# Patient Record
Sex: MALE | Race: WHITE | Employment: OTHER | URBAN - METROPOLITAN AREA
[De-identification: names, ages, dates, MRNs, and addresses within clinical notes are randomized per-mention and may not be internally consistent; named-entity substitution may affect disease eponyms.]

---

## 2017-01-02 ENCOUNTER — HOSPITAL ENCOUNTER (INPATIENT)
Age: 67
LOS: 4 days | Discharge: HOME HEALTH | DRG: 175 | End: 2017-01-06
Attending: EMERGENCY MEDICINE | Admitting: INTERNAL MEDICINE
Payer: MEDICARE

## 2017-01-02 ENCOUNTER — APPOINTMENT (OUTPATIENT)
Dept: CT IMAGING | Age: 67
DRG: 175 | End: 2017-01-02
Attending: EMERGENCY MEDICINE
Payer: MEDICARE

## 2017-01-02 DIAGNOSIS — I26.99 PULMONARY INFARCT (HCC): ICD-10-CM

## 2017-01-02 DIAGNOSIS — I26.99 BILATERAL PULMONARY EMBOLISM (HCC): Primary | ICD-10-CM

## 2017-01-02 LAB
ALBUMIN SERPL BCP-MCNC: 2.9 G/DL (ref 3.5–5)
ALBUMIN/GLOB SERPL: 0.7 {RATIO} (ref 1.1–2.2)
ALP SERPL-CCNC: 106 U/L (ref 45–117)
ALT SERPL-CCNC: 10 U/L (ref 12–78)
ANION GAP BLD CALC-SCNC: 11 MMOL/L (ref 5–15)
APPEARANCE UR: ABNORMAL
APTT PPP: 39.1 SEC (ref 22.1–32.5)
AST SERPL W P-5'-P-CCNC: 15 U/L (ref 15–37)
BACTERIA URNS QL MICRO: ABNORMAL /HPF
BASOPHILS # BLD AUTO: 0 K/UL (ref 0–0.1)
BASOPHILS # BLD: 1 % (ref 0–1)
BILIRUB SERPL-MCNC: 0.9 MG/DL (ref 0.2–1)
BILIRUB UR QL CFM: NEGATIVE
BUN SERPL-MCNC: 10 MG/DL (ref 6–20)
BUN/CREAT SERPL: 10 (ref 12–20)
CALCIUM SERPL-MCNC: 8.9 MG/DL (ref 8.5–10.1)
CHLORIDE SERPL-SCNC: 97 MMOL/L (ref 97–108)
CK SERPL-CCNC: 241 U/L (ref 39–308)
CO2 SERPL-SCNC: 29 MMOL/L (ref 21–32)
COLOR UR: ABNORMAL
CREAT SERPL-MCNC: 1.04 MG/DL (ref 0.7–1.3)
EOSINOPHIL # BLD: 0 K/UL (ref 0–0.4)
EOSINOPHIL NFR BLD: 1 % (ref 0–7)
EPITH CASTS URNS QL MICRO: ABNORMAL /LPF
ERYTHROCYTE [DISTWIDTH] IN BLOOD BY AUTOMATED COUNT: 14.5 % (ref 11.5–14.5)
GLOBULIN SER CALC-MCNC: 4.2 G/DL (ref 2–4)
GLUCOSE SERPL-MCNC: 199 MG/DL (ref 65–100)
GLUCOSE UR STRIP.AUTO-MCNC: NEGATIVE MG/DL
HCT VFR BLD AUTO: 37.8 % (ref 36.6–50.3)
HGB BLD-MCNC: 13.5 G/DL (ref 12.1–17)
HGB UR QL STRIP: NEGATIVE
KETONES UR QL STRIP.AUTO: NEGATIVE MG/DL
LACTATE SERPL-SCNC: 1.8 MMOL/L (ref 0.4–2)
LEUKOCYTE ESTERASE UR QL STRIP.AUTO: NEGATIVE
LYMPHOCYTES # BLD AUTO: 16 % (ref 12–49)
LYMPHOCYTES # BLD: 1.4 K/UL (ref 0.8–3.5)
MCH RBC QN AUTO: 30.5 PG (ref 26–34)
MCHC RBC AUTO-ENTMCNC: 35.7 G/DL (ref 30–36.5)
MCV RBC AUTO: 85.3 FL (ref 80–99)
MONOCYTES # BLD: 1.6 K/UL (ref 0–1)
MONOCYTES NFR BLD AUTO: 19 % (ref 5–13)
NEUTS SEG # BLD: 5.4 K/UL (ref 1.8–8)
NEUTS SEG NFR BLD AUTO: 63 % (ref 32–75)
NITRITE UR QL STRIP.AUTO: NEGATIVE
PH UR STRIP: 6 [PH] (ref 5–8)
PLATELET # BLD AUTO: 308 K/UL (ref 150–400)
POTASSIUM SERPL-SCNC: 3.2 MMOL/L (ref 3.5–5.1)
PROT SERPL-MCNC: 7.1 G/DL (ref 6.4–8.2)
PROT UR STRIP-MCNC: 30 MG/DL
RBC # BLD AUTO: 4.43 M/UL (ref 4.1–5.7)
RBC #/AREA URNS HPF: ABNORMAL /HPF (ref 0–5)
SODIUM SERPL-SCNC: 137 MMOL/L (ref 136–145)
SP GR UR REFRACTOMETRY: 1.02 (ref 1–1.03)
THERAPEUTIC RANGE,PTTT: ABNORMAL SECS (ref 58–77)
TROPONIN I SERPL-MCNC: <0.04 NG/ML
UA: UC IF INDICATED,UAUC: ABNORMAL
UROBILINOGEN UR QL STRIP.AUTO: 0.2 EU/DL (ref 0.2–1)
WBC # BLD AUTO: 8.5 K/UL (ref 4.1–11.1)
WBC URNS QL MICRO: ABNORMAL /HPF (ref 0–4)

## 2017-01-02 PROCEDURE — 74177 CT ABD & PELVIS W/CONTRAST: CPT

## 2017-01-02 PROCEDURE — 65660000000 HC RM CCU STEPDOWN

## 2017-01-02 PROCEDURE — 96375 TX/PRO/DX INJ NEW DRUG ADDON: CPT

## 2017-01-02 PROCEDURE — 87086 URINE CULTURE/COLONY COUNT: CPT | Performed by: EMERGENCY MEDICINE

## 2017-01-02 PROCEDURE — 36415 COLL VENOUS BLD VENIPUNCTURE: CPT | Performed by: EMERGENCY MEDICINE

## 2017-01-02 PROCEDURE — 96374 THER/PROPH/DIAG INJ IV PUSH: CPT

## 2017-01-02 PROCEDURE — 74011636320 HC RX REV CODE- 636/320: Performed by: EMERGENCY MEDICINE

## 2017-01-02 PROCEDURE — 85025 COMPLETE CBC W/AUTO DIFF WBC: CPT | Performed by: EMERGENCY MEDICINE

## 2017-01-02 PROCEDURE — 85730 THROMBOPLASTIN TIME PARTIAL: CPT | Performed by: EMERGENCY MEDICINE

## 2017-01-02 PROCEDURE — 81001 URINALYSIS AUTO W/SCOPE: CPT | Performed by: EMERGENCY MEDICINE

## 2017-01-02 PROCEDURE — 71275 CT ANGIOGRAPHY CHEST: CPT

## 2017-01-02 PROCEDURE — 96361 HYDRATE IV INFUSION ADD-ON: CPT

## 2017-01-02 PROCEDURE — 74011250636 HC RX REV CODE- 250/636

## 2017-01-02 PROCEDURE — 82550 ASSAY OF CK (CPK): CPT | Performed by: EMERGENCY MEDICINE

## 2017-01-02 PROCEDURE — 83605 ASSAY OF LACTIC ACID: CPT | Performed by: EMERGENCY MEDICINE

## 2017-01-02 PROCEDURE — 99285 EMERGENCY DEPT VISIT HI MDM: CPT

## 2017-01-02 PROCEDURE — 80053 COMPREHEN METABOLIC PANEL: CPT | Performed by: EMERGENCY MEDICINE

## 2017-01-02 PROCEDURE — 84484 ASSAY OF TROPONIN QUANT: CPT | Performed by: EMERGENCY MEDICINE

## 2017-01-02 PROCEDURE — 74011250636 HC RX REV CODE- 250/636: Performed by: EMERGENCY MEDICINE

## 2017-01-02 PROCEDURE — 93005 ELECTROCARDIOGRAM TRACING: CPT

## 2017-01-02 RX ORDER — MORPHINE SULFATE 4 MG/ML
INJECTION, SOLUTION INTRAMUSCULAR; INTRAVENOUS
Status: DISCONTINUED
Start: 2017-01-02 | End: 2017-01-03

## 2017-01-02 RX ORDER — HEPARIN SODIUM 5000 [USP'U]/ML
3230 INJECTION, SOLUTION INTRAVENOUS; SUBCUTANEOUS AS NEEDED
Status: DISCONTINUED | OUTPATIENT
Start: 2017-01-02 | End: 2017-01-03

## 2017-01-02 RX ORDER — MORPHINE SULFATE 4 MG/ML
4 INJECTION, SOLUTION INTRAMUSCULAR; INTRAVENOUS ONCE
Status: COMPLETED | OUTPATIENT
Start: 2017-01-02 | End: 2017-01-02

## 2017-01-02 RX ORDER — SODIUM CHLORIDE 9 MG/ML
50 INJECTION, SOLUTION INTRAVENOUS
Status: COMPLETED | OUTPATIENT
Start: 2017-01-02 | End: 2017-01-02

## 2017-01-02 RX ORDER — HEPARIN SODIUM 5000 [USP'U]/ML
80 INJECTION, SOLUTION INTRAVENOUS; SUBCUTANEOUS ONCE
Status: COMPLETED | OUTPATIENT
Start: 2017-01-02 | End: 2017-01-02

## 2017-01-02 RX ORDER — POTASSIUM CHLORIDE 750 MG/1
40 TABLET, FILM COATED, EXTENDED RELEASE ORAL ONCE
Status: COMPLETED | OUTPATIENT
Start: 2017-01-02 | End: 2017-01-03

## 2017-01-02 RX ORDER — HEPARIN SODIUM 5000 [USP'U]/ML
6450 INJECTION, SOLUTION INTRAVENOUS; SUBCUTANEOUS AS NEEDED
Status: DISCONTINUED | OUTPATIENT
Start: 2017-01-02 | End: 2017-01-03

## 2017-01-02 RX ORDER — ENOXAPARIN SODIUM 100 MG/ML
80 INJECTION SUBCUTANEOUS
Status: DISCONTINUED | OUTPATIENT
Start: 2017-01-02 | End: 2017-01-02 | Stop reason: CLARIF

## 2017-01-02 RX ORDER — SODIUM CHLORIDE 9 MG/ML
125 INJECTION, SOLUTION INTRAVENOUS CONTINUOUS
Status: DISCONTINUED | OUTPATIENT
Start: 2017-01-02 | End: 2017-01-03

## 2017-01-02 RX ORDER — HEPARIN SODIUM 10000 [USP'U]/100ML
18-36 INJECTION, SOLUTION INTRAVENOUS
Status: DISCONTINUED | OUTPATIENT
Start: 2017-01-02 | End: 2017-01-03

## 2017-01-02 RX ORDER — SODIUM CHLORIDE 0.9 % (FLUSH) 0.9 %
10 SYRINGE (ML) INJECTION
Status: COMPLETED | OUTPATIENT
Start: 2017-01-02 | End: 2017-01-02

## 2017-01-02 RX ADMIN — HEPARIN SODIUM 6450 UNITS: 5000 INJECTION, SOLUTION INTRAVENOUS; SUBCUTANEOUS at 20:03

## 2017-01-02 RX ADMIN — SODIUM CHLORIDE 1000 ML: 900 INJECTION, SOLUTION INTRAVENOUS at 19:53

## 2017-01-02 RX ADMIN — IOPAMIDOL 100 ML: 755 INJECTION, SOLUTION INTRAVENOUS at 18:51

## 2017-01-02 RX ADMIN — SODIUM CHLORIDE 1000 ML: 900 INJECTION, SOLUTION INTRAVENOUS at 17:29

## 2017-01-02 RX ADMIN — MORPHINE SULFATE 4 MG: 4 INJECTION, SOLUTION INTRAMUSCULAR; INTRAVENOUS at 18:27

## 2017-01-02 RX ADMIN — HEPARIN SODIUM AND DEXTROSE 18 UNITS/KG/HR: 10000; 5 INJECTION INTRAVENOUS at 20:06

## 2017-01-02 RX ADMIN — Medication 10 ML: at 18:50

## 2017-01-02 RX ADMIN — SODIUM CHLORIDE 125 ML/HR: 900 INJECTION, SOLUTION INTRAVENOUS at 20:45

## 2017-01-02 RX ADMIN — SODIUM CHLORIDE 50 ML/HR: 900 INJECTION, SOLUTION INTRAVENOUS at 18:50

## 2017-01-02 NOTE — IP AVS SNAPSHOT
Current Discharge Medication List  
  
Take these medications at their scheduled times Dose & Instructions Dispensing Information Comments Morning Noon Evening Bedtime  
 aspirin, buffered 81 mg Tab Your next dose is: Today, Tomorrow Other:  ____________ Take  by mouth daily. Refills:  0  
     
   
   
   
  
 furosemide 40 mg tablet Commonly known as:  LASIX Your next dose is: Today, Tomorrow Other:  ____________ Dose:  40 mg Take 40 mg by mouth daily. Refills:  0  
     
   
   
   
  
 L. acidoph & paracasei- S therm- Bifido 8 billion cell Cap cap Commonly known as:  BOB-Q/RISAQUAD Your next dose is: Today, Tomorrow Other:  ____________ Dose:  1 Cap Take 1 Cap by mouth daily. Quantity:  30 Cap Refills:  1  
     
   
   
   
  
 lisinopril 2.5 mg tablet Commonly known as:  Linda Nieto Your next dose is: Today, Tomorrow Other:  ____________ Dose:  2.5 mg Take 2.5 mg by mouth daily. Refills:  0  
     
   
   
   
  
 metFORMIN 500 mg tablet Commonly known as:  GLUCOPHAGE Your next dose is: Today, Tomorrow Other:  ____________ Take  by mouth two (2) times daily (with meals). Refills:  0  
     
   
   
   
  
 * rivaroxaban 20 mg Tab tablet Commonly known as:  [place] Your next dose is: Today, Tomorrow Other:  ____________ Dose:  20 mg Take 1 Tab by mouth daily for 30 days. Quantity:  30 Tab Refills:  0  
     
   
   
   
  
 * rivaroxaban 15 mg Tab tablet Commonly known as:  [place]wal Root Your next dose is: Today, Tomorrow Other:  ____________ Dose:  15 mg Take 1 Tab by mouth two (2) times daily (with meals) for 16 days. Quantity:  32 Tab Refills:  0  
     
   
   
   
  
 simvastatin 10 mg tablet Commonly known as:  ZOCOR Your next dose is: Today, Tomorrow Other:  ____________ Take  by mouth nightly. Refills:  0  
     
   
   
   
  
 * Notice: This list has 2 medication(s) that are the same as other medications prescribed for you. Read the directions carefully, and ask your doctor or other care provider to review them with you. Take these medications as needed Dose & Instructions Dispensing Information Comments Morning Noon Evening Bedtime  
 oxyCODONE IR 5 mg immediate release tablet Commonly known as:  Cheryln Records Your next dose is: Today, Tomorrow Other:  ____________ Dose:  5 mg Take 1 Tab by mouth every four (4) hours as needed. Max Daily Amount: 30 mg.  
 Quantity:  30 Tab Refills:  0 Take these medications as directed Dose & Instructions Dispensing Information Comments Morning Noon Evening Bedtime  
 vancomycin 50 mg/mL Soln oral solution (compounded) Your next dose is: Today, Tomorrow Other:  ____________ Take 125 mg 4 times daily for 7 days, then 125 mg 3 times daily for 7 days, then 125 mg 2 times daily for 7 days Quantity:  1 Bottle Refills:  0 Where to Get Your Medications Information about where to get these medications is not yet available ! Ask your nurse or doctor about these medications  
  oxyCODONE IR 5 mg immediate release tablet  
 rivaroxaban 15 mg Tab tablet  
 rivaroxaban 20 mg Tab tablet  
 vancomycin 50 mg/mL Soln oral solution (compounded)

## 2017-01-02 NOTE — ED PROVIDER NOTES
HPI Comments: Bryce Cabrera is a 78 y.o. M with PMHx significant for DM / HTN / Arthritis who presents ambulatory to ED [facility] c/o right sided flank pain x yesterday, diarrhea x 3 days and vomiting (gagging) phlegm x 1 week. He notes taking hydrocodone with no relief of sx. Per wife, pt was discharged November 28th 2016 for C-diff. Pt reports he was previously diagnosed with osteomyelitis in his spine, for which he was given antibiotics for 6 weeks. Pt denies any hx of abdominal surgeries, MI, DVT, PE, Diverticulitis, Hepatitis, pancreatitis or CHF. He denies using any anticoagulants. Pt specifically denies any chest pain or SOB. There are no other complaints, changes, or physical findings at this time. The history is provided by the patient and the spouse. Past Medical History:   Diagnosis Date    Arthritis      knees, back    Chronic pain      knees, back    DM (diabetes mellitus) ([address]     High cholesterol     HTN (hypertension)        Past Surgical History:   Procedure Laterality Date    Hx amputation       vascular; Left great toe amputation    Hx orthopaedic Left      Left arm fracture & repair    Hx other surgical  6/4/15     INCISION AND DRAINAGE, RESECTION OF REMAINING 1ST METATARSAL, INSERTION OF ANTIBIOTIC BEADS         Family History:   Problem Relation Age of Onset    Heart Disease Mother     Heart Disease Father        Social History     Social History    Marital status:      Spouse name: N/A    Number of children: N/A    Years of education: N/A     Occupational History    Not on file.      Social History Main Topics    Smoking status: Former Smoker     Packs/day: 3.00     Years: 20.00     Types: Cigarettes    Smokeless tobacco: Former User     Quit date: 1/15/1995      Comment: stopped smoking about 30 years ago    Alcohol use 0.5 oz/week     1 Cans of beer per week      Comment: occasionally    Drug use: Yes     Special: Prescription, OTC    Sexual activity: Not on file     Other Topics Concern    Not on file     Social History Narrative         ALLERGIES: Vancomycin    Review of Systems   Constitutional: Negative for appetite change, chills and fever. HENT: Negative for congestion. Eyes: Negative for visual disturbance. Respiratory: Negative for cough, shortness of breath and wheezing. Cardiovascular: Negative for chest pain, palpitations and leg swelling. Gastrointestinal: Positive for diarrhea and vomiting (gagging). Negative for abdominal pain. Genitourinary: Positive for flank pain (right sided). Negative for dysuria, frequency and urgency. Musculoskeletal: Negative for back pain, joint swelling, myalgias and neck stiffness. Skin: Negative for rash. Neurological: Negative for dizziness, syncope, weakness and headaches. Hematological: Negative for adenopathy. Psychiatric/Behavioral: Negative for behavioral problems and dysphoric mood. All other systems reviewed and are negative. Patient Vitals for the past 12 hrs:   Temp Pulse Resp BP SpO2   01/02/17 2115 - (!) 115 28 118/55 97 %   01/02/17 2030 - (!) 116 26 112/64 96 %   01/02/17 1951 - (!) 114 26 114/55 97 %   01/02/17 1934 - (!) 114 22 124/74 98 %   01/02/17 1900 - (!) 117 30 - 92 %   01/02/17 1827 - (!) 111 20 109/89 98 %   01/02/17 1745 - (!) 112 28 110/53 99 %   01/02/17 1730 - (!) 113 27 107/51 97 %   01/02/17 1700 - (!) 116 26 126/55 97 %   01/02/17 1617 - (!) 120 29 - 97 %   01/02/17 1612 - - - 147/79 100 %   01/02/17 1417 - (!) 105 18 124/67 98 %   01/02/17 1331 98.8 °F (37.1 °C) (!) 123 18 109/59 97 %            Physical Exam   Constitutional: He is oriented to person, place, and time. He appears well-developed and well-nourished. He appears distressed. HENT:   Head: Normocephalic and atraumatic. Eyes: Conjunctivae and EOM are normal. Pupils are equal, round, and reactive to light. Neck: Normal range of motion. No JVD present.    Cardiovascular: Normal heart sounds, intact distal pulses and normal pulses. Tachycardia present. Exam reveals no gallop and no friction rub. No murmur heard. Pulmonary/Chest: Effort normal and breath sounds normal. No stridor. No respiratory distress. He has no wheezes. He has no rales. He exhibits no tenderness. Abdominal: Soft. He exhibits no distension, no abdominal bruit, no pulsatile midline mass and no mass. Bowel sounds are decreased. There is tenderness in the right upper quadrant and epigastric area. There is no rebound, no guarding and no CVA tenderness. A hernia is present. Musculoskeletal: Normal range of motion. He exhibits no edema, tenderness or deformity. Neurological: He is alert and oriented to person, place, and time. He has normal reflexes. No cranial nerve deficit. Coordination normal.   Skin: Skin is warm. He is not diaphoretic. Nursing note and vitals reviewed. MDM  Number of Diagnoses or Management Options  Bilateral pulmonary embolism [facility]):   Pulmonary infarct [facility]):   Diagnosis management comments: DDx: Pneumonia, Pneumothorax, PE, CHF, Cholecystitis, Bowel obstruction, Perforated viscus. Recent prolonged admission to hospital. Presents with acute onset right chest wall pain, SOB. CT confirms BL pulmonary emboli with mild right heart strain. Discussed with pulmonology who recommends heparin only at this time, as patients vitals are otherwise stable. Will admit to hospitalist service.         Amount and/or Complexity of Data Reviewed  Clinical lab tests: ordered and reviewed  Tests in the radiology section of CPT®: ordered and reviewed  Tests in the medicine section of CPT®: ordered and reviewed  Obtain history from someone other than the patient: yes (Wife)  Review and summarize past medical records: yes  Discuss the patient with other providers: yes ([place] Cho)  Independent visualization of images, tracings, or specimens: yes    Risk of Complications, Morbidity, and/or Mortality  Presenting problems: high  Diagnostic procedures: high  Management options: moderate    Critical Care  Total time providing critical care: 30-74 minutes    Patient Progress  Patient progress: other (comment) (critical)    ED Course       Procedures    CONSULT NOTE:   7:40 PM  Alyssia Hendrickson MD spoke with Dr. Cory Ge,   Specialty: Hospitalist  Discussed pt's hx, disposition, and available diagnostic and imaging results. Reviewed care plans. Consultant will accept pt for admission. Written by Kari Pro. Laren Snellen, ED Scribe, as dictated by Alyssia Hendrickson MD.    CONSULT NOTE:   7:44 PM  Alyssia Hendrickson MD spoke with Dr. Leo Avilez,   Specialty: Pulmonology  Discussed pt's hx, disposition, and available diagnostic and imaging results. Reviewed care plans. Consultant agrees with plans as outlined. Consultant does not recommend TPA at this time due to patients recent colitis and osteomyelitis, as well as vitals not validating use of TPA. Possibly consider interventional radiology, but that should be left up to the hospitalist.   Written by Kari Pro. Laren Snellen, ED Scribe, as dictated by Alyssia Hendrickson MD.    CRITICAL CARE NOTE :  8:01 PM    IMPENDING DETERIORATION -Respiratory, Cardiovascular, Metabolic and Renal  ASSOCIATED RISK FACTORS - Hypotension, Shock, Hypoxia, Dysrhythmia, Dehydration and Vascular Compromise  MANAGEMENT- Bedside Assessment and Supervision of Care  INTERPRETATION -  Xrays, CT Scan, ECG and Blood Pressure  INTERVENTIONS - hemodynamic mngmt, vascular control, Metobolic interventions and heparin drip  CASE REVIEW - Hospitalist, Medical Sub-Specialist, Nursing and Family  TREATMENT RESPONSE -Stable  PERFORMED BY - Self    NOTES   :  I have spent 45 minutes of critical care time involved in lab review, consultations with specialist, family decision- making, bedside attention and documentation. During this entire length of time I was immediately available to the patient .     Alyssia Hendrickson MD    LABORATORY TESTS:  Recent Results (from the past 12 hour(s))   EKG, 12 LEAD, INITIAL    Collection Time: 01/02/17  1:45 PM   Result Value Ref Range    Ventricular Rate 113 BPM    Atrial Rate 113 BPM    P-R Interval 138 ms    QRS Duration 94 ms    Q-T Interval 314 ms    QTC Calculation (Bezet) 430 ms    Calculated P Axis 55 degrees    Calculated R Axis 70 degrees    Calculated T Axis 43 degrees    Diagnosis       Sinus tachycardia  When compared with ECG of 04-JUN-2015 08:44,  Vent. rate has increased BY  49 BPM     CBC WITH AUTOMATED DIFF    Collection Time: 01/02/17  3:45 PM   Result Value Ref Range    WBC 8.5 4.1 - 11.1 K/uL    RBC 4.43 4.10 - 5.70 M/uL    HGB 13.5 12.1 - 17.0 g/dL    HCT 37.8 36.6 - 50.3 %    MCV 85.3 80.0 - 99.0 FL    MCH 30.5 26.0 - 34.0 PG    MCHC 35.7 30.0 - 36.5 g/dL    RDW 14.5 11.5 - 14.5 %    PLATELET 785 126 - 553 K/uL    NEUTROPHILS 63 32 - 75 %    LYMPHOCYTES 16 12 - 49 %    MONOCYTES 19 (H) 5 - 13 %    EOSINOPHILS 1 0 - 7 %    BASOPHILS 1 0 - 1 %    ABS. NEUTROPHILS 5.4 1.8 - 8.0 K/UL    ABS. LYMPHOCYTES 1.4 0.8 - 3.5 K/UL    ABS. MONOCYTES 1.6 (H) 0.0 - 1.0 K/UL    ABS. EOSINOPHILS 0.0 0.0 - 0.4 K/UL    ABS. BASOPHILS 0.0 0.0 - 0.1 K/UL   METABOLIC PANEL, COMPREHENSIVE    Collection Time: 01/02/17  3:45 PM   Result Value Ref Range    Sodium 137 136 - 145 mmol/L    Potassium 3.2 (L) 3.5 - 5.1 mmol/L    Chloride 97 97 - 108 mmol/L    CO2 29 21 - 32 mmol/L    Anion gap 11 5 - 15 mmol/L    Glucose 199 (H) 65 - 100 mg/dL    BUN 10 6 - 20 MG/DL    Creatinine 1.04 0.70 - 1.30 MG/DL    BUN/Creatinine ratio 10 (L) 12 - 20      GFR est AA >60 >60 ml/min/1.73m2    GFR est non-AA >60 >60 ml/min/1.73m2    Calcium 8.9 8.5 - 10.1 MG/DL    Bilirubin, total 0.9 0.2 - 1.0 MG/DL    ALT 10 (L) 12 - 78 U/L    AST 15 15 - 37 U/L    Alk.  phosphatase 106 45 - 117 U/L    Protein, total 7.1 6.4 - 8.2 g/dL    Albumin 2.9 (L) 3.5 - 5.0 g/dL    Globulin 4.2 (H) 2.0 - 4.0 g/dL    A-G Ratio 0.7 (L) 1.1 - 2.2     URINALYSIS W/ REFLEX CULTURE    Collection Time: 01/02/17  3:48 PM   Result Value Ref Range    Color DARK YELLOW      Appearance TURBID (A) CLEAR      Specific gravity 1.020 1.003 - 1.030      pH (UA) 6.0 5.0 - 8.0      Protein 30 (A) NEG mg/dL    Glucose NEGATIVE  NEG mg/dL    Ketone NEGATIVE  NEG mg/dL    Blood NEGATIVE  NEG      Urobilinogen 0.2 0.2 - 1.0 EU/dL    Nitrites NEGATIVE  NEG      Leukocyte Esterase NEGATIVE  NEG      WBC 5-10 0 - 4 /hpf    RBC 5-10 0 - 5 /hpf    Epithelial cells FEW FEW /lpf    Bacteria 1+ (A) NEG /hpf    UA:UC IF INDICATED URINE CULTURE ORDERED (A) CNI     BILIRUBIN, CONFIRM    Collection Time: 01/02/17  3:48 PM   Result Value Ref Range    Bilirubin UA, confirm NEGATIVE  NEG     TROPONIN I    Collection Time: 01/02/17  4:30 PM   Result Value Ref Range    Troponin-I, Qt. <0.04 <0.05 ng/mL   CK W/ REFLX CKMB    Collection Time: 01/02/17  4:30 PM   Result Value Ref Range     39 - 308 U/L   LACTIC ACID, PLASMA    Collection Time: 01/02/17  4:30 PM   Result Value Ref Range    Lactic acid 1.8 0.4 - 2.0 MMOL/L   PTT    Collection Time: 01/02/17  7:55 PM   Result Value Ref Range    aPTT 39.1 (H) 22.1 - 32.5 sec    aPTT, therapeutic range     58.0 - 77.0 SECS       IMAGING RESULTS:  CTA CHEST W WO CONT   Final Result   CT ANGIOGRAPHY CHEST and CT OF THE ABDOMEN AND PELVIS WITH CONTRAST. 1/2/2017  6:51 PM      INDICATION: Dyspnea, flank pain.     COMPARISON: CT abdomen pelvis 11/22/2016, chest CT 10/27/2009, CT lumbar spine  10/14/2016.     TECHNIQUE: CT angiography of the chest was performed after the administration of  100 cc IV contrast (Isovue 370). Coronal and sagittal, and coronal MIP  reconstructions were performed.     CT of the abdomen and pelvis was performed after the same IV contrast  administration.  CT dose reduction was achieved through use of a standardized  protocol tailored for this examination and automatic exposure control for dose  modulation.     FINDINGS:  Chest: A large pulmonary embolism in the right lower lobe straddles several  segmental branches. Distal airspace disease in the right lower lobe may  represent developing infarction. There is a trace sympathetic effusion. A  smaller pulmonary embolism straddles several segmental branches of the left  lower lobe. Flattening of the interventricular septum suggests right heart  strain. The lungs are otherwise clear. The central airways are patent. The  esophagus is patulous, and there is fluid and debris extending nearly to the  level of the thoracic inlet. The heart is at the upper limits of normal in size. Aortic valvular and left anterior descending and right coronary artery  calcifications are mild. No thoracic lymphadenopathy. End-stage right  glenohumeral osteoarthritis.     Abdomen: Tiny calculi layer dependently in the gallbladder. The pancreas is  fatty infiltrated. The stomach, duodenum, liver, spleen, adrenals, and kidneys  are normal.     Pelvis: Though the appendix is mildly dilated (9 mm), the tip is normal caliber  (4 mm), and there is no periappendiceal fat stranding. Further, it appears to be  filled with fluid, and the colon is also fluid-filled. Colonic mucosal hyperemia  is mild, and greatest in the rectum. Sigmoid diverticulosis is extensive. The  small bowel and bladder are normal. No free air or fluid, and no abdominopelvic  lymphadenopathy.     Bones: Destruction of the endplates around chronic L2-L3 osteomyelitis is  stable. There is bilateral neural foraminal stenosis L2-L4.     IMPRESSION  IMPRESSION:   1. Right lower lobar pulmonary embolism. Left lower lobar segmental pulmonary  emboli. 2. Questionable right heart strain: flattening of the interventricular septum. 3. Probable developing infarction in the right lower lobe. Small sympathetic  right pleural effusion.    4. Fluid-filled colon consistent with diarrhea.     The findings were called to Dr. Scott Box on 1/2/2017 7:20 PM by  [employer]. [id number]   CT ABD PELV W CONT   Final Result   CT ANGIOGRAPHY CHEST and CT OF THE ABDOMEN AND PELVIS WITH CONTRAST. 1/2/2017  6:51 PM      INDICATION: Dyspnea, flank pain.     COMPARISON: CT abdomen pelvis 11/22/2016, chest CT 10/27/2009, CT lumbar spine  10/14/2016.     TECHNIQUE: CT angiography of the chest was performed after the administration of  100 cc IV contrast (Isovue 370). Coronal and sagittal, and coronal MIP  reconstructions were performed.     CT of the abdomen and pelvis was performed after the same IV contrast  administration. CT dose reduction was achieved through use of a standardized  protocol tailored for this examination and automatic exposure control for dose  modulation.     FINDINGS:  Chest: A large pulmonary embolism in the right lower lobe straddles several  segmental branches. Distal airspace disease in the right lower lobe may  represent developing infarction. There is a trace sympathetic effusion. A  smaller pulmonary embolism straddles several segmental branches of the left  lower lobe. Flattening of the interventricular septum suggests right heart  strain. The lungs are otherwise clear. The central airways are patent. The  esophagus is patulous, and there is fluid and debris extending nearly to the  level of the thoracic inlet. The heart is at the upper limits of normal in size. Aortic valvular and left anterior descending and right coronary artery  calcifications are mild. No thoracic lymphadenopathy. End-stage right  glenohumeral osteoarthritis.     Abdomen: Tiny calculi layer dependently in the gallbladder. The pancreas is  fatty infiltrated. The stomach, duodenum, liver, spleen, adrenals, and kidneys  are normal.     Pelvis: Though the appendix is mildly dilated (9 mm), the tip is normal caliber  (4 mm), and there is no periappendiceal fat stranding. Further, it appears to be  filled with fluid, and the colon is also fluid-filled.  Colonic mucosal hyperemia  is mild, and greatest in the rectum. Sigmoid diverticulosis is extensive. The  small bowel and bladder are normal. No free air or fluid, and no abdominopelvic  lymphadenopathy.     Bones: Destruction of the endplates around chronic L2-L3 osteomyelitis is  stable. There is bilateral neural foraminal stenosis L2-L4.     IMPRESSION  IMPRESSION:   1. Right lower lobar pulmonary embolism. Left lower lobar segmental pulmonary  emboli. 2. Questionable right heart strain: flattening of the interventricular septum. 3. Probable developing infarction in the right lower lobe. Small sympathetic  right pleural effusion. 4. Fluid-filled colon consistent with diarrhea.     The findings were called to Dr. Vinay Caceres on 1/2/2017 7:20 PM by Dr. Senthil Velez. [address]:  Medications   morphine 4 mg/mL injection (  Canceled Entry 1/2/17 1826)   0.9% sodium chloride infusion (125 mL/hr IntraVENous New Bag 1/2/17 2045)   heparin 25,000 units in D5W 250 ml infusion (18 Units/kg/hr × 80.7 kg IntraVENous New Bag 1/2/17 2006)   heparin (porcine) injection 6,450 Units (not administered)   heparin (porcine) injection 3,230 Units (not administered)   sodium chloride 0.9 % bolus infusion 1,000 mL (0 mL IntraVENous IV Completed 1/2/17 1902)   0.9% sodium chloride infusion (0 mL/hr IntraVENous IV Completed 1/2/17 1902)   iopamidol (ISOVUE-370) 76 % injection 100 mL (100 mL IntraVENous Given 1/2/17 1851)   sodium chloride (NS) flush 10 mL (10 mL IntraVENous Given 1/2/17 1850)   morphine injection 4 mg (4 mg IntraVENous Given 1/2/17 1827)   sodium chloride 0.9 % bolus infusion 1,000 mL (0 mL IntraVENous IV Completed 1/2/17 2130)   heparin (porcine) injection 6,450 Units (6,450 Units IntraVENous Given 1/2/17 2003)       IMPRESSION:  1. Bilateral pulmonary embolism (HCC)    2. Pulmonary infarct ([address]        PLAN:  1. Admit to hospital    10:39 PM  Patient is being admitted to the hospital by Dr. Regulo Morillo.   The results of their tests and reasons for their admission have been discussed with them and/or available family. They convey agreement and understanding for the need to be admitted and for their admission diagnosis. Consultation has been made with the inpatient physician specialist for hospitalization. Written by Mendel Cross. Sima Solano ED Scribe, as dictated by Allison Quinn MD.        Attestations: This note is prepared by Mendel Cross. Nat[place], acting as Scribe for Allison Quinn MD.    Allison Quinn MD: The scribe's documentation has been prepared under my direction and personally reviewed by me in its entirety. I confirm that the note above accurately reflects all work, treatment, procedures, and medical decision making performed by me.

## 2017-01-02 NOTE — IP AVS SNAPSHOT
[address] [facility] 
[phone] Patient: Kiki Roque MRN: [id number] RKL:[id number]/[id number] You are allergic to the following Allergen Reactions Vancomycin Rash Patient broke out in large hive below IV site and c/o itching to area, patient states that \"that it was more likely an infiltration as opposed to an actual reaction\" Recent Documentation Height Weight BMI Smoking Status 1.727 m 80.7 kg 27.05 kg/m2 Former Smoker Emergency Contacts Name Discharge Info Relation Home Work Mobile Kim Iniguez DISCHARGE CAREGIVER [3] Spouse [3] [phone] About your hospitalization You were admitted on:  January 2, 2017 You last received care in the:  [facility] 3 NEUROSCIENCE TELEMETRY You were discharged on:  January 6, 2017 Unit phone number:  [phone] Why you were hospitalized Your primary diagnosis was:  Not on File Your diagnoses also included:  Pulmonary Emboli (Hcc) Providers Seen During Your Hospitalizations Provider Role Specialty Primary office phone Tosin Arroyo MD Attending Provider Emergency Medicine [phone] Ketty Beal MD Attending Provider Internal Medicine [phone] Your Primary Care Physician (PCP) Primary Care Physician Office Phone Office Fax UNKNOWN, PROVIDER ** None ** ** None ** Follow-up Information None Current Discharge Medication List  
  
START taking these medications Dose & Instructions Dispensing Information Comments Morning Noon Evening Bedtime * rivaroxaban 20 mg Tab tablet Commonly known as:  Rebecca De Dios Your next dose is: Today, Tomorrow Other:  _________ Dose:  20 mg Take 1 Tab by mouth daily for 30 days. Quantity:  30 Tab Refills:  0  
     
   
   
   
  
 * rivaroxaban 15 mg Tab tablet Commonly known as:  Melody Michael Your next dose is: Today, Tomorrow Other:  _________ Dose:  15 mg Take 1 Tab by mouth two (2) times daily (with meals) for 16 days. Quantity:  32 Tab Refills:  0  
     
   
   
   
  
 vancomycin 50 mg/mL Soln oral solution (compounded) Your next dose is: Today, Tomorrow Other:  _________ Take 125 mg 4 times daily for 7 days, then 125 mg 3 times daily for 7 days, then 125 mg 2 times daily for 7 days Quantity:  1 Bottle Refills:  0  
     
   
   
   
  
 * Notice: This list has 2 medication(s) that are the same as other medications prescribed for you. Read the directions carefully, and ask your doctor or other care provider to review them with you. CONTINUE these medications which have NOT CHANGED Dose & Instructions Dispensing Information Comments Morning Noon Evening Bedtime  
 aspirin, buffered 81 mg Tab Your next dose is: Today, Tomorrow Other:  _________ Take  by mouth daily. Refills:  0  
     
   
   
   
  
 furosemide 40 mg tablet Commonly known as:  LASIX Your next dose is: Today, Tomorrow Other:  _________ Dose:  40 mg Take 40 mg by mouth daily. Refills:  0  
     
   
   
   
  
 L. acidoph & paracasei- S therm- Bifido 8 billion cell Cap cap Commonly known as:  BOB-Q/RISAQUAD Your next dose is: Today, Tomorrow Other:  _________ Dose:  1 Cap Take 1 Cap by mouth daily. Quantity:  30 Cap Refills:  1  
     
   
   
   
  
 lisinopril 2.5 mg tablet Commonly known as:  Mercedes Pass Christian Your next dose is: Today, Tomorrow Other:  _________ Dose:  2.5 mg Take 2.5 mg by mouth daily. Refills:  0  
     
   
   
   
  
 metFORMIN 500 mg tablet Commonly known as:  GLUCOPHAGE Your next dose is: Today, Tomorrow Other:  _________ Take  by mouth two (2) times daily (with meals). Refills:  0  
     
   
   
   
  
 oxyCODONE IR 5 mg immediate release tablet Commonly known as:  Cara Gutierrez Your next dose is: Today, Tomorrow Other:  _________ Dose:  5 mg Take 1 Tab by mouth every four (4) hours as needed. Max Daily Amount: 30 mg.  
 Quantity:  30 Tab Refills:  0  
     
   
   
   
  
 simvastatin 10 mg tablet Commonly known as:  ZOCOR Your next dose is: Today, Tomorrow Other:  _________ Take  by mouth nightly. Refills:  0 STOP taking these medications   
 methocarbamol 750 mg tablet Commonly known as:  ROBAXIN Where to Get Your Medications Information on where to get these meds will be given to you by the nurse or doctor. ! Ask your nurse or doctor about these medications  
  oxyCODONE IR 5 mg immediate release tablet  
 rivaroxaban 15 mg Tab tablet  
 rivaroxaban 20 mg Tab tablet  
 vancomycin 50 mg/mL Soln oral solution (compounded) Discharge Instructions Zazoo Activation Thank you for requesting access to Zazoo. Please follow the instructions below to securely access and download your online medical record. Zazoo allows you to send messages to your doctor, view your test results, renew your prescriptions, schedule appointments, and more. How Do I Sign Up? 1. In your internet browser, go to [email or web address] 
2. Click on the First Time User? Click Here link in the Sign In box. You will be redirect to the New Member Sign Up page. 3. Enter your Zazoo Access Code exactly as it appears below. You will not need to use this code after youve completed the sign-up process. If you do not sign up before the expiration date, you must request a new code. Zazoo Access Code: [email or web address] Expires: 2017 10:32 AM (This is the date your Zazoo access code will ) 4. Enter the last four digits of your Social Security Number (xxxx) and Date of Birth (mm/dd/yyyy) as indicated and click Submit. You will be taken to the next sign-up page. 5. Create a Once Innovations ID. This will be your Once Innovations login ID and cannot be changed, so think of one that is secure and easy to remember. 6. Create a Once Innov[employer] password. You can change your password at any time. 7. Enter your Password Reset Question and Answer. This can be used at a later time if you forget your password. 8. Enter your e-mail address. You will receive e-mail notification when new information is available in [address] 9. Click Sign Up. You can now view and download portions of your medical record. 10. Click the Download Summary menu link to download a portable copy of your medical information. Additional Information If you have questions, please visit the Frequently Asked Questions section of the Once Innovations website at [email or web address] Remember, Once Innovations is NOT to be used for urgent needs. For medical emergencies, dial 911. Discharge Orders None Introducing [facility]! Frank Velez introduces Once Innovations patient portal. Now you can access parts of your medical record, email your doctor's office, and request medication refills online. 1. In your internet browser, go to [email or web address] 2. Click on the First Time User? Click Here link in the Sign In box. You will see the New Member Sign Up page. 3. Enter your Once Innovations Access Code exactly as it appears below. You will not need to use this code after youve completed the sign-up process. If you do not sign up before the expiration date, you must request a new code. · Once [employer]ations Access Code: [email or web address] Expires: 2/26/2017 10:32 AM 
 
4. Enter the last four digits of your Social Security Number (xxxx) and Date of Birth (mm/dd/yyyy) as indicated and click Submit. You will be taken to the next sign-up page. 5. Create a eMotion Technologies ID. This will be your e[employer] Technologies login ID and cannot be changed, so think of one that is secure and easy to remember. 6. Create a e[employer] Technologies password. You can change your password at any time. 7. Enter your Password Reset Question and Answer. This can be used at a later time if you forget your password. 8. Enter your e-mail address. You will receive e-mail notification when new information is available in [address] 9. Click Sign Up. You can now view and download portions of your medical record. 10. Click the Download Summary menu link to download a portable copy of your medical information. If you have questions, please visit the Frequently Asked Questions section of the [employer] Technologies website. Remember, [employer] Technologies is NOT to be used for urgent needs. For medical emergencies, dial 911. Now available from your iPhone and Android! General Information Please provide this summary of care documentation to your next provider. Patient Signature:  ____________________________________________________________ Date:  ____________________________________________________________  
  
Kelvin Almonte Provider Signature:  ____________________________________________________________ Date:  ____________________________________________________________

## 2017-01-02 NOTE — IP AVS SNAPSHOT
Summary of Care Report The Summary of Care report has been created to help improve care coordination. Users with access to Zuffle or [facility] [address] (Web-based application) may access additional patient information including the Discharge Summary. If you are not currently a [facility] user and need more information, please call the number listed below in the [address] section and ask to be connected with Medical Records. Facility Information Name Address Phone [address] [address] [phone] Patient Information Patient Name Sex ANN MARIE Charles ([id number]) Male 1950 Discharge Information Admitting Provider Service Area Unit Ananth Bonilla MD / [address] [facility] 19 / [phone] Discharge Provider Discharge Date/Time Discharge Disposition Destination (none) (none) (none) (none) Patient Language Language ENGLISH [13] Problem List as of 2017  Date Reviewed: 2017 Codes Priority Class Noted - Resolved High cholesterol (Chronic) ICD-10-CM: E78.00 ICD-9-CM: 272.0  Chronic 6/3/2015 - Present Heart murmur ICD-10-CM: R01.1 ICD-9-CM: 785.2   2014 - Present LU (dyspnea on exertion) ICD-10-CM: R06.09 
ICD-9-CM: 786.09   2014 - Present Leg fatigue ICD-10-CM: R29.898 ICD-9-CM: 729.89   2014 - Present DM (diabetes mellitus) (HCC) (Chronic) ICD-10-CM: E11.9 ICD-9-CM: 250.00  Chronic 6/3/2015 - Present Diabetic foot ulcer ([facility] [address] ICD-10-CM: E11.621, W27.060 ICD-9-CM: 250.80, 707.15  Chronic 6/3/2015 - Present Cellulitis of left lower leg ICD-10-CM: L03.116 ICD-9-CM: 682.6  Present on Admission 6/3/2015 - Present  Spinal stenosis of lumbar region at multiple levels ICD-10-CM: M48.06 
 ICD-9-CM: 724.02  Chronic 6/3/2015 - Present HTN (hypertension) ICD-10-CM: I10 
ICD-9-CM: 401.9  Chronic 6/3/2015 - Present Aortic valve stenosis, moderate ICD-10-CM: I35.0 ICD-9-CM: 424.1  Chronic 6/3/2015 - Present Overview Signed 6/3/2015  1:45 AM by Celestino Davis MD  
  E[address] 66/6769 Abscess of foot including toes ICD-10-CM: L02.619 ICD-9-CM: 682.7, 681.10  Present on Admission 6/3/2015 - Present Sepsis ([facility] [address] ICD-10-CM: A41.9 ICD-9-CM: 038.9, 995.91  Present on Admission 6/3/2015 - Present Amputated great toe of left foot ([facility] [address] ICD-10-CM: E35.355 ICD-9-CM: V49.71  Present on Admission 6/3/2015 - Present Abscess of left foot ICD-10-CM: E99.566 ICD-9-CM: 682.7   6/26/2015 - Present Discitis of lumbar region ICD-10-CM: M46.46 
ICD-9-CM: 722.93   10/14/2016 - Present Clostridium difficile colitis ICD-10-CM: A04.7 ICD-9-CM: 008.45   11/22/2016 - Present Pulmonary emboli [facility]) ICD-10-CM: I26.99 
ICD-9-CM: 415.19   1/2/2017 - Present You are allergic to the following Allergen Reactions Vancomycin Rash Patient broke out in large hive below IV site and c/o itching to area, patient states that \"that it was more likely an infiltration as opposed to an actual reaction\" Current Discharge Medication List  
  
START taking these medications Dose & Instructions Dispensing Information Comments * rivaroxaban 20 mg Tab tablet Commonly known as:  Caralyn Sia Dose:  20 mg Take 1 Tab by mouth daily for 30 days. Quantity:  30 Tab Refills:  0  
   
 * rivaroxaban 15 mg Tab tablet Commonly known as:  Caralyn Sia Dose:  15 mg Take 1 Tab by mouth two (2) times daily (with meals) for 16 days. Quantity:  32 Tab Refills:  0  
   
 vancomycin 50 mg/mL Soln oral solution (compounded) Take 125 mg 4 times daily for 7 days, then 125 mg 3 times daily for 7 days, then 125 mg 2 times daily for 7 days Quantity:  1 Bottle Refills:  0 * Notice: This list has 2 medication(s) that are the same as other medications prescribed for you. Read the directions carefully, and ask your doctor or other care provider to review them with you. CONTINUE these medications which have NOT CHANGED Dose & Instructions Dispensing Information Comments  
 aspirin, buffered 81 mg Tab Take  by mouth daily. Refills:  0  
   
 furosemide 40 mg tablet Commonly known as:  LASIX Dose:  40 mg Take 40 mg by mouth daily. Refills:  0  
   
 L. acidoph & paracasei- S therm- Bifido 8 billion cell Cap cap Commonly known as:  BOB-Q/RISAQUAD Dose:  1 Cap Take 1 Cap by mouth daily. Quantity:  30 Cap Refills:  1  
   
 lisinopril 2.5 mg tablet Commonly known as:  Delsie Commander Dose:  2.5 mg Take 2.5 mg by mouth daily. Refills:  0  
   
 metFORMIN 500 mg tablet Commonly known as:  GLUCOPHAGE Take  by mouth two (2) times daily (with meals). Refills:  0  
   
 oxyCODONE IR 5 mg immediate release tablet Commonly known as:  Renell Speed Dose:  5 mg Take 1 Tab by mouth every four (4) hours as needed. Max Daily Amount: 30 mg.  
 Quantity:  30 Tab Refills:  0  
   
 simvastatin 10 mg tablet Commonly known as:  ZOCOR Take  by mouth nightly. Refills:  0 STOP taking these medications Comments  
 methocarbamol 750 mg tablet Commonly known as:  ROBAXIN Follow-up Information None Discharge Instructions SomaharMedialive Activation Thank you for requesting access to [employer]. Please follow the instructions below to securely access and download your online medical record. [employer] allows you to send messages to your doctor, view your test results, renew your prescriptions, schedule appointments, and more. How Do I Sign Up? 1. In your internet browser, go to [email or web address] 
2. Click on the First Time User? Click Here link in the Sign In box.  You will be redirect to the New Member Sign Up page. 3. Enter your Phlexglobal Access Code exactly as it appears below. You will not need to use this code after youve completed the sign-up process. If you do not sign up before the expiration date, you must request a new code. Phlex[employer] Access Code: [email or web address] Expires: 2017 10:32 AM (This is the date your Phlex[employer] access code will ) 4. Enter the last four digits of your Social Security Number (xxxx) and Date of Birth (mm/dd/yyyy) as indicated and click Submit. You will be taken to the next sign-up page. 5. Create a Anteryont ID. This will be your Phlexglobal login ID and cannot be changed, so think of one that is secure and easy to remember. 6. Create a Phlexglobal password. You can change your password at any time. 7. Enter your Password Reset Question and Answer. This can be used at a later time if you forget your password. 8. Enter your e-mail address. You will receive e-mail notification when new information is available in [facility] 9. Click Sign Up. You can now view and download portions of your medical record. 10. Click the Download Summary menu link to download a portable copy of your medical information. Additional Information If you have questions, please visit the Frequently Asked Questions section of the Phlex[employer] website at [email or web address] Remember, Phlex[employer] is NOT to be used for urgent needs. For medical emergencies, dial 911. Chart Review Routing History Recipient Method Report Sent By Beth Marks MD  
Fax: [phone] Phone: [phone] Fax Note Review [address] Marin Olvera MD [[id number]] 2014 10:14 AM 2014 Fiona Marks MD  
Fax: [phone] Phone: [phone] Fax Mary Norwood MD NOTES AUTO ROUTING REPORT Alexandre Barger MD [[id number]] 6/3/2015  7:33 AM 2015 Fiona Marks MD  
Fax: [phone] Phone: [phone] Fax Eleanor Queen MD NOTES AUTO ROUTING REPORT Marilee Szymanski MD [[id number]] 6/7/2015  9:48 AM 06/07/2015 Haley Gaines DPM  
Phone: [phone] In [employer] IP Auto Routed Collin Alva MD [[id number]] 7/19/2015  5:35 PM 07/19/2015 Provider Unknown, MD  
Patient not available to ask [address] IP Auto Routed Notes Jojo Hendrix MD [[id number]] 10/14/2016 10:16 PM 10/14/2016 Provider Unknown, MD  
Patient not available to ask [address] IP Auto Routed Notes Primitivo Caro MD [[id number]] 10/19/2016 12:16 PM 10/19/2016 Provider Unknown, MD  
Patient not available to ask [address] IP Auto Routed Notes Kiarra Chaney MD [[id number]] 11/22/2016 11:21 PM 11/22/2016 Provider Unknown, MD  
Patient not available to ask [address] IP Auto Routed Notes Sapna Miller MD [[id number]] 11/28/2016 10:24 AM 11/28/2016 Provider Unknown, MD  
Patient not available to ask [address] IP Auto Routed Notes Tania Bray  [phone] 1/3/2017  1:04 AM 01/03/2017

## 2017-01-03 LAB
ALBUMIN SERPL BCP-MCNC: 2.1 G/DL (ref 3.5–5)
ALBUMIN/GLOB SERPL: 0.6 {RATIO} (ref 1.1–2.2)
ALP SERPL-CCNC: 80 U/L (ref 45–117)
ALT SERPL-CCNC: 7 U/L (ref 12–78)
ANION GAP BLD CALC-SCNC: 10 MMOL/L (ref 5–15)
APTT PPP: 123.8 SEC (ref 22.1–32.5)
APTT PPP: 47.6 SEC (ref 22.1–32.5)
APTT PPP: 75.1 SEC (ref 22.1–32.5)
APTT PPP: 76.6 SEC (ref 22.1–32.5)
AST SERPL W P-5'-P-CCNC: 14 U/L (ref 15–37)
ATRIAL RATE: 113 BPM
BASOPHILS # BLD AUTO: 0.1 K/UL (ref 0–0.1)
BASOPHILS # BLD AUTO: 0.1 K/UL (ref 0–0.1)
BASOPHILS # BLD: 1 % (ref 0–1)
BASOPHILS # BLD: 1 % (ref 0–1)
BILIRUB DIRECT SERPL-MCNC: 0.3 MG/DL (ref 0–0.2)
BILIRUB SERPL-MCNC: 0.8 MG/DL (ref 0.2–1)
BUN SERPL-MCNC: 9 MG/DL (ref 6–20)
BUN/CREAT SERPL: 11 (ref 12–20)
C DIFF TOX GENS STL QL NAA+PROBE: POSITIVE
CALCIUM SERPL-MCNC: 7.8 MG/DL (ref 8.5–10.1)
CALCULATED P AXIS, ECG09: 55 DEGREES
CALCULATED R AXIS, ECG10: 70 DEGREES
CALCULATED T AXIS, ECG11: 43 DEGREES
CHLORIDE SERPL-SCNC: 106 MMOL/L (ref 97–108)
CK MB CFR SERPL CALC: NORMAL % (ref 0–2.5)
CK MB SERPL-MCNC: <1 NG/ML (ref 5–25)
CK SERPL-CCNC: 136 U/L (ref 39–308)
CO2 SERPL-SCNC: 26 MMOL/L (ref 21–32)
CREAT SERPL-MCNC: 0.79 MG/DL (ref 0.7–1.3)
DIAGNOSIS, 93000: NORMAL
DIFFERENTIAL METHOD BLD: ABNORMAL
DIFFERENTIAL METHOD BLD: ABNORMAL
EOSINOPHIL # BLD: 0.2 K/UL (ref 0–0.4)
EOSINOPHIL # BLD: 1.5 K/UL (ref 0–0.4)
EOSINOPHIL NFR BLD: 21 % (ref 0–7)
EOSINOPHIL NFR BLD: 3 % (ref 0–7)
ERYTHROCYTE [DISTWIDTH] IN BLOOD BY AUTOMATED COUNT: 14.5 % (ref 11.5–14.5)
ERYTHROCYTE [DISTWIDTH] IN BLOOD BY AUTOMATED COUNT: 14.6 % (ref 11.5–14.5)
GLOBULIN SER CALC-MCNC: 3.4 G/DL (ref 2–4)
GLUCOSE BLD STRIP.AUTO-MCNC: 141 MG/DL (ref 65–100)
GLUCOSE BLD STRIP.AUTO-MCNC: 151 MG/DL (ref 65–100)
GLUCOSE BLD STRIP.AUTO-MCNC: 152 MG/DL (ref 65–100)
GLUCOSE BLD STRIP.AUTO-MCNC: 156 MG/DL (ref 65–100)
GLUCOSE SERPL-MCNC: 155 MG/DL (ref 65–100)
HCT VFR BLD AUTO: 30.8 % (ref 36.6–50.3)
HCT VFR BLD AUTO: 30.9 % (ref 36.6–50.3)
HGB BLD-MCNC: 11 G/DL (ref 12.1–17)
HGB BLD-MCNC: 11.2 G/DL (ref 12.1–17)
LACTATE SERPL-SCNC: 0.9 MMOL/L (ref 0.4–2)
LYMPHOCYTES # BLD AUTO: 14 % (ref 12–49)
LYMPHOCYTES # BLD AUTO: 17 % (ref 12–49)
LYMPHOCYTES # BLD: 0.9 K/UL (ref 0.8–3.5)
LYMPHOCYTES # BLD: 1 K/UL (ref 0.8–3.5)
MAGNESIUM SERPL-MCNC: 1.9 MG/DL (ref 1.6–2.4)
MCH RBC QN AUTO: 30 PG (ref 26–34)
MCH RBC QN AUTO: 30.6 PG (ref 26–34)
MCHC RBC AUTO-ENTMCNC: 35.6 G/DL (ref 30–36.5)
MCHC RBC AUTO-ENTMCNC: 36.4 G/DL (ref 30–36.5)
MCV RBC AUTO: 84.2 FL (ref 80–99)
MCV RBC AUTO: 84.2 FL (ref 80–99)
MONOCYTES # BLD: 0.5 K/UL (ref 0–1)
MONOCYTES # BLD: 0.8 K/UL (ref 0–1)
MONOCYTES NFR BLD AUTO: 15 % (ref 5–13)
MONOCYTES NFR BLD AUTO: 7 % (ref 5–13)
NEUTS BAND NFR BLD MANUAL: 26 % (ref 0–6)
NEUTS SEG # BLD: 3.5 K/UL (ref 1.8–8)
NEUTS SEG # BLD: 4.1 K/UL (ref 1.8–8)
NEUTS SEG NFR BLD AUTO: 31 % (ref 32–75)
NEUTS SEG NFR BLD AUTO: 64 % (ref 32–75)
P-R INTERVAL, ECG05: 138 MS
PHOSPHATE SERPL-MCNC: 2.7 MG/DL (ref 2.6–4.7)
PLATELET # BLD AUTO: 237 K/UL (ref 150–400)
PLATELET # BLD AUTO: 256 K/UL (ref 150–400)
POTASSIUM SERPL-SCNC: 3.2 MMOL/L (ref 3.5–5.1)
PROT SERPL-MCNC: 5.5 G/DL (ref 6.4–8.2)
Q-T INTERVAL, ECG07: 314 MS
QRS DURATION, ECG06: 94 MS
QTC CALCULATION (BEZET), ECG08: 430 MS
RBC # BLD AUTO: 3.66 M/UL (ref 4.1–5.7)
RBC # BLD AUTO: 3.67 M/UL (ref 4.1–5.7)
RBC MORPH BLD: ABNORMAL
RBC MORPH BLD: ABNORMAL
SERVICE CMNT-IMP: ABNORMAL
SODIUM SERPL-SCNC: 142 MMOL/L (ref 136–145)
THERAPEUTIC RANGE,PTTT: ABNORMAL SECS (ref 58–77)
TROPONIN I SERPL-MCNC: <0.04 NG/ML
TSH SERPL DL<=0.05 MIU/L-ACNC: 0.97 UIU/ML (ref 0.36–3.74)
VENTRICULAR RATE, ECG03: 113 BPM
WBC # BLD AUTO: 5.5 K/UL (ref 4.1–11.1)
WBC # BLD AUTO: 7.2 K/UL (ref 4.1–11.1)
WBC MORPH BLD: ABNORMAL

## 2017-01-03 PROCEDURE — 83605 ASSAY OF LACTIC ACID: CPT | Performed by: INTERNAL MEDICINE

## 2017-01-03 PROCEDURE — G8988 SELF CARE GOAL STATUS: HCPCS

## 2017-01-03 PROCEDURE — 80048 BASIC METABOLIC PNL TOTAL CA: CPT | Performed by: INTERNAL MEDICINE

## 2017-01-03 PROCEDURE — 97165 OT EVAL LOW COMPLEX 30 MIN: CPT

## 2017-01-03 PROCEDURE — 80076 HEPATIC FUNCTION PANEL: CPT | Performed by: INTERNAL MEDICINE

## 2017-01-03 PROCEDURE — 85730 THROMBOPLASTIN TIME PARTIAL: CPT | Performed by: INTERNAL MEDICINE

## 2017-01-03 PROCEDURE — 87493 C DIFF AMPLIFIED PROBE: CPT | Performed by: INTERNAL MEDICINE

## 2017-01-03 PROCEDURE — 85730 THROMBOPLASTIN TIME PARTIAL: CPT | Performed by: EMERGENCY MEDICINE

## 2017-01-03 PROCEDURE — 84443 ASSAY THYROID STIM HORMONE: CPT | Performed by: INTERNAL MEDICINE

## 2017-01-03 PROCEDURE — G8987 SELF CARE CURRENT STATUS: HCPCS

## 2017-01-03 PROCEDURE — 74011250636 HC RX REV CODE- 250/636: Performed by: INTERNAL MEDICINE

## 2017-01-03 PROCEDURE — 74011250637 HC RX REV CODE- 250/637: Performed by: INTERNAL MEDICINE

## 2017-01-03 PROCEDURE — 84484 ASSAY OF TROPONIN QUANT: CPT | Performed by: INTERNAL MEDICINE

## 2017-01-03 PROCEDURE — 74011636637 HC RX REV CODE- 636/637: Performed by: INTERNAL MEDICINE

## 2017-01-03 PROCEDURE — 85025 COMPLETE CBC W/AUTO DIFF WBC: CPT | Performed by: EMERGENCY MEDICINE

## 2017-01-03 PROCEDURE — 36415 COLL VENOUS BLD VENIPUNCTURE: CPT | Performed by: INTERNAL MEDICINE

## 2017-01-03 PROCEDURE — 82553 CREATINE MB FRACTION: CPT | Performed by: INTERNAL MEDICINE

## 2017-01-03 PROCEDURE — 84100 ASSAY OF PHOSPHORUS: CPT | Performed by: INTERNAL MEDICINE

## 2017-01-03 PROCEDURE — 83735 ASSAY OF MAGNESIUM: CPT | Performed by: INTERNAL MEDICINE

## 2017-01-03 PROCEDURE — 97530 THERAPEUTIC ACTIVITIES: CPT

## 2017-01-03 PROCEDURE — G8979 MOBILITY GOAL STATUS: HCPCS

## 2017-01-03 PROCEDURE — 97161 PT EVAL LOW COMPLEX 20 MIN: CPT

## 2017-01-03 PROCEDURE — 82962 GLUCOSE BLOOD TEST: CPT

## 2017-01-03 PROCEDURE — G8978 MOBILITY CURRENT STATUS: HCPCS

## 2017-01-03 PROCEDURE — 65660000000 HC RM CCU STEPDOWN

## 2017-01-03 RX ORDER — SODIUM CHLORIDE 9 MG/ML
75 INJECTION, SOLUTION INTRAVENOUS CONTINUOUS
Status: DISCONTINUED | OUTPATIENT
Start: 2017-01-03 | End: 2017-01-04

## 2017-01-03 RX ORDER — HEPARIN SODIUM 5000 [USP'U]/ML
80 INJECTION, SOLUTION INTRAVENOUS; SUBCUTANEOUS AS NEEDED
Status: DISCONTINUED | OUTPATIENT
Start: 2017-01-03 | End: 2017-01-04

## 2017-01-03 RX ORDER — HEPARIN SODIUM 10000 [USP'U]/100ML
18-36 INJECTION, SOLUTION INTRAVENOUS
Status: DISCONTINUED | OUTPATIENT
Start: 2017-01-03 | End: 2017-01-04

## 2017-01-03 RX ORDER — ONDANSETRON 2 MG/ML
4 INJECTION INTRAMUSCULAR; INTRAVENOUS
Status: DISCONTINUED | OUTPATIENT
Start: 2017-01-03 | End: 2017-01-06 | Stop reason: HOSPADM

## 2017-01-03 RX ORDER — SODIUM CHLORIDE 0.9 % (FLUSH) 0.9 %
5-10 SYRINGE (ML) INJECTION AS NEEDED
Status: DISCONTINUED | OUTPATIENT
Start: 2017-01-03 | End: 2017-01-06 | Stop reason: HOSPADM

## 2017-01-03 RX ORDER — LISINOPRIL 5 MG/1
2.5 TABLET ORAL DAILY
Status: DISCONTINUED | OUTPATIENT
Start: 2017-01-03 | End: 2017-01-03

## 2017-01-03 RX ORDER — POTASSIUM CHLORIDE 20 MEQ/1
40 TABLET, EXTENDED RELEASE ORAL 2 TIMES DAILY
Status: COMPLETED | OUTPATIENT
Start: 2017-01-03 | End: 2017-01-03

## 2017-01-03 RX ORDER — PRAVASTATIN SODIUM 10 MG/1
20 TABLET ORAL
Status: DISCONTINUED | OUTPATIENT
Start: 2017-01-03 | End: 2017-01-06 | Stop reason: HOSPADM

## 2017-01-03 RX ORDER — HEPARIN SODIUM 5000 [USP'U]/ML
40 INJECTION, SOLUTION INTRAVENOUS; SUBCUTANEOUS AS NEEDED
Status: DISCONTINUED | OUTPATIENT
Start: 2017-01-03 | End: 2017-01-04

## 2017-01-03 RX ORDER — IPRATROPIUM BROMIDE AND ALBUTEROL SULFATE 2.5; .5 MG/3ML; MG/3ML
3 SOLUTION RESPIRATORY (INHALATION)
Status: DISCONTINUED | OUTPATIENT
Start: 2017-01-03 | End: 2017-01-06 | Stop reason: HOSPADM

## 2017-01-03 RX ORDER — LABETALOL HCL 20 MG/4 ML
10 SYRINGE (ML) INTRAVENOUS
Status: DISCONTINUED | OUTPATIENT
Start: 2017-01-03 | End: 2017-01-05

## 2017-01-03 RX ORDER — SODIUM CHLORIDE 0.9 % (FLUSH) 0.9 %
5-10 SYRINGE (ML) INJECTION EVERY 8 HOURS
Status: DISCONTINUED | OUTPATIENT
Start: 2017-01-03 | End: 2017-01-06 | Stop reason: HOSPADM

## 2017-01-03 RX ORDER — MUPIROCIN 20 MG/G
OINTMENT TOPICAL EVERY 12 HOURS
Status: DISCONTINUED | OUTPATIENT
Start: 2017-01-03 | End: 2017-01-06 | Stop reason: HOSPADM

## 2017-01-03 RX ORDER — IBUPROFEN 200 MG
200 TABLET ORAL
Status: DISCONTINUED | OUTPATIENT
Start: 2017-01-03 | End: 2017-01-03 | Stop reason: SDUPTHER

## 2017-01-03 RX ORDER — OXYCODONE HYDROCHLORIDE 5 MG/1
5 TABLET ORAL
Status: DISCONTINUED | OUTPATIENT
Start: 2017-01-03 | End: 2017-01-06 | Stop reason: HOSPADM

## 2017-01-03 RX ORDER — IBUPROFEN 200 MG
400 TABLET ORAL
Status: DISCONTINUED | OUTPATIENT
Start: 2017-01-03 | End: 2017-01-06 | Stop reason: HOSPADM

## 2017-01-03 RX ORDER — DEXTROSE 50 % IN WATER (D50W) INTRAVENOUS SYRINGE
12.5-25 AS NEEDED
Status: DISCONTINUED | OUTPATIENT
Start: 2017-01-03 | End: 2017-01-06 | Stop reason: HOSPADM

## 2017-01-03 RX ORDER — POLYETHYLENE GLYCOL 3350 17 G/17G
17 POWDER, FOR SOLUTION ORAL DAILY
Status: DISCONTINUED | OUTPATIENT
Start: 2017-01-03 | End: 2017-01-03

## 2017-01-03 RX ORDER — INSULIN LISPRO 100 [IU]/ML
INJECTION, SOLUTION INTRAVENOUS; SUBCUTANEOUS
Status: DISCONTINUED | OUTPATIENT
Start: 2017-01-03 | End: 2017-01-06 | Stop reason: HOSPADM

## 2017-01-03 RX ORDER — MAGNESIUM SULFATE 100 %
4 CRYSTALS MISCELLANEOUS AS NEEDED
Status: DISCONTINUED | OUTPATIENT
Start: 2017-01-03 | End: 2017-01-06 | Stop reason: HOSPADM

## 2017-01-03 RX ADMIN — MUPIROCIN: 20 OINTMENT TOPICAL at 09:00

## 2017-01-03 RX ADMIN — VANCOMYCIN HYDROCHLORIDE 250 MG: 1 INJECTION, POWDER, LYOPHILIZED, FOR SOLUTION INTRAVENOUS at 16:46

## 2017-01-03 RX ADMIN — Medication 10 ML: at 22:26

## 2017-01-03 RX ADMIN — HEPARIN SODIUM AND DEXTROSE 15 UNITS/KG/HR: 10000; 5 INJECTION INTRAVENOUS at 16:24

## 2017-01-03 RX ADMIN — MUPIROCIN: 20 OINTMENT TOPICAL at 21:00

## 2017-01-03 RX ADMIN — IBUPROFEN 200 MG: 200 TABLET, SUGAR COATED ORAL at 03:33

## 2017-01-03 RX ADMIN — Medication 10 ML: at 06:54

## 2017-01-03 RX ADMIN — INSULIN LISPRO 2 UNITS: 100 INJECTION, SOLUTION INTRAVENOUS; SUBCUTANEOUS at 09:00

## 2017-01-03 RX ADMIN — IBUPROFEN 400 MG: 400 TABLET ORAL at 17:54

## 2017-01-03 RX ADMIN — PRAVASTATIN SODIUM 20 MG: 10 TABLET ORAL at 03:33

## 2017-01-03 RX ADMIN — POTASSIUM CHLORIDE 40 MEQ: 20 TABLET, EXTENDED RELEASE ORAL at 17:54

## 2017-01-03 RX ADMIN — ONDANSETRON 4 MG: 2 INJECTION INTRAMUSCULAR; INTRAVENOUS at 09:01

## 2017-01-03 RX ADMIN — Medication 10 ML: at 03:35

## 2017-01-03 RX ADMIN — POTASSIUM CHLORIDE 40 MEQ: 750 TABLET, FILM COATED, EXTENDED RELEASE ORAL at 00:14

## 2017-01-03 RX ADMIN — SODIUM CHLORIDE 75 ML/HR: 900 INJECTION, SOLUTION INTRAVENOUS at 12:08

## 2017-01-03 RX ADMIN — PRAVASTATIN SODIUM 20 MG: 10 TABLET ORAL at 22:25

## 2017-01-03 RX ADMIN — IBUPROFEN 400 MG: 400 TABLET ORAL at 09:02

## 2017-01-03 RX ADMIN — POTASSIUM CHLORIDE 40 MEQ: 20 TABLET, EXTENDED RELEASE ORAL at 09:02

## 2017-01-03 RX ADMIN — Medication 1 CAPSULE: at 09:01

## 2017-01-03 RX ADMIN — INSULIN LISPRO 2 UNITS: 100 INJECTION, SOLUTION INTRAVENOUS; SUBCUTANEOUS at 02:00

## 2017-01-03 RX ADMIN — INSULIN LISPRO 2 UNITS: 100 INJECTION, SOLUTION INTRAVENOUS; SUBCUTANEOUS at 12:18

## 2017-01-03 NOTE — PROGRESS NOTES
Patient with pulmonary embolism and evidence of right ventricular strain. Not currently hypotensive but he is tachycardic. High risk for TPA due to recent Disciitis by report. Also has colitis. Recommendations include IV heparin so we can control any bleeding if it occurs, volume resuscitation and reserve TPA for life-threatening hemodynamics collapse. Recommend Hospitalist consult for admission.

## 2017-01-03 NOTE — PROGRESS NOTES
Spiritual Care Assessment/Progress Notes    Trista Philip [id number]  [id number]    1950  78 y.o.  male    Patient Telephone Number: [phone] (home)   Sabianist Affiliation: Jenise Montalvo   Language: English   Extended Emergency Contact Information  Primary Emergency Contact: [address] [place] [address] Phone: [phone]  Mobile Phone: [phone]  Relation: Spouse   Patient Active Problem List    Diagnosis Date Noted    Pulmonary emboli ([address] 01/02/2017    Clostridium difficile colitis 11/22/2016    Discitis of lumbar region 10/14/2016    Abscess of left foot 06/26/2015    High cholesterol 06/03/2015     Class: Chronic    DM (diabetes mellitus) ([address] 06/03/2015     Class: Chronic    Diabetic foot ulcer ([address] 06/03/2015     Class: Chronic    Cellulitis of left lower leg 06/03/2015     Class: Present on Admission    Spinal stenosis of lumbar region at multiple levels 06/03/2015     Class: Chronic    HTN (hypertension) 06/03/2015     Class: Chronic    Aortic valve stenosis, moderate 06/03/2015     Class: Chronic    Abscess of foot including toes 06/03/2015     Class: Present on Admission    Sepsis ([address] 06/03/2015     Class: Present on Admission    Amputated great toe of left foot ([address] 06/03/2015     Class: Present on Admission    Heart murmur 11/20/2014    LU (dyspnea on exertion) 11/20/2014    Leg fatigue 11/20/2014        Date: 1/3/2017       Level of Sabianist/Spiritual Activity:  [x]         Involved in ayaka tradition/spiritual practice    []         Not involved in ayaka tradition/spiritual practice  [x]         Spiritually oriented    []         Claims no spiritual orientation    []         seeking spiritual identity  []         Feels alienated from Taoist practice/tradition  []         Feels angry about Taoist practice/tradition  [x]         Spirituality/Taoist tradition is a resource for coping at this time.   []         Not able to assess due to medical condition    Services Provided Today:  []         crisis intervention    []         reading Scriptures  [x]         spiritual assessment    [x]         prayer  [x]         empathic listening/emotional support  []         rites and rituals (cite in comments)  [x]         life review     []         Alevism support  []         theological development    []         advocacy  []         ethical dialog     []         blessing  []         bereavement support    []         support to family  []         anticipatory grief support   []         help with AMD  []         spiritual guidance    []         meditation      Spiritual Care Needs  []         Emotional Support  []         Spiritual/Worship Care  []         Loss/Adjustment  []         Advocacy/Referral                /Ethics  [x]         No needs expressed at               this time  []         Other: (note in               comments)  Spiritual Care Plan  []         Follow up visits with               pt/family  []         Provide materials  []         Schedule sacraments  []         Contact Community               Clergy  [x]         Follow up as needed  []         Other: (note in               comments)     Comments:  Responded to staff request to offer pastoral support to patient in CCU. No visitors present. Patient was seated in chair at bedside. Provided supportive listening presence as patient shared his frustration with his lingering illness. He talked about his work as a  and his ability to work on most anything. He also shared he has an old truck he bought that he wants to work on and turn into a hot priscila. He has been very active in his Holiness in the past and said he misses being able to go regularly. He went on Telephone and this past Sunday and said he felt good going,  He was receptive to assurance of prayer. Advised him of  availability.   VETO Rodriguez, [facility], [address]     Paging Service  [phone])

## 2017-01-03 NOTE — ED NOTES
Pt. Given Heparin bolus and continuous drip after sending PT/PTT to lab for testing. Pt. Currently denies chest pain/SOB.  Family at bedside

## 2017-01-03 NOTE — PROGRESS NOTES
Problem: Self Care Deficits Care Plan (Adult)  Goal: *Acute Goals and Plan of Care (Insert Text)  Occupational Therapy Goals  Initiated 1/3/2017  1. Patient will perform lower body dressing with supervision/set-up within 7 day(s). 2. Patient will perform standing ADLs at sink with supervision/set-up within 7 day(s). 3. Patient will perform bathing with supervision/set-up within 7 day(s). 4. Patient will perform toilet transfers with supervision/set-up within 7 day(s). 5. Patient will perform all aspects of toileting with supervision/set-up within 7 day(s). 6. Patient will perform functional transfers in prep for ADLs with no more than 2 verbal cues for safety within 7 days. OCCUPATIONAL THERAPY EVALUATION  Patient: Toño Dupont (19 y.o. male)  Date: 1/3/2017  Primary Diagnosis: Pulmonary emboli (HCC)        Precautions:   Fall, Contact      ASSESSMENT :  Based on the objective data described below, the patient presents with irritability, decreased safety awareness, decreased insight into deficits, R sided rib pain with inhalation and risk for falls s/p admission for acute PE. PTA, pt lived at home with wife, was receiving OP PT (unsure if receiving OP OT as well), mod I with ADLs and ambulated with RW. This date, pt required increased encouragement for minimal activity, agreeable bed to chair. Pt completed transfer with SBA but pt did not achieve erect posture in standing. Pt reaching for bedside chair and pivoted self over using chair arms to support himself Will provide pt with RW next session and address standing and lower body ADLs. O2 and HR stable via room air. Recommend discharge home with HHOT/PT and 24 hour pending progress. Pt is adamant to complete tasks his way. Patient will benefit from skilled intervention to address the above impairments.   Patients rehabilitation potential is considered to be Good  Factors which may influence rehabilitation potential include:   [ ]             None noted  [ ]             Mental ability/status  [ ]             Medical condition  [ ]             Home/family situation and support systems  [X]             Safety awareness  [ ]             Pain tolerance/management  [ ]             Other:        PLAN :  Recommendations and Planned Interventions:  [X]               Self Care Training                  [X]        Therapeutic Activities  [X]               Functional Mobility Training    [ ]        Cognitive Retraining  [X]               Therapeutic Exercises           [X]        Endurance Activities  [X]               Balance Training                   [ ]        Neuromuscular Re-Education  [ ]               Visual/Perceptual Training     [X]   Home Safety Training  [X]               Patient Education                 [X]        Family Training/Education  [ ]               Other (comment):     Frequency/Duration: Patient will be followed by occupational therapy 3 times a week to address goals. Discharge Recommendations: HHOT/PT with 24 hour pending progress  Further Equipment Recommendations for Discharge: TBD       SUBJECTIVE:   Patient stated If I had a walker I could get up and walk.       OBJECTIVE DATA SUMMARY:   HISTORY:   Past Medical History   Diagnosis Date    Arthritis         knees, back    Chronic pain         knees, back    DM (diabetes mellitus) ([facility] [address]      High cholesterol      HTN (hypertension)       Past Surgical History   Procedure Laterality Date    Hx amputation           vascular; Left great toe amputation    Hx orthopaedic Left         Left arm fracture & repair    Hx other surgical   6/4/15       INCISION AND DRAINAGE, RESECTION OF REMAINING 1ST METATARSAL, INSERTION OF ANTIBIOTIC BEADS        Prior Level of Function/Home Situation: PTA, pt lived at home with wife, was receiving OP PT (unsure if receiving OP OT as well), mod I with ADLs and ambulated with RW. Reported fall in parking lot 2 weeks ago transferring from electric scooter to car. Expanded or extensive additional review of patient history:      Home Situation  Home Environment: Private residence  # Steps to Enter: 0  Wheelchair Ramp: Yes  One/Two Story Residence: One story  Living Alone: No  Support Systems: Family member(s), Spouse/Significant Other/Partner  Patient Expects to be Discharged to[de-identified] Private residence  Current DME Used/Available at Home: Blood pressure cuff, Cane, straight, Glucometer, Walker, rolling  Tub or Shower Type: Tub/Shower combination  [X]  Right hand dominant             [ ]  Left hand dominant     EXAMINATION OF PERFORMANCE DEFICITS:  Cognitive/Behavioral Status:  Neurologic State: Irritable; Alert  Orientation Level: Oriented X4  Cognition: Follows commands  Perception: Appears intact  Perseveration: No perseveration noted  Safety/Judgement: Decreased insight into deficits; Decreased awareness of need for safety;Decreased awareness of need for assistance  Skin: dryness to distal LEs, L > R  Edema: distal LEs  Vision/Perceptual:    Tracking: Able to track stimulus in all quadrants w/o difficulty                      Acuity: Within Defined Limits       Range of Motion:     AROM: Within functional limits (with exception of R shoulder 2/2 hx of rotator cuff injury)                       Strength:     Strength: Generally decreased, functional              Coordination:  Coordination: Within functional limits  Fine Motor Skills-Upper: Left Intact; Right Intact    Gross Motor Skills-Upper: Left Intact; Right Intact  Tone & Sensation:     Tone: Normal  Sensation: Intact                       Balance:  Sitting: Intact  Standing: Impaired  Standing - Static: Fair  Standing - Dynamic : Fair     Functional Mobility and Transfers for ADLs:  Bed Mobility:  Rolling: Supervision  Supine to Sit: Supervision  Scooting: Supervision     Transfers:  Sit to Stand: Stand-by asssistance  Stand to Sit: Stand-by asssistance  Bed to Chair: Stand-by asssistance  Toilet Transfer : Stand-by asssistance;Contact guard assistance     ADL Assessment:  Feeding: Independent     Oral Facial Hygiene/Grooming: Contact guard assistance     Bathing: Moderate assistance (c/o R sided pain from PE)     Upper Body Dressing: Supervision     Lower Body Dressing: Moderate assistance (refused to don socks, demosntrate cross leg)     Toileting: Contact guard assistance                 ADL Intervention and task modifications:                Pt educated on role of OT and required verbal cues for safety and proper hand placement during ADLs/functional transfers. Educated pt that due to acute PE, OOB activity is encouraged for all meals. Cognitive Retraining  Safety/Judgement: Decreased insight into deficits; Decreased awareness of need for safety;Decreased awareness of need for assistance        Functional Measure:  Barthel Index:      Bathin  Bladder: 10  Bowels: 10  Groomin  Dressin  Feeding: 10  Mobility: 0  Stairs: 0  Toilet Use: 5  Transfer (Bed to Chair and Back): 10  Total: 55         Barthel and G-code impairment scale:  Percentage of impairment CH  0% CI  1-19% CJ  20-39% CK  40-59% CL  60-79% CM  80-99% CN  100%   Barthel Score 0-100 100 99-80 79-60 59-40 20-39 1-19    0   Barthel Score 0-20 20 17-19 13-16 9-12 5-8 1-4 0      The Barthel ADL Index: Guidelines  1. The index should be used as a record of what a patient does, not as a record of what a patient could do. 2. The main aim is to establish degree of independence from any help, physical or verbal, however minor and for whatever reason. 3. The need for supervision renders the patient not independent. 4. A patient's performance should be established using the best available evidence. Asking the patient, friends/relatives and nurses are the usual sources, but direct observation and common sense are also important. However direct testing is not needed.   5. Usually the patient's performance over the preceding 24-48 hours is important, but occasionally longer periods will be relevant. 6. Middle categories imply that the patient supplies over 50 per cent of the effort. 7. Use of aids to be independent is allowed. Jesse Salcido., Barthel, D.W. (7569). Functional evaluation: the Barthel Index. [address] [facility] (14)2. CELI Hinson, Nico Rodriguez., Leif Jacob., Georgette, [address] [place] [address] (1999). Measuring the change indisability after inpatient rehabilitation; comparison of the responsiveness of the Barthel Index and Functional [place] Measure. Journal of Neurology, Neurosurgery, and Psychiatry, 66(4), 828-682. Edmund Huerta, N.J.A, TROY Neal, & Kole Morel M.A. (2004.) Assessment of post-stroke quality of life in cost-effectiveness studies: The usefulness of the Barthel Index and the EuroQoL-5D. Quality of Life Research, 13, 168-15            G codes: In compliance with CMSs Claims Based Outcome Reporting, the following G-code set was chosen for this patient based on their primary functional limitation being treated: The outcome measure chosen to determine the severity of the functional limitation was the Barthel Index with a score of 55/100 which was correlated with the impairment scale. · Self Care:               - CURRENT STATUS:    CK - 40%-59% impaired, limited or restricted               - GOAL STATUS:           CI - 1%-19% impaired, limited or restricted               - D/C STATUS:                       ---------------To be determined---------------            Pain:  Pain Scale 1: Numeric (0 - 10)  Pain Intensity 1: 0  Pain Location 1: Flank  Pain Orientation 1: Right  Pain Description 1: Sharp; Other (comment) (hurts with movement and deep breaths)  Pain Intervention(s) 1: Medication (see MAR)  Activity Tolerance:   VSS on room air max  with activity   Please refer to the flowsheet for vital signs taken during this treatment.   After treatment:   [X] Patient left in no apparent distress sitting up in chair  [ ] Patient left in no apparent distress in bed  [X] Call bell left within reach  [X] Nursing notified  [ ] Caregiver present  [ ] Bed alarm activated      COMMUNICATION/EDUCATION:   The patients plan of care was discussed with: Physical Therapist and Registered Nurse. [ ] Home safety education was provided and the patient/caregiver indicated understanding. [X] Patient/family have participated as able in goal setting and plan of care. [ ] Patient/family agree to work toward stated goals and plan of care. [ ] Patient understands intent and goals of therapy, but is neutral about his/her participation. [ ] Patient is unable to participate in goal setting and plan of care. This patients plan of care is appropriate for delegation to [facility].      Thank you for this referral.  Kim An OT  Time Calculation: 22 mins

## 2017-01-03 NOTE — PROGRESS NOTES
Critical care interdisciplinary rounds held on [id number]. Following members present: Pharmacy, Diabetes Treatment, Case Management, Occupational Therapy, Physical Therapy and Nutrition. May transfer to tele bed. Plan of care discussed. See clinical pathway fo plan of care and interventions and desired outcomes.

## 2017-01-03 NOTE — H&P
Hospitalist Admission Note    NAME: Noreen Eisenberg   :  1950   MRN:  [id number]     Date/Time:  2017 10:41 PM    Patient PCP: PROVIDER UNKNOWN  ________________________________________________________________________    Given the patient's current clinical presentation, I have a high level of concern for decompensation if discharged from the ED. Complex decision making was performed which includes reviewing the patient's available past medical records, laboratory results, and Xray films. I have also directly communicated my plan and discussed this case with the involved ED physician. My assessment of this patient's clinical condition and my plan of care is as follows:    Assessment / Plan:  Acute pulmonary embolus with right heart strain  Sinus tachycardia  Pulmonary infarction  Pleuritis  -hep gtt for now, convert to NOAC prior to dc once stabilizes  -watch for worsened hypoxia or hypotension  -motrin if able to tolerate for pleuritic pain  -wean o2 as able to tolerate. Is symptomatically improved on O2 at present  -up OOB to walking  -nebs prn  -no role for abx  -pulmonary to continue to follow  -IR on standby should he worsen and reflect hypotension  -monitor on tele for improvement in ST  -avoid pro arrhythmic agents as able - nebs will speed him up though  -TTE pending to evaluate strain pattern better    Diabetes mellitus 2 uncontrolled  -poc bs + ssi for now  -hold OP PO medicine for now - restart as improves    Hypertension, Benign essential   -labetalol PRN sbp >170    Hypokalemia  -watch for hypomag as well given increased WOB  -Replace K>4 and Mag>2, IV or PO    Code Status: full  DVT Prophylaxis: on hep gtt  GI Prophylaxis: not indicated    Baseline: independent        Subjective:   CHIEF COMPLAINT: \"sob and chest hurts when i breathe\"    HISTORY OF PRESENT ILLNESS:     Jason Childs is a 78 y.o.  male with known history as listed below presents to ED with complaint noted above.  Available records were reviewed at the time of H&P. Patient with 5days of increased WOB with exertion worsened over the last 24hr when he further developed right sided chst pain with deep breath. He notes pain is sharp, stabbing at peak inspiration. To improve pain he was taking short breaths and using peppermints. No f/c/n/v. +diarrhea and weakness over this time as well and he is concerned of the viral GE that is going around. No sick contacts. Decreased PO intake. ++recent completion of treatment for Discitis by Dr Mary Walker 2 mos ago and unfortunately developed CDIFF following completion of abx for discitis and now has completed 3wks PO vanco as of 3-4wks ago. He has improved overall. In ED noted to have PE right lower and left lower lobar with developing right lower lobar infarct and possible right heart strain pattern. Without hypotension Pulmonary, whom was consulted by ED, opted not to use TPA, hep gtt initiated. reamins on O2 for comfort. +tachycardic in ED with right sided chest discomfort. We were asked to admit for work up and evaluation of the above problems.     Past Medical History   Diagnosis Date    Arthritis      knees, back    Chronic pain      knees, back    DM [facility] mellitus) ([facility] [address]     High cholesterol     HTN (hypertension)       Past Surgical History   Procedure Laterality Date    Hx amputation       vascular; Left great toe amputation    Hx orthopaedic Left      Left arm fracture & repair    Hx other surgical  6/4/15     INCISION AND DRAINAGE, RESECTION OF REMAINING 1ST METATARSAL, INSERTION OF ANTIBIOTIC BEADS     Social History   Substance Use Topics    Smoking status: Former Smoker     Packs/day: 3.00     Years: 20.00     Types: Cigarettes    Smokeless tobacco: Former User     Quit date: 1/15/1995      Comment: stopped smoking about 30 years ago    Alcohol use 0.5 oz/week     1 Cans of beer per week      Comment: occasionally      Family History   Problem Relation Age of Onset    Heart Disease Mother     Heart Disease Father       Allergies   Allergen Reactions    Vancomycin Rash     Patient broke out in large hive below IV site and c/o itching to area, patient states that \"that it was more likely an infiltration as opposed to an actual reaction\"      Prior to Admission medications    Medication Sig Start Date End Date Taking? Authorizing Provider   oxyCODONE IR (ROXICODONE) 5 mg immediate release tablet Take 1 Tab by mouth every four (4) hours as needed. Max Daily Amount: 30 mg. 11/28/16   MD MISBAH Rocha. acidoph & paracasei- S therm- Bifido (BOB-Q/RISAQUAD) 8 billion cell cap cap Take 1 Cap by mouth daily. 10/19/16   Alec Fregoso MD   furosemide (LASIX) 40 mg tablet Take 40 mg by mouth daily. Historical Provider   lisinopril (PRINIVIL, ZESTRIL) 2.5 mg tablet Take 2.5 mg by mouth daily. Historical Provider   methocarbamol (ROBAXIN) 750 mg tablet Take 750 mg by mouth two (2) times daily as needed. 10/24/14   Historical Provider   Aspirin, Buffered 81 mg tab Take  by mouth daily. Historical Provider   metFORMIN (GLUCOPHAGE) 500 mg tablet Take  by mouth two (2) times daily (with meals). Historical Provider   simvastatin (ZOCOR) 10 mg tablet Take  by mouth nightly.     Historical Provider     REVIEW OF SYSTEMS:  See HPI for details  General: negative for fever, chills, sweats, + weakness, weight loss  Eyes: negative for blurred vision, eye pain, loss of vision, diplopia  Ear Nose and Throat: negative for rhinorrhea, pharyngitis, otalgia, tinnitus, speech or swallowing difficulties  Respiratory:  +pleuritic pain, cough, NO sputum production, wheezing, +SOB, LU  Cardiology:  negative for chest pain, palpitations, orthopnea, PND, edema, syncope   Gastrointestinal: negative for abdominal pain, N/V, dysphagia, change in bowel habits, bleeding  Genitourinary: negative for frequency, urgency, dysuria, hematuria, incontinence  Muskuloskeletal : negative for arthralgia, myalgia  Hematology: negative for easy bruising, bleeding, lymphadenopathy  Dermatological: negative for rash, ulceration, mole change, new lesion  Endocrine: negative for hot flashes or polydipsia  Neurological: negative for headache, dizziness, confusion, focal weakness, paresthesia, memory loss, gait disturbance  Psychological: negative for anxiety, depression, agitation    Objective:   VITALS:    Visit Vitals    /54    Pulse (!) 111    Temp 98.8 °F (37.1 °C)    Resp 25    Wt 80.7 kg (178 lb)    SpO2 98%    BMI 27.06 kg/m2     PHYSICAL EXAM:     GENERAL:    WD y   WN y   Cachectic    Thin y   Obese    Disheveled y   Ill Appearing Critically y   Ill Appearing Chronically    Acute Distress y pulmonary   Other      HEENT:    NC/AT/EOMI y   PERRLA y   Conjunctivae Pink    Conjunctivae Pale y   Moist Mucosa    Dry Mucosa y   Hearing intact to voice y   Other      NECK:    Supple y   Masses n   Thyroid Tender n   Other                   RESPIRATORY:    CTA bilaterally WITHOUT wheezing/rhonchi/rales or crackles    Wheezing n   Rhonchi n   Crackles y right   Use of accessory muscles y   Other      CARDIAC:    regular rate and rhythm No murmurs/rubs/gallops    Murmur 3/6   Rubs n   Gallops n   Rate Regular/Irregular reg   Carotid Bruit Left/Right n   Lower Extremity Edema n   JVP  n   Other Normal capillary refill     ABDOMEN:    Soft non distended non tender +bowel sounds no HSM y   Rigid    Tenderness    Hepatomegaly    Splenomegaly    Distended n   Increased girth due to habitus    Normal/Hyper/Hypo Active Bowel Sounds nml   Other      SKIN / MUSCULOSKELETAL:    Rashes n   Ecchymosis n   Ulcers    Tight to palpitation    Turgor Good/Poor g   Cyanosis/Clubbing n   Amputation(s) n   Other      NEUROLOGY:    cranial nerves II-XII grossly intact y   Cranial Nerve Deficit    Facial Droop n   Slurred Speech    Aphasia    Strength Normal y   Weakness n   Meningismus/Kernig's Sign/ Brudzinsky n   Follows Commands y   Other      PSYCHIATRIC:    AAOx3 in no acute distress y   Insight Poor    Insight Good y   Alert and Oriented to Person     Alert and Oriented to Place    Alert and Oriented toTime    Depressed    Anxious n   Agitated n   Lethargic n   Stuporous n   Sedated    Other    _______________________________________________________________________  Care Plan discussed with:    Comments   Patient y Discussed with patient in room. POC outlined and Questions answered (22   Family  y Wife at baseline   RN x    Care Manager                    Consultant:  shruthi CARMEN MD 10   _______________________________________________________________________  Recommended Disposition:   Home with Family y   HH/PT/OT/RN    SNF/LTC    HATTIE    ________________________________________________________________________  TOTAL TIME:   Minutes    Critical Care Provided   I have provided 60 minutes of critical care time. During this entire length of time I was immediately available to the patient. The reason for providing this level of medical care was due to a critical illness that impaired one or more vital organ systems, such that there was a high probability of imminent or life threatening deterioration in the patient's condition. This care involved high complexity decision making which includes reviewing the patient's past medical records, current laboratory results, and actual Xray films in order to assess, support vital system function, and to treat this degree of vital organ system failure, and to prevent further life threatening deterioration of the patients condition. Comments   >50% of visit spent in counseling and coordination of care x Chart review  Discussion with patient and/or family and questions answered     ________________________________________________________________________  Signed:  Maykel Parson MD    Procedures: see electronic medical records for all procedures/Xrays and details which were not copied into this note but were reviewed prior to creation of Plan. LAB DATA REVIEWED:    Recent Results (from the past 24 hour(s))   EKG, 12 LEAD, INITIAL    Collection Time: 01/02/17  1:45 PM   Result Value Ref Range    Ventricular Rate 113 BPM    Atrial Rate 113 BPM    P-R Interval 138 ms    QRS Duration 94 ms    Q-T Interval 314 ms    QTC Calculation (Bezet) 430 ms    Calculated P Axis 55 degrees    Calculated R Axis 70 degrees    Calculated T Axis 43 degrees    Diagnosis       Sinus tachycardia  When compared with ECG of 04-JUN-2015 08:44,  Vent. rate has increased BY  49 BPM     CBC WITH AUTOMATED DIFF    Collection Time: 01/02/17  3:45 PM   Result Value Ref Range    WBC 8.5 4.1 - 11.1 K/uL    RBC 4.43 4.10 - 5.70 M/uL    HGB 13.5 12.1 - 17.0 g/dL    HCT 37.8 36.6 - 50.3 %    MCV 85.3 80.0 - 99.0 FL    MCH 30.5 26.0 - 34.0 PG    MCHC 35.7 30.0 - 36.5 g/dL    RDW 14.5 11.5 - 14.5 %    PLATELET 217 140 - 049 K/uL    NEUTROPHILS 63 32 - 75 %    LYMPHOCYTES 16 12 - 49 %    MONOCYTES 19 (H) 5 - 13 %    EOSINOPHILS 1 0 - 7 %    BASOPHILS 1 0 - 1 %    ABS. NEUTROPHILS 5.4 1.8 - 8.0 K/UL    ABS. LYMPHOCYTES 1.4 0.8 - 3.5 K/UL    ABS. MONOCYTES 1.6 (H) 0.0 - 1.0 K/UL    ABS. EOSINOPHILS 0.0 0.0 - 0.4 K/UL    ABS. BASOPHILS 0.0 0.0 - 0.1 K/UL   METABOLIC PANEL, COMPREHENSIVE    Collection Time: 01/02/17  3:45 PM   Result Value Ref Range    Sodium 137 136 - 145 mmol/L    Potassium 3.2 (L) 3.5 - 5.1 mmol/L    Chloride 97 97 - 108 mmol/L    CO2 29 21 - 32 mmol/L    Anion gap 11 5 - 15 mmol/L    Glucose 199 (H) 65 - 100 mg/dL    BUN 10 6 - 20 MG/DL    Creatinine 1.04 0.70 - 1.30 MG/DL    BUN/Creatinine ratio 10 (L) 12 - 20      GFR est AA >60 >60 ml/min/1.73m2    GFR est non-AA >60 >60 ml/min/1.73m2    Calcium 8.9 8.5 - 10.1 MG/DL    Bilirubin, total 0.9 0.2 - 1.0 MG/DL    ALT 10 (L) 12 - 78 U/L    AST 15 15 - 37 U/L    Alk.  phosphatase 106 45 - 117 U/L    Protein, total 7.1 6.4 - 8.2 g/dL    Albumin 2.9 (L) 3.5 - 5.0 g/dL    Globulin 4.2 (H) 2.0 - 4.0 g/dL    A-G Ratio 0.7 (L) 1.1 - 2.2     URINALYSIS W/ REFLEX CULTURE    Collection Time: 01/02/17  3:48 PM   Result Value Ref Range    Color DARK YELLOW      Appearance TURBID (A) CLEAR      Specific gravity 1.020 1.003 - 1.030      pH (UA) 6.0 5.0 - 8.0      Protein 30 (A) NEG mg/dL    Glucose NEGATIVE  NEG mg/dL    Ketone NEGATIVE  NEG mg/dL    Blood NEGATIVE  NEG      Urobilinogen 0.2 0.2 - 1.0 EU/dL    Nitrites NEGATIVE  NEG      Leukocyte Esterase NEGATIVE  NEG      WBC 5-10 0 - 4 /hpf    RBC 5-10 0 - 5 /hpf    Epithelial cells FEW FEW /lpf    Bacteria 1+ (A) NEG /hpf    UA:UC IF INDICATED URINE CULTURE ORDERED (A) CNI     BILIRUBIN, CONFIRM    Collection Time: 01/02/17  3:48 PM   Result Value Ref Range    Bilirubin UA, confirm NEGATIVE  NEG     TROPONIN I    Collection Time: 01/02/17  4:30 PM   Result Value Ref Range    Troponin-I, Qt. <0.04 <0.05 ng/mL   CK W/ REFLX CKMB    Collection Time: 01/02/17  4:30 PM   Result Value Ref Range     39 - 308 U/L   LACTIC ACID, PLASMA    Collection Time: 01/02/17  4:30 PM   Result Value Ref Range    Lactic acid 1.8 0.4 - 2.0 MMOL/L   PTT    Collection Time: 01/02/17  7:55 PM   Result Value Ref Range    aPTT 39.1 (H) 22.1 - 32.5 sec    aPTT, therapeutic range     58.0 - 77.0 SECS

## 2017-01-03 NOTE — PROGRESS NOTES
Hospitalist Progress Note    NAME: Rachel Guidry   :  1950   MRN:  [id number]         Assessment / Plan:  Acute bilateral pulmonary embolus with right heart strain, right sided pleuritic chest pain and RLL pulmonary infarction:   - CTA chest with right lower lobar pulmonary embolism. Left lower lobar segmental pulmonary emboli. Questionable right heart strain: flattening of the interventricular septum. Probable developing infarction in the right lower lobe. Small sympathetic  right pleural effusion. Fluid-filled colon consistent with diarrhea. - on heparin gtt for now  - have asked CM to check pricing for xarelto, could transition tomorrow if has coverage  - check echo  Noninsulin dependent DM2 uncontrolled without complication:  - holding home metformin due to contrast for CT  - lispro sliding scale    Hypertension, benign/essential:  Blood pressure has been low  - con't holding home lisinopril, lasix  - prn nitrobid   Severe protein calorie malnutrition:  Appetite and strength remain poor after treatment for C Diff.  - supplements added  - nutrition consulted  Recent C Diff colitis :  Completed vancomycin ~2 weeks ago   - +stool c/w diarrhea seen on CT - will send for C Diff if has loose stool while here  L2-3 discitis 10/16 with psoas abscess and severe spinal stenosis: possible due to back injections vs DM foot ulcer as source. Disc aspiration cultures with amp-resistent staph, now felt to be treated but complicated by C Diff as above. Hypokalemia: replacing     Code Status: full  DVT Prophylaxis: heparin gtt     Subjective:     Chief Complaint / Reason for Physician Visit  \"I'm still having pain on the right side\". Discussed with RN events overnight.      Review of Systems:  Symptom Y/N Comments  Symptom Y/N Comments   Fever/Chills n   Chest Pain y    Poor Appetite y   Edema n    Cough n   Abdominal Pain n    Sputum n   Joint Pain     SOB/LU n   Pruritis/Rash     Nausea/vomit    Tolerating PT/OT     Diarrhea    Tolerating Diet     Constipation    Other       Could NOT obtain due to:      Objective:     VITALS:   Last 24hrs VS reviewed since prior progress note. Most recent are:  Patient Vitals for the past 24 hrs:   Temp Pulse Resp BP SpO2   01/03/17 0700 - 99 20 105/47 96 %   01/03/17 0600 - (!) 101 20 - 95 %   01/03/17 0500 - (!) 102 20 102/48 95 %   01/03/17 0425 - (!) 104 20 102/53 95 %   01/03/17 0314 98.6 °F (37 °C) (!) 114 30 129/65 95 %   01/03/17 0312 - - - - (!) 85 %   01/03/17 0300 98.6 °F (37 °C) - - - -   01/03/17 0230 - (!) 108 18 116/54 97 %   01/03/17 0215 - (!) 111 20 114/56 96 %   01/03/17 0200 - (!) 110 20 100/55 98 %   01/03/17 0145 - (!) 109 19 93/51 96 %   01/03/17 0130 - (!) 110 18 116/60 96 %   01/03/17 0115 - (!) 109 21 97/64 98 %   01/03/17 0100 - (!) 109 20 91/52 96 %   01/03/17 0045 - (!) 108 20 100/58 93 %   01/03/17 0030 - (!) 113 25 114/54 98 %   01/03/17 0015 - (!) 112 22 123/59 94 %   01/03/17 0000 - (!) 112 29 113/60 98 %   01/02/17 2345 - (!) 112 22 115/65 96 %   01/02/17 2315 - (!) 115 25 116/56 97 %   01/02/17 2300 - (!) 117 23 106/64 96 %   01/02/17 2215 - (!) 111 25 105/54 98 %   01/02/17 2115 - (!) 115 28 118/55 97 %   01/02/17 2030 - (!) 116 26 112/64 96 %   01/02/17 1951 - (!) 114 26 114/55 97 %   01/02/17 1934 - (!) 114 22 124/74 98 %   01/02/17 1900 - (!) 117 30 - 92 %   01/02/17 1827 - (!) 111 20 109/89 98 %   01/02/17 1745 - (!) 112 28 110/53 99 %   01/02/17 1730 - (!) 113 27 107/51 97 %   01/02/17 1700 - (!) 116 26 126/55 97 %   01/02/17 1617 - (!) 120 29 - 97 %   01/02/17 1612 - - - 147/79 100 %   01/02/17 1417 - (!) 105 18 124/67 98 %   01/02/17 1331 98.8 °F (37.1 °C) (!) 123 18 109/59 97 %       Intake/Output Summary (Last 24 hours) at 01/03/17 0818  Last data filed at 01/03/17 0700   Gross per 24 hour   Intake           524.52 ml   Output              300 ml   Net           224.52 ml        PHYSICAL EXAM:  General: WD, WN.  Alert, cooperative, no acute distress    EENT:  EOMI. Anicteric sclerae. MMM  Resp:  CTA bilaterally, no wheezing or rales. No accessory muscle use  CV:  Regular rhythm,  No edema  GI:  Soft, Non distended, Non tender.  +Bowel sounds  Neurologic:  Alert and oriented X 3, normal speech,   Psych:   Fair insight. Not anxious nor agitated  Skin:  No rashes. No jaundice    Reviewed most current lab test results and cultures  YES  Reviewed most current radiology test results   YES  Review and summation of old records today    NO  Reviewed patient's current orders and MAR    YES  PMH/SH reviewed - no change compared to H&P  ________________________________________________________________________  Care Plan discussed with:    Comments   Patient x    Family      RN x    Care Manager     Consultant                        Multidiciplinary team rounds were held today with , nursing, pharmacist and clinical coordinator. Patient's plan of care was discussed; medications were reviewed and discharge planning was addressed. ________________________________________________________________________  Total NON critical care TIME:  35 Minutes    Total CRITICAL CARE TIME Spent:   Minutes non procedure based      Comments   >50% of visit spent in counseling and coordination of care x    ________________________________________________________________________  Sebastian Peterson MD     Procedures: see electronic medical records for all procedures/Xrays and details which were not copied into this note but were reviewed prior to creation of Plan. LABS:  I reviewed today's most current labs and imaging studies.   Pertinent labs include:  Recent Labs      01/03/17   0454  01/02/17   1545   WBC  5.5  8.5   HGB  11.0*  13.5   HCT  30.9*  37.8   PLT  237  308     Recent Labs      01/03/17   0454  01/02/17   1545   NA  142  137   K  3.2*  3.2*   CL  106  97   CO2  26  29   GLU  155*  199*   BUN  9  10   CREA  0.79  1.04   CA  7.8*  8.9   MG  1.9   --    PHOS 2.7   --    ALB  2.1*  2.9*   TBILI  0.8  0.9   SGOT  14*  15   ALT  7*  10*       Signed: Gonzalo Hester MD

## 2017-01-03 NOTE — PROGRESS NOTES
5101: TRANSFER - IN REPORT:    Verbal report received from [address] [facility] RN(name) on Arias Saldivar  being received from ED(unit) for routine progression of care      Report consisted of patients Situation, Background, Assessment and   Recommendations(SBAR). Information from the following report(s) ED Summary, Intake/Output, MAR, Recent Results and Cardiac Rhythm ST was reviewed with the receiving nurse. Opportunity for questions and clarification was provided. Assessment completed upon patients arrival to unit and care assumed. Primary Nurse Elio Stanley RN and Jose Gray RN performed a dual skin assessment on this patient No impairment noted. Harris score is 20.    0600: Patient refusing do drink Miralax due to \"bad taste\". Resting well this morning after Motrin taken earlier for pain.

## 2017-01-03 NOTE — CONSULTS
[facility] INTENSIVIST Consult Service Note  Pulmonary, Critical Care, and Sleep Medicine    Name: Johnetta Favre MRN: [id number]   : 1950 Hospital: [address]   Date: 1/3/2017   Hospital Day: 2       Subjective/Interval History:   I have reviewed the flowsheet and previous days notes. Seen earlier today on rounds. Reviewed the evaluation and will assist in implementing plan as outlined by Dr. Nathaniel Mccall and team      IMPRESSION:   · Acute pulmonary embolus with right heart strain  · Sinus tachycardia  · Pulmonary infarction  · Right Pleuritic chest pain  · Hypokalemia  · Diabetes mellitus 2 uncontrolled-poc bs + ssi for now-hold OP PO medicine for now - restart as improves  · Hypertension, Benign essential -labetalol PRN sbp >170  · Hypokalemia  · Former Smoker Packs/day:3.00  Years:20.00  · h/o C diff  · Recent Lumbar Diskiitis      RECOMMENDATIONS/PLAN:   · Monitor for back pain acute neuro changes  · If he develops low back pain as above, can stop heparin and get stat MRI to r/o spinal hematoma  · Continue heparin for 48 hours then if no problems, may convert to NOAC or warfarin  · Replete K  · Recheck for C diff?- now on isolation  · Will be available to assist in medical management while in the CCU pending disposition     Code: Full Code          Subjective/Initial History:   I have reviewed the flowsheet and previous days notes. Asked to see this 78 y.o.    male now in the CCU as a PCU overflow with Large PE, on IV heparin. Florian Cox He presented to ED with 5days of increased WOB with exertion worsened over the last 24hr when he further developed right sided chst pain with deep breath. He notes pain is sharp, stabbing at peak inspiration. To improve pain he was taking short breaths and using peppermints. No f/c/n/v. +diarrhea and weakness over this time as well and he is concerned of the viral GE that is going around. No sick contacts. Decreased PO intake.  ++recent completion of treatment for Discitis by Dr Yamilex Acevedo 2 mos ago and unfortunately developed CDIFF following completion of abx for discitis and now has completed 3wks PO vanco as of 3-4wks ago. He has improved overall. In ED noted to have PE right lower and left lower lobar with developing right lower lobar infarct and possible right heart strain pattern. Without hypotension. Due to his recent Discitiis, decision made to not to use TPA, hep gtt initiated. reamins on O2 for comfort. +tachycardic in ED with right sided chest discomfort. Pt admitted. No back pain or leg complaints. Has some liquid stools. Patient with pulmonary embolism and evidence of right ventricular strain. Pt is tachycardic at rest. Some chest pain with deep breathing. PMH:  has a past medical history of Arthritis; Chronic pain; DM (diabetes mellitus) ([address] High cholesterol; and HTN (hypertension). He also has no past medical history of Difficult intubation; Malignant hyperthermia due to anesthesia; Nausea & vomiting; Pseudocholinesterase deficiency; or Unspecified adverse effect of anesthesia. PSH:   has a past surgical history that includes amputation; orthopaedic (Left); and other surgical (6/4/15). FHX: family history includes Heart Disease in his father and mother. SHX:  reports that he has quit smoking. His smoking use included Cigarettes. He has a 60.00 pack-year smoking history. He quit smokeless tobacco use about 21 years ago. He reports that he drinks about 0.5 oz of alcohol per week  He reports that he uses illicit drugs, including Prescription and OTC.    ROS:A comprehensive review of systems was negative except for that written in the HPI.     MAR reviewed and pertinent medications noted or modified as needed    Allergies   Allergen Reactions    Vancomycin Rash     Patient broke out in large hive below IV site and c/o itching to area, patient states that \"that it was more likely an infiltration as opposed to an actual reaction\"        MEDS:   Current Facility-Administered Medications   Medication    lactobac ac& pc-s.therm-b.anim (BOB Q/RISAQUAD)    lisinopril (PRINIVIL, ZESTRIL) tablet 2.5 mg    oxyCODONE IR (ROXICODONE) tablet 5 mg    pravastatin (PRAVACHOL) tablet 20 mg    ondansetron (ZOFRAN) injection 4 mg    labetalol (NORMODYNE;TRANDATE) 20 mg/4 mL (5 mg/mL) injection 10 mg    polyethylene glycol (MIRALAX) packet 17 g    albuterol-ipratropium (DUO-NEB) 2.5 MG-0.5 MG/3 ML    0.9% sodium chloride infusion    insulin lispro (HUMALOG) injection    glucose chewable tablet 16 g    dextrose (D50W) injection syrg 12.5-25 g    glucagon (GLUCAGEN) injection 1 mg    ibuprofen (MOTRIN) tablet 200 mg    sodium chloride (NS) flush 5-10 mL    sodium chloride (NS) flush 5-10 mL    heparin 25,000 units in D5W 250 ml infusion    mupirocin (BACTROBAN) 2 % ointment    heparin (porcine) injection 6,450 Units    heparin (porcine) injection 3,250 Units    0.9% sodium chloride infusion          Telemetry:    normal sinus rhythm      Objective:     Vital Signs: Intake/Output: Intake/Output:   Visit Vitals    /48    Pulse (!) 101    Temp 98.6 °F (37 °C)    Resp 20    Wt 80.7 kg (177 lb 14.6 oz)    SpO2 95%    BMI 27.05 kg/m2      O2 Device: Room air     Temp (24hrs), Av.7 °F (37.1 °C), Min:98.6 °F (37 °C), Max:98.8 °F (37.1 °C)     Intake/Output Summary (Last 24 hours) at 17 0731  Last data filed at 17 0400   Gross per 24 hour   Intake           294.68 ml   Output              300 ml   Net            -5.32 ml    Last shift:         Last 3 shifts:  1901 -  0700  In: 294.7 [I.V.:294.7]  Out: 300 [Urine:300]           Physical Exam:    General: well developed and well nourished, in no respiratory distress and acyanotic, alert and oriented times 3   HEENT: NCAT   Neck, Lymph: No abnormally enlarged lymph nodes.    Chest: increased AP diameter   Lungs: normal air entry   Heart: Regular rate and rhythm, S1S2 present. EVAN right base   Abdomen: soft, non-tender, without masses or organomegaly   :    Extremity: negative, cyanosis, clubbing;     Neuro: alert   Psych: oriented to time, place and person   Skin: Skin unremarkableSkin color, texture, turgor normal. No rashes or lesions;   Pulses: Bilateral, Radial, 2+    Capillary refill: normal warm;      Data:             Labs:    Recent Labs      01/03/17   0454  01/03/17   0200  01/02/17   1955  01/02/17   1545   WBC  5.5   --    --   8.5   HGB  11.0*   --    --   13.5   PLT  237   --    --   308   APTT  47.6*  123.8*  39.1*   --      Recent Labs      01/03/17   0454  01/02/17   1630  01/02/17   1545   NA  142   --   137   K  3.2*   --   3.2*   CL  106   --   97   CO2  26   --   29   GLU  155*   --   199*   BUN  9   --   10   CREA  0.79   --   1.04   CA  7.8*   --   8.9   MG  1.9   --    --    PHOS  2.7   --    --    LAC  0.9  1.8   --    ALB  2.1*   --   2.9*   SGOT  14*   --   15   ALT  7*   --   10*     No results for input(s): PH, PCO2, PO2, HCO3, FIO2 in the last 72 hours. No results for input(s): PH, PCO2, PO2, HCO3, FIO2 in the last 72 hours. Recent Labs      01/03/17 0454  01/02/17   1630   CPK  136   --    CKNDX  CANNOT BE CALCULATED   --    TROIQ  <0.04  <0.04         Imaging:  I have personally reviewed the patients radiographs and have reviewed the reports:          Results from Hospital Encounter encounter on 11/22/16   XR CHEST PORT   Narrative INDICATION: Evaluate PICC line. Portable AP semiupright view of the chest.    Direct comparison made to prior chest x-ray dated August 6, 2015. Cardiomediastinal silhouette is stable. Right-sided PICC line extends to the mid  SVC. Lungs are clear bilaterally. Pleural spaces are normal. There is marked  degenerative narrowing of the right glenohumeral joint. Impression IMPRESSION: PICC line in appropriate position.             Results from [facility] encounter on 01/02/17   CT ABD PELV W CONT   Narrative CT ANGIOGRAPHY CHEST and CT OF THE ABDOMEN AND PELVIS WITH CONTRAST. 1/2/2017  6:51 PM     INDICATION: Dyspnea, flank pain. COMPARISON: CT abdomen pelvis 11/22/2016, chest CT 10/27/2009, CT lumbar spine  10/14/2016. TECHNIQUE: CT angiography of the chest was performed after the administration of  100 cc IV contrast (Isovue 370). Coronal and sagittal, and coronal MIP  reconstructions were performed. CT of the abdomen and pelvis was performed after the same IV contrast  administration. CT dose reduction was achieved through use of a standardized  protocol tailored for this examination and automatic exposure control for dose  modulation. FINDINGS:  Chest: A large pulmonary embolism in the right lower lobe straddles several  segmental branches. Distal airspace disease in the right lower lobe may  represent developing infarction. There is a trace sympathetic effusion. A  smaller pulmonary embolism straddles several segmental branches of the left  lower lobe. Flattening of the interventricular septum suggests right heart  strain. The lungs are otherwise clear. The central airways are patent. The  esophagus is patulous, and there is fluid and debris extending nearly to the  level of the thoracic inlet. The heart is at the upper limits of normal in size. Aortic valvular and left anterior descending and right coronary artery  calcifications are mild. No thoracic lymphadenopathy. End-stage right  glenohumeral osteoarthritis. Abdomen: Tiny calculi layer dependently in the gallbladder. The pancreas is  fatty infiltrated. The stomach, duodenum, liver, spleen, adrenals, and kidneys  are normal.    Pelvis: Though the appendix is mildly dilated (9 mm), the tip is normal caliber  (4 mm), and there is no periappendiceal fat stranding. Further, it appears to be  filled with fluid, and the colon is also fluid-filled. Colonic mucosal hyperemia  is mild, and greatest in the rectum.  Sigmoid diverticulosis is extensive. The  small bowel and bladder are normal. No free air or fluid, and no abdominopelvic  lymphadenopathy. Bones: Destruction of the endplates around chronic L2-L3 osteomyelitis is  stable. There is bilateral neural foraminal stenosis L2-L4. Impression IMPRESSION:   1. Right lower lobar pulmonary embolism. Left lower lobar segmental pulmonary  emboli. 2. Questionable right heart strain: flattening of the interventricular septum. 3. Probable developing infarction in the right lower lobe. Small sympathetic  right pleural effusion. 4. Fluid-filled colon consistent with diarrhea. The findings were called to Dr. Shameka Pereyra on 1/2/2017 7:20 PM by Dr. Travis Terrazas.   789             My assessment/plan was discussed with:  nursing    respiratory therapy Dr. flower      Complex decision making was performed which includes reviewing the patient's past medical records, current laboratory results, medications, ECG and actual Xray films, immediately available at the bedside to the patient          Hoa Cuevas MD

## 2017-01-03 NOTE — PROGRESS NOTES
Nutrition Assessment:    RECOMMENDATIONS:   Continue diet as tolerated  RD to add Glucerna BID    DIETITIANS INTERVENTIONS/PLAN:   Continue diet as tolerated  Add PO supplements  Monitor appetite/PO intake    ASSESSMENT:   Pt admitted with PE + diarrhea. PMH: HTN, DM. Chart reviewed, case discussed during CCU rounds. Pt reports poor appetite 2' recent hospitalizations, he mainly sticks with liquids/soft foods like soups, yogurt, pudding etc.  Pt does drink ensure at home, and is willing to try glucerna while here. Noted he has had a significant 12.6% wt loss since October 2016. K+ 3.2, being repleted. Pt being checked for C diff. Pt seems depressed, notified RN he may like a visit from [facility] care. Provided pt with a menu and explanation of room service, encouraging him to orders what items he feels he can tolerate best.       SUBJECTIVE/OBJECTIVE:   \"I like all kinds of fruit\"   Diet Order: Regular, Other (comment) (NCS + Ensure Pudding TID)  % Eaten:  No data found. Pertinent Medications:humalog, oj Q, miralax, KCl; Rakesh@[employer]). Chemistries:  Lab Results   Component Value Date/Time    Sodium 142 01/03/2017 04:54 AM    Potassium 3.2 01/03/2017 04:54 AM    Chloride 106 01/03/2017 04:54 AM    CO2 26 01/03/2017 04:54 AM    Anion gap 10 01/03/2017 04:54 AM    Glucose 155 01/03/2017 04:54 AM    BUN 9 01/03/2017 04:54 AM    Creatinine 0.79 01/03/2017 04:54 AM    BUN/Creatinine ratio 11 01/03/2017 04:54 AM    GFR est AA >60 01/03/2017 04:54 AM    GFR est non-AA >60 01/03/2017 04:54 AM    Calcium 7.8 01/03/2017 04:54 AM    ALT 7 01/03/2017 04:54 AM    AST 14 01/03/2017 04:54 AM    Alk.  phosphatase 80 01/03/2017 04:54 AM    Protein, total 5.5 01/03/2017 04:54 AM    Albumin 2.1 01/03/2017 04:54 AM    Globulin 3.4 01/03/2017 04:54 AM    A-G Ratio 0.6 01/03/2017 04:54 AM      Anthropometrics: Height: 5' 8\" (172.7 cm) Weight: 80.7 kg (177 lb 14.6 oz)    IBW (%IBW):   ( ) UBW (%UBW):   (  %)    BMI: Body mass index is 27.05 kg/(m^2). This BMI is indicative of:  []Underweight   [x]Normal(for age)   []Overweight   [] Obesity   [] Extreme Obesity (BMI>40)  Estimated Nutrition Needs (Based on): 2018 Kcals/day (MSJ 1552 x 1.3) , 65 g (0.8gPro/kg) Protein  Carbohydrate: At Least 130 g/day  Fluids: 2000 mL/day    Last BM: 1/3-diarrhea   [x]Active     []Hyperactive  []Hypoactive       [] Absent   BS  Skin:    [x] Intact   [] Incision  [] Breakdown   [] DTI   [] Tears/Excoriation/Abrasion  []Edema [] Other: Wt Readings from Last 30 Encounters:   01/03/17 80.7 kg (177 lb 14.6 oz)   11/22/16 83.5 kg (184 lb)   10/14/16 92.3 kg (203 lb 7.8 oz)   06/20/16 109.2 kg (240 lb 11.9 oz)   08/06/15 97.1 kg (214 lb 2 oz)   06/23/15 97.1 kg (214 lb)   06/04/15 101.6 kg (224 lb)   01/15/15 101.3 kg (223 lb 6.4 oz)   11/20/14 105.4 kg (232 lb 4.8 oz)      NUTRITION DIAGNOSES:   Problem:  Inadequate oral food/beverage intake      Etiology: related to poor appetite and diarrhea      Signs/Symptoms: as evidenced by 12.6% wt loss since October 2016. NUTRITION INTERVENTIONS:  Meals/Snacks: General/healthful diet   Supplements: Commercial supplement              GOAL:   Pt will consume >50% of meals/supplements in 2-4 days.      Cultural, Mu-ism, or Ethnic Dietary Needs: None     LEARNING NEEDS (Diet, Food/Nutrient-Drug Interaction):    [x] None Identified   [] Identified and Education Provided/Documented   [] Identified and Pt declined/was not appropriate      [x] Interdisciplinary Care Plan Reviewed/Documented    [x] Participated in Discharge Planning: Regular diet + Glucerna BID     [x] Interdisciplinary Rounds     NUTRITION RISK:    [x] High              [] Moderate           []  Low  []  Minimal/Uncompromised    PT SEEN FOR:    [x]  MD Consult: []Calorie Count      []Diabetic Diet Education        []Diet Education     []Electrolyte Management     [x]General Nutrition Management and Supplements     []Management of Tube Feeding     []TPN Recommendations    [x]  RN Referral:  [x]MST score >=2     []Enteral/Parenteral Nutrition PTA     []Pregnant: Gestational DM or Multigestation   []  Low BMI  []  DTR Referral       Andrew Sanon RD  Pager [phone]  Weekend Pager [phone]

## 2017-01-03 NOTE — PROGRESS NOTES
Problem: Mobility Impaired (Adult and Pediatric)  Goal: *Acute Goals and Plan of Care (Insert Text)  Physical Therapy Goals  Initiated 1/3/2017  1. Patient will move from supine to sit and sit to supine , scoot up and down and roll side to side in bed with modified independence within 7 day(s). 2. Patient will transfer from bed to chair and chair to bed with modified independence using the least restrictive device within 7 day(s). 3. Patient will perform sit to stand with modified independence within 7 day(s). 4. Patient will ambulate with modified independence for 250 feet with the least restrictive device within 7 day(s). PHYSICAL THERAPY EVALUATION  Patient: Howard Serrano (9 y.o. male)  Date: 1/3/2017  Primary Diagnosis: Pulmonary emboli (HCC)        Precautions:   Fall, Contact      ASSESSMENT :  Based on the objective data described below, the patient presents with impaired functional mobility secondary to increased R sided flank pain, decreased safety awareness, impulsiveness, generalized weakness, and decreased endurance/activity tolerance. Prior to admission, pt reports being mod I with use of RW however reports history of 1 fall. Today, pt required increased amt of encouragement for minimal participation with therapy with pt only agreeable to mobilization to bedside chair. Pt performed all aspects of mobility with stand-by assist this date however pt extremely impulsive and would not let this author assist despite unsteady gait. Pt unable to achieve full hip and trunk extension during limited distance ambulation trial, exhibiting excessive trunk flexion as well as noted to be reaching out for support of furniture/objects. Shuffling, unsteady gait overall. Anticipate that gait stability will be much improved with use of RW as this is pt's baseline. Will progress mobility with use of RW during next therapy session.  Educated pt regarding importance of participation with therapy and progressing mobility as well as SAFETY during mobility with pt voicing understanding. Upon discharge, recommend pt return home w/ continued assist of wife and resumption of OP PT vs HH PT. Patient will benefit from skilled intervention to address the above impairments. Patients rehabilitation potential is considered to be Good  Factors which may influence rehabilitation potential include:   [ ]         None noted  [ ]         Mental ability/status  [ ]         Medical condition  [ ]         Home/family situation and support systems  [X]         Safety awareness  [ ]         Pain tolerance/management  [ ]         Other:        PLAN :  Recommendations and Planned Interventions:  [X]           Bed Mobility Training             [ ]    Neuromuscular Re-Education  [X]           Transfer Training                   [ ]    Orthotic/Prosthetic Training  [X]           Gait Training                         [ ]    Modalities  [X]           Therapeutic Exercises           [ ]    Edema Management/Control  [X]           Therapeutic Activities            [X]    Patient and Family Training/Education  [ ]           Other (comment):     Frequency/Duration: Patient will be followed by physical therapy  3 times a week to address goals. Discharge Recommendations: TBD pending further participation/progress with therapy  Further Equipment Recommendations for Discharge: None       SUBJECTIVE:   Patient stated You all don't understand. I'm not doing anything until I get my yogurt.       OBJECTIVE DATA SUMMARY:   HISTORY:    Past Medical History   Diagnosis Date    Arthritis         knees, back    Chronic pain         knees, back    DM [facility]diabetes mellitus) ([facility] [address]      High cholesterol      HTN (hypertension)       Past Surgical History   Procedure Laterality Date    Hx amputation           vascular; Left great toe amputation    Hx orthopaedic Left         Left arm fracture & repair    Hx other surgical   6/4/15       INCISION AND DRAINAGE, RESECTION OF REMAINING 1ST METATARSAL, INSERTION OF ANTIBIOTIC BEADS     Prior Level of Function/Home Situation: Mod I with use of RW, history of 1 fall since hospital admission 11/2016, lives with wife, has been attending outpatient physical therapy since Nov. 2016.    Personal factors and/or comorbidities impacting plan of care:      Home Situation  Home Environment: Private residence  # Steps to Enter: 0  Wheelchair Ramp: Yes  One/Two Story Residence: One story  Living Alone: No  Support Systems: Family member(s), Spouse/Significant Other/Partner  Patient Expects to be Discharged to[de-identified] Private residence  Current DME Used/Available at Home: Blood pressure cuff, Cane, straight, Glucometer, Walker, rolling  Tub or Shower Type: Tub/Shower combination     EXAMINATION/PRESENTATION/DECISION MAKING:   Critical Behavior:  Neurologic State: Irritable, Alert  Orientation Level: Oriented X4  Cognition: Follows commands  Safety/Judgement: Decreased insight into deficits, Decreased awareness of need for safety, Decreased awareness of need for assistance  Skin:  Intact  Strength:    Strength: Generally decreased, functional                    Tone & Sensation:   Tone: Normal              Sensation: Intact               Range Of Motion:  AROM: Within functional limits (with exception of R shoulder 2/2 hx of rotator cuff injury)                       Coordination:  Coordination: Within functional limits     Functional Mobility:  Bed Mobility:  Rolling: Supervision  Supine to Sit: Supervision     Scooting: Supervision  Transfers:  Sit to Stand: Stand-by asssistance  Stand to Sit: Stand-by asssistance        Bed to Chair: Stand-by asssistance              Balance:   Sitting: Intact  Standing: Impaired  Standing - Static: Fair  Standing - Dynamic : Fair  Ambulation/Gait Training:  Distance (ft): 2 Feet (ft)  Assistive Device: Gait belt  Ambulation - Level of Assistance: Stand-by asssistance        Gait Abnormalities: Decreased step clearance;Shuffling gait (excessive trunk flexion)        Base of Support: Widened     Speed/Elysia: Pace decreased (<100 feet/min); Shuffled  Step Length: Left shortened;Right shortened                             Pt ambulated ~2ft from EOB to bedside chair w/ stand-by assist, exhibiting shuffling gait with excessive trunk flexion and pt noted to be reaching out for support of furniture/objects. VERY IMPULSIVE during mobility. Functional Measure:  Barthel Index:      Bathin  Bladder: 10  Bowels: 10  Groomin  Dressin  Feeding: 10  Mobility: 0  Stairs: 0  Toilet Use: 5  Transfer (Bed to Chair and Back): 10  Total: 55         Barthel and G-code impairment scale:  Percentage of impairment CH  0% CI  1-19% CJ  20-39% CK  40-59% CL  60-79% CM  80-99% CN  100%   Barthel Score 0-100 100 99-80 79-60 59-40 20-39 1-19    0   Barthel Score 0-20 20 17-19 13-16 9-12 5-8 1-4 0      The Barthel ADL Index: Guidelines  1. The index should be used as a record of what a patient does, not as a record of what a patient could do. 2. The main aim is to establish degree of independence from any help, physical or verbal, however minor and for whatever reason. 3. The need for supervision renders the patient not independent. 4. A patient's performance should be established using the best available evidence. Asking the patient, friends/relatives and nurses are the usual sources, but direct observation and common sense are also important. However direct testing is not needed. 5. Usually the patient's performance over the preceding 24-48 hours is important, but occasionally longer periods will be relevant. 6. Middle categories imply that the patient supplies over 50 per cent of the effort. 7. Use of aids to be independent is allowed. India Dewitt., Barthel, D.W. (1431). Functional evaluation: the Barthel Index. [facility] (14)2. CELI Mcleod, Farshad Velasquez., Masood Santos., [place], [address] [place] [address] ().  Measuring the change indisability after inpatient rehabilitation; comparison of the responsiveness of the Barthel Index and Functional Proctor Measure. Journal of Neurology, Neurosurgery, and Psychiatry, 66(4), 649-636. JONATHAN Griffin.PETER, TROY Neal, & Clarissa Beltran M.A. (2004.) Assessment of post-stroke quality of life in cost-effectiveness studies: The usefulness of the Barthel Index and the EuroQoL-5D. Quality of Life Research, 13, 108-61         G codes: In compliance with CMSs Claims Based Outcome Reporting, the following G-code set was chosen for this patient based on their primary functional limitation being treated: The outcome measure chosen to determine the severity of the functional limitation was the Barthel Index with a score of 55/100 which was correlated with the impairment scale. · Mobility - Walking and Moving Around:               - CURRENT STATUS:    CK - 40%-59% impaired, limited or restricted               - GOAL STATUS:           CI - 1%-19% impaired, limited or restricted               - D/C STATUS:                       ---------------To be determined---------------      Physical Therapy Evaluation Charge Determination   History Examination Presentation Decision-Making   LOW Complexity : Zero comorbidities / personal factors that will impact the outcome / POC LOW Complexity : 1-2 Standardized tests and measures addressing body structure, function, activity limitation and / or participation in recreation  LOW Complexity : Stable, uncomplicated  LOW Complexity : FOTO score of       Based on the above components, the patient evaluation is determined to be of the following complexity level: LOW      Pain:  Pain Scale 1: Numeric (0 - 10)  Pain Intensity 1: 0  Pain Location 1: Flank  Pain Orientation 1: Right  Pain Description 1: Sharp; Other (comment) (hurts with movement and deep breaths)  Pain Intervention(s) 1: Medication (see MAR)  Activity Tolerance: VSS throughout on RA  Please refer to the flowsheet for vital signs taken during this treatment. After treatment:   [X]         Patient left in no apparent distress sitting up in chair  [ ]         Patient left in no apparent distress in bed  [X]         Call bell left within reach  [X]         Nursing notified  [ ]         Caregiver present  [ ]         Bed alarm activated      COMMUNICATION/EDUCATION:   The patients plan of care was discussed with: Occupational Therapist and Registered Nurse.  [X]         Fall prevention education was provided and the patient/caregiver indicated understanding. [X]         Patient/family have participated as able in goal setting and plan of care. [X]         Patient/family agree to work toward stated goals and plan of care. [ ]         Patient understands intent and goals of therapy, but is neutral about his/her participation. [ ]         Patient is unable to participate in goal setting and plan of care.      Thank you for this referral.  Gisselle Shay, PT, DPT   Time Calculation: 19 mins

## 2017-01-03 NOTE — ED NOTES
Care assumed of patient @ this time & intro with rounding done, with report from ___Hermann Barry RN________________. Patient resting quietly on stretcher without verbal complaints.

## 2017-01-03 NOTE — ED NOTES
Pt. Repositioned in bed for comfort. Pt. Reports having \"just a little back pain\", which he describes as chronic in nature. Sacral area assessed; no signs of skin break down noted.

## 2017-01-03 NOTE — PROGRESS NOTES
10:30 Pt sitting in chair, c/o of discomfort when moving but relief post med with motrin. I loose stool this am and voided without problem. Awaiting transfer to [facility] floor. Cont on heparin gtt, ptt pending @ 12:35.  14:00 Heparin gtt remains @ 15 units/kg/hr, ptt therapeutic 76.6. Cont to monitor. 19:15 Report to oncoming shift Sudha SCHULTE.

## 2017-01-04 LAB
ANION GAP BLD CALC-SCNC: 10 MMOL/L (ref 5–15)
APTT PPP: 77.2 SEC (ref 22.1–32.5)
BACTERIA SPEC CULT: NORMAL
BUN SERPL-MCNC: 10 MG/DL (ref 6–20)
BUN/CREAT SERPL: 13 (ref 12–20)
CALCIUM SERPL-MCNC: 7.6 MG/DL (ref 8.5–10.1)
CC UR VC: NORMAL
CHLORIDE SERPL-SCNC: 107 MMOL/L (ref 97–108)
CO2 SERPL-SCNC: 25 MMOL/L (ref 21–32)
CREAT SERPL-MCNC: 0.79 MG/DL (ref 0.7–1.3)
ERYTHROCYTE [DISTWIDTH] IN BLOOD BY AUTOMATED COUNT: 14.6 % (ref 11.5–14.5)
GLUCOSE BLD STRIP.AUTO-MCNC: 125 MG/DL (ref 65–100)
GLUCOSE BLD STRIP.AUTO-MCNC: 144 MG/DL (ref 65–100)
GLUCOSE BLD STRIP.AUTO-MCNC: 152 MG/DL (ref 65–100)
GLUCOSE BLD STRIP.AUTO-MCNC: 162 MG/DL (ref 65–100)
GLUCOSE SERPL-MCNC: 116 MG/DL (ref 65–100)
HCT VFR BLD AUTO: 21.1 % (ref 36.6–50.3)
HCT VFR BLD AUTO: 28.8 % (ref 36.6–50.3)
HGB BLD-MCNC: 10.4 G/DL (ref 12.1–17)
HGB BLD-MCNC: 7.5 G/DL (ref 12.1–17)
MAGNESIUM SERPL-MCNC: 1.8 MG/DL (ref 1.6–2.4)
MCH RBC QN AUTO: 29.3 PG (ref 26–34)
MCHC RBC AUTO-ENTMCNC: 35.5 G/DL (ref 30–36.5)
MCV RBC AUTO: 82.4 FL (ref 80–99)
PLATELET # BLD AUTO: 206 K/UL (ref 150–400)
POTASSIUM SERPL-SCNC: 3.4 MMOL/L (ref 3.5–5.1)
RBC # BLD AUTO: 2.56 M/UL (ref 4.1–5.7)
SERVICE CMNT-IMP: ABNORMAL
SERVICE CMNT-IMP: NORMAL
SODIUM SERPL-SCNC: 142 MMOL/L (ref 136–145)
THERAPEUTIC RANGE,PTTT: ABNORMAL SECS (ref 58–77)
WBC # BLD AUTO: 6.9 K/UL (ref 4.1–11.1)

## 2017-01-04 PROCEDURE — 85027 COMPLETE CBC AUTOMATED: CPT | Performed by: INTERNAL MEDICINE

## 2017-01-04 PROCEDURE — 36415 COLL VENOUS BLD VENIPUNCTURE: CPT | Performed by: INTERNAL MEDICINE

## 2017-01-04 PROCEDURE — 80048 BASIC METABOLIC PNL TOTAL CA: CPT | Performed by: INTERNAL MEDICINE

## 2017-01-04 PROCEDURE — 74011250636 HC RX REV CODE- 250/636: Performed by: INTERNAL MEDICINE

## 2017-01-04 PROCEDURE — 85018 HEMOGLOBIN: CPT | Performed by: INTERNAL MEDICINE

## 2017-01-04 PROCEDURE — 85730 THROMBOPLASTIN TIME PARTIAL: CPT | Performed by: INTERNAL MEDICINE

## 2017-01-04 PROCEDURE — 74011250637 HC RX REV CODE- 250/637: Performed by: INTERNAL MEDICINE

## 2017-01-04 PROCEDURE — 83735 ASSAY OF MAGNESIUM: CPT | Performed by: INTERNAL MEDICINE

## 2017-01-04 PROCEDURE — 74011636637 HC RX REV CODE- 636/637: Performed by: INTERNAL MEDICINE

## 2017-01-04 PROCEDURE — 82962 GLUCOSE BLOOD TEST: CPT

## 2017-01-04 PROCEDURE — 65660000000 HC RM CCU STEPDOWN

## 2017-01-04 RX ORDER — POTASSIUM CHLORIDE 750 MG/1
40 TABLET, FILM COATED, EXTENDED RELEASE ORAL
Status: COMPLETED | OUTPATIENT
Start: 2017-01-04 | End: 2017-01-04

## 2017-01-04 RX ORDER — ENOXAPARIN SODIUM 100 MG/ML
1 INJECTION SUBCUTANEOUS EVERY 12 HOURS
Status: DISCONTINUED | OUTPATIENT
Start: 2017-01-04 | End: 2017-01-05

## 2017-01-04 RX ADMIN — Medication 1 CAPSULE: at 09:45

## 2017-01-04 RX ADMIN — PRAVASTATIN SODIUM 20 MG: 10 TABLET ORAL at 22:39

## 2017-01-04 RX ADMIN — INSULIN LISPRO 2 UNITS: 100 INJECTION, SOLUTION INTRAVENOUS; SUBCUTANEOUS at 17:09

## 2017-01-04 RX ADMIN — ENOXAPARIN SODIUM 80 MG: 80 INJECTION SUBCUTANEOUS at 22:39

## 2017-01-04 RX ADMIN — POTASSIUM CHLORIDE 40 MEQ: 750 TABLET, FILM COATED, EXTENDED RELEASE ORAL at 12:25

## 2017-01-04 RX ADMIN — FIDAXOMICIN 200 MG: 200 TABLET, FILM COATED ORAL at 17:14

## 2017-01-04 RX ADMIN — VANCOMYCIN HYDROCHLORIDE 250 MG: 1 INJECTION, POWDER, LYOPHILIZED, FOR SOLUTION INTRAVENOUS at 00:22

## 2017-01-04 RX ADMIN — HEPARIN SODIUM AND DEXTROSE 15 UNITS/KG/HR: 10000; 5 INJECTION INTRAVENOUS at 12:52

## 2017-01-04 RX ADMIN — MUPIROCIN: 20 OINTMENT TOPICAL at 09:46

## 2017-01-04 RX ADMIN — IBUPROFEN 400 MG: 400 TABLET ORAL at 17:09

## 2017-01-04 RX ADMIN — OXYCODONE HYDROCHLORIDE 5 MG: 5 TABLET ORAL at 21:07

## 2017-01-04 RX ADMIN — VANCOMYCIN HYDROCHLORIDE 250 MG: 1 INJECTION, POWDER, LYOPHILIZED, FOR SOLUTION INTRAVENOUS at 06:13

## 2017-01-04 RX ADMIN — VANCOMYCIN HYDROCHLORIDE 250 MG: 1 INJECTION, POWDER, LYOPHILIZED, FOR SOLUTION INTRAVENOUS at 12:55

## 2017-01-04 RX ADMIN — Medication 10 ML: at 06:13

## 2017-01-04 RX ADMIN — INSULIN LISPRO 2 UNITS: 100 INJECTION, SOLUTION INTRAVENOUS; SUBCUTANEOUS at 12:55

## 2017-01-04 RX ADMIN — MUPIROCIN: 20 OINTMENT TOPICAL at 22:39

## 2017-01-04 RX ADMIN — Medication 10 ML: at 22:40

## 2017-01-04 RX ADMIN — SODIUM CHLORIDE 75 ML/HR: 900 INJECTION, SOLUTION INTRAVENOUS at 17:15

## 2017-01-04 NOTE — PROGRESS NOTES
Interdisciplinary team rounds were held 1/4/2017 with the following team members:Cardiac Rehab and Wellness, Diabetes Treatment Specialist, Nursing, Nutrition, Pharmacy, Physical Therapy. Physician and Respiratory therapy. Plan of care discussed. Goal: Replenish electrolytes. See MD orders and progress notes for further  interventions and desired outcomes.

## 2017-01-04 NOTE — PROGRESS NOTES
11:35 Update events, cont awaiting bed for transfer to tele floor. H/h pending post decrease am labs from yesterday. Pt cont to deny pain at present. Cont to monitor. 11:46 Repeat labs 10.4/28.8.  19:10 Report to oncoming shift Sudha SCHULTE.

## 2017-01-04 NOTE — PROGRESS NOTES
Hospitalist Progress Note    NAME: Jessica Mckeon   :  1950   MRN:  [id number]         Assessment / Plan:  C diff diarrhea, present on admission:  Recent C Diff colitis  after treatment for discitis  - appreciate ID input, probiotic stopped per recommendations  - difficid started, have asked CM to assist with insurance coverage/options  Acute bilateral pulmonary embolus with right heart strain, right sided pleuritic chest pain and RLL pulmonary infarction:   - CTA chest on admssion with right lower lobar pulmonary embolism. Left lower lobar segmental pulmonary emboli. Questionable right heart strain: flattening of the interventricular septum. Probable developing infarction in the right lower lobe. Small sympathetic right pleural effusion. Fluid-filled colon consistent with diarrhea. - echo with EF 55-60%. There were no regional wall motion abnormalities. There was mild concentric hypertrophy. Aortic valve moderately to markedly increased thickness, moderate to marked calcification, and moderately reduced cuspal separation. There was moderate stenosis. No RV dilation.  - has been stable on heparin gtt, will transition to lovenox q12  - CM checking pricing for xarelto, could transition tomorrow if has coverage  Noninsulin dependent DM2 uncontrolled without complication:  - con't holding home metformin due to contrast for CT  - lispro sliding scale    Hypertension, benign/essential: Blood pressure has been low  - con't holding home lisinopril, lasix today, possible restart tomorrow  - prn nitrobid   Severe protein calorie malnutrition:  Appetite and strength remain poor after treatment for C Diff.  - supplements added  - nutrition consult appreciated  L2-3 discitis 10/16 with psoas abscess and severe spinal stenosis: possible due to back injections vs DM foot ulcer as source. Disc aspiration cultures with amp-resistent staph, now felt to be treated but complicated by C Diff as above.   Hypokalemia: replacing      Code Status: full  DVT Prophylaxis: heparin gtt     Subjective:     Chief Complaint / Reason for Physician Visit  \"My chest pain is better today\". Discussed with RN events overnight. Review of Systems:  Symptom Y/N Comments  Symptom Y/N Comments   Fever/Chills n   Chest Pain y    Poor Appetite n   Edema n    Cough n   Abdominal Pain n    Sputum n   Joint Pain     SOB/LU y   Pruritis/Rash     Nausea/vomit    Tolerating PT/OT     Diarrhea    Tolerating Diet     Constipation    Other       Could NOT obtain due to:      Objective:     VITALS:   Last 24hrs VS reviewed since prior progress note. Most recent are:  Patient Vitals for the past 24 hrs:   Temp Pulse Resp BP SpO2   01/04/17 1000 - 90 20 - -   01/04/17 0832 98.2 °F (36.8 °C) 95 24 112/61 98 %   01/04/17 0800 - (!) 103 17 - 99 %   01/04/17 0700 - 91 18 - 96 %   01/04/17 0600 - 87 17 - 97 %   01/04/17 0400 98.4 °F (36.9 °C) 90 17 102/54 96 %   01/04/17 0019 98.5 °F (36.9 °C) 96 20 97/50 96 %   01/03/17 1958 98.1 °F (36.7 °C) 96 20 98/46 97 %   01/03/17 1800 - 94 19 - 99 %   01/03/17 1755 - 96 21 - 99 %   01/03/17 1700 - 94 24 - 100 %   01/03/17 1619 98.1 °F (36.7 °C) 96 23 106/69 94 %   01/03/17 1600 - 98 18 - 100 %   01/03/17 1527 - 93 16 - 100 %   01/03/17 1515 - 89 18 - 99 %   01/03/17 1357 - 96 21 - 97 %       Intake/Output Summary (Last 24 hours) at 01/04/17 1246  Last data filed at 01/04/17 1100   Gross per 24 hour   Intake          2393.32 ml   Output                0 ml   Net          2393.32 ml        PHYSICAL EXAM:  General: WD, WN. Alert, cooperative, no acute distress    EENT:  EOMI. Anicteric sclerae. MMM  Resp:  CTA bilaterally, no wheezing or rales. No accessory muscle use. Diminished at bases. CV:  Regular rhythm,  No edema  GI:  Soft, Non distended, Non tender.  +Bowel sounds  Neurologic:  Alert and oriented X 3, normal speech,   Psych:   Good insight. Not anxious nor agitated  Skin:  No rashes.   No jaundice    Reviewed most current lab test results and cultures  YES  Reviewed most current radiology test results   YES  Review and summation of old records today    NO  Reviewed patient's current orders and MAR    YES  PMH/SH reviewed - no change compared to H&P  ________________________________________________________________________  Care Plan discussed with:    Comments   Patient x    Family      RN     Care Manager     Consultant  x ID                     Multidiciplinary team rounds were held today with , nursing, pharmacist and clinical coordinator. Patient's plan of care was discussed; medications were reviewed and discharge planning was addressed. ________________________________________________________________________  Total NON critical care TIME:  35 Minutes    Total CRITICAL CARE TIME Spent:   Minutes non procedure based      Comments   >50% of visit spent in counseling and coordination of care x    ________________________________________________________________________  Brian Sanford MD     Procedures: see electronic medical records for all procedures/Xrays and details which were not copied into this note but were reviewed prior to creation of Plan. LABS:  I reviewed today's most current labs and imaging studies.   Pertinent labs include:  Recent Labs      01/04/17   1129  01/04/17   0430  01/03/17   1236  01/03/17   0454   WBC   --   6.9  7.2  5.5   HGB  10.4*  7.5*  11.2*  11.0*   HCT  28.8*  21.1*  30.8*  30.9*   PLT   --   206  256  237     Recent Labs      01/04/17   0430  01/03/17   0454  01/02/17   1545   NA  142  142  137   K  3.4*  3.2*  3.2*   CL  107  106  97   CO2  25  26  29   GLU  116*  155*  199*   BUN  10  9  10   CREA  0.79  0.79  1.04   CA  7.6*  7.8*  8.9   MG  1.8  1.9   --    PHOS   --   2.7   --    ALB   --   2.1*  2.9*   TBILI   --   0.8  0.9   SGOT   --   14*  15   ALT   --   7*  10*       Signed: Brian Sanford MD

## 2017-01-04 NOTE — PROGRESS NOTES
PULMONARY ASSOCIATES [facility] INTENSIVIST Consult Service Note  Pulmonary, Critical Care, and Sleep Medicine    Name: Inessa Logan MRN: [id number]   : 1950 Hospital: [facility]   Date: 2017   Hospital Day: 3       Subjective/Interval History:   17: Pt has some mild right lower chest wall pain. No back pain, no abdominal pain. Still having loose stools. I have reviewed the flowsheet and previous days notes. Seen earlier today on rounds. Reviewed the evaluation and will assist in implementing plan as outlined by Dr. Marilynn Hirsch and team      IMPRESSION:   · Acute pulmonary embolus with right heart strain  · Sinus tachycardia  · Pulmonary infarction  · Right Pleuritic chest pain  · Hypokalemia  · Diabetes mellitus 2 uncontrolled-poc bs + ssi for now-hold OP PO medicine for now - restart as improves  · Hypertension, Benign essential -labetalol PRN sbp >170  · Hypokalemia  · Former Smoker Packs/day:3.00  Years:20.00  · h/o C diff, now C diff positive again. · Recent Lumbar Diskiitis      RECOMMENDATIONS/PLAN:   · Monitor for back pain acute neuro changes  · If he develops low back pain as above, can stop heparin and get stat MRI to r/o spinal hematoma  · Continue heparin for 48 hours then if no problems, may convert to NOAC or warfarin  · Replete K  · C diff, recurrent. Will ask Dr. David Rosa opinion and how best to manage. · Will be available to assist in medical management while in the CCU pending disposition  · Day 2 waiting for bed outside ICU. Code: Full Code          Subjective/Initial History:   I have reviewed the flowsheet and previous days notes. Asked to see this 78 y.o.    male now in the CCU as a PCU overflow with Large PE, on IV heparin. [employer] He presented to ED with 5days of increased WOB with exertion worsened over the last 24hr when he further developed right sided chst pain with deep breath. He notes pain is sharp, stabbing at peak inspiration.  To improve pain he was taking short breaths and using peppermints. No f/c/n/v. +diarrhea and weakness over this time as well and he is concerned of the viral GE that is going around. No sick contacts. Decreased PO intake. ++recent completion of treatment for Discitis by Dr Dax Kat 2 mos ago and unfortunately developed CDIFF following completion of abx for discitis and now has completed 3wks PO vanco as of 3-4wks ago. He has improved overall. In ED noted to have PE right lower and left lower lobar with developing right lower lobar infarct and possible right heart strain pattern. Without hypotension. Due to his recent Discitiis, decision made to not to use TPA, hep gtt initiated. reamins on O2 for comfort. +tachycardic in ED with right sided chest discomfort. Pt admitted. No back pain or leg complaints. Has some liquid stools. Patient with pulmonary embolism and evidence of right ventricular strain. Pt is tachycardic at rest. Some chest pain with deep breathing. PMH:  has a past medical history of Arthritis; Chronic pain; DM (diabetes mellitus) ([address] High cholesterol; and HTN (hypertension). He also has no past medical history of Difficult intubation; Malignant hyperthermia due to anesthesia; Nausea & vomiting; Pseudocholinesterase deficiency; or Unspecified adverse effect of anesthesia. PSH:   has a past surgical history that includes amputation; orthopaedic (Left); and other surgical (6/4/15). FHX: family history includes Heart Disease in his father and mother. SHX:  reports that he has quit smoking. His smoking use included Cigarettes. He has a 60.00 pack-year smoking history. He quit smokeless tobacco use about 21 years ago. He reports that he drinks about 0.5 oz of alcohol per week  He reports that he uses illicit drugs, including Prescription and OTC.    ROS:A comprehensive review of systems was negative except for that written in the HPI.     MAR reviewed and pertinent medications noted or modified as needed    Allergies   Allergen Reactions    Vancomycin Rash     Patient broke out in large hive below IV site and c/o itching to area, patient states that \"that it was more likely an infiltration as opposed to an actual reaction\"        MEDS:   Current Facility-Administered Medications   Medication    lactobac ac& pc-s.therm-b.anim (BOB Q/RISAQUAD)    oxyCODONE IR (ROXICODONE) tablet 5 mg    pravastatin (PRAVACHOL) tablet 20 mg    ondansetron (ZOFRAN) injection 4 mg    labetalol (NORMODYNE;TRANDATE) 20 mg/4 mL (5 mg/mL) injection 10 mg    albuterol-ipratropium (DUO-NEB) 2.5 MG-0.5 MG/3 ML    0.9% sodium chloride infusion    insulin lispro (HUMALOG) injection    glucose chewable tablet 16 g    dextrose (D50W) injection syrg 12.5-25 g    glucagon (GLUCAGEN) injection 1 mg    sodium chloride (NS) flush 5-10 mL    sodium chloride (NS) flush 5-10 mL    heparin 25,000 units in D5W 250 ml infusion    mupirocin (BACTROBAN) 2 % ointment    heparin (porcine) injection 6,450 Units    heparin (porcine) injection 3,250 Units    ibuprofen (MOTRIN) tablet 400 mg    vancomycin 50 mg/mL oral solution (compounded) 250 mg          Telemetry:    normal sinus rhythm      Objective:     Vital Signs: Intake/Output: Intake/Output:   Visit Vitals    /61 (BP 1 Location: Right arm, BP Patient Position: At rest)    Pulse 95    Temp 98.2 °F (36.8 °C)    Resp 24    Ht 5' 8\" (1.727 m)    Wt 80.7 kg (177 lb 14.6 oz)    SpO2 98%    BMI 27.05 kg/m2      O2 Device: Room air     Temp (24hrs), Av.1 °F (36.7 °C), Min:97.5 °F (36.4 °C), Max:98.5 °F (36.9 °C)     Intake/Output Summary (Last 24 hours) at 17 0925  Last data filed at 17 0600   Gross per 24 hour   Intake          2419.12 ml   Output                0 ml   Net          2419.12 ml    Last shift:         Last 3 shifts:   0700  In: 3267.8 [P.O.:740;  I.V.:2527.8]  Out: 300 [Urine:300]           Physical Exam:    General: well developed and well nourished, in no respiratory distress and acyanotic, alert and oriented times 3   HEENT: NCAT   Neck, Lymph: No abnormally enlarged lymph nodes. Chest: increased AP diameter   Lungs: normal air entry   Heart: Regular rate and rhythm, S1S2 present. EVAN right base   Abdomen: soft, non-tender, without masses or organomegaly   :    Extremity: negative, cyanosis, clubbing;     Neuro: alert   Psych: oriented to time, place and person   Skin: Skin unremarkableSkin color, texture, turgor normal. No rashes or lesions;   Pulses: Bilateral, Radial, 2+    Capillary refill: normal warm;      Data:             Labs:    Recent Labs      01/04/17   0430  01/03/17   1953  01/03/17   1236  01/03/17   0454   WBC  6.9   --   7.2  5.5   HGB  7.5*   --   11.2*  11.0*   PLT  206   --   256  237   APTT  77.2*  75.1*  76.6*  47.6*     Recent Labs      01/04/17 0430 01/03/17 0454 01/02/17   1630  01/02/17   1545   NA  142  142   --   137   K  3.4*  3.2*   --   3.2*   CL  107  106   --   97   CO2  25  26   --   29   GLU  116*  155*   --   199*   BUN  10  9   --   10   CREA  0.79  0.79   --   1.04   CA  7.6*  7.8*   --   8.9   MG  1.8  1.9   --    --    PHOS   --   2.7   --    --    LAC   --   0.9  1.8   --    ALB   --   2.1*   --   2.9*   SGOT   --   14*   --   15   ALT   --   7*   --   10*     No results for input(s): PH, PCO2, PO2, HCO3, FIO2 in the last 72 hours. No results for input(s): PH, PCO2, PO2, HCO3, FIO2 in the last 72 hours. Recent Labs      01/03/17 0454 01/02/17   1630   CPK  136   --    CKNDX  CANNOT BE CALCULATED   --    TROIQ  <0.04  <0.04         Imaging:  I have personally reviewed the patients radiographs and have reviewed the reports:          Results from Hospital Encounter encounter on 11/22/16   XR CHEST PORT   Narrative INDICATION: Evaluate PICC line.     Portable AP semiupright view of the chest.    Direct comparison made to prior chest x-ray dated August 6, 2015.    Cardiomediastinal silhouette is stable. Right-sided PICC line extends to the mid  SVC. Lungs are clear bilaterally. Pleural spaces are normal. There is marked  degenerative narrowing of the right glenohumeral joint. Impression IMPRESSION: PICC line in appropriate position. Results from [place] encounter on 01/02/17   CT ABD PELV W CONT   Narrative CT ANGIOGRAPHY CHEST and CT OF THE ABDOMEN AND PELVIS WITH CONTRAST. 1/2/2017  6:51 PM     INDICATION: Dyspnea, flank pain. COMPARISON: CT abdomen pelvis 11/22/2016, chest CT 10/27/2009, CT lumbar spine  10/14/2016. TECHNIQUE: CT angiography of the chest was performed after the administration of  100 cc IV contrast (Isovue 370). Coronal and sagittal, and coronal MIP  reconstructions were performed. CT of the abdomen and pelvis was performed after the same IV contrast  administration. CT dose reduction was achieved through use of a standardized  protocol tailored for this examination and automatic exposure control for dose  modulation. FINDINGS:  Chest: A large pulmonary embolism in the right lower lobe straddles several  segmental branches. Distal airspace disease in the right lower lobe may  represent developing infarction. There is a trace sympathetic effusion. A  smaller pulmonary embolism straddles several segmental branches of the left  lower lobe. Flattening of the interventricular septum suggests right heart  strain. The lungs are otherwise clear. The central airways are patent. The  esophagus is patulous, and there is fluid and debris extending nearly to the  level of the thoracic inlet. The heart is at the upper limits of normal in size. Aortic valvular and left anterior descending and right coronary artery  calcifications are mild. No thoracic lymphadenopathy. End-stage right  glenohumeral osteoarthritis. Abdomen: Tiny calculi layer dependently in the gallbladder. The pancreas is  fatty infiltrated.  The stomach, duodenum, liver, spleen, adrenals, and kidneys  are normal.    Pelvis: Though the appendix is mildly dilated (9 mm), the tip is normal caliber  (4 mm), and there is no periappendiceal fat stranding. Further, it appears to be  filled with fluid, and the colon is also fluid-filled. Colonic mucosal hyperemia  is mild, and greatest in the rectum. Sigmoid diverticulosis is extensive. The  small bowel and bladder are normal. No free air or fluid, and no abdominopelvic  lymphadenopathy. Bones: Destruction of the endplates around chronic L2-L3 osteomyelitis is  stable. There is bilateral neural foraminal stenosis L2-L4. Impression IMPRESSION:   1. Right lower lobar pulmonary embolism. Left lower lobar segmental pulmonary  emboli. 2. Questionable right heart strain: flattening of the interventricular septum. 3. Probable developing infarction in the right lower lobe. Small sympathetic  right pleural effusion. 4. Fluid-filled colon consistent with diarrhea. The findings were called to Dr. Shaggy Lei on 1/2/2017 7:20 PM by Dr. Jesi Hayes.   789             My assessment/plan was discussed with:  nursing    respiratory therapy Dr. flower      Complex decision making was performed which includes reviewing the patient's past medical records, current laboratory results, medications, ECG and actual Xray films, immediately available at the bedside to the patient          Josesito Gandhi MD

## 2017-01-04 NOTE — CONSULTS
Infectious Disease Consult    I am asked to see this patient by Dr. Ashwin Joshua for advice/opinion related to evaluating first relapse of C difficile diarrheal infection. The patient was interviewed and examined. All available records were reviewed in detail. The patient is a 78 y.o. male admitted to [facility] on 1/2/2017 with an acute pulmonary embolus. He had recently been treated to resolution with oral vancomycin for his first C diff infection, but has relapsed this admission. I am familiar with him from ID consultation for recent treatment of MSSA  lumbar discitis(L2-3). He was discharged on 10/19 with a PICC line on cefazolin 2 grams IV q 8 hrs and a daily probiotic. He was readmitted with  C Difficile+ colitis after he had completed all but a few days of the planned 6 wks Tx of the discitis with cefazolin. All antibiotics were stopped and he was treated with PO vancomycin with resolution of diarrhea and abdominal pain. He is now admitted with an acute right sided PE and respiratory failure, and is responding to treatment. However, he began having frequent watery stools, and stool was again C Diff +. Oral vancomycin was restarted, and he is improving on Tx. No true antibiotic allergies. Medication list, past medical history, and social history were all reviewed. Impression:    1. Acute PE and infarct. 2. First relapse C Diff colitis. Standard of care is to treat first relapses (occur in 25-30% of patients) with same drug as in initial Tx, ie vancomycin. He is responding to this approach. However, some data to show that though there is no difference in success with vancomycin vs fidaxomicin, the latter may decrease likelihood of further relapses. With his significant comorbid issues, I would like to do our best to prevent this. Therefore, will change to fidaxomicin. Also, will stop probiotic. There is some risk of bacteremia with this when given during acute C Diff infection.  More value to using it to prevent diarrhea while on antibiotics. 3. Recent lumbar discitis secondary to MSSA. Antibiotic treatment completed and no clinical signs of recurrence at this point in time. ·     Plan:      1. Fidaxomicin 200 mg PO BID for at least 14 days. 2. Stop vancomycin and probiotics. 3. Appropriate isolation for C Diff. 4. No further Tx for discitis needed for now. 5. Will follow with you. Subjective:   Date of Consultation:  January 4, 2017  Referring Physician: Bonifacio Caruso    Patient Active Problem List   Diagnosis Code    High cholesterol E78.00    Heart murmur R01.1    LU (dyspnea on exertion) R06.09    Leg fatigue R29.898    DM (diabetes mellitus) ([facility] E11.9    Diabetic foot ulcer ([facility] E11.621, L97.509    Cellulitis of left lower leg L03. 80    Spinal stenosis of lumbar region at multiple levels M48.06    HTN (hypertension) I10    Aortic valve stenosis, moderate I35.0    Abscess of foot including toes L02.619    Sepsis (HCC) A41.9    Amputated great toe of left foot ([facility]) Z89.412    Abscess of left foot L02.612    Discitis of lumbar region M46.46    Clostridium difficile colitis A04.7    Pulmonary emboli ([facility]) I26.99     Past Medical History   Diagnosis Date    Arthritis      knees, back    Chronic pain      knees, back    DM (diabetes mellitus) ([facility])     High cholesterol     HTN (hypertension)       Family History   Problem Relation Age of Onset    Heart Disease Mother     Heart Disease Father       Social History   Substance Use Topics    Smoking status: Former Smoker     Packs/day: 3.00     Years: 20.00     Types: Cigarettes    Smokeless tobacco: Former User     Quit date: 1/15/1995      Comment: stopped smoking about 30 years ago    Alcohol use 0.5 oz/week     1 Cans of beer per week      Comment: occasionally     Past Surgical History   Procedure Laterality Date    Hx amputation       vascular; Left great toe amputation    Hx orthopaedic Left      Left arm fracture & repair    Hx other surgical  6/4/15     INCISION AND DRAINAGE, RESECTION OF REMAINING 1ST METATARSAL, INSERTION OF ANTIBIOTIC BEADS      Prior to Admission medications    Medication Sig Start Date End Date Taking? Authorizing Provider   oxyCODONE IR (ROXICODONE) 5 mg immediate release tablet Take 1 Tab by mouth every four (4) hours as needed. Max Daily Amount: 30 mg. 11/28/16   Sari Gomez MD   L. acidoph & paracasei- S therm- Bifido (BOB-Q/RISAQUAD) 8 billion cell cap cap Take 1 Cap by mouth daily. 10/19/16   Alec Fregoso MD   furosemide (LASIX) 40 mg tablet Take 40 mg by mouth daily. Historical Provider   lisinopril (PRINIVIL, ZESTRIL) 2.5 mg tablet Take 2.5 mg by mouth daily. Historical Provider   methocarbamol (ROBAXIN) 750 mg tablet Take 750 mg by mouth two (2) times daily as needed. 10/24/14   Historical Provider   Aspirin, Buffered 81 mg tab Take  by mouth daily. Historical Provider   metFORMIN (GLUCOPHAGE) 500 mg tablet Take  by mouth two (2) times daily (with meals). Historical Provider   simvastatin (ZOCOR) 10 mg tablet Take  by mouth nightly. Historical Provider     Allergies   Allergen Reactions    Vancomycin Rash     Patient broke out in large hive below IV site and c/o itching to area, patient states that \"that it was more likely an infiltration as opposed to an actual reaction\"        Review of Systems:    - fever, sweats, or chills. +fatigue/malasie.  -Rash.  -Swollen glands.  -Oral lesions.  -Photophobia. -Jaundice.   + SOB. - cough. - abdominal pain or tenderness.  - Nausea or vomiting.  + Diarrhea. No joint inflammation or swelling. No headache or AMS. + chronic back pain. ROS otherwise negative with greater than 10 systems reviewed. Objective:   Blood pressure 99/45, pulse (!) 103, temperature 98.6 °F (37 °C), resp. rate 23, height 5' 8\" (1.727 m), weight 177 lb 14.6 oz (80.7 kg), SpO2 95 %. Exam:   General: Afebrile and not toxic. No exanthem or enanthem.  No peripheral adenopathy. Alert and oriented x3. HEENT: EOMI. Anicteric. Oral mucosa normal. Conjunctivae normal. Neck supple. Chest: Lungs clear to P. + decreased breath sounds at right base. Regular rhythm without murmurs. Abdomen: Soft and diffusely tender in the lower quadrants, [place] on the left. Nondistended.    Musculoskeletal: No joint inflammation or effusions. No edema. . . Neurologic: Nonfocal exam.        Data Review: WBC = 6900. Creat = 0.79. Imaging with right sided PE/infarct. Stool + for C diff.         Current Facility-Administered Medications   Medication Dose Route Frequency    lactobac ac& pc-s.therm-b.anim (BOB Q/RISAQUAD)  1 Cap Oral DAILY    oxyCODONE IR (ROXICODONE) tablet 5 mg  5 mg Oral Q4H PRN    pravastatin (PRAVACHOL) tablet 20 mg  20 mg Oral QHS    ondansetron (ZOFRAN) injection 4 mg  4 mg IntraVENous Q4H PRN    labetalol (NORMODYNE;TRANDATE) 20 mg/4 mL (5 mg/mL) injection 10 mg  10 mg IntraVENous Q2H PRN    albuterol-ipratropium (DUO-NEB) 2.5 MG-0.5 MG/3 ML  3 mL Nebulization Q2H PRN    0.9% sodium chloride infusion  75 mL/hr IntraVENous CONTINUOUS    insulin lispro (HUMALOG) injection   SubCUTAneous AC&HS    glucose chewable tablet 16 g  4 Tab Oral PRN    dextrose (D50W) injection syrg 12.5-25 g  12.5-25 g IntraVENous PRN    glucagon (GLUCAGEN) injection 1 mg  1 mg IntraMUSCular PRN    sodium chloride (NS) flush 5-10 mL  5-10 mL IntraVENous Q8H    sodium chloride (NS) flush 5-10 mL  5-10 mL IntraVENous PRN    heparin 25,000 units in D5W 250 ml infusion  18-36 Units/kg/hr IntraVENous TITRATE    mupirocin (BACTROBAN) 2 % ointment   Topical Q12H    heparin (porcine) injection 6,450 Units  80 Units/kg IntraVENous PRN    heparin (porcine) injection 3,250 Units  40 Units/kg IntraVENous PRN    ibuprofen (MOTRIN) tablet 400 mg  400 mg Oral TID PRN    vancomycin 50 mg/mL oral solution (compounded) 250 mg  250 mg Oral Q6H          Signed By: Emelia Forte MD     January 4, 2017

## 2017-01-04 NOTE — PROGRESS NOTES
Patient with unevenful night, he did have 3 liquid stools. Denied pain or SOB. 0700 Bedside and Verbal shift change report given to Cheyenne Hu (oncoming nurse) by [employer] (offgoing nurse). Report included the following information SBAR, Kardex, OR Summary, Procedure Summary, Intake/Output, MAR, Recent Results and Med Rec Status.

## 2017-01-05 LAB
ANION GAP BLD CALC-SCNC: 12 MMOL/L (ref 5–15)
BUN SERPL-MCNC: 6 MG/DL (ref 6–20)
BUN/CREAT SERPL: 8 (ref 12–20)
CALCIUM SERPL-MCNC: 7 MG/DL (ref 8.5–10.1)
CHLORIDE SERPL-SCNC: 107 MMOL/L (ref 97–108)
CO2 SERPL-SCNC: 22 MMOL/L (ref 21–32)
CREAT SERPL-MCNC: 0.74 MG/DL (ref 0.7–1.3)
ERYTHROCYTE [DISTWIDTH] IN BLOOD BY AUTOMATED COUNT: 14.6 % (ref 11.5–14.5)
GLUCOSE BLD STRIP.AUTO-MCNC: 134 MG/DL (ref 65–100)
GLUCOSE BLD STRIP.AUTO-MCNC: 144 MG/DL (ref 65–100)
GLUCOSE BLD STRIP.AUTO-MCNC: 148 MG/DL (ref 65–100)
GLUCOSE BLD STRIP.AUTO-MCNC: 156 MG/DL (ref 65–100)
GLUCOSE SERPL-MCNC: 213 MG/DL (ref 65–100)
HCT VFR BLD AUTO: 24.6 % (ref 36.6–50.3)
HGB BLD-MCNC: 8.6 G/DL (ref 12.1–17)
MCH RBC QN AUTO: 29.5 PG (ref 26–34)
MCHC RBC AUTO-ENTMCNC: 35 G/DL (ref 30–36.5)
MCV RBC AUTO: 84.2 FL (ref 80–99)
PLATELET # BLD AUTO: 244 K/UL (ref 150–400)
POTASSIUM SERPL-SCNC: 3.1 MMOL/L (ref 3.5–5.1)
RBC # BLD AUTO: 2.92 M/UL (ref 4.1–5.7)
SERVICE CMNT-IMP: ABNORMAL
SODIUM SERPL-SCNC: 141 MMOL/L (ref 136–145)
WBC # BLD AUTO: 9.2 K/UL (ref 4.1–11.1)

## 2017-01-05 PROCEDURE — 74011250637 HC RX REV CODE- 250/637: Performed by: INTERNAL MEDICINE

## 2017-01-05 PROCEDURE — 74011250636 HC RX REV CODE- 250/636: Performed by: INTERNAL MEDICINE

## 2017-01-05 PROCEDURE — 82962 GLUCOSE BLOOD TEST: CPT

## 2017-01-05 PROCEDURE — 80048 BASIC METABOLIC PNL TOTAL CA: CPT | Performed by: INTERNAL MEDICINE

## 2017-01-05 PROCEDURE — 74011636637 HC RX REV CODE- 636/637: Performed by: INTERNAL MEDICINE

## 2017-01-05 PROCEDURE — 97535 SELF CARE MNGMENT TRAINING: CPT

## 2017-01-05 PROCEDURE — 36415 COLL VENOUS BLD VENIPUNCTURE: CPT | Performed by: INTERNAL MEDICINE

## 2017-01-05 PROCEDURE — 65660000000 HC RM CCU STEPDOWN

## 2017-01-05 PROCEDURE — 97110 THERAPEUTIC EXERCISES: CPT

## 2017-01-05 PROCEDURE — 97116 GAIT TRAINING THERAPY: CPT

## 2017-01-05 PROCEDURE — 85027 COMPLETE CBC AUTOMATED: CPT | Performed by: INTERNAL MEDICINE

## 2017-01-05 RX ORDER — POTASSIUM CHLORIDE 750 MG/1
40 TABLET, FILM COATED, EXTENDED RELEASE ORAL
Status: COMPLETED | OUTPATIENT
Start: 2017-01-05 | End: 2017-01-05

## 2017-01-05 RX ORDER — POTASSIUM CHLORIDE 20 MEQ/1
40 TABLET, EXTENDED RELEASE ORAL EVERY 4 HOURS
Status: DISPENSED | OUTPATIENT
Start: 2017-01-05 | End: 2017-01-05

## 2017-01-05 RX ORDER — METFORMIN HYDROCHLORIDE 500 MG/1
500 TABLET ORAL 2 TIMES DAILY WITH MEALS
Status: DISCONTINUED | OUTPATIENT
Start: 2017-01-06 | End: 2017-01-06 | Stop reason: HOSPADM

## 2017-01-05 RX ORDER — FUROSEMIDE 20 MG/1
20 TABLET ORAL DAILY
Status: DISCONTINUED | OUTPATIENT
Start: 2017-01-06 | End: 2017-01-06 | Stop reason: HOSPADM

## 2017-01-05 RX ADMIN — MUPIROCIN: 20 OINTMENT TOPICAL at 11:49

## 2017-01-05 RX ADMIN — POTASSIUM CHLORIDE 40 MEQ: 750 TABLET, FILM COATED, EXTENDED RELEASE ORAL at 09:31

## 2017-01-05 RX ADMIN — RIVAROXABAN 15 MG: 15 TABLET, FILM COATED ORAL at 21:17

## 2017-01-05 RX ADMIN — ENOXAPARIN SODIUM 80 MG: 80 INJECTION SUBCUTANEOUS at 09:31

## 2017-01-05 RX ADMIN — PRAVASTATIN SODIUM 20 MG: 10 TABLET ORAL at 21:17

## 2017-01-05 RX ADMIN — POTASSIUM CHLORIDE 40 MEQ: 20 TABLET, EXTENDED RELEASE ORAL at 13:49

## 2017-01-05 RX ADMIN — MUPIROCIN: 20 OINTMENT TOPICAL at 21:17

## 2017-01-05 RX ADMIN — FIDAXOMICIN 200 MG: 200 TABLET, FILM COATED ORAL at 17:00

## 2017-01-05 RX ADMIN — INSULIN LISPRO 2 UNITS: 100 INJECTION, SOLUTION INTRAVENOUS; SUBCUTANEOUS at 17:00

## 2017-01-05 RX ADMIN — Medication 10 ML: at 06:45

## 2017-01-05 RX ADMIN — FIDAXOMICIN 200 MG: 200 TABLET, FILM COATED ORAL at 09:57

## 2017-01-05 RX ADMIN — INSULIN LISPRO 2 UNITS: 100 INJECTION, SOLUTION INTRAVENOUS; SUBCUTANEOUS at 09:57

## 2017-01-05 RX ADMIN — Medication 10 ML: at 21:18

## 2017-01-05 NOTE — PROGRESS NOTES
Problem: Self Care Deficits Care Plan (Adult)  Goal: *Acute Goals and Plan of Care (Insert Text)  Occupational Therapy Goals  Initiated 1/3/2017  1. Patient will perform lower body dressing with supervision/set-up within 7 day(s). 2. Patient will perform standing ADLs at sink with supervision/set-up within 7 day(s). 3. Patient will perform bathing with supervision/set-up within 7 day(s). 4. Patient will perform toilet transfers with supervision/set-up within 7 day(s). 5. Patient will perform all aspects of toileting with supervision/set-up within 7 day(s). 6. Patient will perform functional transfers in prep for ADLs with no more than 2 verbal cues for safety within 7 days. OCCUPATIONAL THERAPY TREATMENT/DISCHARGE  Patient: Jonah Cruz (73 y.o. male)  Date: 1/5/2017  Diagnosis: Pulmonary emboli (HCC) <principal problem not specified>       Precautions: Fall, Contact      ASSESSMENT:  patient demonstrated improvement today with participation and functional mobility. Pt was mod I for bed mobility, SBA for all transfers. Pt steady with use of RW, completed standing ADLs at sink with SBA. Pt able to dress LB with cross leg technique, R LE with baseline cellulitis. HR elevated at rest 107-110. Pt in chair at end of session and anticipates discharge home with OP PT follow-up. Recommend continued use of RW. Pt is not in need of further OT services. Progression toward goals:  [X]            Improving appropriately and progressing toward goals  [ ]            Improving slowly and progressing toward goals  [ ]            Not making progress toward goals and plan of care will be adjusted       PLAN:  Patient will be discharged from occupational therapy at this time.   Rationale for discharge:  [X]   Goals Achieved  [ ]   Emma Lino  [ ]   Patient not participating in therapy  [ ]   Other:  Discharge Recommendations:  OP PT resumed services  Further Equipment Recommendations for Discharge:  none       SUBJECTIVE: Patient stated Mg Herrmann will get up.       OBJECTIVE DATA SUMMARY:   Cognitive/Behavioral Status:  Neurologic State: Eyes open spontaneously  Orientation Level: Oriented X4  Cognition: Appropriate for age attention/concentration              Functional Mobility and Transfers for ADLs:  Bed Mobility:  Supine to Sit: Modified independent     Transfers:  Sit to Stand: Stand-by asssistance  Stand to Sit: Stand-by asssistance     Balance:  Sitting: Intact  Standing: Intact; With support (RW)  Standing - Static: Good  Standing - Dynamic : Good     ADL Intervention:        Grooming  Washing Hands: Stand-by assistance                       Lower Body Dressing Assistance  Socks: Supervision/set-up (simulated in chair, pt completes EOB, cross leg)                 Pain:  Pain Scale 1: Numeric (0 - 10)  Pain Intensity 1: 0              Activity Tolerance:   HR at rest 107-110   Please refer to the flowsheet for vital signs taken during this treatment.   After treatment:   [X] Patient left in no apparent distress sitting up in chair  [ ] Patient left in no apparent distress in bed  [X] Call bell left within reach  [X] Nursing notified  [ ] Caregiver present  [ ] Bed alarm activated      COMMUNICATION/COLLABORATION:   The patients plan of care was discussed with: Registered Nurse     Sergo Christianson OT  Time Calculation: 13 mins

## 2017-01-05 NOTE — PROGRESS NOTES
Shift Summary:    Mr. Preeti Monge ambulated in the gray with walker and standby assist twice today. Up to the chair 3 times, worked with PT and OT. VS remained stable. Denied pain throughout shift. No stools this shift. Voiding well. Fair PO intake.

## 2017-01-05 NOTE — PROGRESS NOTES
Hospitalist Progress Note    NAME: Jonah Cruz   :  1950   MRN:  [id number]         Assessment / Plan:  Anemia:  Hg slowly declining, but not overt source of blood loss seen  - con't xarelto  - monitor Hg closely  C diff diarrhea, present on admission: Recent C Diff colitis  after treatment for discitis  - appreciate ID input, probiotic stopped per recommendations  - dificid started but does not appear will be affordable on discharge. Discussed with ID, will change to vancomycin oral on discharge x 3 weeks if cannot qualify for dificid assistance  Acute bilateral pulmonary embolus with right heart strain, right sided pleuritic chest pain and RLL pulmonary infarction:   - CTA chest on admssion with right lower lobar pulmonary embolism. Left lower lobar segmental pulmonary emboli. Questionable right heart strain: flattening of the interventricular septum. Probable developing infarction in the right lower lobe. Small sympathetic right pleural effusion. Fluid-filled colon consistent with diarrhea. - echo with EF 55-60%. There were no regional wall motion abnormalities. There was mild concentric hypertrophy. Aortic valve moderately to markedly increased thickness, moderate to marked calcification, and moderately reduced cuspal separation. There was moderate stenosis. No RV dilation.   - transitioning to xarelto today  Non-insulin dependent DM2 uncontrolled without complication:  - restart metformin tomorrow AM (initially held for CT contrast)  - lispro sliding scale    Hypertension, benign/essential: Blood pressure has been low  - con't holding home lisinopril  - restart home lasix at reduced dose tomorrow  - prn nitrobid   Severe protein calorie malnutrition:  Appetite and strength remain poor after treatment for C Diff.  - supplements added  - nutrition consult appreciated  - con't PT/OT  L2-3 discitis 10/16 with psoas abscess and severe spinal stenosis: possible due to back injections vs DM foot ulcer as source. Disc aspiration cultures with amp-resistent staph, now felt to be treated but complicated by C Diff as above. Hypokalemia: replacing      Code Status: full (wife would be decision maker if needed)  DVT Prophylaxis: xarelto     Subjective:     Chief Complaint / Reason for Physician Visit  \"I'm stronger today\". Able to work with PT. Appetite overall remains poor. Discussed with RN events overnight. Review of Systems:  Symptom Y/N Comments  Symptom Y/N Comments   Fever/Chills n   Chest Pain n    Poor Appetite n   Edema n    Cough n   Abdominal Pain n    Sputum n   Joint Pain     SOB/LU n   Pruritis/Rash     Nausea/vomit    Tolerating PT/OT     Diarrhea    Tolerating Diet     Constipation    Other       Could NOT obtain due to:      Objective:     VITALS:   Last 24hrs VS reviewed since prior progress note. Most recent are:  Patient Vitals for the past 24 hrs:   Temp Pulse Resp BP SpO2   01/05/17 1150 (P) 98.2 °F (36.8 °C) - - - -   01/05/17 0830 98.1 °F (36.7 °C) 83 20 130/46 100 %   01/05/17 0400 98.2 °F (36.8 °C) 85 20 106/55 99 %   01/04/17 2320 98 °F (36.7 °C) 94 19 119/61 98 %   01/04/17 2000 97.8 °F (36.6 °C) 100 22 103/53 99 %   01/04/17 1647 98.5 °F (36.9 °C) 95 26 99/65 99 %       Intake/Output Summary (Last 24 hours) at 01/05/17 1256  Last data filed at 01/05/17 0830   Gross per 24 hour   Intake            633.9 ml   Output              300 ml   Net            333.9 ml        PHYSICAL EXAM:  General: WD, WN. Alert, cooperative, no acute distress    EENT:  EOMI. Anicteric sclerae. MMM  Resp:  CTA bilaterally, no wheezing or rales. No accessory muscle use  CV:  Regular  rhythm,  No edema  GI:  Soft, mildly distended, Non tender.  +Bowel sounds  Neurologic:  Alert and oriented X 3, normal speech,   Psych:   Good insight. Not anxious nor agitated  Skin:  No rashes.   No jaundice    Reviewed most current lab test results and cultures  YES  Reviewed most current radiology test results YES  Review and summation of old records today    NO  Reviewed patient's current orders and MAR    YES  PMH/SH reviewed - no change compared to H&P  ________________________________________________________________________  Care Plan discussed with:    Comments   Patient x    Family      RN x    Care Manager     Consultant  x ID                     Multidiciplinary team rounds were held today with , nursing, pharmacist and clinical coordinator. Patient's plan of care was discussed; medications were reviewed and discharge planning was addressed. ________________________________________________________________________  Total NON critical care TIME:  25 Minutes    Total CRITICAL CARE TIME Spent:   Minutes non procedure based      Comments   >50% of visit spent in counseling and coordination of care x    ________________________________________________________________________  Hoda Ni MD     Procedures: see electronic medical records for all procedures/Xrays and details which were not copied into this note but were reviewed prior to creation of Plan. LABS:  I reviewed today's most current labs and imaging studies.   Pertinent labs include:  Recent Labs      01/05/17   0401  01/04/17   1129  01/04/17   0430  01/03/17   1236   WBC  9.2   --   6.9  7.2   HGB  8.6*  10.4*  7.5*  11.2*   HCT  24.6*  28.8*  21.1*  30.8*   PLT  244   --   206  256     Recent Labs      01/05/17   0401  01/04/17   0430  01/03/17   0454  01/02/17   1545   NA  141  142  142  137   K  3.1*  3.4*  3.2*  3.2*   CL  107  107  106  97   CO2  22  25  26  29   GLU  213*  116*  155*  199*   BUN  6  10  9  10   CREA  0.74  0.79  0.79  1.04   CA  7.0*  7.6*  7.8*  8.9   MG   --   1.8  1.9   --    PHOS   --    --   2.7   --    ALB   --    --   2.1*  2.9*   TBILI   --    --   0.8  0.9   SGOT   --    --   14*  15   ALT   --    --   7*  10*       Signed: Hoda Ni MD

## 2017-01-05 NOTE — PROGRESS NOTES
Interdisciplinary team rounds were held  1/5/2017  with the following team members:Care Management, Diabetes Treatment Specialist, Nursing, Nutrition, Pharmacy, Physical Therapy, Physician and Respiratory Therapy. Plan of care discussed. Goal: See MD orders and progress notes for further  interventions and desired outcomes.

## 2017-01-05 NOTE — PROGRESS NOTES
Patient had quiet night. No c/o chest pain or SOB. Had 3 loose stools, in smaller amts than last night. Bedside and Verbal shift change report given to [address] [place] [address] (oncoming nurse) by Neo Drummond (offgoing nurse). Report included the following information SBAR, Kardex, OR Summary, Procedure Summary, Intake/Output, MAR and Recent Results.

## 2017-01-05 NOTE — PROGRESS NOTES
Problem: Mobility Impaired (Adult and Pediatric)  Goal: *Acute Goals and Plan of Care (Insert Text)  Physical Therapy Goals  Initiated 1/3/2017  1. Patient will move from supine to sit and sit to supine , scoot up and down and roll side to side in bed with modified independence within 7 day(s). 2. Patient will transfer from bed to chair and chair to bed with modified independence using the least restrictive device within 7 day(s). 3. Patient will perform sit to stand with modified independence within 7 day(s). 4. Patient will ambulate with modified independence for 250 feet with the least restrictive device within 7 day(s). PHYSICAL THERAPY TREATMENT  Patient: Mina Vargas (19 y.o. male)  Date: 1/5/2017  Diagnosis: Pulmonary emboli (HCC) <principal problem not specified>       Precautions: Fall, Contact      ASSESSMENT:  Pt up in chair, agreeable to therapy. Pt is overall SBA with mobility, including amb 200 ft with min use of cardiac cart, RA VSS. When medically stable,pt will do well with discharge home with return to [facility]. Progression toward goals:  [X]    Improving appropriately and progressing toward goals  [ ]    Improving slowly and progressing toward goals  [ ]    Not making progress toward goals and plan of care will be adjusted       PLAN:  Patient continues to benefit from skilled intervention to address the above impairments. Continue treatment per established plan of care. Discharge Recommendations:  Return to [facility]  Further Equipment Recommendations for Discharge:  None, has RW       SUBJECTIVE:   Patient stated I'm doing well with therapy & i get around good with my walker.       OBJECTIVE DATA SUMMARY:   Critical Behavior:  Neurologic State: Alert  Orientation Level: Oriented X4  Cognition: Appropriate for age attention/concentration  Safety/Judgement: mildly impulsive  Functional Mobility Training:  Bed Mobility:         Pt uup in chair           Transfers:  Sit to Stand: Stand-by asssistance  Stand to Sit: Stand-by asssistance                             Balance:  Sitting: Intact  Standing: Intact; With support (Good with RW, fair without)  Standing - Static: Good  Standing - Dynamic : Good  Ambulation/Gait Training:  Distance (ft): 200 Feet (ft)  Assistive Device:  (Cardiac cart)  Ambulation - Level of Assistance: Stand-by asssistance                       Speed/Elysia: Accelerated                    No balance loss, cues to slow his pace  Therapeutic Exercises:   Review of seated/standing BLE exer. PT has HEP from [facility]  Pain:  Pain Scale 1: Numeric (0 - 10)  Pain Intensity 1: 0              Activity Tolerance:   Good  Please refer to the flowsheet for vital signs taken during this treatment.   After treatment:   [ ]    Patient left in no apparent distress sitting up in chair  [X]    Patient left in no apparent distress in bed  [X]    Call bell left within reach  [X]    Nursing notified  [ ]    Caregiver present  [ ]    Bed alarm activated      COMMUNICATION/COLLABORATION:   The patients plan of care was discussed with: Registered Nurse     Siva De La Rosa, PT   Time Calculation: 25 mins

## 2017-01-05 NOTE — PROGRESS NOTES
ID Progress Note    Subjective:   Daily Progress Note: 2017 2:49 PM    DX:    1. Acute PE and infarct.     2. First relapse C Diff colitis. 3. Recent lumbar discitis secondary to MSSA. Review of Systems: Diarrhea slowing down. Afebrile. fatigue/malasie. -Rash. -Swollen glands. -Oral lesions. -Photophobia. -Jaundice. + SOB. - cough. - abdominal pain or tenderness. - Nausea or vomiting. + Diarrhea. No joint inflammation or swelling. + chronic back pain. ROS otherwise negative. CURRENT ANTIMICROBIALS: fidaxomicin      Objective:     Visit Vitals    /46 (BP 1 Location: Right arm, BP Patient Position: At rest)    Pulse 83    Temp (P) 98.2 °F (36.8 °C)    Resp 20    Ht 5' 8\" (1.727 m)    Wt 177 lb 14.6 oz (80.7 kg)    SpO2 100%    BMI 27.05 kg/m2      O2 Device: Room air    Temp (24hrs), Av.1 °F (36.7 °C), Min:97.8 °F (36.6 °C), Max:98.5 °F (36.9 °C)  Exam:   General: Afebrile and not toxic. No exanthem or enanthem. No peripheral adenopathy. Alert and oriented x3. HEENT: EOMI. Anicteric. Oral mucosa normal. Conjunctivae normal. Neck supple. Chest: Lungs clear to P. + decreased breath sounds at right base. Regular rhythm without murmurs. Abdomen: Soft and diffusely tender in the lower quadrants, bethel on the left. Nondistended.    Musculoskeletal: No joint inflammation or effusions. No edema. . . Neurologic: Nonfocal exam.          Data Review:  WBC = 9200. Creat = 0.74.           Recent Results (from the past 24 hour(s))   GLUCOSE, POC    Collection Time: 17  4:50 PM   Result Value Ref Range    Glucose (POC) 144 (H) 65 - 100 mg/dL    Performed by [address] [place] [address], POC    Collection Time: 17 10:17 PM   Result Value Ref Range    Glucose (POC) 152 (H) 65 - 100 mg/dL    Performed by Joaquin Morales    CBC W/O DIFF    Collection Time: 17  4:01 AM   Result Value Ref Range    WBC 9.2 4.1 - 11.1 K/uL    RBC 2.92 (L) 4.10 - 5.70 M/uL    HGB 8.6 (L) 12.1 - 17.0 g/dL HCT 24.6 (L) 36.6 - 50.3 %    MCV 84.2 80.0 - 99.0 FL    MCH 29.5 26.0 - 34.0 PG    MCHC 35.0 30.0 - 36.5 g/dL    RDW 14.6 (H) 11.5 - 14.5 %    PLATELET 660 420 - 446 K/uL   METABOLIC PANEL, BASIC    Collection Time: 01/05/17  4:01 AM   Result Value Ref Range    Sodium 141 136 - 145 mmol/L    Potassium 3.1 (L) 3.5 - 5.1 mmol/L    Chloride 107 97 - 108 mmol/L    CO2 22 21 - 32 mmol/L    Anion gap 12 5 - 15 mmol/L    Glucose 213 (H) 65 - 100 mg/dL    BUN 6 6 - 20 MG/DL    Creatinine 0.74 0.70 - 1.30 MG/DL    BUN/Creatinine ratio 8 (L) 12 - 20      GFR est AA >60 >60 ml/min/1.73m2    GFR est non-AA >60 >60 ml/min/1.73m2    Calcium 7.0 (L) 8.5 - 10.1 MG/DL   GLUCOSE, POC    Collection Time: 01/05/17  9:30 AM   Result Value Ref Range    Glucose (POC) 144 (H) 65 - 100 mg/dL    Performed by E[address] Robin [address], POC    Collection Time: 01/05/17 11:48 AM   Result Value Ref Range    Glucose (POC) 134 (H) 65 - 100 mg/dL    Performed by Mayelin Loya          Assessment/Plan:    Responding to fidaxomicin Tx for C diff colitis. May be too expensive to complete outpatient Tx with this. If so, would to back to oral vancomccin, and taper over a month. Discussed with Dr. Winston Mack.

## 2017-01-05 NOTE — PROGRESS NOTES
[facility] INTENSIVIST Consult Service Note  Pulmonary, Critical Care, and Sleep Medicine    Name: Fabian Madden MRN: [id number]   : 1950 Hospital: [facility]   Date: 2017   Hospital Day: 4       Subjective/Interval History:   17: NO acute complaints. Chest discomfort is much better. NO difficulty breathing. No coughing. Has some ongoing loose stools. No headaches. No back pain. No abdominal pain. 17: Pt has some mild right lower chest wall pain. No back pain, no abdominal pain. Still having loose stools. I have reviewed the flowsheet and previous days notes. Seen earlier today on rounds. Reviewed the evaluation and will assist in implementing plan as outlined by Dr. Mcgowan Killer and team      IMPRESSION:   · Acute pulmonary embolus with right heart strain-stable. Normal oxygen levels. · Pulmonary infarction  · Right Pleuritic chest pain  · Hypokalemia  · Diabetes mellitus 2 uncontrolled-poc bs + ssi for now-hold OP PO medicine for now - restart as improves  · Hypertension, Benign essential -labetalol PRN sbp >170  · Hypokalemia  · Former Smoker Packs/day:3.00  Years:20.00  · Recurrent C diff positive again. · Recent Lumbar Diskiitis      RECOMMENDATIONS/PLAN:   · Agree with oral anticoagulant  · If he develops low back pain as above, can stop heparin and get stat MRI to r/o spinal hematoma  · Continue heparin for 48 hours then if no problems, may convert to NOAC or warfarin  · Replete K  · C diff, recurrent. Will ask Dr. Rafal Conrad opinion and how best to manage. · Will be available to assist in medical management while in the CCU pending disposition  · Day 3 waiting for bed outside ICU. Agree with discharge home form ICU. Can follow up in our office or with his PCP. Code: Full Code          Subjective/Initial History:   I have reviewed the flowsheet and previous days notes.     Asked to see this 78 y.o.    male now in the CCU as a PCU overflow with Large PE, on IV heparin. Mariana Nevarez He presented to ED with 5days of increased WOB with exertion worsened over the last 24hr when he further developed right sided chst pain with deep breath. He notes pain is sharp, stabbing at peak inspiration. To improve pain he was taking short breaths and using peppermints. No f/c/n/v. +diarrhea and weakness over this time as well and he is concerned of the viral GE that is going around. No sick contacts. Decreased PO intake. ++recent completion of treatment for Discitis by Dr Mary Walker 2 mos ago and unfortunately developed CDIFF following completion of abx for discitis and now has completed 3wks PO vanco as of 3-4wks ago. He has improved overall. In ED noted to have PE right lower and left lower lobar with developing right lower lobar infarct and possible right heart strain pattern. Without hypotension. Due to his recent Discitiis, decision made to not to use TPA, hep gtt initiated. reamins on O2 for comfort. +tachycardic in ED with right sided chest discomfort. Pt admitted. No back pain or leg complaints. Has some liquid stools. Patient with pulmonary embolism and evidence of right ventricular strain. Pt is tachycardic at rest. Some chest pain with deep breathing. PMH:  has a past medical history of Arthritis; Chronic pain; DM (diabetes mellitus) ([address] High cholesterol; and HTN (hypertension). He also has no past medical history of Difficult intubation; Malignant hyperthermia due to anesthesia; Nausea & vomiting; Pseudocholinesterase deficiency; or Unspecified adverse effect of anesthesia. PSH:   has a past surgical history that includes amputation; orthopaedic (Left); and other surgical (6/4/15). FHX: family history includes Heart Disease in his father and mother. SHX:  reports that he has quit smoking. His smoking use included Cigarettes. He has a 60.00 pack-year smoking history. He quit smokeless tobacco use about 21 years ago.  He reports that he drinks about 0.5 oz of alcohol per week  He reports that he uses illicit drugs, including Prescription and OTC.    ROS:A comprehensive review of systems was negative except for that written in the HPI.     MAR reviewed and pertinent medications noted or modified as needed    Allergies   Allergen Reactions    Vancomycin Rash     Patient broke out in large hive below IV site and c/o itching to area, patient states that \"that it was more likely an infiltration as opposed to an actual reaction\"        MEDS:   Current Facility-Administered Medications   Medication    potassium chloride (K-DUR, KLOR-CON) SR tablet 40 mEq    [START ON 2017] rivaroxaban (XARELTO) tablet 15 mg    rivaroxaban (XARELTO) tablet 15 mg    fidaxomicin (DIFICID) tablet 200 mg    oxyCODONE IR (ROXICODONE) tablet 5 mg    pravastatin (PRAVACHOL) tablet 20 mg    ondansetron (ZOFRAN) injection 4 mg    albuterol-ipratropium (DUO-NEB) 2.5 MG-0.5 MG/3 ML    insulin lispro (HUMALOG) injection    glucose chewable tablet 16 g    dextrose (D50W) injection syrg 12.5-25 g    glucagon (GLUCAGEN) injection 1 mg    sodium chloride (NS) flush 5-10 mL    sodium chloride (NS) flush 5-10 mL    mupirocin (BACTROBAN) 2 % ointment    ibuprofen (MOTRIN) tablet 400 mg          Telemetry:    normal sinus rhythm      Objective:     Vital Signs: Intake/Output: Intake/Output:   Visit Vitals    /46 (BP 1 Location: Right arm, BP Patient Position: At rest)    Pulse 83    Temp (P) 98.2 °F (36.8 °C)    Resp 20    Ht 5' 8\" (1.727 m)    Wt 80.7 kg (177 lb 14.6 oz)    SpO2 100%    BMI 27.05 kg/m2      O2 Device: Room air     Temp (24hrs), Av.1 °F (36.7 °C), Min:97.8 °F (36.6 °C), Max:98.5 °F (36.9 °C)     Intake/Output Summary (Last 24 hours) at 17 1405  Last data filed at 17 0830   Gross per 24 hour   Intake            459.7 ml   Output              300 ml   Net            159.7 ml    Last shift:      701 -  1900  In: -   Out: 150 [Urine:150]  Last 3 shifts: 01/03 1901 - 01/05 0700  In: 2461.9 [P.O.:340; I.V.:2121.9]  Out: 300 [Urine:300]           Physical Exam:    General: well developed and well nourished, in no respiratory distress and acyanotic, alert and oriented times 3   HEENT: NCAT   Neck, Lymph: No abnormally enlarged lymph nodes. Chest: increased AP diameter   Lungs: normal air entry   Heart: Regular rate and rhythm, S1S2 present. EVAN right base   Abdomen: soft, non-tender, without masses or organomegaly   :    Extremity: negative, cyanosis, clubbing;     Neuro: alert   Psych: oriented to time, place and person   Skin: Skin unremarkableSkin color, texture, turgor normal. No rashes or lesions;   Pulses: Bilateral, Radial, 2+    Capillary refill: normal warm;      Data:             Labs:    Recent Labs      01/05/17   0401  01/04/17   1129  01/04/17   0430  01/03/17   1953  01/03/17   1236   WBC  9.2   --   6.9   --   7.2   HGB  8.6*  10.4*  7.5*   --   11.2*   PLT  244   --   206   --   256   APTT   --    --   77.2*  75.1*  76.6*     Recent Labs      01/05/17   0401  01/04/17   0430  01/03/17   0454  01/02/17   1630  01/02/17   1545   NA  141  142  142   --   137   K  3.1*  3.4*  3.2*   --   3.2*   CL  107  107  106   --   97   CO2  22  25  26   --   29   GLU  213*  116*  155*   --   199*   BUN  6  10  9   --   10   CREA  0.74  0.79  0.79   --   1.04   CA  7.0*  7.6*  7.8*   --   8.9   MG   --   1.8  1.9   --    --    PHOS   --    --   2.7   --    --    LAC   --    --   0.9  1.8   --    ALB   --    --   2.1*   --   2.9*   SGOT   --    --   14*   --   15   ALT   --    --   7*   --   10*     No results for input(s): PH, PCO2, PO2, HCO3, FIO2 in the last 72 hours. No results for input(s): PH, PCO2, PO2, HCO3, FIO2 in the last 72 hours.   Recent Labs      01/03/17   0454  01/02/17   1630   CPK  136   --    CKNDX  CANNOT BE CALCULATED   --    TROIQ  <0.04  <0.04         Imaging:  I have personally reviewed the patients radiographs and have reviewed the reports:          Results from Hospital Encounter encounter on 11/22/16   XR CHEST PORT   Narrative INDICATION: Evaluate PICC line. Portable AP semiupright view of the chest.    Direct comparison made to prior chest x-ray dated August 6, 2015. Cardiomediastinal silhouette is stable. Right-sided PICC line extends to the mid  SVC. Lungs are clear bilaterally. Pleural spaces are normal. There is marked  degenerative narrowing of the right glenohumeral joint. Impression IMPRESSION: PICC line in appropriate position. Results from [place] encounter on 01/02/17   CT ABD PELV W CONT   Narrative CT ANGIOGRAPHY CHEST and CT OF THE ABDOMEN AND PELVIS WITH CONTRAST. 1/2/2017  6:51 PM     INDICATION: Dyspnea, flank pain. COMPARISON: CT abdomen pelvis 11/22/2016, chest CT 10/27/2009, CT lumbar spine  10/14/2016. TECHNIQUE: CT angiography of the chest was performed after the administration of  100 cc IV contrast (Isovue 370). Coronal and sagittal, and coronal MIP  reconstructions were performed. CT of the abdomen and pelvis was performed after the same IV contrast  administration. CT dose reduction was achieved through use of a standardized  protocol tailored for this examination and automatic exposure control for dose  modulation. FINDINGS:  Chest: A large pulmonary embolism in the right lower lobe straddles several  segmental branches. Distal airspace disease in the right lower lobe may  represent developing infarction. There is a trace sympathetic effusion. A  smaller pulmonary embolism straddles several segmental branches of the left  lower lobe. Flattening of the interventricular septum suggests right heart  strain. The lungs are otherwise clear. The central airways are patent. The  esophagus is patulous, and there is fluid and debris extending nearly to the  level of the thoracic inlet. The heart is at the upper limits of normal in size.   Aortic valvular and left anterior descending and right coronary artery  calcifications are mild. No thoracic lymphadenopathy. End-stage right  glenohumeral osteoarthritis. Abdomen: Tiny calculi layer dependently in the gallbladder. The pancreas is  fatty infiltrated. The stomach, duodenum, liver, spleen, adrenals, and kidneys  are normal.    Pelvis: Though the appendix is mildly dilated (9 mm), the tip is normal caliber  (4 mm), and there is no periappendiceal fat stranding. Further, it appears to be  filled with fluid, and the colon is also fluid-filled. Colonic mucosal hyperemia  is mild, and greatest in the rectum. Sigmoid diverticulosis is extensive. The  small bowel and bladder are normal. No free air or fluid, and no abdominopelvic  lymphadenopathy. Bones: Destruction of the endplates around chronic L2-L3 osteomyelitis is  stable. There is bilateral neural foraminal stenosis L2-L4. Impression IMPRESSION:   1. Right lower lobar pulmonary embolism. Left lower lobar segmental pulmonary  emboli. 2. Questionable right heart strain: flattening of the interventricular septum. 3. Probable developing infarction in the right lower lobe. Small sympathetic  right pleural effusion. 4. Fluid-filled colon consistent with diarrhea. The findings were called to Dr. Odilon Ramirez on 1/2/2017 7:20 PM by Dr. Marcy Deng.   [phone]             My assessment/plan was discussed with:  nursing    respiratory therapy Dr. flower      Complex decision making was performed which includes reviewing the patient's past medical records, current laboratory results, medications, ECG and actual Xray films, immediately available at the bedside to the patient          Jesica Zarate MD

## 2017-01-06 VITALS
DIASTOLIC BLOOD PRESSURE: 72 MMHG | BODY MASS INDEX: 26.96 KG/M2 | RESPIRATION RATE: 20 BRPM | HEART RATE: 89 BPM | WEIGHT: 177.91 LBS | HEIGHT: 68 IN | OXYGEN SATURATION: 96 % | SYSTOLIC BLOOD PRESSURE: 110 MMHG | TEMPERATURE: 98.6 F

## 2017-01-06 LAB
ANION GAP BLD CALC-SCNC: 10 MMOL/L (ref 5–15)
BUN SERPL-MCNC: 4 MG/DL (ref 6–20)
BUN/CREAT SERPL: 5 (ref 12–20)
CALCIUM SERPL-MCNC: 8.1 MG/DL (ref 8.5–10.1)
CHLORIDE SERPL-SCNC: 106 MMOL/L (ref 97–108)
CO2 SERPL-SCNC: 25 MMOL/L (ref 21–32)
CREAT SERPL-MCNC: 0.77 MG/DL (ref 0.7–1.3)
ERYTHROCYTE [DISTWIDTH] IN BLOOD BY AUTOMATED COUNT: 14.7 % (ref 11.5–14.5)
GLUCOSE BLD STRIP.AUTO-MCNC: 114 MG/DL (ref 65–100)
GLUCOSE BLD STRIP.AUTO-MCNC: 164 MG/DL (ref 65–100)
GLUCOSE BLD STRIP.AUTO-MCNC: 165 MG/DL (ref 65–100)
GLUCOSE SERPL-MCNC: 134 MG/DL (ref 65–100)
HCT VFR BLD AUTO: 29.6 % (ref 36.6–50.3)
HGB BLD-MCNC: 10.5 G/DL (ref 12.1–17)
MCH RBC QN AUTO: 30.2 PG (ref 26–34)
MCHC RBC AUTO-ENTMCNC: 35.5 G/DL (ref 30–36.5)
MCV RBC AUTO: 85.1 FL (ref 80–99)
PLATELET # BLD AUTO: 333 K/UL (ref 150–400)
POTASSIUM SERPL-SCNC: 4 MMOL/L (ref 3.5–5.1)
RBC # BLD AUTO: 3.48 M/UL (ref 4.1–5.7)
SERVICE CMNT-IMP: ABNORMAL
SODIUM SERPL-SCNC: 141 MMOL/L (ref 136–145)
WBC # BLD AUTO: 12.6 K/UL (ref 4.1–11.1)

## 2017-01-06 PROCEDURE — 74011250637 HC RX REV CODE- 250/637: Performed by: INTERNAL MEDICINE

## 2017-01-06 PROCEDURE — 80048 BASIC METABOLIC PNL TOTAL CA: CPT | Performed by: INTERNAL MEDICINE

## 2017-01-06 PROCEDURE — 36415 COLL VENOUS BLD VENIPUNCTURE: CPT | Performed by: INTERNAL MEDICINE

## 2017-01-06 PROCEDURE — 97116 GAIT TRAINING THERAPY: CPT

## 2017-01-06 PROCEDURE — 85027 COMPLETE CBC AUTOMATED: CPT | Performed by: INTERNAL MEDICINE

## 2017-01-06 PROCEDURE — 74011636637 HC RX REV CODE- 636/637: Performed by: INTERNAL MEDICINE

## 2017-01-06 PROCEDURE — 82962 GLUCOSE BLOOD TEST: CPT

## 2017-01-06 RX ORDER — OXYCODONE HYDROCHLORIDE 5 MG/1
5 TABLET ORAL
Qty: 30 TAB | Refills: 0 | Status: SHIPPED | OUTPATIENT
Start: 2017-01-06 | End: 2017-07-13

## 2017-01-06 RX ORDER — FUROSEMIDE 40 MG/1
20 TABLET ORAL DAILY
Qty: 30 TAB | Refills: 0 | Status: SHIPPED
Start: 2017-01-06 | End: 2019-06-29

## 2017-01-06 RX ADMIN — INSULIN LISPRO 2 UNITS: 100 INJECTION, SOLUTION INTRAVENOUS; SUBCUTANEOUS at 02:00

## 2017-01-06 RX ADMIN — Medication 10 ML: at 07:48

## 2017-01-06 RX ADMIN — FIDAXOMICIN 200 MG: 200 TABLET, FILM COATED ORAL at 10:28

## 2017-01-06 RX ADMIN — FUROSEMIDE 20 MG: 20 TABLET ORAL at 07:47

## 2017-01-06 RX ADMIN — RIVAROXABAN 15 MG: 15 TABLET, FILM COATED ORAL at 10:28

## 2017-01-06 RX ADMIN — INSULIN LISPRO 2 UNITS: 100 INJECTION, SOLUTION INTRAVENOUS; SUBCUTANEOUS at 12:50

## 2017-01-06 RX ADMIN — MUPIROCIN: 20 OINTMENT TOPICAL at 10:31

## 2017-01-06 RX ADMIN — METFORMIN HYDROCHLORIDE 500 MG: 500 TABLET ORAL at 07:47

## 2017-01-06 RX ADMIN — OXYCODONE HYDROCHLORIDE 5 MG: 5 TABLET ORAL at 07:47

## 2017-01-06 NOTE — PROGRESS NOTES
TRANSFER - OUT REPORT:    Verbal report given to Tanisha(name) on Kaci Balloon  being transferred to TELE(unit) for routine progression of care       Report consisted of patients Situation, Background, Assessment and   Recommendations(SBAR). Information from the following report(s) SBAR, Kardex, ED Summary, MAR, Recent Results and Med Rec Status was reviewed with the receiving nurse. Lines:   Peripheral IV 01/02/17 Right Hand (Active)   Site Assessment Clean, dry, & intact 1/5/2017  4:00 PM   Phlebitis Assessment 0 1/5/2017  4:00 PM   Infiltration Assessment 0 1/5/2017  4:00 PM   Dressing Status Clean, dry, & intact 1/5/2017  4:00 PM   Dressing Type Transparent 1/5/2017  4:00 PM   Hub Color/Line Status Blue;Flushed 1/5/2017  8:30 AM       Peripheral IV 01/03/17 Left Wrist (Active)   Site Assessment Clean, dry, & intact 1/5/2017  4:00 PM   Phlebitis Assessment 0 1/5/2017  4:00 PM   Infiltration Assessment 0 1/5/2017  4:00 PM   Dressing Status Clean, dry, & intact 1/5/2017  4:00 PM   Dressing Type Transparent 1/5/2017  4:00 PM   Hub Color/Line Status Pink;Flushed 1/5/2017  8:30 AM        Opportunity for questions and clarification was provided.       Patient transported with:   Monitor  Patient-specific medications from Pharmacy  Registered Nurse

## 2017-01-06 NOTE — ROUTINE PROCESS
..  * No surgery found *  Bedside and Verbal shift change report given to [address] (oncoming nurse) by Ernst Jerry (offgoing nurse). Report included the following information SBAR, Kardex and Recent Results.     Zone Phone:  [phone]      Significant changes during shift:  Signed release form to not have bed alarm         Patient Information    Noreen Eisenberg  78 y.o.  1/2/2017  4:06 PM by Bert Peres MD. Noreen Eisenberg was admitted from Home    Problem List    Patient Active Problem List    Diagnosis Date Noted    Pulmonary emboli ([address] 01/02/2017    Clostridium difficile colitis 11/22/2016    Discitis of lumbar region 10/14/2016    Abscess of left foot 06/26/2015    High cholesterol 06/03/2015     Class: Chronic    DM (diabetes mellitus) ([address] 06/03/2015     Class: Chronic    Diabetic foot ulcer ([address] 06/03/2015     Class: Chronic    Cellulitis of left lower leg 06/03/2015     Class: Present on Admission    Spinal stenosis of lumbar region at multiple levels 06/03/2015     Class: Chronic    HTN (hypertension) 06/03/2015     Class: Chronic    Aortic valve stenosis, moderate 06/03/2015     Class: Chronic    Abscess of foot including toes 06/03/2015     Class: Present on Admission    Sepsis ([address] 06/03/2015     Class: Present on Admission    Amputated great toe of left foot ([address] 06/03/2015     Class: Present on Admission    Heart murmur 11/20/2014    LU (dyspnea on exertion) 11/20/2014    Leg fatigue 11/20/2014     Past Medical History   Diagnosis Date    Arthritis      knees, back    Chronic pain      knees, back    DM (diabetes mellitus) ([address]     High cholesterol     HTN (hypertension)          Core Measures:    CVA: NO NO  CHF:NO NO  PNA:NO NO    Post Op Surgical (If Applicable):     Number times ambulated in hallway past shift:  0  Number of times OOB to chair past shift:   0  NG Tube: NO  Incentive Spirometer: NO  Drains: NO   Volume  0  Dressing Present:  NO  Flatus:  NO    Activity Status:    OOB to Chair NO  Ambulated this shift NO   Bed Rest YES    Supplemental O2: (If Applicable)    NC NO  NRB NO  Venti-mask NO  On 0 Liters/min      LINES AND DRAINS:    Central Line? NO Placement date 0 Reason Medically Necessary 0    PICC LINE? NO Placement date 0Reason Medically Necessary 0    Urinary Catheter? NO Placement Date 0 Reason Medically Necessary 0    DVT prophylaxis:    DVT prophylaxis Med- NO  DVT prophylaxis SCD or ANTONIO- NO     Wounds: (If Applicable)    Wounds- NO    Location 0    Patient Safety:    Falls Score Total Score: 3  Safety Level_______  Bed Alarm On? NO  Sitter?  NO    Plan for upcoming shift:  Cont blood thinner apply scds        Discharge Plan: NO 0    Active Consults:  IP CONSULT TO INTENSIVIST  IP CONSULT TO INFECTIOUS DISEASES

## 2017-01-06 NOTE — DISCHARGE SUMMARY
Hospitalist Discharge Summary     Patient ID:  Jewel Alonzo  [id number]  78 y.o.  1950    PCP on record: PROVIDER UNKNOWN    Admit date: 1/2/2017  Discharge date and time: 1/6/2017      DISCHARGE DIAGNOSIS:  Acute bilateral pulmonary embolus with right heart strain, right sided pleuritic chest pain and RLL pulmonary infarction  C diff diarrhea, present on admission  Non-insulin dependent DM2 uncontrolled without complication  Anemia  Hypertension, benign/essential  Severe protein calorie malnutrition  L2-3 discitis 10/16 with psoas abscess and severe spinal stenosis  Hypokalemia, replaced        CONSULTATIONS:  IP CONSULT TO INTENSIVIST  IP CONSULT TO INFECTIOUS DISEASES    Excerpted HPI from H&P of Rhoda Vanegas MD:  Janette Cruz is a 78 y.o.  male with known history as listed below presents to ED with complaint noted above. Available records were reviewed at the time of H&P. Patient with 5days of increased WOB with exertion worsened over the last 24hr when he further developed right sided chst pain with deep breath. He notes pain is sharp, stabbing at peak inspiration. To improve pain he was taking short breaths and using peppermints. No f/c/n/v. +diarrhea and weakness over this time as well and he is concerned of the viral GE that is going around. No sick contacts. Decreased PO intake. ++recent completion of treatment for Discitis by Dr Vernon Pallas 2 mos ago and unfortunately developed CDIFF following completion of abx for discitis and now has completed 3wks PO vanco as of 3-4wks ago. He has improved overall. In ED noted to have PE right lower and left lower lobar with developing right lower lobar infarct and possible right heart strain pattern. Without hypotension Pulmonary, whom was consulted by ED, opted not to use TPA, hep gtt initiated. reamins on O2 for comfort. +tachycardic in ED with right sided chest discomfort.  We were asked to admit for work up and evaluation of the above problems. ______________________________________________________________________  DISCHARGE SUMMARY/HOSPITAL COURSE:  for full details see H&P, daily progress notes, labs, consult notes. Hospital course:  Acute bilateral pulmonary embolus with right heart strain, right sided pleuritic chest pain and RLL pulmonary infarction:  CTA chest on admssion with right lower lobar pulmonary embolism. Left lower lobar segmental pulmonary emboli. Questionable right heart strain: flattening of the interventricular septum. Probable developing infarction in the right lower lobe. Small sympathetic right pleural effusion. Fluid-filled colon consistent with diarrhea. Pt had echo with EF 55-60%. There were no regional wall motion abnormalities. There was mild concentric hypertrophy. Aortic valve moderately to markedly increased thickness, moderate to marked calcification, and moderately reduced cuspal separation. There was moderate stenosis. No RV dilation. He was initially treated with IV heparin, but remained stable and was able to be transitioned to xarelto on discharge after it was verified with his insurance that he does have coverage. C diff diarrhea, present on admission: Recent C Diff colitis 11/16 after treatment for discitis. Pt was seen by ID, probiotic stopped per recommendations. He was started on dificid but it does not appear will be affordable on discharge. Discussed with ID, Pt was changed to oral vancomycin taper on discharge x 3 weeks. Non-insulin dependent DM2 uncontrolled without complication: restart metformin (initially held for CT contrast)  Anemia: Hg slowly declining, but not overt source of blood loss seen. Feel this is due to chronic disease and poor nutritional status. Hypertension, benign/essential: Blood pressure has been low. I stopped his home lisinopril.   Will con't home lasix at reduced dose.   Severe protein calorie malnutrition:  Appetite and strength remain poor after treatment for C Diff. Long discussion with his Pt and wife regarding importance of nutrition in order for Pt to recover normal bowel function. L2-3 discitis 10/16 with psoas abscess and severe spinal stenosis: possible due to back injections vs DM foot ulcer as source. Disc aspiration cultures 10/16 with amp-resistent staph, now felt to be treated but complicated by C Diff as above. Hypokalemia  _______________________________________________________________________  Patient seen and examined by me on discharge day. Pertinent Findings:  Gen: awake, appropriate, NAD  HEENT: cl grzegorz, no lesions  Chest: CTA bilaterally, no crackles or wheezes  Cv: RRR, no murmur, no edema  Abd: soft, NT, ND, BS+, no mass  Neuro: CN intact  _______________________________________________________________________  DISCHARGE MEDICATIONS:   Current Discharge Medication List      START taking these medications    Details   !! rivaroxaban (XARELTO) 15 mg tab tablet Take 1 Tab by mouth two (2) times daily (with meals) for 16 days. Qty: 32 Tab, Refills: 0      vancomycin 50 mg/mL soln oral solution (compounded) Take 125 mg 4 times daily for 7 days, then 125 mg 3 times daily for 7 days, then 125 mg 2 times daily for 7 days  Qty: 1 Bottle, Refills: 0      !! rivaroxaban (XARELTO) 20 mg tab tablet Take 1 Tab by mouth daily for 30 days. Qty: 30 Tab, Refills: 0       !! - Potential duplicate medications found. Please discuss with provider. CONTINUE these medications which have CHANGED    Details   oxyCODONE IR (ROXICODONE) 5 mg immediate release tablet Take 1 Tab by mouth every four (4) hours as needed. Max Daily Amount: 30 mg.  Qty: 30 Tab, Refills: 0      furosemide (LASIX) 40 mg tablet Take 0.5 Tabs by mouth daily. Qty: 30 Tab, Refills: 0         CONTINUE these medications which have NOT CHANGED    Details   Aspirin, Buffered 81 mg tab Take  by mouth daily. metFORMIN (GLUCOPHAGE) 500 mg tablet Take  by mouth two (2) times daily (with meals). simvastatin (ZOCOR) 10 mg tablet Take  by mouth nightly. STOP taking these medications       L. acidoph & paracasei- S therm- Bifido (BOB-Q/RISAQUAD) 8 billion cell cap cap Comments:   Reason for Stopping:         lisinopril (PRINIVIL, ZESTRIL) 2.5 mg tablet Comments:   Reason for Stopping:         methocarbamol (ROBAXIN) 750 mg tablet Comments:   Reason for Stopping:               My Recommended Diet, Activity, Wound Care, and follow-up labs are listed in the patient's Discharge Insturctions which I have personally completed and reviewed.     _______________________________________________________________________  DISPOSITION:    Home with Family: x   Home with HH/PT/OT/RN: x   SNF/LTC:    HATTIE:    OTHER:        Condition at Discharge:  Stable  _______________________________________________________________________  Follow up with:   PCP : PROVIDER UNKNOWN  Follow-up Information     Follow up With Details Comments Efren Hart MD In 3 weeks  [id number]  [address] [place] [address]  [phone]                Total time in minutes spent coordinating this discharge (includes going over instructions, follow-up, prescriptions, and preparing report for sign off to her PCP) :  45 minutes    Signed:  Mateo Mayo MD

## 2017-01-06 NOTE — PROGRESS NOTES
ID Progress Note    Subjective:   Daily Progress Note: 2017 2:49 PM    DX:    1. Acute PE and infarct.     2. First relapse C Diff colitis. 3. Recent lumbar discitis secondary to MSSA. Review of Systems: Diarrhea gone. . Afebrile. fatigue/malasie. -Rash. -Swollen glands. -Oral lesions. -Photophobia. -Jaundice. + SOB. - cough. - abdominal pain or tenderness. - Nausea or vomiting. -Diarrhea. No joint inflammation or swelling. + chronic back pain. ROS otherwise negative. CURRENT ANTIMICROBIALS: fidaxomicin      Objective:     Visit Vitals    /72 (BP 1 Location: Left arm, BP Patient Position: At rest)    Pulse 89    Temp 98.6 °F (37 °C)    Resp 20    Ht 5' 8\" (1.727 m)    Wt 177 lb 14.6 oz (80.7 kg)    SpO2 96%    BMI 27.05 kg/m2      O2 Device: Room air    Temp (24hrs), Av.6 °F (37 °C), Min:98.1 °F (36.7 °C), Max:99 °F (37.2 °C)  Exam:   General: Afebrile and not toxic. No exanthem or enanthem. No peripheral adenopathy. Alert and oriented x3. HEENT: EOMI. Anicteric. Oral mucosa normal. Conjunctivae normal. Neck supple. Chest: Lungs clear to P. + decreased breath sounds at right base. Regular rhythm without murmurs. Abdomen: Soft and diffusely tender in the lower quadrants, bethel on the left. Nondistended.    Musculoskeletal: No joint inflammation or effusions. No edema. . . Neurologic: Nonfocal exam.          Data Review:  WBC = 12,600. Creat = 0.77.           Recent Results (from the past 24 hour(s))   GLUCOSE, POC    Collection Time: 17  4:22 PM   Result Value Ref Range    Glucose (POC) 156 (H) 65 - 100 mg/dL    Performed by Ej ePderson, POC    Collection Time: 17  8:55 PM   Result Value Ref Range    Glucose (POC) 148 (H) 65 - 100 mg/dL    Performed by Romie Olvera, BASIC    Collection Time: 17  4:26 AM   Result Value Ref Range    Sodium 141 136 - 145 mmol/L    Potassium 4.0 3.5 - 5.1 mmol/L    Chloride 106 97 - 108 mmol/L CO2 25 21 - 32 mmol/L    Anion gap 10 5 - 15 mmol/L    Glucose 134 (H) 65 - 100 mg/dL    BUN 4 (L) 6 - 20 MG/DL    Creatinine 0.77 0.70 - 1.30 MG/DL    BUN/Creatinine ratio 5 (L) 12 - 20      GFR est AA >60 >60 ml/min/1.73m2    GFR est non-AA >60 >60 ml/min/1.73m2    Calcium 8.1 (L) 8.5 - 10.1 MG/DL   CBC W/O DIFF    Collection Time: 01/06/17  4:26 AM   Result Value Ref Range    WBC 12.6 (H) 4.1 - 11.1 K/uL    RBC 3.48 (L) 4.10 - 5.70 M/uL    HGB 10.5 (L) 12.1 - 17.0 g/dL    HCT 29.6 (L) 36.6 - 50.3 %    MCV 85.1 80.0 - 99.0 FL    MCH 30.2 26.0 - 34.0 PG    MCHC 35.5 30.0 - 36.5 g/dL    RDW 14.7 (H) 11.5 - 14.5 %    PLATELET 858 545 - 895 K/uL   GLUCOSE, POC    Collection Time: 01/06/17  6:21 AM   Result Value Ref Range    Glucose (POC) 164 (H) 65 - 100 mg/dL    Performed by Sejal Anton, POC    Collection Time: 01/06/17 12:00 PM   Result Value Ref Range    Glucose (POC) 165 (H) 65 - 100 mg/dL    Performed by Florian Rivera (PCT)          Assessment/Plan:    Responding to fidaxomicin Tx for C diff colitis. Feels well and no diarrhea or abdominal pain. May be too expensive to complete outpatient Tx with this. If so, would to back to oral vancomccin, and taper over a month. Little else that I can add at this point in time and will sign off. Call back PRN.

## 2017-01-06 NOTE — PROGRESS NOTES
Nutrition Assessment:    INTERVENTIONS/RECOMMENDATIONS:   Meals/Snacks: General/healthful diet: Continue current diet. Encourage PO and honor preferences as able  Supplements: Commercial supplement: Ensure pudding TID, Glucerna BID    ASSESSMENT:   Patient medically noted for C-diff, pulmonary emboli, DM, and HTN. Patient reports improved appetite. Using menu and room service to order foods he tolerates well. Drinking glucerna and eating ensure pudding well; reports liking these and consuming routinely. Continue current nutrition plan of care. Encourage PO intake of meals/supplements. Diet Order: Regular (No concentrated sweets )  % Eaten:  Patient Vitals for the past 72 hrs:   % Diet Eaten   01/05/17 1830 25 %   01/05/17 1200 25 %   01/05/17 0800 25 %   01/04/17 1252 20 %     Pertinent Medications: [x] Reviewed []Other: dificid, lasix, humalog, metformin, pravastatin, xarelto   Pertinent Labs: [x]Reviewed  []Other:  [phone]  Food Allergies: [x]None []Other:     Last BM: 1/4   [x]Active     []Hyperactive  []Hypoactive       [] Absent  BS  Skin:    [x] Intact   [] Incision  [] Breakdown   []Edema   []Other:    Anthropometrics: Height: 5' 8\" (172.7 cm) Weight: 80.7 kg (177 lb 14.6 oz)    IBW (%IBW):   ( ) UBW (%UBW):   (  %)    BMI: Body mass index is 27.05 kg/(m^2). This BMI is indicative of:  []Underweight   []Normal   [x]Overweight   [] Obesity   [] Extreme Obesity (BMI>40)  Last Weight Metrics:  Weight Loss Metrics 1/3/2017 11/22/2016 10/14/2016 6/20/2016 8/6/2015 6/23/2015 6/4/2015   Today's Wt 177 lb 14.6 oz 184 lb 203 lb 7.8 oz 240 lb 11.9 oz 214 lb 2 oz 214 lb 224 lb   BMI 27.05 kg/m2 27.98 kg/m2 29.2 kg/m2 32.64 kg/m2 29.03 kg/m2 29.02 kg/m2 32.14 kg/m2       Estimated Nutrition Needs (Based on): 2018 Kcals/day (MSJ 1552 x 1.3) , 65 g (0.8gPro/kg) Protein  Carbohydrate:  At Least 130 g/day  Fluids: 2000 mL/day     Pt expected to meet estimated nutrient needs: [x]Yes []No    NUTRITION DIAGNOSES:   Problem:  Unintended weight loss      Etiology: related to poor appetite and diarrhea      Signs/Symptoms: as evidenced by 12.6% wt loss since October 2016. NUTRITION INTERVENTIONS:  Meals/Snacks: General/healthful diet   Supplements: Commercial supplement              GOAL:   PO intake >/=50% of meals/supplements next 3-5 days     NUTRITION MONITORING AND EVALUATION   Food/Nutrient Intake Outcomes: Total energy intake  Physical Signs/Symptoms Outcomes: Weight/weight change, Electrolyte and renal profile, Glucose profile, GI profile    Previous Goal Met:   [] Met              [x] Progressing Towards Goal              [] Not Progressing Towards Goal   Previous Recommendations:   [x] Implemented          [] Not Implemented          [] Not Applicable    LEARNING NEEDS (Diet, Food/Nutrient-Drug Interaction):    [x] None Identified   [] Identified and Education Provided/Documented   [] Identified and Pt declined/was not appropriate     Cultural, Bahai, OR Ethnic Dietary Needs:    [x] None Identified   [] Identified and Addressed     [x] Interdisciplinary Care Plan Reviewed/Documented    [x] Discharge Planning: Regular/consistent carbohydrate diet.  Preferred ONS TID    [] Participated in Interdisciplinary Rounds    NUTRITION RISK:    [] High              [x] Moderate           []  Low  []  Minimal/Uncompromised      Scott Sorto  Pager [phone]              Weekend Pager [phone]

## 2017-01-06 NOTE — DISCHARGE INSTRUCTIONS
HOSPITALIST DISCHARGE INSTRUCTIONS    NAME: Rachel Guidry   :  1950   MRN:  [id number]     Date/Time:  2017 4:49 PM    ADMIT DATE: 2017   DISCHARGE DATE: 2017     Attending Physician: Thomas Willingham MD    DISCHARGE DIAGNOSIS:  Pulmonary embolism  C Diff Colitis    MEDICATIONS:  See above    · It is important that you take the medication exactly as they are prescribed. · Keep your medication in the bottles provided by the pharmacist and keep a list of the medication names, dosages, and times to be taken in your wallet. · Do not take other medications without consulting your doctor. Pain Management: per above medications    What to do at Home    Recommended diet:  Please work on adding solid food to your diet. Liquids and hydration are very important, but you also need solid food in order for your bowels to function properly. Recommended activity: Activity as tolerated    If you have questions regarding the hospital related prescriptions or hospital related issues please call [facility] at . You can always direct your questions to your primary care doctor if you are unable to reach your hospital physician; your PCP works as an extension of your hospital doctor just like your hospital doctor is an extension of your PCP for your time at [facility]. If you experience any of the following symptoms then please call your primary care physician or return to the emergency room if you cannot get hold of your doctor:  Fever, chills, nausea, vomiting, diarrhea, change in mentation, falling, bleeding, shortness of breath    Additional Instructions:      Bring these papers with you to your follow up appointments. The papers will help your doctors be sure to continue the care plan from the hospital.              Information obtained by :  I understand that if any problems occur once I am at home I am to contact my physician.     I understand and acknowledge receipt of the instructions indicated above. Physician's or R.N.'s Signature                                                                  Date/Time                                                                                                                                              Patient or Representative Signature                                                          Date/Time      MyChart Activation    Thank you for requesting access to [facility]. Please follow the instructions below to securely access and download your online medical record. [facility] allows you to send messages to your doctor, view your test results, renew your prescriptions, schedule appointments, and more. How Do I Sign Up? 1. In your internet browser, go to [email or web address]  2. Click on the First Time User? Click Here link in the Sign In box. You will be redirect to the New Member Sign Up page. 3. Enter your [facility] Access Code exactly as it appears below. You will not need to use this code after youve completed the sign-up process. If you do not sign up before the expiration date, you must request a new code. [facility] Access Code: [email or web address]  Expires: 2017 10:32 AM (This is the date your [facility] access code will )    4. Enter the last four digits of your Social Security Number (xxxx) and Date of Birth (mm/dd/yyyy) as indicated and click Submit. You will be taken to the next sign-up page. 5. Create a [facility] ID. This will be your [facility] login ID and cannot be changed, so think of one that is secure and easy to remember. 6. Create a [facility] password. You can change your password at any time. 7. Enter your Password Reset Question and Answer. This can be used at a later time if you forget your password. 8. Enter your e-mail address.  You will receive e-mail notification when new information is available in [address] 9. Click Sign Up. You can now view and download portions of your medical record. 10. Click the Download Summary menu link to download a portable copy of your medical information. Additional Information    If you have questions, please visit the Frequently Asked Questions section of the [facility] website at [email or web address] Remember, [facility] is NOT to be used for urgent needs. For medical emergencies, dial 911.

## 2017-01-06 NOTE — HOME CARE
Home Health Care Discharge Planning: [facility]  Face to Face Encounter      NAME: Trista Philip   :  1950   MRN:  [id number]     Primary Diagnosis:   Acute bilateral pulmonary embolus with right heart strain, right sided pleuritic chest pain and RLL pulmonary infarction  C diff diarrhea, present on admission  Non-insulin dependent DM2 uncontrolled without complication  Anemia  Hypertension, benign/essential  Severe protein calorie malnutrition  L2-3 discitis 10/16 with psoas abscess and severe spinal stenosis  Hypokalemia, replaced    Date of Face to Face:  2017 6:13 PM                                  Face to Face Encounter findings are related to primary reason for home care:   YES    1. I certify that the patient needs intermittent skilled nursing care, physical therapy and/or speech therapy. I will not be following this patient in the Community and Dr. Terrence Coats will be responsible for signing the [address] of Care. 2. Initial Orders for Care: Skilled Nursing and Physical Therapy    3. I certify that this patient is homebound because of illness or injury, need the aid of supportive devices such as crutches, canes, wheelchairs, and walkers; the use of special transportation; or the assistance of another person in order to leave their place of residence. There exists a normal inability to leave home and leaving home requires a considerable and taxing effort. 4. I certify that this patient is under my care and that I had a Face-to-Face Encounter that meets the physician Face-to-Face Encounter requirements.   Document the physical findings from the Face-to-Face Encounter that support the need for skilled services: Has new diagnosis that requires skilled nursing teaching and intervention , Has new medications that requires skilled nursing teaching and monitoring for understanding and compliance  and Has new finding of weakness and altered mobility that requires skilled physical/occupational and/or speech therapy services for evaluation and interventions.      Mardell Cowden, MD  Discharging Physician                 Office:  [phone]  Fax:    [phone]

## 2017-01-06 NOTE — PROGRESS NOTES
Primary Nurse Jacqueline Chaves RN and Gerhard Ramirez RN performed a dual skin assessment on this patient noted redness on back and bilateral lower extremity. Left great toe amputated, with dry skin noted to bilateral lower feet.   Harris score is 21

## 2017-01-06 NOTE — INTERDISCIPLINARY ROUNDS
[facility] Interdisciplinary rounds were held today to discuss patient plan of care and outcomes. The interdisciplinary team collaborated to facilitate discharge planning needs. The following members were present; Nurse Manager, Pharmacist, Primary Nurse, , Physical Therapy, [facility] Liaison,  Physician, and Case Management.      PLAN:  Will need outpt PT when ready

## 2017-01-06 NOTE — PROGRESS NOTES
Patient awake and alert one person assist back to bed from bedside commode. Patient follow all commands with no complaints. Will continue to monitor.

## 2017-01-06 NOTE — PROGRESS NOTES
Problem: Mobility Impaired (Adult and Pediatric)  Goal: *Acute Goals and Plan of Care (Insert Text)  Physical Therapy Goals  Initiated 1/3/2017  1. Patient will move from supine to sit and sit to supine , scoot up and down and roll side to side in bed with modified independence within 7 day(s). 2. Patient will transfer from bed to chair and chair to bed with modified independence using the least restrictive device within 7 day(s). 3. Patient will perform sit to stand with modified independence within 7 day(s). 4. Patient will ambulate with modified independence for 250 feet with the least restrictive device within 7 day(s). PHYSICAL THERAPY TREATMENT  Patient: Kaci Arriola (8 y.o. male)  Date: 1/6/2017  Diagnosis: Pulmonary emboli (HCC) <principal problem not specified>       Precautions: Fall, Contact      ASSESSMENT:  Patient received sitting on EOB with caregiver present and agreeable to PT intervention. Patient cleared by nursing for mobility. Patient performed transfers with SBA, requiring minimal VCs for safety while transferring stand>sit. Patient ambulated 215' with SBA-CGA x 1 using RW. Patient with fluctuations in gait speed, forward flexed trunk, and shuffling gait throughout ambulation. Patient with RW too far in front during gait and provided with VCs to correct, however patient reporting that that was how he used his RW at home. Patient also provided with education to remain within RW frame during gait for safety. Patient somewhat resistant towards education provided by therapist this date. Patient was assisted into bedside chair and encouraged to perform seated exercises, however patient performed one (as described below, requiring VCs for proper performance), then declined additional exercises. Patient was left sitting in bedside chair with all needs met, nursing informed, and caregiver present. Recommend patient return home with family support, use of RW, and continued OP PT.       Progression toward goals:  [X]    Improving appropriately and progressing toward goals  [ ]    Improving slowly and progressing toward goals  [ ]    Not making progress toward goals and plan of care will be adjusted       PLAN:  Patient continues to benefit from skilled intervention to address the above impairments. Continue treatment per established plan of care. Discharge Recommendations:  Outpatient  Further Equipment Recommendations for Discharge:  None; pt owns RW       SUBJECTIVE:   Patient stated Don't put it over there.       OBJECTIVE DATA SUMMARY:   Critical Behavior:  Neurologic State: Alert  Orientation Level: Oriented X4  Cognition: Appropriate safety awareness, Follows commands  Safety/Judgement: Decreased insight into deficits, Decreased awareness of need for safety, Decreased awareness of need for assistance  Functional Mobility Training:  Bed Mobility:           Scooting: Modified independent        Transfers:  Sit to Stand: Stand-by asssistance  Stand to Sit: Stand-by asssistance        Bed to Chair: Stand-by asssistance                    Balance:  Sitting: Intact; Without support  Standing: Intact; With support (RW support)  Standing - Static: Good  Standing - Dynamic : Good  Ambulation/Gait Training:  Distance (ft): 215 Feet (ft)  Assistive Device: Gait belt;Walker, rolling  Ambulation - Level of Assistance: Stand-by asssistance;Contact guard assistance        Gait Abnormalities: Decreased step clearance;Shuffling gait (forward flexed trunk)        Base of Support: Widened     Speed/Elysia: Fluctuations  Step Length: Left shortened;Right shortened     Therapeutic Exercises:   LAQ 10x BLE  Pain:  Pain Scale 1: Numeric (0 - 10)  Pain Intensity 1: 0  Pain Location 1: Rib cage  Pain Orientation 1: Right  Pain Description 1: Sharp  Pain Intervention(s) 1: Medication (see MAR)  Activity Tolerance:   Good - pt without complaints  After treatment:   [X]    Patient left in no apparent distress sitting up in chair  [ ]    Patient left in no apparent distress in bed  [X]    Call bell left within reach  [X]    Nursing notified  [X]    Caregiver present  [ ]    Bed alarm activated      COMMUNICATION/COLLABORATION:   The patients plan of care was discussed with: Physical Therapist and Registered Nurse     Jaclynn Mcardle, PT, DPT   Time Calculation: 17 mins

## 2017-01-06 NOTE — PROGRESS NOTES
Pt is a 77 yo male admitted from home for evaluation/treatment of SOB and R-sided chest pain - suspected pulmonary emboli. Pt has complex hx which includes MSSA discitis, C Diff which has re-occured, DM type 2, HTN. Pt receives his primary care at [facility] but is unable to name a specific MD.    Pt lives w/ his spouse, is a retired  - fairly independent with [address] [place] [address]. Pt also has electric scooter. Pt has had 3 admissions since Oct 2016 and has been to SNF care at [facility] after one of these admission. After one of these admissions, it was arranged for pt to obtain oral Vancomycin at [facility]. CM was asked to look into pt cost for Xarelto at [place]. His regular pharmacy is at [facility] in [place] - pharmacist reported that after pt satisfies $360 annual deductible, his cost for Xarelto will be $35/mo. Discount card for first 30 days Free is on pt's bedside chart for dc. CM also looked into cost of Dificid for resistant CDiff - cost at pt's pharmacy and [facility] was in range of $1300 for 26 tablets. Dr. David Rosa and Dr. Marilynn Hirsch are considering oral Vanc at [facility]. CM could also inquire if Dificid would covered by SNF (doubtful) but pt is not enthusiastic about returning to SNF at [facility]. [facility] is recommending resumption of O/P PT at [facility] - pt had been attending O/P at [facility] in [place]. Care Management Interventions  PCP Verified by CM: No (Need to determine if pt has PCP)  Mode of Transport at Discharge:  Other (see comment) (family)  Transition of Care Consult (CM Consult): Discharge Planning (Pt was assessed for possible dc needs and medication assistance)  Discharge Durable Medical Equipment: No  Physical Therapy Consult: Yes  Occupational Therapy Consult: Yes  Speech Therapy Consult: No  Current Support Network: Lives with Spouse  Confirm Follow Up Transport: Family  Plan discussed with Pt/Family/Caregiver: Yes  Freedom of Choice Offered: Yes  Discharge Location  Discharge Placement: Home with home health     SABRINA Niño

## 2017-07-13 ENCOUNTER — HOSPITAL ENCOUNTER (OUTPATIENT)
Dept: CT IMAGING | Age: 67
Discharge: HOME | End: 2017-07-13
Attending: NURSE PRACTITIONER
Payer: MEDICARE

## 2017-07-13 ENCOUNTER — OFFICE VISIT (OUTPATIENT)
Dept: SURGERY | Age: 67
End: 2017-07-13

## 2017-07-13 VITALS
BODY MASS INDEX: 30.77 KG/M2 | HEART RATE: 65 BPM | SYSTOLIC BLOOD PRESSURE: 121 MMHG | OXYGEN SATURATION: 99 % | RESPIRATION RATE: 20 BRPM | DIASTOLIC BLOOD PRESSURE: 60 MMHG | HEIGHT: 68 IN | WEIGHT: 203 LBS

## 2017-07-13 DIAGNOSIS — R10.31 ABDOMINAL PAIN, RIGHT LOWER QUADRANT: ICD-10-CM

## 2017-07-13 DIAGNOSIS — K40.90 RIGHT INGUINAL HERNIA: Primary | ICD-10-CM

## 2017-07-13 LAB — CREAT BLD-MCNC: 1.4 MG/DL (ref 0.6–1.3)

## 2017-07-13 PROCEDURE — 74011000255 HC RX REV CODE- 255: Performed by: INTERNAL MEDICINE

## 2017-07-13 PROCEDURE — 74177 CT ABD & PELVIS W/CONTRAST: CPT

## 2017-07-13 PROCEDURE — 82565 ASSAY OF CREATININE: CPT

## 2017-07-13 PROCEDURE — 74011636320 HC RX REV CODE- 636/320: Performed by: INTERNAL MEDICINE

## 2017-07-13 PROCEDURE — 74011250636 HC RX REV CODE- 250/636: Performed by: INTERNAL MEDICINE

## 2017-07-13 RX ORDER — SODIUM CHLORIDE 9 MG/ML
50 INJECTION, SOLUTION INTRAVENOUS
Status: COMPLETED | OUTPATIENT
Start: 2017-07-13 | End: 2017-07-13

## 2017-07-13 RX ORDER — BARIUM SULFATE 20 MG/ML
900 SUSPENSION ORAL
Status: COMPLETED | OUTPATIENT
Start: 2017-07-13 | End: 2017-07-13

## 2017-07-13 RX ORDER — SODIUM CHLORIDE 0.9 % (FLUSH) 0.9 %
10 SYRINGE (ML) INJECTION
Status: COMPLETED | OUTPATIENT
Start: 2017-07-13 | End: 2017-07-13

## 2017-07-13 RX ADMIN — Medication 10 ML: at 08:40

## 2017-07-13 RX ADMIN — IOPAMIDOL 100 ML: 755 INJECTION, SOLUTION INTRAVENOUS at 08:40

## 2017-07-13 RX ADMIN — BARIUM SULFATE 900 ML: 21 SUSPENSION ORAL at 08:40

## 2017-07-13 RX ADMIN — SODIUM CHLORIDE 50 ML/HR: 900 INJECTION, SOLUTION INTRAVENOUS at 08:40

## 2017-07-13 NOTE — MR AVS SNAPSHOT
Visit Information Date & Time Provider Department Dept. Phone Encounter #  
 7/13/2017 10:40 AM Dell Powers MD [facility] [id number] Upcoming Health Maintenance Date Due Hepatitis C Screening 1950 MICROALBUMIN Q1 7/12/1959 EYE EXAM RETINAL OR DILATED Q1 7/12/1959 DTaP/Tdap/Td series (1 - Tdap) 7/12/1970 FOBT Q 1 YEAR AGE 50-75 7/12/1999 ZOSTER VACCINE AGE 60> 7/12/2009 GLAUCOMA SCREENING Q2Y 7/12/2014 Pneumococcal 65+ High/Highest Risk (1 of 2 - PCV13) 7/12/2014 MEDICARE YEARLY EXAM 7/12/2014 LIPID PANEL Q1 4/10/2016 HEMOGLOBIN A1C Q6M 4/15/2017 INFLUENZA AGE 9 TO ADULT 8/1/2017 FOOT EXAM Q1 10/16/2017 Allergies as of 7/13/2017  Review Complete On: 7/13/2017 By: Dell Powers MD  
  
 Severity Noted Reaction Type Reactions Vancomycin  06/03/2015    Rash Patient broke out in large hive below IV site and c/o itching to area, patient states that \"that it was more likely an infiltration as opposed to an actual reaction\" Current Immunizations  Reviewed on 6/3/2015 No immunizations on file. Not reviewed this visit Vitals BP Pulse Resp Height(growth percentile) Weight(growth percentile) SpO2  
 121/60 (BP 1 Location: Right arm, BP Patient Position: Sitting) 65 20 5' 8\" (1.727 m) 203 lb (92.1 kg) 99% BMI Smoking Status 30.87 kg/m2 Former Smoker BMI and BSA Data Body Mass Index Body Surface Area  
 30.87 kg/m 2 2.1 m 2 Preferred Pharmacy Pharmacy Name Phone [id number] [address] [facility] [phone] Your Updated Medication List  
  
   
This list is accurate as of: 7/13/17 11:16 AM.  Always use your most recent med list.  
  
  
  
  
 aspirin, buffered 81 mg Tab Take  by mouth daily. furosemide 40 mg tablet Commonly known as:  LASIX Take 0.5 Tabs by mouth daily. metFORMIN 500 mg tablet Commonly known as:  GLUCOPHAGE Take  by mouth two (2) times daily (with meals). simvastatin 10 mg tablet Commonly known as:  ZOCOR Take  by mouth nightly. Introducing [facility] SERVICES! Renetta Herring introduces [employer] patient portal. Now you can access parts of your medical record, email your doctor's office, and request medication refills online. 1. In your internet browser, go to [email or web address] 2. Click on the First Time User? Click Here link in the Sign In box. You will see the New Member Sign Up page. 3. Enter your Cit[employer] Access Code exactly as it appears below. You will not need to use this code after youve completed the sign-up process. If you do not sign up before the expiration date, you must request a new code. · [employer] Access Code: [email or web address] Expires: 10/11/2017  6:55 AM 
 
4. Enter the last four digits of your Social Security Number (xxxx) and Date of Birth (mm/dd/yyyy) as indicated and click Submit. You will be taken to the next sign-up page. 5. Create a Citizengine ID. This will be your Cit[employer]engine login ID and cannot be changed, so think of one that is secure and easy to remember. 6. Create a Cit[employer]engine password. You can change your password at any time. 7. Enter your Password Reset Question and Answer. This can be used at a later time if you forget your password. 8. Enter your e-mail address. You will receive e-mail notification when new information is available in [address] 9. Click Sign Up. You can now view and download portions of your medical record. 10. Click the Download Summary menu link to download a portable copy of your medical information. If you have questions, please visit the Frequently Asked Questions section of the [employer] website. Remember, [employer]engine is NOT to be used for urgent needs. For medical emergencies, dial 911. Now available from your iPhone and Android! Please provide this summary of care documentation to your next provider. Your primary care clinician is listed as Ada Quintana. If you have any questions after today's visit, please call [phone].

## 2017-07-13 NOTE — PROGRESS NOTES
HISTORY OF PRESENT ILLNESS  Chantelle Wheeler is a 75 y.o. male. HPI Comments:   Right groin pain for 2 months  Radiates to right testicle    No nausea  BMs normal  Appetite good    On chronic hydrocodone     Was on oral vanc in Jan for C Diff      ____________________________________________________________________________  Patient presents with:  Possible Hernia: Seen at the request of Dr. Chel Engel for evaluation of possible Hernia. /60 (BP 1 Location: Right arm, BP Patient Position: Sitting)  Pulse 65  Resp 20  Ht 5' 8\" (1.727 m)  Wt 92.1 kg (203 lb)  SpO2 99%  BMI 30.87 kg/m2  Past Medical History:  No date: Arthritis      Comment: knees, back  No date: Chronic pain      Comment: knees, back  No date: DM (diabetes mellitus) (HCC)  No date: High cholesterol  No date: HTN (hypertension)  Past Surgical History:  No date: HX AMPUTATION      Comment: vascular; Left great toe amputation  No date: HX ORTHOPAEDIC Left      Comment: Left arm fracture & repair  6/4/15: HX OTHER SURGICAL      Comment: INCISION AND DRAINAGE, RESECTION OF REMAINING                1ST METATARSAL, INSERTION OF ANTIBIOTIC BEADS  Social History    Marital status:              Spouse name:                       Years of education:                 Number of children:               Social History Main Topics    Smoking status: Former Smoker                                                                Packs/day: 3.00      Years: 20.00          Types: Cigarettes    Smokeless status: Former User                          Quit date: 1/15/1995    Comment: stopped smoking about 30 years ago    Alcohol use: Yes           0.5 oz/week       1 Cans of beer per week       Comment: occasionally    Drug use:  Yes                Special: Prescription, OTC    Review of patient's family history indicates:    Heart Disease                  Mother                    Hypertension                   Mother                    Diabetes Mother                    Heart Disease                  Father                    Current Outpatient Prescriptions:  oxyCODONE IR (ROXICODONE) 5 mg immediate release tablet, Take 1 Tab by mouth every four (4) hours as needed. Max Daily Amount: 30 mg.  furosemide (LASIX) 40 mg tablet, Take 0.5 Tabs by mouth daily. Aspirin, Buffered 81 mg tab, Take  by mouth daily. metFORMIN (GLUCOPHAGE) 500 mg tablet, Take  by mouth two (2) times daily (with meals). simvastatin (ZOCOR) 10 mg tablet, Take  by mouth nightly. No current facility-administered medications for this visit. Allergies:  -- Vancomycin -- Rash    --  Patient broke out in large hive below IV site and             c/o itching to area, patient states that \"that it             was more likely an infiltration as opposed to an             actual reaction\"  _____________________________________________________________________________      Possible Hernia   The history is provided by the patient. This is a chronic problem. The current episode started more than 1 week ago. The problem occurs daily. The problem has been gradually worsening. Pertinent negatives include no chest pain, no abdominal pain, no headaches and no shortness of breath. The symptoms are aggravated by exertion. The symptoms are relieved by rest. The treatment provided no relief. Review of Systems   Constitutional: Negative for chills, fever and weight loss. HENT: Negative for ear pain. Eyes: Negative for pain. Respiratory: Negative for shortness of breath. Cardiovascular: Negative for chest pain. Gastrointestinal: Negative for abdominal pain and blood in stool. Genitourinary: Negative for hematuria. Musculoskeletal: Negative for joint pain. Skin: Negative for rash. Neurological: Negative for dizziness, focal weakness, seizures and headaches. Endo/Heme/Allergies: Does not bruise/bleed easily. Psychiatric/Behavioral: The patient does not have insomnia. Physical Exam   Constitutional: He is oriented to person, place, and time. He appears well-developed and well-nourished. No distress. HENT:   Head: Normocephalic and atraumatic. Mouth/Throat: No oropharyngeal exudate. Eyes: Pupils are equal, round, and reactive to light. Neck: Normal range of motion. No tracheal deviation present. Cardiovascular: Normal rate, regular rhythm and normal heart sounds. No murmur heard. Pulmonary/Chest: Effort normal and breath sounds normal. No respiratory distress. He has no wheezes. Abdominal: Soft. Bowel sounds are normal. He exhibits no distension and no mass. There is no rebound and no guarding. A hernia is present. Hernia confirmed positive in the right inguinal area. Hernia confirmed negative in the ventral area. Musculoskeletal: Normal range of motion. He exhibits no edema or tenderness. Lymphadenopathy:     He has no cervical adenopathy. Neurological: He is alert and oriented to person, place, and time. Skin: Skin is warm. No rash noted. He is not diaphoretic. No erythema. Psychiatric: He has a normal mood and affect. His behavior is normal.       ASSESSMENT and PLAN    ICD-10-CM ICD-9-CM    1. Right inguinal hernia K40.90 550.90      Lucinda Grey is having symptoms. I have recommended to him that we proceed with surgery. I had an extensive discussion with him regarding the risks, benefits, and alternatives of proceeding with a Laparoscopic Right, Possible Bilateral Inguinal Hernia Repair with Mesh, Robot Assisted. Risks,benefits, and alternatives were discussed including the risk of anesthesia, bleeding, infection, including mesh infection, chronic orchialgia, neuralgia, other pain syndromes, testicular ischemia, conversion to open, injury to bowel, and recurrence were discussed. I reviewed with Lucinda Grey his increased risk of bleeding secondary to Asprin use.   I have asked him to discontinue the Asprin for 2 days prior to surgery to reduce this risk. Randall Dobbins with receive sliding scale insulin in the perioperative period to reduce increase risk of infection. He is in agreement to proceed. All questions were answered. Randall Dobbins will undergo preoperative evaluation and will proceed provided he is medically stable. We will schedule him at his earliest convenience. Thank you for this consult.

## 2017-08-11 ENCOUNTER — TELEPHONE (OUTPATIENT)
Dept: SURGERY | Age: 67
End: 2017-08-11

## 2017-08-11 NOTE — TELEPHONE ENCOUNTER
Spoke with pt's wife, hernia surgery on hold until ok'd by Dr. Lobito Argueta. If there any any new symptoms regarding pt's hernia call the office. Try to avoid any heavy lifting. All questions answered with clarification.

## 2017-08-11 NOTE — TELEPHONE ENCOUNTER
Received a note from Dr. Abe Ramey today. Pt had a PE in January and is supposed to be on Xarelto. Pt told us the only blood thinner he is on is 81mg ASA. Xarelto is not listed on his current meds and PE is not listed in [address]. Looking back, I do now see a hospital admission in January where a PE was diagnosed. Dr. Abe Ramey has a CTA planned for next week and has recommended Xarelto for at least 3 more months. I added PE to his PMH. Jabier Gregory, Let the patient know that we are not scheduling his surgery and he can follow up in the office to reassess after cleared by Dr. Abe Ramey.

## 2017-08-11 NOTE — LETTER
8/11/2017 3:46 PM 
 
Mr. Randall Dobbins [address] [facility] [address] [place], Thank you for your note today. Randall Dobbins did not make me aware that he was on Xarelto for a bilateral PE. We will not be scheduling his hernia surgery at this time. Rubén Aleman MD

## 2017-08-17 ENCOUNTER — HOSPITAL ENCOUNTER (OUTPATIENT)
Dept: CT IMAGING | Age: 67
Discharge: HOME | End: 2017-08-17
Attending: INTERNAL MEDICINE
Payer: MEDICARE

## 2017-08-17 DIAGNOSIS — I26.99 BILATERAL PULMONARY EMBOLISM (HCC): ICD-10-CM

## 2017-08-17 PROCEDURE — 74011636320 HC RX REV CODE- 636/320: Performed by: INTERNAL MEDICINE

## 2017-08-17 PROCEDURE — 71275 CT ANGIOGRAPHY CHEST: CPT

## 2017-08-17 PROCEDURE — 74011250636 HC RX REV CODE- 250/636: Performed by: INTERNAL MEDICINE

## 2017-08-17 RX ORDER — SODIUM CHLORIDE 0.9 % (FLUSH) 0.9 %
10 SYRINGE (ML) INJECTION
Status: COMPLETED | OUTPATIENT
Start: 2017-08-17 | End: 2017-08-17

## 2017-08-17 RX ORDER — SODIUM CHLORIDE 9 MG/ML
50 INJECTION, SOLUTION INTRAVENOUS
Status: COMPLETED | OUTPATIENT
Start: 2017-08-17 | End: 2017-08-17

## 2017-08-17 RX ADMIN — Medication 10 ML: at 09:04

## 2017-08-17 RX ADMIN — SODIUM CHLORIDE 50 ML/HR: 900 INJECTION, SOLUTION INTRAVENOUS at 09:04

## 2017-08-17 RX ADMIN — IOPAMIDOL 100 ML: 755 INJECTION, SOLUTION INTRAVENOUS at 09:04

## 2018-08-23 ENCOUNTER — OFFICE VISIT (OUTPATIENT)
Dept: CARDIOLOGY CLINIC | Age: 68
End: 2018-08-23

## 2018-08-23 VITALS
OXYGEN SATURATION: 96 % | SYSTOLIC BLOOD PRESSURE: 100 MMHG | WEIGHT: 207.3 LBS | BODY MASS INDEX: 31.42 KG/M2 | RESPIRATION RATE: 18 BRPM | HEART RATE: 73 BPM | DIASTOLIC BLOOD PRESSURE: 64 MMHG | HEIGHT: 68 IN

## 2018-08-23 DIAGNOSIS — I35.0 AORTIC VALVE STENOSIS, MODERATE: ICD-10-CM

## 2018-08-23 DIAGNOSIS — E78.00 HIGH CHOLESTEROL: Chronic | ICD-10-CM

## 2018-08-23 DIAGNOSIS — R01.1 HEART MURMUR: Primary | ICD-10-CM

## 2018-08-23 DIAGNOSIS — I10 ESSENTIAL HYPERTENSION: ICD-10-CM

## 2018-08-23 RX ORDER — BISMUTH SUBSALICYLATE 262 MG
1 TABLET,CHEWABLE ORAL DAILY
COMMUNITY

## 2018-08-23 RX ORDER — HYDROCODONE BITARTRATE AND ACETAMINOPHEN 5; 325 MG/1; MG/1
1 TABLET ORAL
COMMUNITY
Start: 2018-08-06 | End: 2019-07-08

## 2018-08-23 NOTE — MR AVS SNAPSHOT
[address] [facility] 
[phone] Patient: Yarelis Mccartney MRN: [id number] NXN:[id number] Visit Information Date & Time Provider Department Dept. Phone Encounter #  
 8/23/2018  2:30 PM Alissa Wetzel, [address] [facility] [phone] [id number] Follow-up Instructions Return in about 1 year (around 8/23/2019). Your Appointments 8/31/2018  1:30 PM  
ECHO CARDIOGRAMS 2D with [address] [facility] Appt Note: Dr TAM 2D ECHO COMPLETE ADULT (TTE) W OR WO CONTR [[id number]] (Order [id number]),[facility]  
 [address] [facility]  
[phone] [address] [facility] Upcoming Health Maintenance Date Due Hepatitis C Screening 1950 MICROALBUMIN Q1 7/12/1959 EYE EXAM RETINAL OR DILATED Q1 7/12/1959 DTaP/Tdap/Td series (1 - Tdap) 7/12/1970 FOBT Q 1 YEAR AGE 50-75 7/12/1999 ZOSTER VACCINE AGE 60> 5/12/2009 GLAUCOMA SCREENING Q2Y 7/12/2014 Pneumococcal 65+ Low/Medium Risk (1 of 2 - PCV13) 7/12/2014 LIPID PANEL Q1 4/10/2016 HEMOGLOBIN A1C Q6M 4/15/2017 FOOT EXAM Q1 10/16/2017 MEDICARE YEARLY EXAM 3/14/2018 Influenza Age 5 to Adult 8/1/2018 Allergies as of 8/23/2018  Review Complete On: 8/23/2018 By: Alissa Wetzel MD  
  
 Severity Noted Reaction Type Reactions Vancomycin  06/03/2015    Rash Patient broke out in large hive below IV site and c/o itching to area, patient states that \"that it was more likely an infiltration as opposed to an actual reaction\" Current Immunizations  Reviewed on 6/3/2015 No immunizations on file. Not reviewed this visit You Were Diagnosed With   
  
 Codes Comments Heart murmur    -  Primary ICD-10-CM: R01.1 ICD-9-CM: 785.2 High cholesterol     ICD-10-CM: E78.00 ICD-9-CM: 272.0 Essential hypertension     ICD-10-CM: I10 
ICD-9-CM: 401.9 Aortic valve stenosis, moderate     ICD-10-CM: I35.0 ICD-9-CM: 424.1 Vitals BP Pulse Resp Height(growth percentile) Weight(growth percentile) SpO2  
 100/64 (BP 1 Location: Right arm, BP Patient Position: Sitting) 73 18 5' 8\" (1.727 m) 207 lb 4.8 oz (94 kg) 96% BMI Smoking Status 31.52 kg/m2 Former Smoker Vitals History BMI and BSA Data Body Mass Index Body Surface Area  
 31.52 kg/m 2 2.12 m 2 Preferred Pharmacy Pharmacy Name Phone [id number] [address] [phone] Your Updated Medication List  
  
   
This list is accurate as of 8/23/18  2:58 PM.  Always use your most recent med list.  
  
  
  
  
 aspirin, buffered 81 mg Tab Take  by mouth daily. furosemide 40 mg tablet Commonly known as:  LASIX Take 0.5 Tabs by mouth daily. HYDROcodone-acetaminophen 5-325 mg per tablet Commonly known as:  [address] Take 1 Tab by mouth every eight (8) hours as needed. metFORMIN 500 mg tablet Commonly known as:  GLUCOPHAGE Take  by mouth daily (with breakfast). multivitamin tablet Commonly known as:  ONE A DAY Take 1 Tab by mouth daily. simvastatin 10 mg tablet Commonly known as:  ZOCOR Take  by mouth nightly. We Performed the Following AMB POC EKG ROUTINE W/ 12 LEADS, INTER & REP [11891 CPT(R)] LIPID PANEL [56280 CPT(R)] Follow-up Instructions Return in about 1 year (around 8/23/2019). To-Do List   
 08/23/2018 ECHO:  2D ECHO COMPLETE ADULT (TTE) W OR WO CONTR Introducing [facility] & HEALTH SERVICES! Sagar Trevino introduces [employer] patient portal. Now you can access parts of your medical record, email your doctor's office, and request medication refills online. 1. In your internet browser, go to [email or web address] 2. Click on the First Time User? Click Here link in the Sign In box. You will see the New Member Sign Up page. 3. Enter your Rapt Access Code exactly as it appears below. You will not need to use this code after youve completed the sign-up process. If you do not sign up before the expiration date, you must request a new code. · Rapt Access Code: [email or web address] Expires: 11/21/2018  2:56 PM 
 
4. Enter the last four digits of your Social Security Number (xxxx) and Date of Birth (mm/dd/yyyy) as indicated and click Submit. You will be taken to the next sign-up page. 5. Create a Rapt ID. This will be your Rapt login ID and cannot be changed, so think of one that is secure and easy to remember. 6. Create a Rapt password. You can change your password at any time. 7. Enter your Password Reset Question and Answer. This can be used at a later time if you forget your password. 8. Enter your e-mail address. You will receive e-mail notification when new information is available in [address] 9. Click Sign Up. You can now view and download portions of your medical record. 10. Click the Download Summary menu link to download a portable copy of your medical information. If you have questions, please visit the Frequently Asked Questions section of the Rapt website. Remember, Rapt is NOT to be used for urgent needs. For medical emergencies, dial 911. Now available from your iPhone and Android! Please provide this summary of care documentation to your next provider. Your primary care clinician is listed as Sejal Garcia. If you have any questions after today's visit, please call [phone].

## 2018-08-23 NOTE — PROGRESS NOTES
1. Have you been to the ER, urgent care clinic since your last visit? Hospitalized since your last visit? No.    2. Have you seen or consulted any other health care providers outside of the [address] [place] [address] since your last visit? Include any pap smears or colon screening. Seen by PCP. Elieser Scott for pain management.     Chief Complaint   Patient presents with    New Patient     referred by PCP- heart murmur-pt denies any cardiac symptoms

## 2018-08-23 NOTE — PROGRESS NOTES
8/23/2018 2:56 PM      Subjective:     Nisreen Forte is here for evaluation. Last seen in 2014. He denies chest pain, chest pressure/discomfort, dyspnea, palpitations, irregular heart beats, near-syncope, syncope, fatigue, orthopnea, paroxysmal nocturnal dyspnea, exertional chest pressure/discomfort, claudication, lower extremity edema, tachypnea. Visit Vitals    /64 (BP 1 Location: Right arm, BP Patient Position: Sitting)    Pulse 73    Resp 18    Ht 5' 8\" (1.727 m)    Wt 207 lb 4.8 oz (94 kg)    SpO2 96%    BMI 31.52 kg/m2     Current Outpatient Prescriptions   Medication Sig    HYDROcodone-acetaminophen (NORCO) 5-325 mg per tablet Take 1 Tab by mouth every eight (8) hours as needed.  multivitamin (ONE A DAY) tablet Take 1 Tab by mouth daily.  furosemide (LASIX) 40 mg tablet Take 0.5 Tabs by mouth daily. (Patient taking differently: Take 40 mg by mouth daily.)    Aspirin, Buffered 81 mg tab Take  by mouth daily.  metFORMIN (GLUCOPHAGE) 500 mg tablet Take  by mouth daily (with breakfast).  simvastatin (ZOCOR) 10 mg tablet Take  by mouth nightly. No current facility-administered medications for this visit. Objective:      Visit Vitals    /64 (BP 1 Location: Right arm, BP Patient Position: Sitting)    Pulse 73    Resp 18    Ht 5' 8\" (1.727 m)    Wt 207 lb 4.8 oz (94 kg)    SpO2 96%    BMI 31.52 kg/m2       Data Review:    EKG: Normal sinus rhythm, no acute st/t changes    Reviewed and/or ordered active problem list, medication list tests    Past Medical History:   Diagnosis Date    Arthritis     knees, back    Chronic pain     knees, back    DM (diabetes mellitus) (HCC)     High cholesterol     HTN (hypertension)     currently on no meds    PE (pulmonary thromboembolism) ([facility] [address] 01/2017    bilateral with right heart strain.  Treated by Dr. Gregg Ponce      Past Surgical History:   Procedure Laterality Date    HX AMPUTATION      vascular; Left great toe amputation    HX ORTHOPAEDIC Left     Left arm fracture & repair    HX OTHER SURGICAL  6/4/15    INCISION AND DRAINAGE, RESECTION OF REMAINING 1ST METATARSAL, INSERTION OF ANTIBIOTIC BEADS     Allergies   Allergen Reactions    Vancomycin Rash     Patient broke out in large hive below IV site and c/o itching to area, patient states that \"that it was more likely an infiltration as opposed to an actual reaction\"      Family History   Problem Relation Age of Onset    Heart Disease Mother     Hypertension Mother     Diabetes Mother     Heart Disease Father       Social History     Social History    Marital status:      Spouse name: N/A    Number of children: N/A    Years of education: N/A     Occupational History    Not on file. Social History Main Topics    Smoking status: Former Smoker     Packs/day: 3.00     Years: 20.00     Types: Cigarettes    Smokeless tobacco: Never Used      Comment: stopped smoking about 30 years ago    Alcohol use 0.5 oz/week     1 Cans of beer per week      Comment: occasionally    Drug use: Yes     Special: Prescription, OTC    Sexual activity: Not on file     Other Topics Concern    Not on file     Social History Narrative       Review of Systems     General: Not Present- Anorexia, Chills, Dietary Changes, Fatigue, Fever, Medication Changes, Night Sweats, Weight Gain > 10lbs. and Weight Loss > 10lbs. .  Skin: Not Present- Bruising and Excessive Sweating. HEENT: Not Present- Headache, Visual Loss and Vertigo. Respiratory: Not Present- Cough, Decreased Exercise Tolerance, Difficulty Breathing, Snoring and Wheezing. Cardiovascular: Not Present- Abnormal Blood Pressure, Chest Pain, Claudications, Difficulty Breathing On Exertion, Edema, Fainting / Blacking Out, Irregular Heart Beat, Night Cramps, Orthopnea, Palpitations, Paroxysmal Nocturnal Dyspnea, Rapid Heart Rate, Shortness of Breath and Swelling of Extremities.   Gastrointestinal: Not Present- Black, Tarry Stool, Bloody Stool, Diarrhea, Hematemesis, Rectal Bleeding and Vomiting. Musculoskeletal: Not Present- Muscle Pain and Muscle Weakness. Neurological: Not Present- Dizziness. Psychiatric: Not Present- Depression. Endocrine: Not Present- Cold Intolerance, Heat Intolerance and Thyroid Problems. Hematology: Not Present- Abnormal Bleeding, Anemia, Blood Clots and Easy Bruising.       Physical Exam   The physical exam findings are as follows:       General   Mental Status - Alert. General Appearance - Cooperative and Well groomed. Not in acute distress. Orientation - Oriented to time, Oriented to place and Oriented to person. Build & Nutrition - Well developed. HEENT  Head - Normal.  Eye - Normal.      Neck   Carotid Arteries - normal upstroke. No Bruits. Chest and Lung Exam   Inspection:   Chest Wall: - Normal. Accessory muscles - No use of accessory muscles in breathing. Auscultation:   Breath sounds: - Normal.      Cardiovascular   Inspection: Jugular vein - Bilateral - Inspection Normal.  Palpation/Percussion:   Apical Impulse: - Normal.  Auscultation: Rhythm - Regular. Heart Sounds - S1 WNL and S2 WNL. No S3 or S4. Murmurs & Other Heart Sounds: Auscultation of the heart reveals - 2/6 systolic murmur over aortic area. Abdomen   Palpation/Percussion: Palpation and Percussion of the abdomen reveal - No Palpable abdominal masses. Auscultation: Auscultation of the abdomen reveals - Bowel sounds normal.      Peripheral Vascular   Upper Extremity: Inspection - Bilateral - No Cyanotic nailbeds or Digital clubbing. Palpation: Radial pulse - Bilateral - Normal.  Lower Extremity:   Palpation: Edema - Bilateral - No edema. Neurologic   Mental Status: Affect - normal.  Motor: - Normal. Gait - Normal.        Assessment:       ICD-10-CM ICD-9-CM    1. Heart murmur R01.1 785.2 AMB POC EKG ROUTINE W/ 12 LEADS, INTER & REP      2D ECHO COMPLETE ADULT (TTE) W OR WO CONTR      LIPID PANEL   2. High cholesterol E78.00 272.0 2D ECHO COMPLETE ADULT (TTE) W OR WO CONTR      LIPID PANEL   3. Essential hypertension I10 401.9 2D ECHO COMPLETE ADULT (TTE) W OR WO CONTR      LIPID PANEL   4. Aortic valve stenosis, moderate I35.0 424.1 2D ECHO COMPLETE ADULT (TTE) W OR WO CONTR      LIPID PANEL       Plan:     1. Moderate Aortic stenosis on last Echo in 2014. Clinically asymptomatic. Recheck Echo.    2. Hyperlipidemia: on statin. No recent FLP. Check FLP  3. Diet and exercise.

## 2018-08-31 ENCOUNTER — CLINICAL SUPPORT (OUTPATIENT)
Dept: CARDIOLOGY CLINIC | Age: 68
End: 2018-08-31

## 2018-08-31 DIAGNOSIS — R01.1 HEART MURMUR: ICD-10-CM

## 2018-08-31 DIAGNOSIS — I35.0 AORTIC VALVE STENOSIS, MODERATE: ICD-10-CM

## 2018-08-31 DIAGNOSIS — I10 ESSENTIAL HYPERTENSION: ICD-10-CM

## 2018-08-31 DIAGNOSIS — E78.00 HIGH CHOLESTEROL: Chronic | ICD-10-CM

## 2018-09-11 ENCOUNTER — OFFICE VISIT (OUTPATIENT)
Dept: CARDIOLOGY CLINIC | Age: 68
End: 2018-09-11

## 2018-09-11 VITALS
DIASTOLIC BLOOD PRESSURE: 60 MMHG | OXYGEN SATURATION: 98 % | HEART RATE: 65 BPM | SYSTOLIC BLOOD PRESSURE: 118 MMHG | BODY MASS INDEX: 31.64 KG/M2 | HEIGHT: 68 IN | WEIGHT: 208.8 LBS

## 2018-09-11 DIAGNOSIS — E11.8 TYPE 2 DIABETES MELLITUS WITH COMPLICATION, WITHOUT LONG-TERM CURRENT USE OF INSULIN (HCC): Chronic | ICD-10-CM

## 2018-09-11 DIAGNOSIS — R01.1 HEART MURMUR: Primary | ICD-10-CM

## 2018-09-11 DIAGNOSIS — I10 ESSENTIAL HYPERTENSION: ICD-10-CM

## 2018-09-11 DIAGNOSIS — I35.0 AORTIC VALVE STENOSIS, MODERATE: ICD-10-CM

## 2018-09-11 NOTE — MR AVS SNAPSHOT
[address] [facility] 
[phone] Patient: Anamaria Stokes MRN: [id number] NJE:[birth year] Visit Information Date & Time Provider Department Dept. Phone Encounter #  
 9/11/2018  9:15 AM Liberty Domínguez, [address] [facility] [phone] [id number] Follow-up Instructions Return in about 3 months (around 12/11/2018). Follow-up and Disposition History Your Appointments 12/6/2018  1:45 PM  
ESTABLISHED PATIENT with Liberty Domínguez MD  
[facility]) Appt Note: 3 month f/u 9/11/18 kb  
 [address] [facility]  
[phone] [address] [facility] Upcoming Health Maintenance Date Due Hepatitis C Screening 1950 MICROALBUMIN Q1 7/12/1959 EYE EXAM RETINAL OR DILATED Q1 7/12/1959 DTaP/Tdap/Td series (1 - Tdap) 7/12/1970 FOBT Q 1 YEAR AGE 50-75 7/12/1999 ZOSTER VACCINE AGE 60> 5/12/2009 GLAUCOMA SCREENING Q2Y 7/12/2014 Pneumococcal 65+ Low/Medium Risk (1 of 2 - PCV13) 7/12/2014 LIPID PANEL Q1 4/10/2016 HEMOGLOBIN A1C Q6M 4/15/2017 FOOT EXAM Q1 10/16/2017 MEDICARE YEARLY EXAM 3/14/2018 Influenza Age 5 to Adult 8/1/2018 Allergies as of 9/11/2018  Review Complete On: 9/11/2018 By: Liberty Domínguez MD  
  
 Severity Noted Reaction Type Reactions Vancomycin  06/03/2015    Rash Patient broke out in large hive below IV site and c/o itching to area, patient states that \"that it was more likely an infiltration as opposed to an actual reaction\" Current Immunizations  Reviewed on 6/3/2015 No immunizations on file. Not reviewed this visit You Were Diagnosed With   
  
 Codes Comments Heart murmur    -  Primary ICD-10-CM: R01.1 ICD-9-CM: 785.2 Essential hypertension     ICD-10-CM: I10 
ICD-9-CM: 401.9 Aortic valve stenosis, moderate     ICD-10-CM: I35.0 ICD-9-CM: 424.1 Type 2 diabetes mellitus with complication, without long-term current use of insulin (HCC)     ICD-10-CM: E11.8 ICD-9-CM: 250.90 Vitals BP Pulse Height(growth percentile) Weight(growth percentile) SpO2 BMI  
 118/60 (BP 1 Location: Right arm, BP Patient Position: Sitting) 65 5' 8\" (1.727 m) 208 lb 12.8 oz (94.7 kg) 98% 31.75 kg/m2 Smoking Status Former Smoker Vitals History BMI and BSA Data Body Mass Index Body Surface Area 31.75 kg/m 2 2.13 m 2 Preferred Pharmacy Pharmacy Name Phone [id number] [phone] Your Updated Medication List  
  
   
This list is accurate as of 9/11/18 10:04 AM.  Always use your most recent med list.  
  
  
  
  
 aspirin, buffered 81 mg Tab Take  by mouth daily. furosemide 40 mg tablet Commonly known as:  LASIX Take 0.5 Tabs by mouth daily. HYDROcodone-acetaminophen 5-325 mg per tablet Commonly known as:  Annamary Hasmukh Take 1 Tab by mouth every eight (8) hours as needed. metFORMIN 500 mg tablet Commonly known as:  GLUCOPHAGE Take  by mouth daily (with breakfast). multivitamin tablet Commonly known as:  ONE A DAY Take 1 Tab by mouth daily. simvastatin 10 mg tablet Commonly known as:  ZOCOR Take  by mouth nightly. We Performed the Following AMB POC EKG ROUTINE W/ 12 LEADS, INTER & REP [69986 CPT(R)] Follow-up Instructions Return in about 3 months (around 12/11/2018). Introducing [facility]! [employer] introduces [employer] patient portal. Now you can access parts of your medical record, email your doctor's office, and request medication refills online. 1. In your internet browser, go to [email or web address] 2. Click on the First Time User? Click Here link in the Sign In box.  You will see the New Member Sign Up page. 3. Enter your Inuvo Access Code exactly as it appears below. You will not need to use this code after youve completed the sign-up process. If you do not sign up before the expiration date, you must request a new code. · Inuvo Access Code: [email or web address] Expires: 11/21/2018  2:56 PM 
 
4. Enter the last four digits of your Social Security Number (xxxx) and Date of Birth (mm/dd/yyyy) as indicated and click Submit. You will be taken to the next sign-up page. 5. Create a Inuvo ID. This will be your Inuvo login ID and cannot be changed, so think of one that is secure and easy to remember. 6. Create a Inuvo password. You can change your password at any time. 7. Enter your Password Reset Question and Answer. This can be used at a later time if you forget your password. 8. Enter your e-mail address. You will receive e-mail notification when new information is available in [address] 9. Click Sign Up. You can now view and download portions of your medical record. 10. Click the Download Summary menu link to download a portable copy of your medical information. If you have questions, please visit the Frequently Asked Questions section of the Inuvo website. Remember, Inuvo is NOT to be used for urgent needs. For medical emergencies, dial 911. Now available from your iPhone and Android! Please provide this summary of care documentation to your next provider. Your primary care clinician is listed as Patrick Henry. If you have any questions after today's visit, please call [phone].

## 2018-09-11 NOTE — PROGRESS NOTES
Chief Complaint Patient presents with  
 Heart Murmur F/U ECHO RESULTS 1. Have you been to the ER, urgent care clinic since your last visit? Hospitalized since your last visit? NO 
 
2. Have you seen or consulted any other health care providers outside of the [address] [place] [address] since your last visit? Include any pap smears or colon screening.  NO

## 2018-09-11 NOTE — PROGRESS NOTES
[address] [phone] Subjective:  
  
Cr Boswell is a 70 y.o. male is here to discuss result of recent echocardiogram.  The patient denies chest pain/ shortness of breath, orthopnea, PND, LE edema, palpitations, syncope, or presyncope. Patient Active Problem List  
 Diagnosis Date Noted  Pulmonary emboli ([address] 01/02/2017  Clostridium difficile colitis 11/22/2016  Discitis of lumbar region 10/14/2016  Abscess of left foot 06/26/2015  High cholesterol 06/03/2015 Class: Chronic  DM (diabetes mellitus) ([address] 06/03/2015 Class: Chronic  Diabetic foot ulcer ([address] 06/03/2015 Class: Chronic  Cellulitis of left lower leg 06/03/2015 Class: Present on Admission  Spinal stenosis of lumbar region at multiple levels 06/03/2015 Class: Chronic  
 HTN (hypertension) 06/03/2015 Class: Chronic  Aortic valve stenosis, moderate 06/03/2015 Class: Chronic  Abscess of foot including toes 06/03/2015 Class: Present on Admission  Sepsis ([address] 06/03/2015 Class: Present on Admission  Amputated great toe of left foot ([address] 06/03/2015 Class: Present on Admission  Heart murmur 11/20/2014  LU (dyspnea on exertion) 11/20/2014  Leg fatigue 11/20/2014 Frankey Console, NP Past Medical History:  
Diagnosis Date  Arthritis   
 knees, back  Chronic pain   
 knees, back  DM (diabetes mellitus) ([address]  High cholesterol  HTN (hypertension) currently on no meds  PE (pulmonary thromboembolism) ([address] 01/2017  
 bilateral with right heart strain. Treated by Dr. Amparo Henry Past Surgical History:  
Procedure Laterality Date  HX AMPUTATION    
 vascular; Left great toe amputation  HX ORTHOPAEDIC Left Left arm fracture & repair  HX OTHER SURGICAL  6/4/15 INCISION AND DRAINAGE, RESECTION OF REMAINING 1ST METATARSAL, INSERTION OF ANTIBIOTIC BEADS Allergies Allergen Reactions  Vancomycin Rash Patient broke out in large hive below IV site and c/o itching to area, patient states that \"that it was more likely an infiltration as opposed to an actual reaction\" Family History Problem Relation Age of Onset  Heart Disease Mother  Hypertension Mother  Diabetes Mother  Heart Disease Father Social History Social History  Marital status:  Spouse name: N/A  
 Number of children: N/A  
 Years of education: N/A Occupational History  Not on file. Social History Main Topics  Smoking status: Former Smoker Packs/day: 3.00 Years: 20.00 Types: Cigarettes  Smokeless tobacco: Never Used Comment: stopped smoking about 30 years ago  Alcohol use 0.5 oz/week 1 Cans of beer per week Comment: occasionally  Drug use: Yes Special: Prescription, OTC  Sexual activity: Not on file Other Topics Concern  Not on file Social History Narrative Current Outpatient Prescriptions Medication Sig  
 HYDROcodone-acetaminophen (NORCO) 5-325 mg per tablet Take 1 Tab by mouth every eight (8) hours as needed.  multivitamin (ONE A DAY) tablet Take 1 Tab by mouth daily.  furosemide (LASIX) 40 mg tablet Take 0.5 Tabs by mouth daily. (Patient taking differently: Take 40 mg by mouth daily.)  Aspirin, Buffered 81 mg tab Take  by mouth daily.  metFORMIN (GLUCOPHAGE) 500 mg tablet Take  by mouth daily (with breakfast).  simvastatin (ZOCOR) 10 mg tablet Take  by mouth nightly. No current facility-administered medications for this visit. Review of Symptoms: 
11 systems reviewed, negative other than as stated in the HPI Physical ExamPhysical Exam:   
Vitals:  
 09/11/18 0910 09/11/18 7444 BP: 122/64 118/60 Pulse: 65 SpO2: 98% Weight: 208 lb 12.8 oz (94.7 kg) Height: 5' 8\" (1.727 m) Body mass index is 31.75 kg/(m^2). General PE Gen:  NAD 
 Mental Status - Alert. General Appearance - Not in acute distress. Chest and Lung Exam  
Inspection: Accessory muscles - No use of accessory muscles in breathing. Auscultation:  
Breath sounds: - Normal.  
Cardiovascular Inspection: Jugular vein - Bilateral - Inspection Normal.  
Palpation/Percussion:  
Apical Impulse: - Normal.  
Auscultation: Rhythm - Regular. Heart Sounds - S1 WNL and S2 WNL. No S3 or S4. Murmurs & Other Heart Sounds: Auscultation of the heart reveals - 3/6 EVAN Peripheral Vascular Upper Extremity: Inspection - Bilateral - No Cyanotic nailbeds or Digital clubbing. Lower Extremity:  
Palpation: Edema - Bilateral - No edema. Abdomen:   Soft, non-tender, bowel sounds are active. Neuro: A&O times 3, CN and motor grossly WNL Labs:  
Lab Results Component Value Date/Time Cholesterol, total 118 04/10/2015 10:15 AM  
 HDL Cholesterol 42 04/10/2015 10:15 AM  
 LDL, calculated 54 04/10/2015 10:15 AM  
 Triglyceride 109 04/10/2015 10:15 AM  
 
Lab Results Component Value Date/Time  01/03/2017 04:54 AM  
 
Lab Results Component Value Date/Time Sodium 141 01/06/2017 04:26 AM  
 Potassium 4.0 01/06/2017 04:26 AM  
 Chloride 106 01/06/2017 04:26 AM  
 CO2 25 01/06/2017 04:26 AM  
 Anion gap 10 01/06/2017 04:26 AM  
 Glucose 134 (H) 01/06/2017 04:26 AM  
 BUN 4 (L) 01/06/2017 04:26 AM  
 Creatinine 0.77 01/06/2017 04:26 AM  
 BUN/Creatinine ratio 5 (L) 01/06/2017 04:26 AM  
 GFR est AA >60 01/06/2017 04:26 AM  
 GFR est non-AA >60 01/06/2017 04:26 AM  
 Calcium 8.1 (L) 01/06/2017 04:26 AM  
 Bilirubin, total 0.8 01/03/2017 04:54 AM  
 AST (SGOT) 14 (L) 01/03/2017 04:54 AM  
 Alk. phosphatase 80 01/03/2017 04:54 AM  
 Protein, total 5.5 (L) 01/03/2017 04:54 AM  
 Albumin 2.1 (L) 01/03/2017 04:54 AM  
 Globulin 3.4 01/03/2017 04:54 AM  
 A-G Ratio 0.6 (L) 01/03/2017 04:54 AM  
 ALT (SGPT) 7 (L) 01/03/2017 04:54 AM  
 
 
EKG: 
NSR Assessment: 
 
 Assessment: 1. Heart murmur 2. Essential hypertension 3. Aortic valve stenosis, moderate 4. Type 2 diabetes mellitus with complication, without long-term current use of insulin ([address] Orders Placed This Encounter  AMB POC EKG ROUTINE W/ 12 LEADS, INTER & REP Order Specific Question:   Reason for Exam: Answer:   ROUTINE Plan:  
 
Patient presents to discuss result of recent echo,  doing well and stable from cardiac standpoint. 1. Moderate Aortic stenosis on last Echo in 2014. Repeat echo 8/18 showed severe AS, mean gradient 44 mmHg, valve area 0.86 cm2. Clinically asymptomatic, specifically denies sob/doyle, cp, dizziness. 2. Hyperlipidemia: On statin. Lipids and labs followed by PCP. Pt has lab slip for repeat. 3. Bp well controlled 4. Discussed importance of diet and exercise - eventual goal of 30 - 60 minutes, 5-7 times a week as per AHA guideline. Continue current care and f/u in 3 months Mary Miner NP Pt seen and examined in details. Agree with NP A&P. Option of right & left heart cath and evaluation by valve team d/w pt. He wants to hold off and will monitor for symptoms. F/u in 3 months. Advised to call back if notice any change.   
 
Genesis Guardado MD

## 2019-01-29 ENCOUNTER — OFFICE VISIT (OUTPATIENT)
Dept: CARDIOLOGY CLINIC | Age: 69
End: 2019-01-29

## 2019-01-29 VITALS
RESPIRATION RATE: 16 BRPM | BODY MASS INDEX: 33.3 KG/M2 | DIASTOLIC BLOOD PRESSURE: 70 MMHG | HEART RATE: 70 BPM | SYSTOLIC BLOOD PRESSURE: 120 MMHG | HEIGHT: 68 IN | OXYGEN SATURATION: 96 % | WEIGHT: 219.7 LBS

## 2019-01-29 DIAGNOSIS — E78.00 HIGH CHOLESTEROL: Chronic | ICD-10-CM

## 2019-01-29 DIAGNOSIS — I10 ESSENTIAL HYPERTENSION: ICD-10-CM

## 2019-01-29 DIAGNOSIS — I35.0 AORTIC VALVE STENOSIS, MODERATE: Primary | ICD-10-CM

## 2019-01-29 NOTE — PROGRESS NOTES
Subjective/HPI:  
 
Mr. Ada Sullivan is a 70 y.o. male is here for routine f/u. He has a PMHx of aortic stenosis, DM2, HTN, HLD and hx of PE. He is doing well. He remains asymptomatic with exertion. He enjoys working on cars and trucks. He has no symptoms of chest pains, shortness of breath, light-headedness, syncope or near-syncope. He denies orthopnea, PND or edema. He denies palpitations. PCP Provider Mark Saldaña NP Past Medical History:  
Diagnosis Date  Arthritis   
 knees, back  Chronic pain   
 knees, back  DM (diabetes mellitus) [facility]  High cholesterol  HTN (hypertension) currently on no meds  [facility] 01/2017  
 bilateral with right heart strain. Treated by Dr. Jared Ellington Past Surgical History:  
Procedure Laterality Date  HX AMPUTATION    
 vascular; Left great toe amputation  HX ORTHOPAEDIC Left Left arm fracture & repair  HX OTHER SURGICAL  6/4/15 INCISION AND DRAINAGE, RESECTION OF REMAINING 1ST METATARSAL, INSERTION OF ANTIBIOTIC BEADS Family History Problem Relation Age of Onset  Heart Disease Mother  Hypertension Mother  Diabetes Mother  Heart Disease Father Social History Socioeconomic History  Marital status:  Spouse name: Not on file  Number of children: Not on file  Years of education: Not on file  Highest education level: Not on file Social Needs  Financial resource strain: Not on file  Food insecurity - worry: Not on file  Food insecurity - inability: Not on file  Transportation needs - medical: Not on file  Transportation needs - non-medical: Not on file Occupational History  Not on file Tobacco Use  Smoking status: Former Smoker Packs/day: 3.00 Years: 20.00 Pack years: 60.00 Types: Cigarettes  Smokeless tobacco: Never Used  Tobacco comment: stopped smoking about 30 years ago Substance and Sexual Activity  Alcohol use: Yes Alcohol/week: 0.5 oz Types: 1 Cans of beer per week Comment: occasionally  Drug use: Yes Types: Prescription, OTC  Sexual activity: Not on file Other Topics Concern  Not on file Social History Narrative  Not on file Allergies Allergen Reactions  Vancomycin Rash Patient broke out in large hive below IV site and c/o itching to area, patient states that \"that it was more likely an infiltration as opposed to an actual reaction\" Current Outpatient Medications Medication Sig  
 HYDROcodone-acetaminophen (NORCO) 5-325 mg per tablet Take 1 Tab by mouth every eight (8) hours as needed.  multivitamin (ONE A DAY) tablet Take 1 Tab by mouth daily.  furosemide (LASIX) 40 mg tablet Take 0.5 Tabs by mouth daily. (Patient taking differently: Take 40 mg by mouth daily.)  Aspirin, Buffered 81 mg tab Take  by mouth daily.  metFORMIN (GLUCOPHAGE) 500 mg tablet Take  by mouth daily (with breakfast).  simvastatin (ZOCOR) 10 mg tablet Take  by mouth nightly. No current facility-administered medications for this visit. I have reviewed the problem list, allergy list, medical history, family, social history and medications. Review of Symptoms: 
 
Review of Systems Constitutional: Negative for chills, fever and weight loss. HENT: Negative for nosebleeds. Eyes: Negative for blurred vision and double vision. Respiratory: Negative for cough, shortness of breath and wheezing. Cardiovascular: Negative for chest pain, palpitations, orthopnea, leg swelling and PND. Gastrointestinal: Negative for abdominal pain, blood in stool, diarrhea, nausea and vomiting. Musculoskeletal: Negative for joint pain. Skin: Negative for rash. Neurological: Negative for dizziness, tingling and loss of consciousness. Endo/Heme/Allergies: Does not bruise/bleed easily.   
 
 
Physical Exam:   
 
 General: Well developed, in no acute distress, cooperative and alert HEENT: No carotid bruits, no JVD, trach is midline. Neck Supple, PEERL, EOM intact. Heart:  reg rate and rhythm; normal S1/S2; 3/6 crescendo-descrendo murmur heard loudest over the RUSB radiating up to the carotids, no gallops or rubs.  
Respiratory: Clear bilaterally x 4, no wheezing or rales Abdomen:   Soft, non-tender, no distention, no masses. + BS.  
Extremities:  Normal cap refill, no cyanosis, atraumatic. No edema. Neuro: A&Ox3, speech clear, gait stable. Skin: Skin color is normal. No rashes or lesions. Non diaphoretic Vascular: 2+ pulses symmetric in all extremities Vitals:  
 01/29/19 1025 01/29/19 1026 BP: 120/70 120/70 Pulse: 70 Resp: 16 SpO2: 96% Weight: 219 lb 11.2 oz (99.7 kg) Height: 5' 8\" (1.727 m) Cardiographics ECG: sinus rhythm with rare PVCs Results for orders placed or performed during the hospital encounter of 01/02/17 EKG, 12 LEAD, INITIAL Result Value Ref Range Ventricular Rate 113 BPM  
 Atrial Rate 113 BPM  
 P-R Interval 138 ms QRS Duration 94 ms Q-T Interval 314 ms QTC Calculation (Bezet) 430 ms Calculated P Axis 55 degrees Calculated R Axis 70 degrees Calculated T Axis 43 degrees Diagnosis Sinus tachycardia When compared with ECG of 04-JUN-2015 08:44, 
Vent. rate has increased BY  49 BPM 
Confirmed by Delisa Farris ([id number]) on 1/3/2017 2:43:30 PM 
  
 
 
Cardiology Labs: 
Lab Results Component Value Date/Time Cholesterol, total 118 04/10/2015 10:15 AM  
 HDL Cholesterol 42 04/10/2015 10:15 AM  
 LDL, calculated 54 04/10/2015 10:15 AM  
 Triglyceride 109 04/10/2015 10:15 AM  
 
 
Lab Results Component Value Date/Time  Sodium 141 01/06/2017 04:26 AM  
 Potassium 4.0 01/06/2017 04:26 AM  
 Chloride 106 01/06/2017 04:26 AM  
 CO2 25 01/06/2017 04:26 AM  
 Anion gap 10 01/06/2017 04:26 AM  
 Glucose 134 (H) 01/06/2017 04:26 AM  
 BUN 4 (L) 01/06/2017 04:26 AM  
 Creatinine 0.77 01/06/2017 04:26 AM  
 BUN/Creatinine ratio 5 (L) 01/06/2017 04:26 AM  
 GFR est AA >60 01/06/2017 04:26 AM  
 GFR est non-AA >60 01/06/2017 04:26 AM  
 Calcium 8.1 (L) 01/06/2017 04:26 AM  
 Bilirubin, total 0.8 01/03/2017 04:54 AM  
 AST (SGOT) 14 (L) 01/03/2017 04:54 AM  
 Alk. phosphatase 80 01/03/2017 04:54 AM  
 Protein, total 5.5 (L) 01/03/2017 04:54 AM  
 Albumin 2.1 (L) 01/03/2017 04:54 AM  
 Globulin 3.4 01/03/2017 04:54 AM  
 A-G Ratio 0.6 (L) 01/03/2017 04:54 AM  
 ALT (SGPT) 7 (L) 01/03/2017 04:54 AM  
  
 
 
 Assessment: 
 
 Assessment: ICD-10-CM ICD-9-CM 1. Aortic valve stenosis, moderate I35.0 424.1 AMB POC EKG ROUTINE W/ 12 LEADS, INTER & REP  
   ECHO ADULT COMPLETE 2. Essential hypertension I10 401.9 3. High cholesterol E78.00 272.0 Plan: 1. Aortic valve stenosis, With severe AS with Vmax 4.28 m/s and mean PG 44 mmHg with preserved LVEF 60-65%. He remains asymptomatic with exertion. Will repeat echo in 3 months and keep close f/u. Discussed left/right heart catheterization for further assessment of valve severity and prep for valve replacement, but he would like to wait a few months longer. 
- AMB POC EKG ROUTINE W/ 12 LEADS, INTER & REP 2. Essential hypertension BP controlled; continue anti-hypertensives and low sodium diet. 3. High cholesterol Continue statin therapy and low fat, low cholesterol diet. Due for lipids check, will print lab slip. JAYLIN Gaffney-MILAN Lee NP Patient seen and examined by me with nurse practitioner. Damaris Kin personally performed all components of the history, physical, and medical decision making and agree with the assessment and plan with minor modifications as noted. Severe asymptomatic AS. Symptoms d/w pt. Close f/u.   
 
Matt Thompson MD

## 2019-01-29 NOTE — PROGRESS NOTES
1. Have you been to the ER, urgent care clinic since your last visit? Hospitalized since your last visit? No 
 
2. Have you seen or consulted any other health care providers outside of the [address] [place] [address] since your last visit? Include any pap smears or colon screening. Yes When: Pain clinic for shot to R knee Chief Complaint Patient presents with  Follow-up Hx of HTN, Aortic Valve disorder Pt has no cardiac complaints today

## 2019-04-29 ENCOUNTER — OFFICE VISIT (OUTPATIENT)
Dept: CARDIOLOGY CLINIC | Age: 69
End: 2019-04-29

## 2019-04-29 VITALS
SYSTOLIC BLOOD PRESSURE: 124 MMHG | OXYGEN SATURATION: 98 % | HEIGHT: 68 IN | BODY MASS INDEX: 33.86 KG/M2 | DIASTOLIC BLOOD PRESSURE: 70 MMHG | RESPIRATION RATE: 18 BRPM | WEIGHT: 223.4 LBS | HEART RATE: 66 BPM

## 2019-04-29 DIAGNOSIS — I35.0 AORTIC STENOSIS, SEVERE: Primary | ICD-10-CM

## 2019-04-29 DIAGNOSIS — R06.09 DOE (DYSPNEA ON EXERTION): ICD-10-CM

## 2019-04-29 DIAGNOSIS — E78.00 HIGH CHOLESTEROL: ICD-10-CM

## 2019-04-29 DIAGNOSIS — I10 ESSENTIAL HYPERTENSION: ICD-10-CM

## 2019-04-29 RX ORDER — BLOOD SUGAR DIAGNOSTIC
STRIP MISCELLANEOUS
COMMUNITY
Start: 2019-03-05

## 2019-04-29 RX ORDER — LANCETS
EACH MISCELLANEOUS
COMMUNITY
Start: 2019-03-05

## 2019-04-29 NOTE — PROGRESS NOTES
Elina Solomon DNP, ANP-BC  Subjective/HPI:     Yarelis Mccartney is a 70 y.o. male is here for 3-month recheck of aortic stenosis progression. Patient reports he feels relatively well with intermittent fatigue and mild dyspnea, he is accompanied by his wife who reports he is \"not telling the entire story\". He works on cars on a regular basis he reports his limiting factor is a right arm rotator cuff injury and right leg osteoarthritis. Denies lightheadedness dizziness syncope or presyncopal feelings. PCP Provider  Marlene Mejia NP  Past Medical History:   Diagnosis Date    Arthritis     knees, back    Chronic pain     knees, back    DM (diabetes mellitus) ([facility]     High cholesterol     HTN (hypertension)     currently on no meds    PE (pulmonary thromboembolism) ([facility] 01/2017    bilateral with right heart strain. Treated by Dr. Lana Florence      Past Surgical History:   Procedure Laterality Date    HX AMPUTATION      vascular; Left great toe amputation    HX ORTHOPAEDIC Left     Left arm fracture & repair    HX OTHER SURGICAL  6/4/15    INCISION AND DRAINAGE, RESECTION OF REMAINING 1ST METATARSAL, INSERTION OF ANTIBIOTIC BEADS     Allergies   Allergen Reactions    Vancomycin Rash     Patient broke out in large hive below IV site and c/o itching to area, patient states that \"that it was more likely an infiltration as opposed to an actual reaction\"      Family History   Problem Relation Age of Onset    Heart Disease Mother     Hypertension Mother     Diabetes Mother     Heart Disease Father       Current Outpatient Medications   Medication Sig    ACCU-CHEK KIRILL PLUS TEST STRP strip     ACCU-CHEK SOFTCLIX LANCETS misc     HYDROcodone-acetaminophen (NORCO) 5-325 mg per tablet Take 1 Tab by mouth every eight (8) hours as needed.  multivitamin (ONE A DAY) tablet Take 1 Tab by mouth daily.  furosemide (LASIX) 40 mg tablet Take 0.5 Tabs by mouth daily.  (Patient taking differently: Take 40 mg by mouth daily.)    Aspirin, Buffered 81 mg tab Take  by mouth daily.  metFORMIN (GLUCOPHAGE) 500 mg tablet Take  by mouth daily (with breakfast).  simvastatin (ZOCOR) 10 mg tablet Take  by mouth nightly. No current facility-administered medications for this visit. Vitals:    04/29/19 1018 04/29/19 1031   BP: 124/60 124/70   Pulse: 66    Resp: 18    SpO2: 98%    Weight: 223 lb 6.4 oz (101.3 kg)    Height: 5' 8\" (1.727 m)      Social History     Socioeconomic History    Marital status:      Spouse name: Not on file    Number of children: Not on file    Years of education: Not on file    Highest education level: Not on file   Occupational History    Not on file   Social Needs    Financial resource strain: Not on file    Food insecurity:     Worry: Not on file     Inability: Not on file    Transportation needs:     Medical: Not on file     Non-medical: Not on file   Tobacco Use    Smoking status: Former Smoker     Packs/day: 3.00     Years: 20.00     Pack years: 60.00     Types: Cigarettes    Smokeless tobacco: Never Used    Tobacco comment: stopped smoking about 30 years ago   Substance and Sexual Activity    Alcohol use:  Yes     Alcohol/week: 0.5 oz     Types: 1 Cans of beer per week     Comment: occasionally    Drug use: Yes     Types: Prescription, OTC    Sexual activity: Not on file   Lifestyle    Physical activity:     Days per week: Not on file     Minutes per session: Not on file    Stress: Not on file   Relationships    Social connections:     Talks on phone: Not on file     Gets together: Not on file     Attends Hinduism service: Not on file     Active member of club or organization: Not on file     Attends meetings of clubs or organizations: Not on file     Relationship status: Not on file    Intimate partner violence:     Fear of current or ex partner: Not on file     Emotionally abused: Not on file     Physically abused: Not on file     Forced sexual activity: Not on file Other Topics Concern    Not on file   Social History Narrative    Not on file       I have reviewed the nurses notes, vitals, problem list, allergy list, medical history, family, social history and medications. Review of Symptoms:    General: Pt denies excessive weight gain or loss. Pt is able to conduct ADL's  HEENT: Denies blurred vision, headaches, epistaxis and difficulty swallowing. Respiratory: Denies shortness of breath, + intermittent LU,no wheezing or stridor. Cardiovascular: Denies precordial pain, palpitations, edema or PND  Gastrointestinal: Denies poor appetite, indigestion, abdominal pain or blood in stool  Musculoskeletal: Right rotator cuff pain and right leg pain  Neurologic: Denies tremor, paresthesias, or sensory motor disturbance  Skin: Denies rash, itching or texture change. Physical Exam:      General: Well developed, in no acute distress, cooperative and alert  HEENT: No carotid bruits, no JVD, trach is midline. Neck Supple, PEERL, EOM intact. Heart:  Normal S1/S2 negative S3 or S4. Regular, 3/6 harsh systolic aortic murmur, no gallop or rub.   Respiratory: Clear bilaterally x 4, no wheezing or rales  Abdomen:   Soft, non-tender, no masses, bowel sounds are active.   Extremities:  No edema, normal cap refill, no cyanosis, atraumatic. Neuro: A&Ox3, speech clear, gait stable. Skin: Skin color is normal. No rashes or lesions.  Non diaphoretic  Vascular: 2+ pulses symmetric in all extremities    Cardiographics    ECG:   Results for orders placed or performed during the hospital encounter of 01/02/17   EKG, 12 LEAD, INITIAL   Result Value Ref Range    Ventricular Rate 113 BPM    Atrial Rate 113 BPM    P-R Interval 138 ms    QRS Duration 94 ms    Q-T Interval 314 ms    QTC Calculation (Bezet) 430 ms    Calculated P Axis 55 degrees    Calculated R Axis 70 degrees    Calculated T Axis 43 degrees    Diagnosis       Sinus tachycardia  When compared with ECG of 04-JUN-2015 08:44,  Vent. rate has increased BY  49 BPM  Confirmed by Adriana Escamilla ([id number]) on 1/3/2017 2:43:30 PM           Cardiology Labs:  Lab Results   Component Value Date/Time    Cholesterol, total 118 04/10/2015 10:15 AM    HDL Cholesterol 42 04/10/2015 10:15 AM    LDL, calculated 54 04/10/2015 10:15 AM    Triglyceride 109 04/10/2015 10:15 AM       Lab Results   Component Value Date/Time    Sodium 141 01/06/2017 04:26 AM    Potassium 4.0 01/06/2017 04:26 AM    Chloride 106 01/06/2017 04:26 AM    CO2 25 01/06/2017 04:26 AM    Anion gap 10 01/06/2017 04:26 AM    Glucose 134 (H) 01/06/2017 04:26 AM    BUN 4 (L) 01/06/2017 04:26 AM    Creatinine 0.77 01/06/2017 04:26 AM    BUN/Creatinine ratio 5 (L) 01/06/2017 04:26 AM    GFR est AA >60 01/06/2017 04:26 AM    GFR est non-AA >60 01/06/2017 04:26 AM    Calcium 8.1 (L) 01/06/2017 04:26 AM    Bilirubin, total 0.8 01/03/2017 04:54 AM    AST (SGOT) 14 (L) 01/03/2017 04:54 AM    Alk. phosphatase 80 01/03/2017 04:54 AM    Protein, total 5.5 (L) 01/03/2017 04:54 AM    Albumin 2.1 (L) 01/03/2017 04:54 AM    Globulin 3.4 01/03/2017 04:54 AM    A-G Ratio 0.6 (L) 01/03/2017 04:54 AM    ALT (SGPT) 7 (L) 01/03/2017 04:54 AM           Assessment:     Assessment:     Diagnoses and all orders for this visit:    1. Aortic stenosis, severe    2. High cholesterol  -     AMB POC EKG ROUTINE W/ 12 LEADS, INTER & REP    3. Essential hypertension    4. LU (dyspnea on exertion)        ICD-10-CM ICD-9-CM    1. Aortic stenosis, severe I35.0 424.1    2. High cholesterol E78.00 272.0 AMB POC EKG ROUTINE W/ 12 LEADS, INTER & REP   3. Essential hypertension I10 401.9    4. LU (dyspnea on exertion) R06.09 786.09      Orders Placed This Encounter    AMB POC EKG ROUTINE W/ 12 LEADS, INTER & REP     Order Specific Question:   Reason for Exam:     Answer:   Routine    ACCU-CHEK KIRILL PLUS TEST STRP strip    ACCU-CHEK SOFTCLIX LANCETS [facility]        Plan:     1.   Severe aortic stenosis: Repeat mean gradient 40 mmHg. Reports intermittent fatigue and mild dyspnea on exertion states his limiting factor in his activities is his right leg and right arm pain. 2.  Hypertension: Controlled 124/70 continue current medications  3. Hyperlipidemia: On statin therapy,  Maisha Dykes NP    This note was created using voice recognition software. Despite editing, there may be syntax errors. Patient seen and examined by me with nurse practitioner. Emma Hager personally performed all components of the history, physical, and medical decision making and agree with the assessment and plan with minor modifications as noted. Discussed with pt and wife in details. It appears that he is not convinced that AS is symptomatic. However per wife conversation, it appears that his AS is symptomatic now. Work up of AS, including cardiac cath, CTA and heart team evaluation d/w pt. He wants to think about it and will call back.      Phuc Keating MD

## 2019-04-29 NOTE — PROGRESS NOTES
1. Have you been to the ER, urgent care clinic since your last visit? Hospitalized since your last visit? No    2. Have you seen or consulted any other health care providers outside of the [address] [place] [address] since your last visit? Include any pap smears or colon screening.  Yes, Dr. Wolf Lopez    Chief Complaint   Patient presents with    Cholesterol Problem     3 mo follow up    Hypertension     3 mo follow up

## 2019-05-06 DIAGNOSIS — I35.0 AORTIC VALVE STENOSIS, MODERATE: Primary | ICD-10-CM

## 2019-05-07 LAB
ALBUMIN SERPL-MCNC: 4.1 G/DL (ref 3.6–4.8)
ALBUMIN/GLOB SERPL: 1.9 {RATIO} (ref 1.2–2.2)
ALP SERPL-CCNC: 69 IU/L (ref 39–117)
ALT SERPL-CCNC: 10 IU/L (ref 0–44)
AST SERPL-CCNC: 14 IU/L (ref 0–40)
BILIRUB SERPL-MCNC: 0.4 MG/DL (ref 0–1.2)
BUN SERPL-MCNC: 14 MG/DL (ref 8–27)
BUN/CREAT SERPL: 12 (ref 10–24)
CALCIUM SERPL-MCNC: 9.2 MG/DL (ref 8.6–10.2)
CHLORIDE SERPL-SCNC: 102 MMOL/L (ref 96–106)
CO2 SERPL-SCNC: 26 MMOL/L (ref 20–29)
CREAT SERPL-MCNC: 1.16 MG/DL (ref 0.76–1.27)
ERYTHROCYTE [DISTWIDTH] IN BLOOD BY AUTOMATED COUNT: 14.1 % (ref 12.3–15.4)
GLOBULIN SER CALC-MCNC: 2.2 G/DL (ref 1.5–4.5)
GLUCOSE SERPL-MCNC: 129 MG/DL (ref 65–99)
HCT VFR BLD AUTO: 41 % (ref 37.5–51)
HGB BLD-MCNC: 14.1 G/DL (ref 13–17.7)
INR PPP: 1.1 (ref 0.8–1.2)
MCH RBC QN AUTO: 31.6 PG (ref 26.6–33)
MCHC RBC AUTO-ENTMCNC: 34.4 G/DL (ref 31.5–35.7)
MCV RBC AUTO: 92 FL (ref 79–97)
PLATELET # BLD AUTO: 233 X10E3/UL (ref 150–379)
POTASSIUM SERPL-SCNC: 4.4 MMOL/L (ref 3.5–5.2)
PROT SERPL-MCNC: 6.3 G/DL (ref 6–8.5)
PROTHROMBIN TIME: 11 SEC (ref 9.1–12)
RBC # BLD AUTO: 4.46 X10E6/UL (ref 4.14–5.8)
SODIUM SERPL-SCNC: 140 MMOL/L (ref 134–144)
WBC # BLD AUTO: 4.7 X10E3/UL (ref 3.4–10.8)

## 2019-05-13 ENCOUNTER — APPOINTMENT (OUTPATIENT)
Dept: CT IMAGING | Age: 69
End: 2019-05-13
Attending: NURSE PRACTITIONER
Payer: MEDICARE

## 2019-05-13 ENCOUNTER — HOSPITAL ENCOUNTER (OUTPATIENT)
Age: 69
Discharge: HOME | End: 2019-05-13
Attending: INTERNAL MEDICINE | Admitting: INTERNAL MEDICINE
Payer: MEDICARE

## 2019-05-13 ENCOUNTER — APPOINTMENT (OUTPATIENT)
Dept: VASCULAR SURGERY | Age: 69
End: 2019-05-13
Attending: NURSE PRACTITIONER
Payer: MEDICARE

## 2019-05-13 VITALS
HEIGHT: 70 IN | RESPIRATION RATE: 18 BRPM | DIASTOLIC BLOOD PRESSURE: 55 MMHG | OXYGEN SATURATION: 97 % | BODY MASS INDEX: 31.64 KG/M2 | SYSTOLIC BLOOD PRESSURE: 117 MMHG | TEMPERATURE: 98.2 F | HEART RATE: 74 BPM | WEIGHT: 221 LBS

## 2019-05-13 DIAGNOSIS — I24.9 ACS (ACUTE CORONARY SYNDROME) (HCC): ICD-10-CM

## 2019-05-13 PROBLEM — Z98.890 S/P CARDIAC CATH: Status: ACTIVE | Noted: 2019-05-13

## 2019-05-13 LAB
GLUCOSE BLD STRIP.AUTO-MCNC: 134 MG/DL (ref 65–100)
LEFT CCA DIST DIAS: 15.6 CM/S
LEFT CCA DIST SYS: 56.3 CM/S
LEFT CCA PROX DIAS: 15.2 CM/S
LEFT CCA PROX SYS: 59.7 CM/S
LEFT ECA DIAS: 0 CM/S
LEFT ECA SYS: 31.8 CM/S
LEFT ICA DIST DIAS: 32.1 CM/S
LEFT ICA DIST SYS: 96.5 CM/S
LEFT ICA MID DIAS: 24.9 CM/S
LEFT ICA MID SYS: 78.2 CM/S
LEFT ICA PROX DIAS: 20.8 CM/S
LEFT ICA PROX SYS: 67.7 CM/S
LEFT ICA/CCA SYS: 1.71
LEFT SUBCLAVIAN DIAS: 0 CM/S
LEFT SUBCLAVIAN SYS: 109.1 CM/S
LEFT VERTEBRAL DIAS: 14.74 CM/S
LEFT VERTEBRAL SYS: 33.1 CM/S
RIGHT CCA DIST DIAS: 13.8 CM/S
RIGHT CCA DIST SYS: 41.5 CM/S
RIGHT CCA PROX DIAS: 16.8 CM/S
RIGHT CCA PROX SYS: 67.5 CM/S
RIGHT ECA DIAS: 0 CM/S
RIGHT ECA SYS: 45.2 CM/S
RIGHT ICA DIST DIAS: 23 CM/S
RIGHT ICA DIST SYS: 85.9 CM/S
RIGHT ICA MID DIAS: 32.9 CM/S
RIGHT ICA MID SYS: 74.8 CM/S
RIGHT ICA PROX DIAS: 12.8 CM/S
RIGHT ICA PROX SYS: 60.1 CM/S
RIGHT ICA/CCA SYS: 2.1
RIGHT SUBCLAVIAN DIAS: 0 CM/S
RIGHT SUBCLAVIAN SYS: 88.2 CM/S
RIGHT VERTEBRAL DIAS: 11 CM/S
RIGHT VERTEBRAL SYS: 43.3 CM/S
SERVICE CMNT-IMP: ABNORMAL

## 2019-05-13 PROCEDURE — 76937 US GUIDE VASCULAR ACCESS: CPT | Performed by: INTERNAL MEDICINE

## 2019-05-13 PROCEDURE — C1769 GUIDE WIRE: HCPCS | Performed by: INTERNAL MEDICINE

## 2019-05-13 PROCEDURE — 82962 GLUCOSE BLOOD TEST: CPT

## 2019-05-13 PROCEDURE — 77030015766: Performed by: INTERNAL MEDICINE

## 2019-05-13 PROCEDURE — 77030019569 HC BND COMPR RAD TERU -B: Performed by: INTERNAL MEDICINE

## 2019-05-13 PROCEDURE — 94060 EVALUATION OF WHEEZING: CPT

## 2019-05-13 PROCEDURE — 74011000250 HC RX REV CODE- 250: Performed by: INTERNAL MEDICINE

## 2019-05-13 PROCEDURE — C1887 CATHETER, GUIDING: HCPCS | Performed by: INTERNAL MEDICINE

## 2019-05-13 PROCEDURE — 93880 EXTRACRANIAL BILAT STUDY: CPT

## 2019-05-13 PROCEDURE — 77030010221 HC SPLNT WR POS TELE -B: Performed by: INTERNAL MEDICINE

## 2019-05-13 PROCEDURE — C1751 CATH, INF, PER/CENT/MIDLINE: HCPCS | Performed by: INTERNAL MEDICINE

## 2019-05-13 PROCEDURE — 77030019698 HC SYR ANGI MDLON MRTM -A: Performed by: INTERNAL MEDICINE

## 2019-05-13 PROCEDURE — 74011250637 HC RX REV CODE- 250/637: Performed by: INTERNAL MEDICINE

## 2019-05-13 PROCEDURE — 74174 CTA ABD&PLVS W/CONTRAST: CPT

## 2019-05-13 PROCEDURE — 77030008543 HC TBNG MON PRSS MRTM -A: Performed by: INTERNAL MEDICINE

## 2019-05-13 PROCEDURE — 99153 MOD SED SAME PHYS/QHP EA: CPT | Performed by: INTERNAL MEDICINE

## 2019-05-13 PROCEDURE — 94729 DIFFUSING CAPACITY: CPT

## 2019-05-13 PROCEDURE — C1894 INTRO/SHEATH, NON-LASER: HCPCS | Performed by: INTERNAL MEDICINE

## 2019-05-13 PROCEDURE — 74011250636 HC RX REV CODE- 250/636: Performed by: INTERNAL MEDICINE

## 2019-05-13 PROCEDURE — 71275 CT ANGIOGRAPHY CHEST: CPT

## 2019-05-13 PROCEDURE — 77030004549 HC CATH ANGI DX PRF MRTM -A: Performed by: INTERNAL MEDICINE

## 2019-05-13 PROCEDURE — 77030028837 HC SYR ANGI PWR INJ COEU -A: Performed by: INTERNAL MEDICINE

## 2019-05-13 PROCEDURE — 99152 MOD SED SAME PHYS/QHP 5/>YRS: CPT | Performed by: INTERNAL MEDICINE

## 2019-05-13 PROCEDURE — 93460 R&L HRT ART/VENTRICLE ANGIO: CPT | Performed by: INTERNAL MEDICINE

## 2019-05-13 PROCEDURE — 74011636320 HC RX REV CODE- 636/320: Performed by: INTERNAL MEDICINE

## 2019-05-13 PROCEDURE — 74011250636 HC RX REV CODE- 250/636

## 2019-05-13 PROCEDURE — 77030030195 HC CATH ANGI DX PRF4 MRTM -A: Performed by: INTERNAL MEDICINE

## 2019-05-13 PROCEDURE — 74011250636 HC RX REV CODE- 250/636: Performed by: NURSE PRACTITIONER

## 2019-05-13 RX ORDER — FENTANYL CITRATE 50 UG/ML
INJECTION, SOLUTION INTRAMUSCULAR; INTRAVENOUS AS NEEDED
Status: DISCONTINUED | OUTPATIENT
Start: 2019-05-13 | End: 2019-05-13 | Stop reason: HOSPADM

## 2019-05-13 RX ORDER — HEPARIN SODIUM 200 [USP'U]/100ML
INJECTION, SOLUTION INTRAVENOUS
Status: COMPLETED | OUTPATIENT
Start: 2019-05-13 | End: 2019-05-13

## 2019-05-13 RX ORDER — SODIUM CHLORIDE 0.9 % (FLUSH) 0.9 %
SYRINGE (ML) INJECTION
Status: DISCONTINUED
Start: 2019-05-13 | End: 2019-05-13 | Stop reason: HOSPADM

## 2019-05-13 RX ORDER — GUAIFENESIN 100 MG/5ML
81 LIQUID (ML) ORAL DAILY
Status: DISCONTINUED | OUTPATIENT
Start: 2019-05-13 | End: 2019-05-13 | Stop reason: HOSPADM

## 2019-05-13 RX ORDER — SODIUM CHLORIDE 0.9 % (FLUSH) 0.9 %
10 SYRINGE (ML) INJECTION
Status: COMPLETED | OUTPATIENT
Start: 2019-05-13 | End: 2019-05-13

## 2019-05-13 RX ORDER — LIDOCAINE HYDROCHLORIDE 10 MG/ML
INJECTION, SOLUTION EPIDURAL; INFILTRATION; INTRACAUDAL; PERINEURAL AS NEEDED
Status: DISCONTINUED | OUTPATIENT
Start: 2019-05-13 | End: 2019-05-13 | Stop reason: HOSPADM

## 2019-05-13 RX ORDER — SODIUM CHLORIDE 9 MG/ML
100 INJECTION, SOLUTION INTRAVENOUS CONTINUOUS
Status: DISCONTINUED | OUTPATIENT
Start: 2019-05-13 | End: 2019-05-13 | Stop reason: HOSPADM

## 2019-05-13 RX ORDER — VERAPAMIL HYDROCHLORIDE 2.5 MG/ML
INJECTION, SOLUTION INTRAVENOUS AS NEEDED
Status: DISCONTINUED | OUTPATIENT
Start: 2019-05-13 | End: 2019-05-13 | Stop reason: HOSPADM

## 2019-05-13 RX ORDER — HEPARIN SODIUM 1000 [USP'U]/ML
INJECTION, SOLUTION INTRAVENOUS; SUBCUTANEOUS AS NEEDED
Status: DISCONTINUED | OUTPATIENT
Start: 2019-05-13 | End: 2019-05-13 | Stop reason: HOSPADM

## 2019-05-13 RX ORDER — ALBUTEROL SULFATE 0.83 MG/ML
2.5 SOLUTION RESPIRATORY (INHALATION)
Status: COMPLETED | OUTPATIENT
Start: 2019-05-13 | End: 2019-05-13

## 2019-05-13 RX ORDER — MIDAZOLAM HYDROCHLORIDE 1 MG/ML
INJECTION, SOLUTION INTRAMUSCULAR; INTRAVENOUS AS NEEDED
Status: DISCONTINUED | OUTPATIENT
Start: 2019-05-13 | End: 2019-05-13 | Stop reason: HOSPADM

## 2019-05-13 RX ADMIN — IOPAMIDOL 100 ML: 755 INJECTION, SOLUTION INTRAVENOUS at 16:25

## 2019-05-13 RX ADMIN — ALBUTEROL SULFATE 2.5 MG: 2.5 SOLUTION RESPIRATORY (INHALATION) at 15:21

## 2019-05-13 RX ADMIN — Medication 10 ML: at 16:25

## 2019-05-13 RX ADMIN — ASPIRIN 81 MG 81 MG: 81 TABLET ORAL at 08:39

## 2019-05-13 RX ADMIN — SODIUM CHLORIDE 100 ML/HR: 900 INJECTION, SOLUTION INTRAVENOUS at 10:56

## 2019-05-13 NOTE — Clinical Note
TRANSFER - OUT REPORT:  
 
Verbal report given to: FRANCA Alvarez. Report consisted of patient's Situation, Background, Assessment and  
Recommendations(SBAR). Opportunity for questions and clarification was provided. Patient transported with a Registered Nurse and [address] [place] [address] / [facility], [place]. Patient transported to: [facility].

## 2019-05-13 NOTE — DISCHARGE INSTRUCTIONS
[address] [place] [address]  [phone]        Patient ID:  Alma Leonard  [id number]  26 y.o.  1950    Admit Date: 5/13/2019    Discharge Date: 5/13/2019     Admitting Physician: Jorgito Wilson MD     Discharge Physician: Claudia Smith NP    Admission Diagnoses:   ACS (acute coronary syndrome) [facility]) [I24.9]    Discharge Diagnoses: Active Problems:    S/P cardiac cath (5/13/2019)      Overview: 5/13/19:  Mild, non-obstructive CAD         Discharge Condition: Good    Cardiology Procedures this Admission:  Diagnostic left heart catheterization, PFTs, carotid dopplers, CTA chest/abdomen/pelvis    Disposition: home    Reference discharge instructions provided by nursing for diet and activity. Signed:  Claudia Smith NP  5/13/2019  4:25 PM      Radial Cardiac Catheterization/Angiography Discharge Instructions    It is normal to feel tired the first couple days. Take it easy and follow the physicians instructions. CHECK THE CATHETER INSERTION SITE DAILY:    Remove the wrist dressing 24 hours after the procedure. You may shower 24 hours after the procedure. Wash with soap and water and pat dry. Gentle cleaning of the site with soap and water is sufficient, cover with a dry clean dressing or bandage. Do not apply creams or powders to the area. No soaking the wrist for 3 days. Leave the puncture site open to air after 24 hours post-procedure. CALL THE PHYSICIANS:     If the site becomes red, swollen or feels warm to the touch  If there is bleeding or drainage or if there is unusual pain at the radial site. If there is any minor oozing, you may apply a band-aid and remove after 12 hours.    If the bleeding continues, hold pressure with the middle finger against the puncture site and the thumb against the back of the wrist,call 911 to be transported to the hospital.  DO  [address].    ACTIVITY:   For the first 24 hours do not manipulate the wrist.  No lifting, pushing or pulling over 3-5 pounds with the affected wrist for 7 daysand no straining the insertion site. Do not life grocery bags or the garbage can, do not run the vacuum  or  for 7 days. Start with short walks as in the hospital and gradually increase as tolerated each day. It is recommended to walk 30 minutes 5-7 days per week. Follow your physicians instructions on activity. Avoid walking outside in extremes of heat or cold. Walk inside when it is cold and windy or hot and humid. Things to keep in mind:  No driving for at least 24 hours, or as designated by your physician. Limit the number of times you go up and down the stairs  Take rests and pace yourself with activity. Be careful and do not strain with bowel movements. MEDICATIONS:    Take all medications as prescribed  Call your physician if you have any questions  Keep an updated list of your medications with you at all times and give a list to your physician and pharmacist    SIGNS AND SYMPTOMS:   Be cautious of symptoms of angina or recurrent symptoms such as chest discomfort, unusual shortness of breath or fatigue. These could be symptoms of restenosis, a new blockage or a heart attack. If your symptoms are relieved with rest it is still recommended that you notify your physician of recurrent chest pain or discomfort. For CHEST PAIN or symptoms of angina not relieved with rest:  If the discomfort is not relieved with rest, and you have been prescribed Nitroglycerin, take as directed (taken under the tongue, one at a time 5 minutes apart for a total of 3 doses). If the discomfort is not relieved after the 3rd nitroglycerin, call 911. If you have not been prescribed Nitroglycerin  and your chest discomfort is not relieved with rest, call 911. AFTER CARE:   Follow up with your physician as instructed.   Follow a heart healthy diet with proper portion control, daily stress management, daily exercise, blood pressure and cholesterol control , and smoking cessation.

## 2019-05-13 NOTE — PROGRESS NOTES
Iveth Castaneda is recovering post-procedure. R radial site dressing is CDI without swelling or bleeding. VSS. Rhythm sinus. Iveth Castaneda denies complaints at this time. If recovery continues to progress without complication, discharge is planned for later today.     
 
 
 
 
Darrius Candelaria NP 
DNP, RN, AGACNP-BC

## 2019-05-13 NOTE — PROGRESS NOTES
IV infiltrate to right AC. Per Geetha Cuenca, [facility]. Isovue 100 ml infiltrated. Swelling noted to area. Ice packs applied. IV removed. Pt states just \"aching\" at site. [place] FRANCA Pfeiffer called and given updates. Instructions given to pt and copy on chart. Pt taken to room by transport. NAD noted at time of transfer. Dr. Angeli higgins and MD assessed pt.

## 2019-05-14 NOTE — PROGRESS NOTES
Patient: Karma Goodman   Age: 70 y.o. Patient Care Team:  Radha Reddy NP as PCP - General (Nurse Practitioner)  Ubaldo Mcnair MD as Surgeon (General Surgery)  Julieth Duran MD (Cardiology)    PCP: Radha Reddy NP    Cardiologist: Dr. Lois Bajwa    Diagnosis/Reason for Consultation: The primary encounter diagnosis was Aortic valve stenosis, moderate. Diagnoses of Essential hypertension, LU (dyspnea on exertion), High cholesterol, Type 2 diabetes mellitus with ketoacidosis without coma, without long-term current use of insulin ([facility] [address] and Amputated great toe of left foot ([facility] [address] were also pertinent to this visit. Problem List:   Patient Active Problem List   Diagnosis Code    High cholesterol E78.00    Heart murmur R01.1    LU (dyspnea on exertion) R06.09    Leg fatigue R29.898    DM (diabetes mellitus) ([facility] [address] E11.9    Diabetic foot ulcer (HCC) E11.621, L97.509    Cellulitis of left lower leg L03. 80    Spinal stenosis of lumbar region at multiple levels M48.061    HTN (hypertension) I10    Aortic valve stenosis, moderate I35.0    Abscess of foot including toes L02.619    Sepsis (HCC) A41.9    Amputated great toe of left foot (HCC) Y42.685R    Abscess of left foot L02.612    Discitis of lumbar region M46.46    Clostridium difficile colitis A04.72    Pulmonary emboli (HCC) I26.99    S/P cardiac cath Z98.890         HPI: 70 y.o. male with PMHx of Aortic Stenosis, Hypertension, Hyperlipidemia, Provoked Bilateral PE's with concern for right heart strain and left lower lobe infarction (2017) - Pt had discitis with subsequent c diff colitis and PE, Left Great Toe Amputation after diabetic foot infection that is referred to the [address] [place] [address] by Dr. Lois Bajwa for interventional evaluation of his aortic stenosis. Mr. Constance Gallegos has followed with Dr. Lois Bajwa for some time. He has remained relatively asymptomatic until recently.  He is fatigue and experiencing some dyspnea on exertion. He has some osteoarthritis and a right rotator cuff injury, but his wife feels he is much more fatigued then he was a few months prior. He denies chest tightness, lightheadedness and syncope. He denies orthopnea and PND. HE CLEARLY HAD A CONTRAST DYE REACTION AND HAS NOTED REDNESS AND BLISTERING THAT APPEARED AFTER HIS CTA    He has indiscretions with his Type 2 DM and his blood glucoses do run high at times. He had TTE last August and a repeat recently and his MITZY is more tight, 0.86cm2 to 0.7cm2. He has a mean gradient of 40 mmHg and a max gradient of 66mmHg. These are similar to prior studies. TTE with EF 55-60%, Severe AS, Mild AR, Mild MAC, Trace MR, Trace TR    NYHA Classification: Class II   Class II (Mild): Slight limitation of physical activity. Comfortable at rest, but ordinary physical activity results in fatigue, palpitation, or dyspnea. Angina Classification: Class 0   Class 0: No symptoms       Past Medical History:   Diagnosis Date    Arthritis     knees, back    Chronic pain     knees, back    DM (diabetes mellitus) ([facility] [address]     High cholesterol     HTN (hypertension)     currently on no meds    PE (pulmonary thromboembolism) ([facility] [address] 01/2017    bilateral with right heart strain.  Treated by Dr. Brenda Corral       Endocarditis: No  Pulmonary HTN: mild PCWP 22 Greater than 2/3 systemic: No  Immunocompromised/Steroids: No  Heart Failure Admission w/in past year:  No  Heart Failure Admission w/in past 2 weeks: No  Liver Dz/Cirrhosis: No   If yes, MELD score:  NA    Last Dental Visit:  recent   Any dental pain/concerns: No issues, only has one tooth    Past Surgical History:   Procedure Laterality Date    HX AMPUTATION      vascular; Left great toe amputation    HX ORTHOPAEDIC Left     Left arm fracture & repair    HX OTHER SURGICAL  6/4/15    INCISION AND DRAINAGE, RESECTION OF REMAINING 1ST METATARSAL, INSERTION OF ANTIBIOTIC BEADS      Social History     Tobacco Use    Smoking status: Former Smoker     Packs/day: 3.00     Years: 20.00     Pack years: 60.00     Types: Cigarettes    Smokeless tobacco: Never Used    Tobacco comment: stopped smoking about 30 years ago   Substance Use Topics    Alcohol use: Yes     Alcohol/week: 0.5 oz     Types: 1 Cans of beer per week     Comment: occasionally      Family History   Problem Relation Age of Onset    Heart Disease Mother     Hypertension Mother     Diabetes Mother     Heart Disease Father      Prior to Admission medications    Medication Sig Start Date End Date Taking? Authorizing Provider   ACCU-CHEK KIRILL PLUS TEST STRP strip  3/5/19   Provider, Historical   [facility]  3/5/19   Provider, Historical   HYDROcodone-acetaminophen (NORCO) 5-325 mg per tablet Take 1 Tab by mouth every eight (8) hours as needed. 8/6/18   Provider, Historical   multivitamin (ONE A DAY) tablet Take 1 Tab by mouth daily. Provider, Historical   furosemide (LASIX) 40 mg tablet Take 0.5 Tabs by mouth daily. Patient taking differently: Take 40 mg by mouth daily. 1/6/17   Shira Torrez MD   Aspirin, Buffered 81 mg tab Take  by mouth daily. Provider, Historical   metFORMIN (GLUCOPHAGE) 500 mg tablet Take  by mouth daily (with breakfast). Provider, Historical   simvastatin (ZOCOR) 10 mg tablet Take  by mouth nightly.     Provider, Historical       Allergies   Allergen Reactions    Contrast Agent [Iodine] Other (comments)     He had flushing, blistering and scabbing on chest and both arms right after Cath and CTA/ His iv infiltrated with the administration of the dye    Vancomycin Rash     Patient broke out in large hive below IV site and c/o itching to area, patient states that \"that it was more likely an infiltration as opposed to an actual reaction\"       Current Medications:   Current Outpatient Medications   Medication Sig Dispense Refill    ACCU-CHEK KIRILL PLUS TEST STRP strip       [place] [facility] [place]       HYDROcodone-acetaminophen (NORCO) 5-325 mg per tablet Take 1 Tab by mouth every eight (8) hours as needed.  multivitamin (ONE A DAY) tablet Take 1 Tab by mouth daily.  furosemide (LASIX) 40 mg tablet Take 0.5 Tabs by mouth daily. (Patient taking differently: Take 40 mg by mouth daily.) 30 Tab 0    Aspirin, Buffered 81 mg tab Take  by mouth daily.  metFORMIN (GLUCOPHAGE) 500 mg tablet Take  by mouth daily (with breakfast).  simvastatin (ZOCOR) 10 mg tablet Take  by mouth nightly. Vitals: Blood pressure 128/68, pulse 73, SpO2 98 %. Weight 218lbs, Ht 5'10\"    Allergies: is allergic to contrast agent [iodine] and vancomycin. Review of Systems: Pertinent Positives per HPI   [] Unable to obtain  ROS due to  []mental status change  []sedated   []intubated   [x]Total of 13 systems reviewed as follows:  Constitutional: Positive fatigue, Negative fever, negative chills  Eyes:   Negative for amauroses fugax  ENT:   Negative sore throat,oral absecess  Endocrine Negative for thyroid replacement Rx; goiter; DM  Respiratory:  Negative chronic cough,sputum production  Cards:   Positive LU,Negative for  palpitations, lower extremity edema, varicosities, claudication  GI:   Negative for dysphagia, bleeding, nausea, vomiting, diarrhea, and abdominal pain  Genitourinary: Negative for frequency, dysuria, BPH   Integument:  Negative for rash and pruritus  Hematologic:  Negative for easy bruising; bleeding dyscarsia  Musculoskel: Negative for muscle weakness inhibiting ambulation  Neurological:  Negative for stroke, TIA, syncope, dizziness  Behavl/Psych: Negative for feelings of anxiety, depression     Cardiovascular Testing:   EKG: NSR 62    TTE:  4-  MITZY 0.7cm2, mean gradient 40mmHg and Peak Gradient 66 mmHg  · Left ventricular low normal global systolic function. Estimated left ventricular ejection fraction is 56 - 60%. Visually measured ejection fraction.  Left ventricular global hypokinesis. · Aortic valve is probably trileaflet. Aortic valve leaflet calcification present. Severe aortic valve stenosis is present. Mild aortic valve regurgitation is present. Echo Findings     Left Ventricle Normal cavity size and wall thickness. There is global low normal systolic function. The estimated ejection fraction is 56 - 60%. Visually measured ejection fraction. Global hypokinesis observed. Left Atrium Normal cavity size. Right Ventricle Normal cavity size, wall thickness and global systolic function. Right Atrium Normal size. Aortic Valve Aortic valve is probably trileaflet. There is leaflet calcification. There is severe aortic stenosis. Mild aortic valve regurgitation   Mitral Valve No stenosis. Leaflet calcification. Mild mitral annular calcification. Trace regurgitation. Tricuspid Valve Normal valve structure and no stenosis. Trace tricuspid valve regurgitation. Pulmonic Valve Normal valve structure, no stenosis and no regurgitation. Aorta Normal aortic root, ascending aortic, and aortic arch. IVC/Hepatic Veins Normal structure. Normal central venous pressure (0-5 mmHg); IVC diameter is less than 21 mm and collapses more than 50% with respiration. Pericardium Normal pericardium and no evidence of pericardial effusion. Cardiac catheterization: right and left on 5-  RV Pressures 38 mmHg    20 mmHg     288 mmHg/sec        PA Pressures 35 mmHg    25 mmHg    24 mmHg         LV Pressures 167 mmHg    7 mmHg     1680 mmHg/sec        AO Pressures 113 mmHg    62 mmHg    85 mmHg         RA Pressures   15 mmHg     14 mmHg    13 mmHg      PCW Pressures   22 mmHg     19 mmHg    20 mmHg        Left Main   The vessel is angiographically normal.   Left Anterior Descending   Mid LAD lesion 30% stenosed. . The pre-interventional distal flow is normal (KYLER 3). First Diagonal Branch   The vessel is small. The vessel exhibits minimal luminal irregularities.    Second Diagonal Branch   The vessel is small. The vessel exhibits minimal luminal irregularities. Ramus Intermedius   The vessel is angiographically normal.   Left Circumflex   The vessel exhibits minimal luminal irregularities. First Obtuse Marginal Branch   The vessel exhibits minimal luminal irregularities. Right Coronary Artery   The vessel exhibits minimal luminal irregularities         Carotid Dopplers: < 50% stenosis of BICA's    PFTs: FEV1/Predicted:5-    FEV1 pre 1.93  61%; FEV1 post 2.15 68%  Normal FEV1 > 1 Liter, Predicted = 100%, DLCO > 80%    Mild Lung Disease (60-70% Predicted)    Moderate Lung Disease (50-55% Predicted)    Severe Lung Disease (< 50% Predicted or PO2 < 60 or pCO2>50 on RA)    Gated C/A/P CTA: 5-  FINDINGS:  There is aortic valvular and annulus and coronary artery calcification. There is   aortic calcification. The thoracic aorta and great vessels enhance without   aneurysm or dissection. The abdominal aorta is calcified without dissection or aneurysm. Iliofemoral  arteries are normal. The celiac, superior mesenteric and renal arteries are  patent without stenosis. The common iliac and external iliac arteries are  patent.        Aortic measurements are provided.     The lungs contain calcified granulomas without acute finding. There is no significant mediastinal or hilar adenopathy. There are no focal abnormalities within the liver, spleen, pancreas, adrenal  glands or kidneys. There is no retroperitoneal adenopathy or mass. The bowel is nondilated. There is a right inguinal hernia containing bowel loops  without obstruction or inflammation. There is no ascites or free intraperitoneal  air.     IMPRESSION  IMPRESSION:   1. CTA Chest Abdomen Pelvis performed TAVR protocol. Measurements provided  2. No acute finding. Physical Exam:  General: Older than stated age male accompanied by his wife, a RN  Neuro: A&OX3.  ANAND. unassisted gait, appears to have gait disturbance  Head:Normocephalic. Atraumatic. Symmetrical  Neck: Trachea Midline  Resp: mild crackles in blt bases, No Adv BS/cough/sputum/tachypnea with seated conversation  CV: S1S2 RRR. EVAN II/VI. No JVD/carotid bruits. Pink/warm/dry extremities. Mild LE peripheral edema  GI:Benign ab. Soft. NT/ND. Active BS  : Voids  Integ: Arms and chest are red and clear blistered areas healing, no s/s of infection/ Left foot transmetatarsal amputation  Musculo/Skeletal: FROM in all major joints. normal muscle tone    Clinic Evaluation:   KCCQ-12: scanned into EMR -completed on 5-    5 meter gait: 7.62 seconds    Frality Survey:  Julieth Reynolds Index ADL - 6/6  scanned into EMR     STS 2.81 Risk Score / Predicted 30 day mortality: - calculations scanned into EMR  Procedure: Isolated AVR CALCULATE   Risk of Mortality:  1.350%    Renal Failure:  1.866%    Permanent Stroke:  0.796%    Prolonged Ventilation:  5.653%    DSW Infection:  0.135%    Reoperation:  4.072%    Morbidity or Mortality:  9.976%    Short Length of Stay:  50.177%    Long Length of Stay:  3.864%         Assessment/Plan:   1. Aortic Stenosis - Severe and Symptomatic,   Low Surgical Risk. Discussed low risk trial and provided a copy of the consent for review only. I provided the patient and his wife the contact information for Marilyn Villa RN. I have also reached out to David Vega. 2. Hypertension - Taken off Lisinopril for low BPs    3. Type 2 DM - Metformin daily, Hgb A1C apparently in the 6 range but has dietary indiscretions fairly often    4 Hyperlipidemia - Zocor    5 History of PE x 1 - Provoked after lengthy illness, completed Xarelto    6 Obesity - BMI 31    7. Gait Disturbance - Had left foot transmet amputation and has difficulty with ambulation, is not unsteady per say but would have trouble with SAVR    7. Further plan/care by Jose Santos

## 2019-05-15 ENCOUNTER — OFFICE VISIT (OUTPATIENT)
Dept: CARDIOTHORACIC SURGERY | Age: 69
End: 2019-05-15

## 2019-05-15 VITALS — OXYGEN SATURATION: 98 % | HEART RATE: 73 BPM | DIASTOLIC BLOOD PRESSURE: 68 MMHG | SYSTOLIC BLOOD PRESSURE: 128 MMHG

## 2019-05-15 DIAGNOSIS — E78.00 HIGH CHOLESTEROL: ICD-10-CM

## 2019-05-15 DIAGNOSIS — S98.112A AMPUTATED GREAT TOE OF LEFT FOOT (HCC): ICD-10-CM

## 2019-05-15 DIAGNOSIS — I35.0 AORTIC VALVE STENOSIS, MODERATE: Primary | ICD-10-CM

## 2019-05-15 DIAGNOSIS — R06.09 DOE (DYSPNEA ON EXERTION): ICD-10-CM

## 2019-05-15 DIAGNOSIS — E11.10 TYPE 2 DIABETES MELLITUS WITH KETOACIDOSIS WITHOUT COMA, WITHOUT LONG-TERM CURRENT USE OF INSULIN (HCC): ICD-10-CM

## 2019-05-15 DIAGNOSIS — I10 ESSENTIAL HYPERTENSION: ICD-10-CM

## 2019-05-22 ENCOUNTER — OFFICE VISIT (OUTPATIENT)
Dept: CARDIOLOGY CLINIC | Age: 69
End: 2019-05-22

## 2019-05-22 ENCOUNTER — DOCUMENTATION ONLY (OUTPATIENT)
Dept: CARDIOLOGY CLINIC | Age: 69
End: 2019-05-22

## 2019-05-22 VITALS
RESPIRATION RATE: 16 BRPM | HEIGHT: 70 IN | TEMPERATURE: 97.4 F | DIASTOLIC BLOOD PRESSURE: 64 MMHG | OXYGEN SATURATION: 97 % | SYSTOLIC BLOOD PRESSURE: 120 MMHG | HEART RATE: 73 BPM | BODY MASS INDEX: 31.5 KG/M2 | WEIGHT: 220 LBS

## 2019-05-22 DIAGNOSIS — E78.00 HIGH CHOLESTEROL: ICD-10-CM

## 2019-05-22 DIAGNOSIS — I35.0 AORTIC STENOSIS, SEVERE: ICD-10-CM

## 2019-05-22 DIAGNOSIS — E11.8 TYPE 2 DIABETES MELLITUS WITH COMPLICATION, WITHOUT LONG-TERM CURRENT USE OF INSULIN (HCC): ICD-10-CM

## 2019-05-22 DIAGNOSIS — I10 ESSENTIAL HYPERTENSION: ICD-10-CM

## 2019-05-22 DIAGNOSIS — I35.0 AORTIC VALVE STENOSIS, MODERATE: Primary | ICD-10-CM

## 2019-05-22 NOTE — PROGRESS NOTES
Patient: Marleen Torrez   Age: 70 y.o. Patient Care Team:  Xu Lo NP as PCP - General (Nurse Practitioner)  Brianna Carbajal MD as Surgeon (General Surgery)  Kimani Salvador MD (Cardiology)  Otto Franks, RN as Staff Nurse    PCP: Xu Lo NP    Cardiologist: Catalino Alvarez    Diagnosis/Reason for Consultation: The primary encounter diagnosis was Aortic valve stenosis, moderate. Diagnoses of Aortic stenosis, severe, Essential hypertension, Type 2 diabetes mellitus with complication, without long-term current use of insulin ([facility] [address] and High cholesterol were also pertinent to this visit. Problem List:   Patient Active Problem List   Diagnosis Code    High cholesterol E78.00    Heart murmur R01.1    LU (dyspnea on exertion) R06.09    Leg fatigue R29.898    DM (diabetes mellitus) ([facility] [address] E11.9    Diabetic foot ulcer (HCC) E11.621, L97.509    Cellulitis of left lower leg L03. 80    Spinal stenosis of lumbar region at multiple levels M48.061    HTN (hypertension) I10    Aortic valve stenosis, moderate I35.0    Abscess of foot including toes L02.619    Sepsis (HCC) A41.9    Amputated great toe of left foot (HCC) Q14.824A    Abscess of left foot L02.612    Discitis of lumbar region M46.46    Clostridium difficile colitis A04.72    Pulmonary emboli (HCC) I26.99    S/P cardiac cath Z98.890      HPI: 70 y.o.  male with PMHx of AS, HTN, HLD, DM, Provoked Bilateral PE's with concern for right heart strain and left lower lobe infarction (2017) in the setting of discitis and C. Diff colitis, s/p Left Great Toe Amputation after diabetic foot infection, s/p L arm surgery (plates/pins) that was referred to the [address] [place] [address] by Dr. Catalino Alvarez for interventional evaluation of his AS.  He was seen at the [facility] valve clinic last week and deemed to be a low risk surgical candidate and he is here today for interventional evaluation and consideration of enrollment in our [facility] Risk Clinical TAVR Trial (LR AMRIK). Mr. Christo Thornton has followed with Dr. Angel Diaz for some time and has a long standing murmur. A recent eval reveals pathologic progression of his AS as well as sx development. Today he reports SOB/LU with walking any extended distance as well as progressive fatigue over the past 6 months and he is requiring more naps. He admits to simply moving slower with all activities to avoid LU and the occasional chest tightness. He also states he occasionally has lightheadedness when he stands from a seated position and has some baseline balance issues. He denies any syncope but admits to a mechanical fall 2 weeks ago that did not result in any injury. He has some LE edema that has worsening over the past couple of months. He further denies any orthopnea, PND, cardiac hospitalization, chest surgery/trauma/XRT. Mr. Christo Thornton has a new allergy to contrast dye as had some redness/blistering of skin after CTA.      Followed by Dr. Tonya Sanchez in pain clinic for knees/legs    He is independent in all of his ADLs, medical decision making and medication administration. NYHA Classification: II              Class II (Mild): Slight limitation of physical activity. Comfortable at rest, but ordinary physical activity results in fatigue, palpitation, or dyspnea.     Angina Classification: 2   Class 2: Angina with moderate exercise     Past Medical History:   Diagnosis Date    Arthritis     knees, back    Chronic pain     knees, back    DM (diabetes mellitus) ([facility] [address]     High cholesterol     HTN (hypertension)     currently on no meds    PE [facility] thromboembolism) [facility] 01/2017    bilateral with right heart strain.  Treated by Dr. Jamel Franco     Endocarditis: No  Pulmonary HTN: no       Greater than 2/3 systemic: No  Immunocompromised/Steroids: No  Heart Failure Admission w/in past year:  No  Heart Failure Admission w/in past 2 weeks: No  Liver Dz/Cirrhosis: No   If yes, MELD score:  NA  Last Dental Visit:  recent   Any dental pain/concerns: No issues, only has one tooth    Past Surgical History:   Procedure Laterality Date    HX AMPUTATION      vascular; Left great toe amputation    HX ORTHOPAEDIC Left     Left arm fracture & repair    HX OTHER SURGICAL  6/4/15    INCISION AND DRAINAGE, RESECTION OF REMAINING 1ST METATARSAL, INSERTION OF ANTIBIOTIC BEADS      Social History     Tobacco Use    Smoking status: Former Smoker     Packs/day: 3.00     Years: 20.00     Pack years: 60.00     Types: Cigarettes    Smokeless tobacco: Never Used    Tobacco comment: stopped smoking about 30 years ago   Substance Use Topics    Alcohol use: Yes     Alcohol/week: 0.5 oz     Types: 1 Cans of beer per week     Comment: occasionally      Family History   Problem Relation Age of Onset    Heart Disease Mother     Hypertension Mother     Diabetes Mother     Heart Disease Father      Prior to Admission medications    Medication Sig Start Date End Date Taking? Authorizing Provider   OTHER daily. Indications: Tumeric   Yes Provider, Historical   ACCU-CHEK KIRILL PLUS TEST STRP strip  3/5/19  Yes Provider, Historical   ACCU-CHEK SOFTCLIX LANCETS misc  3/5/19  Yes Provider, Historical   HYDROcodone-acetaminophen (NORCO) 5-325 mg per tablet Take 1 Tab by mouth every eight (8) hours as needed. 8/6/18  Yes Provider, Historical   multivitamin (ONE A DAY) tablet Take 1 Tab by mouth daily. Yes Provider, Historical   furosemide (LASIX) 40 mg tablet Take 0.5 Tabs by mouth daily. Patient taking differently: Take 40 mg by mouth daily. 1/6/17  Yes Pollo Smith MD   Aspirin, Buffered 81 mg tab Take  by mouth daily. Yes Provider, Historical   metFORMIN (GLUCOPHAGE) 500 mg tablet Take  by mouth daily (with breakfast). Yes Provider, Historical   simvastatin (ZOCOR) 10 mg tablet Take  by mouth nightly. Yes Provider, Historical   predniSONE (DELTASONE) 50 mg tablet Take 50 mg of prednisone by mouth at 12:30 am on 6/26/19.  Then take 50 mg of prednisone by mouth at 6:30 am on 6/26/19 6/21/19   Darlene Pimentel NP   mupirocin [facility]) 2 % ointment Apply  to affected area daily for 2 days. 6/24/19 6/26/19  Joni Everett PA   chlorhexidine (PERIDEX) 0.12 % solution 15 mL by Swish and Spit route two (2) times a day for 2 days. 6/24/19 6/26/19  ROBYN Burgos       Allergies   Allergen Reactions    Contrast Agent [Iodine] Other (comments)     He had flushing, blistering and scabbing on chest and both arms right after Cath and CTA/ His iv infiltrated with the administration of the dye    Vancomycin Rash     Patient broke out in large hive below IV site and c/o itching to area, patient states that \"that it was more likely an infiltration as opposed to an actual reaction\"       Current Medications:   Current Outpatient Medications   Medication Sig Dispense Refill    OTHER daily. Indications: Tumeric      ACCU-CHEK KIRILL PLUS TEST STRP strip       ACCU-CHEK SOFTCLIX LANCETS misc       HYDROcodone-acetaminophen (NORCO) 5-325 mg per tablet Take 1 Tab by mouth every eight (8) hours as needed.  multivitamin (ONE A DAY) tablet Take 1 Tab by mouth daily.  furosemide (LASIX) 40 mg tablet Take 0.5 Tabs by mouth daily. (Patient taking differently: Take 40 mg by mouth daily.) 30 Tab 0    Aspirin, Buffered 81 mg tab Take  by mouth daily.  metFORMIN (GLUCOPHAGE) 500 mg tablet Take  by mouth daily (with breakfast).  simvastatin (ZOCOR) 10 mg tablet Take  by mouth nightly.  predniSONE (DELTASONE) 50 mg tablet Take 50 mg of prednisone by mouth at 12:30 am on 6/26/19. Then take 50 mg of prednisone by mouth at 6:30 am on 6/26/19 2 Tab 0    mupirocin (BACTROBAN) 2 % ointment Apply  to affected area daily for 2 days. 22 g 0    chlorhexidine (PERIDEX) 0.12 % solution 15 mL by Swish and Spit route two (2) times a day for 2 days.  420 mL 0       Vitals: Blood pressure 120/64, pulse 73, temperature 97.4 °F (36.3 °C), temperature source Oral, resp. rate 16, height 5' 10\" (1.778 m), weight 220 lb (99.8 kg), SpO2 97 %. Allergies: is allergic to contrast agent [iodine] and vancomycin. Review of Systems: Pertinent Positives per HPI   [x]Total of 13 systems reviewed as follows:  Constitutional: Negative fever, negative chills  Eyes:   Negative for amauroses fugax  ENT:   Negative sore throat,oral absecess  Endocrine Negative for thyroid replacement Rx; goiter  Respiratory:  Negative chronic cough,sputum production  Cards:   Negative for palpitations, varicosities, claudication  GI:   Negative for dysphagia, bleeding, nausea, vomiting, diarrhea, and abdominal pain  Genitourinary: Negative for frequency, dysuria  Integument:  Negative for rash and pruritus  Hematologic:  Negative for easy bruising; bleeding dyscarsia  Musculoskel: Negative for muscle weakness inhibiting ambulation  Neurological:  Negative for stroke, TIA, syncope, dizziness  Behavl/Psych: Negative for feelings of anxiety, depression     Cardiovascular Testing:   EKG 4/29/2019: SR 62 bpm. Inferior Q waves    TTE 4/29/2019: Interpretation Summary   · Left ventricular low normal global systolic function. Estimated left ventricular ejection fraction is 56 - 60%. Visually measured ejection fraction. Left ventricular global hypokinesis. · Aortic valve is probably trileaflet. Aortic valve leaflet calcification present. Severe aortic valve stenosis is present. Mild aortic valve regurgitation is present. Echo Findings     Left Ventricle Normal cavity size and wall thickness. There is global low normal systolic function. The estimated ejection fraction is 56 - 60%. Visually measured ejection fraction. Global hypokinesis observed. Left Atrium Normal cavity size. Right Ventricle Normal cavity size, wall thickness and global systolic function. Right Atrium Normal size. Aortic Valve Aortic valve is probably trileaflet. There is leaflet calcification.  There is severe aortic stenosis. Mild aortic valve regurgitation   Mitral Valve No stenosis. Leaflet calcification. Mild mitral annular calcification. Trace regurgitation. Tricuspid Valve Normal valve structure and no stenosis. Trace tricuspid valve regurgitation. Pulmonic Valve Normal valve structure, no stenosis and no regurgitation. Aorta Normal aortic root, ascending aortic, and aortic arch. IVC/Hepatic Veins Normal structure. Normal central venous pressure (0-5 mmHg); IVC diameter is less than 21 mm and collapses more than 50% with respiration. Pericardium Normal pericardium and no evidence of pericardial effusion.      2D/M-Mode Measurements     Dimensions   Measurement Value (Range)   LVIDs 2.91 cm      LVIDd 4.37 cm (4.2 - 5.9)      IVSd 0.96 cm (0.6 - 1.0)      LVPWd 0.77 cm (0.6 - 1.0)      LV Mass .8 g (88 - 224)      LV Mass AL Index 63.5 g/m2 (49 - 115)      Left Atrium Major Axis 3.7 cm      LA Area 4C 17.3 cm2      Left Atrium to Aortic Root Ratio 1.39       Tapse 2.36 cm (1.5 - 2.0)                Aorta Measurements     Aorta   Measurement Value (Range)   Ao Root D 2.66 cm             Aortic Valve Measurements     Stenosis   Aortic Valve Systolic Peak Velocity 911.25 cm/s      AoV PG 66.3 mmHg      Aortic Valve Systolic Mean Gradient 82.7 mmHg      AoV VTI 96.8 cm      Aortic Valve Area by Continuity of VTI 0.7 cm2      Aortic Valve Area by Continuity of Peak Velocity 0.7 cm2       Regurgitation/PISA   Aortic Regurgitant Pressure Half-time 602.3 cm      AR Max Antonio 310.28 cm/s       LVOT   LVOT Peak Velocity 85.21 cm/s      LVOT Peak Gradient 2.9 mmHg      LVOT VTI 20.05 cm      LVOT d 2.05 cm             Mitral Valve Measurements     Stenosis   Mitral Valve Pressure Half-time 68.6 ms      MVA (PHT) 3.2 cm2                     Diastolic Filling/Shunts     Diastolic Filling   LV E' Septal Velocity 6.45 cm/s      E/E' septal 13.69       LV E' Lateral Velocity 11.48 cm/s      E/E' lateral 7.69 E/E' ratio (averaged) 10.69       Mitral Valve E Wave Deceleration Time 236.5 ms      MV E Antonio 88.27 cm/s      MV A Antonio 81 cm/s      MV E/A 1.09        Shunt   LVOT SV 66 ml              Cardiac catheterization 5/13/2019: Coronary Findings     Diagnostic   Dominance: Right   Left Main   The vessel is angiographically normal.   Left Anterior Descending   Mid LAD lesion 30% stenosed. . The pre-interventional distal flow is normal (KYLER 3). First Diagonal Branch   The vessel is small. The vessel exhibits minimal luminal irregularities. Second Diagonal Branch   The vessel is small. The vessel exhibits minimal luminal irregularities. Ramus Intermedius   The vessel is angiographically normal.   Left Circumflex   The vessel exhibits minimal luminal irregularities. First Obtuse Marginal Branch   The vessel exhibits minimal luminal irregularities. Right Coronary Artery   The vessel exhibits minimal luminal irregularities. Intervention     No interventions have been documented. Left Heart     Left Ventricle The left ventricular systolic function is normal.   Aortic Valve There is severe aortic valve stenosis. Right Heart     Right Heart Cath [place] catheter inserted. Mild pulmonary hypertension noted. Severe aortic stenosis was noted.    Phase: Baseline     Data Systolic Diastolic Mean dP/dt A Wave V Wave   RV Pressures 38 mmHg    20 mmHg     288 mmHg/sec        PA Pressures 35 mmHg    25 mmHg    24 mmHg         LV Pressures 167 mmHg    7 mmHg     1680 mmHg/sec        AO Pressures 113 mmHg    62 mmHg    85 mmHg         RA Pressures   15 mmHg     14 mmHg    13 mmHg      PCW Pressures   22 mmHg     19 mmHg    20 mmHg      Phase: Baseline     Data HR TDCO TDCI Darek CI PVR/SVR TPR/TVR   Cardiac Output Results 72 bpm      2.8 L/min/m2        Resistance Results     0.03    0.28      Blood Oximetry 5/13/19 0927--5/13/19 1039 before discharge     PA O2 Sat   74.5 %   Aortic Valve 5/13/19 0927--5/13/19 1039 before discharge     Date/Time AV SEP AV Area AV Flow AV Index AV Gradient   05/13/19 09:30:39 19.69 sec/beat 1.09 cm2 309.96 cc 0.5 cm2/m2 40.92 mmHg           Carotid Dopplers 5/13/2019: Interpretation Summary   · There is mild stenosis in the right ICA (<50%). · The right vertebral is antegrade. · There is mild stenosis in the left ICA (<50%). · The left vertebral is antegrade. Cerebrovascular Findings   Right Carotid   There is mild stenosis in the right ICA (<50%). The right ICA has heterogeneous plaque. The right ECA is patent. The right vertebral is antegrade. The right subclavian is normal.   Left Carotid   There is mild stenosis in the left ICA (<50%). The left ECA is patent. The left vertebral is antegrade. The left subclavian is normal.     PFTs: FEV1/Predicted 5/13/2019:  1.93L / 61%    Mild Lung Disease (60-70% Predicted)      Gated C/A/P CTA 5/13/2019: FINDINGS: There is aortic valvular and annulus and coronary artery calcification. There is  aortic calcification. The thoracic aorta and great vessels enhance without  aneurysm or dissection. The abdominal aorta is calcified without dissection or aneurysm. Iliofemoral arteries are normal. The celiac, superior mesenteric and renal arteries are patent without stenosis. The common iliac and external iliac arteries are patent. Aortic measurements are provided. The lungs contain calcified granulomas without acute finding. There is no significant mediastinal or hilar adenopathy. There are no focal abnormalities within the liver, spleen, pancreas, adrenal glands or kidneys. There is no retroperitoneal adenopathy or mass. The bowel is nondilated. There is a right inguinal hernia containing bowel loops without obstruction or inflammation. There is no ascites or free intraperitoneal air. IMPRESSION:   1. CTA Chest Abdomen Pelvis performed TAVR protocol. Measurements provided  2. No acute finding.     Physical Exam:  General: Older than stated age male accompanied by his wife, a RN  Neuro: A&OX3. ANAND. unassisted gait, appears to have gait disturbance  Head:Normocephalic. Atraumatic. Symmetrical  Neck: Trachea Midline  Resp: mild crackles in blt bases, No Adv BS/cough/sputum/tachypnea with seated conversation  CV: S1S2 RRR. EVAN II/VI. No JVD/carotid bruits. Pink/warm/dry extremities. Mild LE peripheral edema  GI:Benign ab. Soft. NT/ND. Active BS  : Voids  Integ: Arms and chest are red and clear blistered areas healing, no s/s of infection/ Left foot transmetatarsal amputation  Musculo/Skeletal: FROM in all major joints. normal muscle tone    Clinic Evaluation:   KCCQ-12: scanned into EMR - done at previous clinic visit last week    5 meter gait: 6.83 seconds    Frality Survey:  Ruthie Free Index ADL - 6/6  scanned into EMR - done at previous clinic visit last week    STS 2.9 Risk Score / Predicted 30 day mortality: - calculations scanned into EMR  STS Adult Cardiac Surgery Database Version 2.9 RISK SCORES   Procedure: Isolated AVR CALCULATE   Risk of Mortality:  1.086%    Renal Failure:  2.178%    Permanent Stroke:  0.909%    Prolonged Ventilation:  4.260%    DSW Infection:  0.145%    Reoperation:  3.273%    Morbidity or Mortality:  7.914%    Short Length of Stay:  49.684%    Long Length of Stay:  3.751%     Assessment/Plan:   1. AS - severe and symptomatic. Low Risk Surgical Candidate. Interested in [address] CRC to get IFC signed. Risks/benefits of TAVR reviewed w/ pt and wife and they wish to proceed. 2. HTN - recently taken off lisinopril for hypotension  3. DM II - oral meds per PCP. Check HgbAIC at PAT. 4. HLD - statin per cards  5. PE - during setting of profound illness/septoid. No longer anticoagulated with DOAC  6. Obesity - BMI 31  7. Contrast Dye Allergy - needs prophylax meds at PAT  8. Further care by Jose Galo

## 2019-05-22 NOTE — PROGRESS NOTES
Mr. Ottoniel Mahajan presents today for consent discussion regarding the Low Risk TAVR Continued Access clinical trial.  Informed consent form (version date 20-AUG-2018, IRB approval date 25-NOV-80) was reviewed with patient in detail, specifically discussing the study rationale, schedule of events, side effects and/or risks, potential benefits, alternative choices, potential costs, confidentiality and privacy issues, and the voluntary nature of both participation and withdrawal. The patient was given time to ask questions and confirmed that I answered all of the patients questions at this time. Mr. Lilly Quezadas understanding of the information presented in the informed consent form. He is in agreement to participate in this clinical trial and signed the informed consent today, which includes HIPAA details. He was given a copy of the signed consent. I will follow up with the patient regarding the scheduling of his surgery after review of his eligibility. No protocol procedures were done prior to signing consent. I reviewed my contact information and encouraged the patient to contact me with any questions or concerns.

## 2019-05-22 NOTE — PROGRESS NOTES
Pt seen and full note reviewed  See note by Amanda Carlos NP  Echo and CTA reviewed and discussed  He is low surgical risk and I explained open access to TVAR  HE is agreeable to proceed

## 2019-05-22 NOTE — PROGRESS NOTES
I had the pleasure of seeing Mr. Sofia Baker in the valve clinic earlier today with Ms. Laurel Layne NP - please see her note for details. He has severe AS and is low surgical risk, and is symptomatic. CT shows right TF with 29 CV Pro (annulus 77mm perimeter with adequate coronary heights).

## 2019-05-23 LAB
ALBUMIN SERPL-MCNC: 4.2 G/DL (ref 3.6–4.8)
ALBUMIN/GLOB SERPL: 1.8 {RATIO} (ref 1.2–2.2)
ALP SERPL-CCNC: 75 IU/L (ref 39–117)
ALT SERPL-CCNC: 13 IU/L (ref 0–44)
AST SERPL-CCNC: 15 IU/L (ref 0–40)
BILIRUB SERPL-MCNC: 0.5 MG/DL (ref 0–1.2)
BUN SERPL-MCNC: 12 MG/DL (ref 8–27)
BUN/CREAT SERPL: 10 (ref 10–24)
CALCIUM SERPL-MCNC: 9 MG/DL (ref 8.6–10.2)
CHLORIDE SERPL-SCNC: 101 MMOL/L (ref 96–106)
CO2 SERPL-SCNC: 25 MMOL/L (ref 20–29)
CREAT SERPL-MCNC: 1.19 MG/DL (ref 0.76–1.27)
GLOBULIN SER CALC-MCNC: 2.3 G/DL (ref 1.5–4.5)
GLUCOSE SERPL-MCNC: 124 MG/DL (ref 65–99)
POTASSIUM SERPL-SCNC: 4.4 MMOL/L (ref 3.5–5.2)
PROT SERPL-MCNC: 6.5 G/DL (ref 6–8.5)
SODIUM SERPL-SCNC: 140 MMOL/L (ref 134–144)

## 2019-05-28 NOTE — PROCEDURES
[address]  PULMONARY FUNCTION TEST    Name:  Krista Jacobson  MR#:  [id number]  :  1950  ACCOUNT #:  [de-identified]  DATE OF SERVICE:  2019    REASON FOR THE TEST:  Dyspnea with exertion. Spirometry was performed and it reveals:  1. Moderate airflow obstruction. 2.  Moderate reduction in FVC. 3.  Normal DLCO. 4.  Significant improvement on FEV1 after bronchodilators. 5.  Flow-volume loop is consistent with airflow obstruction.       Chance Cespedes MD      [id number]  D:  2019 10:46  T:  2019 12:25  JOB #:  [id number]  CC:  Jak Parada NP

## 2019-05-29 ENCOUNTER — TELEPHONE (OUTPATIENT)
Dept: CARDIOLOGY CLINIC | Age: 69
End: 2019-05-29

## 2019-06-20 ENCOUNTER — HOSPITAL ENCOUNTER (OUTPATIENT)
Dept: PREADMISSION TESTING | Age: 69
Discharge: HOME | End: 2019-06-20
Payer: MEDICARE

## 2019-06-20 ENCOUNTER — HOSPITAL ENCOUNTER (OUTPATIENT)
Dept: GENERAL RADIOLOGY | Age: 69
Discharge: HOME | End: 2019-06-20
Attending: NURSE PRACTITIONER
Payer: MEDICARE

## 2019-06-20 VITALS
WEIGHT: 223 LBS | SYSTOLIC BLOOD PRESSURE: 96 MMHG | HEIGHT: 68 IN | DIASTOLIC BLOOD PRESSURE: 50 MMHG | BODY MASS INDEX: 33.8 KG/M2

## 2019-06-20 LAB
ALBUMIN SERPL-MCNC: 3.6 G/DL (ref 3.5–5)
ALBUMIN/GLOB SERPL: 1.4 {RATIO} (ref 1.1–2.2)
ALP SERPL-CCNC: 74 U/L (ref 45–117)
ALT SERPL-CCNC: 16 U/L (ref 12–78)
ANION GAP SERPL CALC-SCNC: 5 MMOL/L (ref 5–15)
APPEARANCE UR: CLEAR
APTT PPP: 25.5 SEC (ref 22.1–32)
ARTERIAL PATENCY WRIST A: YES
AST SERPL-CCNC: 17 U/L (ref 15–37)
ATRIAL RATE: 60 BPM
BACTERIA URNS QL MICRO: NEGATIVE /HPF
BASE DEFICIT BLD-SCNC: 1 MMOL/L
BASOPHILS # BLD: 0 K/UL (ref 0–0.1)
BASOPHILS NFR BLD: 1 % (ref 0–1)
BDY SITE: ABNORMAL
BILIRUB SERPL-MCNC: 0.4 MG/DL (ref 0.2–1)
BILIRUB UR QL: NEGATIVE
BNP SERPL-MCNC: 649 PG/ML
BUN SERPL-MCNC: 15 MG/DL (ref 6–20)
BUN/CREAT SERPL: 14 (ref 12–20)
CALCIUM SERPL-MCNC: 8.4 MG/DL (ref 8.5–10.1)
CALCULATED P AXIS, ECG09: 76 DEGREES
CALCULATED R AXIS, ECG10: 80 DEGREES
CALCULATED T AXIS, ECG11: 68 DEGREES
CHLORIDE SERPL-SCNC: 108 MMOL/L (ref 97–108)
CO2 SERPL-SCNC: 27 MMOL/L (ref 21–32)
COLOR UR: NORMAL
CREAT SERPL-MCNC: 1.11 MG/DL (ref 0.7–1.3)
DIAGNOSIS, 93000: NORMAL
DIFFERENTIAL METHOD BLD: ABNORMAL
EOSINOPHIL # BLD: 0.4 K/UL (ref 0–0.4)
EOSINOPHIL NFR BLD: 8 % (ref 0–7)
EPITH CASTS URNS QL MICRO: NORMAL /LPF
ERYTHROCYTE [DISTWIDTH] IN BLOOD BY AUTOMATED COUNT: 13.9 % (ref 11.5–14.5)
EST. AVERAGE GLUCOSE BLD GHB EST-MCNC: 123 MG/DL
GAS FLOW.O2 O2 DELIVERY SYS: ABNORMAL L/MIN
GLOBULIN SER CALC-MCNC: 2.6 G/DL (ref 2–4)
GLUCOSE SERPL-MCNC: 135 MG/DL (ref 65–100)
GLUCOSE UR STRIP.AUTO-MCNC: NEGATIVE MG/DL
HBA1C MFR BLD: 5.9 % (ref 4.2–6.3)
HCO3 BLD-SCNC: 23.5 MMOL/L (ref 22–26)
HCT VFR BLD AUTO: 36.6 % (ref 36.6–50.3)
HGB BLD-MCNC: 12.8 G/DL (ref 12.1–17)
HGB UR QL STRIP: NEGATIVE
HYALINE CASTS URNS QL MICRO: NORMAL /LPF (ref 0–5)
IMM GRANULOCYTES # BLD AUTO: 0 K/UL (ref 0–0.04)
IMM GRANULOCYTES NFR BLD AUTO: 0 % (ref 0–0.5)
INR PPP: 1.1 (ref 0.9–1.1)
KETONES UR QL STRIP.AUTO: NEGATIVE MG/DL
LEUKOCYTE ESTERASE UR QL STRIP.AUTO: NEGATIVE
LYMPHOCYTES # BLD: 1.2 K/UL (ref 0.8–3.5)
LYMPHOCYTES NFR BLD: 25 % (ref 12–49)
MAGNESIUM SERPL-MCNC: 2.2 MG/DL (ref 1.6–2.4)
MCH RBC QN AUTO: 31.9 PG (ref 26–34)
MCHC RBC AUTO-ENTMCNC: 35 G/DL (ref 30–36.5)
MCV RBC AUTO: 91.3 FL (ref 80–99)
MONOCYTES # BLD: 0.5 K/UL (ref 0–1)
MONOCYTES NFR BLD: 10 % (ref 5–13)
NEUTS SEG # BLD: 2.6 K/UL (ref 1.8–8)
NEUTS SEG NFR BLD: 56 % (ref 32–75)
NITRITE UR QL STRIP.AUTO: NEGATIVE
NRBC # BLD: 0 K/UL (ref 0–0.01)
NRBC BLD-RTO: 0 PER 100 WBC
O2/TOTAL GAS SETTING VFR VENT: 21 %
P-R INTERVAL, ECG05: 190 MS
PCO2 BLD: 37.9 MMHG (ref 35–45)
PH BLD: 7.4 [PH] (ref 7.35–7.45)
PH UR STRIP: 5 [PH] (ref 5–8)
PLATELET # BLD AUTO: 223 K/UL (ref 150–400)
PMV BLD AUTO: 10.9 FL (ref 8.9–12.9)
PO2 BLD: 77 MMHG (ref 80–100)
POTASSIUM SERPL-SCNC: 4.2 MMOL/L (ref 3.5–5.1)
PROT SERPL-MCNC: 6.2 G/DL (ref 6.4–8.2)
PROT UR STRIP-MCNC: NEGATIVE MG/DL
PROTHROMBIN TIME: 10.9 SEC (ref 9–11.1)
Q-T INTERVAL, ECG07: 426 MS
QRS DURATION, ECG06: 104 MS
QTC CALCULATION (BEZET), ECG08: 426 MS
RBC # BLD AUTO: 4.01 M/UL (ref 4.1–5.7)
RBC #/AREA URNS HPF: NORMAL /HPF (ref 0–5)
RBC MORPH BLD: ABNORMAL
RBC MORPH BLD: ABNORMAL
SAO2 % BLD: 95 % (ref 92–97)
SODIUM SERPL-SCNC: 140 MMOL/L (ref 136–145)
SP GR UR REFRACTOMETRY: 1.02 (ref 1–1.03)
SPECIMEN TYPE: ABNORMAL
THERAPEUTIC RANGE,PTTT: NORMAL SECS (ref 58–77)
TOTAL RESP. RATE, ITRR: 12
TSH SERPL DL<=0.05 MIU/L-ACNC: 3.02 UIU/ML (ref 0.36–3.74)
UA: UC IF INDICATED,UAUC: NORMAL
UROBILINOGEN UR QL STRIP.AUTO: 1 EU/DL (ref 0.2–1)
VENTRICULAR RATE, ECG03: 60 BPM
WBC # BLD AUTO: 4.7 K/UL (ref 4.1–11.1)
WBC URNS QL MICRO: NORMAL /HPF (ref 0–4)

## 2019-06-20 PROCEDURE — 36600 WITHDRAWAL OF ARTERIAL BLOOD: CPT

## 2019-06-20 PROCEDURE — 86900 BLOOD TYPING SEROLOGIC ABO: CPT

## 2019-06-20 PROCEDURE — 71046 X-RAY EXAM CHEST 2 VIEWS: CPT

## 2019-06-20 PROCEDURE — 83735 ASSAY OF MAGNESIUM: CPT

## 2019-06-20 PROCEDURE — 86923 COMPATIBILITY TEST ELECTRIC: CPT

## 2019-06-20 PROCEDURE — 85025 COMPLETE CBC W/AUTO DIFF WBC: CPT

## 2019-06-20 PROCEDURE — 85730 THROMBOPLASTIN TIME PARTIAL: CPT

## 2019-06-20 PROCEDURE — 84443 ASSAY THYROID STIM HORMONE: CPT

## 2019-06-20 PROCEDURE — 36415 COLL VENOUS BLD VENIPUNCTURE: CPT

## 2019-06-20 PROCEDURE — 83036 HEMOGLOBIN GLYCOSYLATED A1C: CPT

## 2019-06-20 PROCEDURE — 80053 COMPREHEN METABOLIC PANEL: CPT

## 2019-06-20 PROCEDURE — 85610 PROTHROMBIN TIME: CPT

## 2019-06-20 PROCEDURE — 82803 BLOOD GASES ANY COMBINATION: CPT

## 2019-06-20 PROCEDURE — 93005 ELECTROCARDIOGRAM TRACING: CPT

## 2019-06-20 PROCEDURE — 83880 ASSAY OF NATRIURETIC PEPTIDE: CPT

## 2019-06-20 PROCEDURE — 81001 URINALYSIS AUTO W/SCOPE: CPT

## 2019-06-20 RX ORDER — CHLORHEXIDINE GLUCONATE 1.2 MG/ML
15 RINSE ORAL 2 TIMES DAILY
Qty: 420 ML | Refills: 0 | Status: SHIPPED | OUTPATIENT
Start: 2019-06-24 | End: 2019-06-29

## 2019-06-20 RX ORDER — MUPIROCIN 20 MG/G
OINTMENT TOPICAL DAILY
Qty: 22 G | Refills: 0 | Status: SHIPPED | OUTPATIENT
Start: 2019-06-24 | End: 2019-06-29

## 2019-06-20 NOTE — H&P
CSS   History and Physical    Subjective:         HPI: 68 y.o.  male with PMHx of AS, HTN, HLD, DM, Provoked Bilateral PE's with concern for right heart strain and left lower lobe infarction (2017) in the setting of discitis and C. Diff colitis, s/p Left Great Toe Amputation after diabetic foot infection that was referred to the [address] [place] [address] by Dr. Can Childers interventional evaluation of his AS. He was seen at the [facility] valve clinic last week and deemed to be a low risk surgical candidate and he is here today for interventional evaluation and consideration of enrollment in our Low Risk Clinical TAVR Trial (LR AMRIK).       Mr. Margot Miles has followed with Dr. Shaun Ellington for some time. He has remained relatively asymptomatic until recently. He is fatigue and experiencing some dyspnea on exertion. He has some osteoarthritis and a right rotator cuff injury, but his wife feels he is much more fatigued then he was a few months prior. He denies chest tightness, lightheadedness and syncope. He denies orthopnea and PND.     HE CLEARLY HAD A CONTRAST DYE REACTION AND HAS NOTED REDNESS AND BLISTERING THAT APPEARED AFTER HIS CTA     He has indiscretions with his Type 2 DM and his blood glucoses do run high at times.     Long standing murmur. Recent Echo with AS progression. SOB/LU with walking long distance. Progressive fatigue over 6 months. Moves so slowly to avoid SOB/LU. No CP. Occasional c/o chest tightness. Occasioinal lightheadness with standing. Some balance issues - clumsy. Fell two weeks ago. No orthopena/PND. Doesn't feel rested when wakes every morning. Naps more often and for longer periods than 612 months ago.        Furosemide started couple yrs ago for LE edema - by PCP. No change in dose recently. No cardiac hosptilazations. No chest surgery/trauma/ XRT.     Dr. Evette Whitten in pain clinic - knees/leg  S/p L arm surgery - plates/pins  -974 - but Hgb A1C typically ok - ? PCP        NYHA Classification: II              Class II (Mild): Slight limitation of physical activity. Comfortable at rest, but ordinary physical activity results in fatigue, palpitation, or dyspnea.     Angina Classification: 2              Class 2: Angina with moderate exercise    Cardiac testing:    TTE:  Left Ventricle Normal cavity size and wall thickness. There is global low normal systolic function. The estimated ejection fraction is 56 - 60%. Visually measured ejection fraction. Global hypokinesis observed. Left Atrium Normal cavity size. Right Ventricle Normal cavity size, wall thickness and global systolic function. Right Atrium Normal size. Aortic Valve Aortic valve is probably trileaflet. There is leaflet calcification. There is severe aortic stenosis. Mild aortic valve regurgitation   Mitral Valve No stenosis. Leaflet calcification. Mild mitral annular calcification. Trace regurgitation. Tricuspid Valve Normal valve structure and no stenosis. Trace tricuspid valve regurgitation. Pulmonic Valve Normal valve structure, no stenosis and no regurgitation. Aorta Normal aortic root, ascending aortic, and aortic arch. IVC/Hepatic Veins Normal structure. Normal central venous pressure (0-5 mmHg); IVC diameter is less than 21 mm and collapses more than 50% with respiration. Pericardium Normal pericardium and no evidence of pericardial effusion. Cardiac Cath:  Left Main   The vessel is angiographically normal.   Left Anterior Descending   Mid LAD lesion 30% stenosed. . The pre-interventional distal flow is normal (KYLER 3). First Diagonal Branch   The vessel is small. The vessel exhibits minimal luminal irregularities. Second Diagonal Branch   The vessel is small. The vessel exhibits minimal luminal irregularities. Ramus Intermedius   The vessel is angiographically normal.   Left Circumflex   The vessel exhibits minimal luminal irregularities.    First Obtuse Marginal Branch   The vessel exhibits minimal luminal irregularities. Right Coronary Artery   The vessel exhibits minimal luminal irregularities. Past Medical History:   Diagnosis Date    Arthritis     knees, back    Chronic pain     knees, back    DM [facility]di[facility] mellitus) [facility] [facility]     High cholesterol     HTN (hypertension)     currently on no meds    PE (pulmonary thromboembolism) ([facility] [facility] 01/2017    bilateral with right heart strain. Treated by Dr. Sulema Hodgkin     Past Surgical History:   Procedure Laterality Date    HX AMPUTATION      vascular; Left great toe amputation    HX ORTHOPAEDIC Left     Left arm fracture & repair    HX OTHER SURGICAL  6/4/15    INCISION AND DRAINAGE, RESECTION OF REMAINING 1ST METATARSAL, INSERTION OF ANTIBIOTIC BEADS      Social History     Tobacco Use    Smoking status: Former Smoker     Packs/day: 3.00     Years: 20.00     Pack years: 60.00     Types: Cigarettes    Smokeless tobacco: Never Used    Tobacco comment: stopped smoking about 30 years ago   Substance Use Topics    Alcohol use: Yes     Alcohol/week: 0.5 oz     Types: 1 Cans of beer per week     Comment: occasionally      Family History   Problem Relation Age of Onset    Heart Disease Mother     Hypertension Mother     Diabetes Mother     Heart Disease Father      Prior to Admission medications    Medication Sig Start Date End Date Taking? Authorizing Provider   mupirocin (BACTROBAN) 2 % ointment Apply  to affected area daily for 2 days. 6/24/19 6/26/19  Shawn Everett PA   chlorhexidine (PERIDEX) 0.12 % solution 15 mL by Swish and Spit route two (2) times a day for 2 days. 6/24/19 6/26/19  Shawn Everett PA   OTHER daily. Indications: Tumeric    Provider, Historical   ACCU-CHEK KIRILL PLUS TEST STRP strip  3/5/19   Provider, Historical   ACCU-CHEK SOFTCLIX LANCETS misc  3/5/19   Provider, Historical   HYDROcodone-acetaminophen (NORCO) 5-325 mg per tablet Take 1 Tab by mouth every eight (8) hours as needed.  8/6/18   Provider, Historical   multivitamin (ONE A DAY) tablet Take 1 Tab by mouth daily. Provider, Historical   furosemide (LASIX) 40 mg tablet Take 0.5 Tabs by mouth daily. Patient taking differently: Take 40 mg by mouth daily. 1/6/17   Romayne Gauze, MD   Aspirin, Buffered 81 mg tab Take  by mouth daily. Provider, Historical   metFORMIN (GLUCOPHAGE) 500 mg tablet Take  by mouth daily (with breakfast). Provider, Historical   simvastatin (ZOCOR) 10 mg tablet Take  by mouth nightly. Provider, Historical       Allergies   Allergen Reactions    Contrast Agent [Iodine] Other (comments)     He had flushing, blistering and scabbing on chest and both arms right after Cath and CTA/ His iv infiltrated with the administration of the dye    Vancomycin Rash     Patient broke out in large hive below IV site and c/o itching to area, patient states that \"that it was more likely an infiltration as opposed to an actual reaction\"     Review of Systems:   Consititutional: + Fatigue. Denies fever or chills. Eyes:  Denies use of glasses or vision problems(cataracts). ENT:  Denies hearing or swallowing difficulty. CV: Denies CP, claudication, HTN. Resp: + dyspnea, -productive cough. : Denies dialysis or kidney problems. GI: Denies ulcers, esophageal strictures, liver problems. M/S: Denies joint or bone problems, or implanted artificial hardware. Skin: Denies varicose veins, edema. Neuro: Denies strokes, or TIAs. Psych: Denies anxiety or depression. Endocrine: Denies thyroid problems or diabetes. Heme/Lymphatic: Denies easy bruising or lymphedema. Objective:     VS:   Wt:101.2 kg  Ht: 173 cm  HR: 66  RR: 16  BP: 96/50  Temp: 98.5 degrees F  SpO2: 98%    Physical Exam:    General appearance: alert, cooperative, no distress  Head: normocephalic, without obvious abnormality; atraumatic  Eyes: conjunctivae/corneas clear; EOM's intact. Nose: nares normal; no drainage.   Neck: no carotid bruit and no JVD  Lungs: clear to auscultation bilaterally  Heart: regular rate and rhythm; + murmur  Abdomen: soft, non-tender; bowel sounds normal  Extremities: moves all extremities; no weakness. Skin: Skin color normal; No varicose veins or edema. Neurologic: Grossly normal      Labs:   Recent Labs     06/20/19  1003      K 4.2   BUN 15   CREA 1.11   *   INR 1.1       Diagnostics:   PA and lateral:  Impression: No acute process.     Carotid doppler:   · There is mild stenosis in the right ICA (<50%). · The right vertebral is antegrade. · There is mild stenosis in the left ICA (<50%). · The left vertebral is antegrade. PFTS-FEV1:   Spirometry was performed and it reveals:  1. Moderate airflow obstruction. 2.  Moderate reduction in FVC. 3.  Normal DLCO. 4.  Significant improvement on FEV1 after bronchodilators. 5.  Flow-volume loop is consistent with airflow obstruction. EKG:   NSR 60s    Assessment:     Active Problems: Aortic valve stenosis, moderate (6/3/2015)      Overview: severe        Plan:   The risk and benefit of surgery were reviewed with patient and family and all questions answered and the patient wishes to proceed. Risk include infection, bleeding, stroke, heart attack, irregular heart rhythm, kidney failure and death. The patient was given instructions and prescriptions for bactroban and peridex. The patient was instructed to stop lasix and metformin. Surgery is scheduled for 6-26-19. STS 2.81 Risk Score / Predicted 30 day mortality: - calculations scanned into EMR  STS Adult Cardiac Surgery Database Version 2.9 RISK SCORES   Procedure: Isolated AVR CALCULATE   Risk of Mortality:  1.086%    Renal Failure:  2.178%    Permanent Stroke:  0.909%    Prolonged Ventilation:  4.260%    DSW Infection:  0.145%    Reoperation:  3.273%    Morbidity or Mortality:  7.914%    Short Length of Stay:  49.684%    Long Length of Stay:  3.751%     Treatment Plan:    1. AS - severe and symptomatic. Plan for TAVR  2. DM II - oral meds per PCP  3. HLD - statin per cards  4. PE - during setting of profound illness/septoid. No longer anticoagulated with DOAC  5.  Obesity - BMI 31          Signed By: ROBYN Mathew     June 20, 2019

## 2019-06-21 DIAGNOSIS — T50.995A ALLERGIC REACTION TO DYE, INITIAL ENCOUNTER: Primary | ICD-10-CM

## 2019-06-21 RX ORDER — PREDNISONE 50 MG/1
TABLET ORAL
Qty: 2 TAB | Refills: 0 | Status: ON HOLD | OUTPATIENT
Start: 2019-06-21 | End: 2019-06-29 | Stop reason: CLARIF

## 2019-06-21 NOTE — PROGRESS NOTES
Pt has contrast allergy. Please administer benadryl and prednisone 1 hour prior to surgery. Pt has been prescribed prednisone to take 13 hrs and 7 hrs prior to surgery.

## 2019-06-25 RX ORDER — PHENYLEPHRINE 10 MG/250 ML(40 MCG/ML)IN 0.9 % SOD.CHLORIDE INTRAVENOUS
10-100
Status: DISCONTINUED | OUTPATIENT
Start: 2019-06-26 | End: 2019-06-26 | Stop reason: HOSPADM

## 2019-06-25 RX ORDER — HYDROMORPHONE HYDROCHLORIDE 1 MG/ML
0.2 INJECTION, SOLUTION INTRAMUSCULAR; INTRAVENOUS; SUBCUTANEOUS
Status: CANCELLED | OUTPATIENT
Start: 2019-06-25

## 2019-06-25 RX ORDER — ONDANSETRON 2 MG/ML
4 INJECTION INTRAMUSCULAR; INTRAVENOUS AS NEEDED
Status: CANCELLED | OUTPATIENT
Start: 2019-06-25

## 2019-06-25 RX ORDER — MORPHINE SULFATE 10 MG/ML
2 INJECTION, SOLUTION INTRAMUSCULAR; INTRAVENOUS
Status: CANCELLED | OUTPATIENT
Start: 2019-06-25

## 2019-06-25 RX ORDER — SODIUM CHLORIDE 0.9 % (FLUSH) 0.9 %
5-40 SYRINGE (ML) INJECTION EVERY 8 HOURS
Status: CANCELLED | OUTPATIENT
Start: 2019-06-25

## 2019-06-25 RX ORDER — FENTANYL CITRATE 50 UG/ML
25 INJECTION, SOLUTION INTRAMUSCULAR; INTRAVENOUS
Status: CANCELLED | OUTPATIENT
Start: 2019-06-25

## 2019-06-25 RX ORDER — MIDAZOLAM HYDROCHLORIDE 1 MG/ML
0.5 INJECTION, SOLUTION INTRAMUSCULAR; INTRAVENOUS
Status: CANCELLED | OUTPATIENT
Start: 2019-06-25

## 2019-06-25 RX ORDER — DIPHENHYDRAMINE HYDROCHLORIDE 50 MG/ML
12.5 INJECTION, SOLUTION INTRAMUSCULAR; INTRAVENOUS AS NEEDED
Status: CANCELLED | OUTPATIENT
Start: 2019-06-25 | End: 2019-06-25

## 2019-06-25 RX ORDER — SODIUM CHLORIDE, SODIUM LACTATE, POTASSIUM CHLORIDE, CALCIUM CHLORIDE 600; 310; 30; 20 MG/100ML; MG/100ML; MG/100ML; MG/100ML
100 INJECTION, SOLUTION INTRAVENOUS CONTINUOUS
Status: CANCELLED | OUTPATIENT
Start: 2019-06-25

## 2019-06-25 RX ORDER — OXYCODONE HYDROCHLORIDE 5 MG/1
5 TABLET ORAL AS NEEDED
Status: CANCELLED | OUTPATIENT
Start: 2019-06-25

## 2019-06-25 RX ORDER — SODIUM CHLORIDE 0.9 % (FLUSH) 0.9 %
5-40 SYRINGE (ML) INJECTION AS NEEDED
Status: CANCELLED | OUTPATIENT
Start: 2019-06-25

## 2019-06-25 RX ORDER — SODIUM CHLORIDE 9 MG/ML
1.5-3 INJECTION, SOLUTION INTRAVENOUS
Status: DISCONTINUED | OUTPATIENT
Start: 2019-06-26 | End: 2019-06-26

## 2019-06-26 ENCOUNTER — APPOINTMENT (OUTPATIENT)
Dept: NON INVASIVE DIAGNOSTICS | Age: 69
DRG: 267 | End: 2019-06-26
Attending: THORACIC SURGERY (CARDIOTHORACIC VASCULAR SURGERY)
Payer: MEDICARE

## 2019-06-26 ENCOUNTER — HOSPITAL ENCOUNTER (INPATIENT)
Age: 69
LOS: 3 days | Discharge: HOME HEALTH | DRG: 267 | End: 2019-06-29
Attending: THORACIC SURGERY (CARDIOTHORACIC VASCULAR SURGERY) | Admitting: THORACIC SURGERY (CARDIOTHORACIC VASCULAR SURGERY)
Payer: MEDICARE

## 2019-06-26 ENCOUNTER — ANESTHESIA (OUTPATIENT)
Dept: CARDIOTHORACIC SURGERY | Age: 69
DRG: 267 | End: 2019-06-26
Payer: MEDICARE

## 2019-06-26 ENCOUNTER — APPOINTMENT (OUTPATIENT)
Dept: GENERAL RADIOLOGY | Age: 69
DRG: 267 | End: 2019-06-26
Attending: NURSE PRACTITIONER
Payer: MEDICARE

## 2019-06-26 ENCOUNTER — APPOINTMENT (OUTPATIENT)
Dept: GENERAL RADIOLOGY | Age: 69
DRG: 267 | End: 2019-06-26
Attending: THORACIC SURGERY (CARDIOTHORACIC VASCULAR SURGERY)
Payer: MEDICARE

## 2019-06-26 ENCOUNTER — ANESTHESIA EVENT (OUTPATIENT)
Dept: CARDIOTHORACIC SURGERY | Age: 69
DRG: 267 | End: 2019-06-26
Payer: MEDICARE

## 2019-06-26 PROBLEM — I35.0 AORTIC STENOSIS: Status: ACTIVE | Noted: 2019-06-26

## 2019-06-26 LAB
ADMINISTERED INITIALS, ADMINIT: NORMAL
ALBUMIN SERPL-MCNC: 3.2 G/DL (ref 3.5–5)
ALBUMIN/GLOB SERPL: 1.1 {RATIO} (ref 1.1–2.2)
ALP SERPL-CCNC: 68 U/L (ref 45–117)
ALT SERPL-CCNC: 18 U/L (ref 12–78)
ANION GAP SERPL CALC-SCNC: 7 MMOL/L (ref 5–15)
APTT PPP: 32.3 SEC (ref 22.1–32)
ARTERIAL PATENCY WRIST A: ABNORMAL
AST SERPL-CCNC: 23 U/L (ref 15–37)
ATRIAL RATE: 73 BPM
ATRIAL RATE: 85 BPM
BASE DEFICIT BLDV-SCNC: 4 MMOL/L
BASOPHILS # BLD: 0 K/UL (ref 0–0.1)
BASOPHILS NFR BLD: 0 % (ref 0–1)
BDY SITE: ABNORMAL
BILIRUB SERPL-MCNC: 0.4 MG/DL (ref 0.2–1)
BUN SERPL-MCNC: 16 MG/DL (ref 6–20)
BUN/CREAT SERPL: 15 (ref 12–20)
CALCIUM SERPL-MCNC: 8.2 MG/DL (ref 8.5–10.1)
CALCULATED P AXIS, ECG09: 74 DEGREES
CALCULATED P AXIS, ECG09: 84 DEGREES
CALCULATED R AXIS, ECG10: -28 DEGREES
CALCULATED R AXIS, ECG10: -30 DEGREES
CALCULATED T AXIS, ECG11: 100 DEGREES
CALCULATED T AXIS, ECG11: 97 DEGREES
CHLORIDE SERPL-SCNC: 110 MMOL/L (ref 97–108)
CO2 SERPL-SCNC: 24 MMOL/L (ref 21–32)
CREAT SERPL-MCNC: 1.1 MG/DL (ref 0.7–1.3)
D50 ADMINISTERED, D50ADM: 0 ML
D50 ORDER, D50ORD: 0 ML
DIAGNOSIS, 93000: NORMAL
DIAGNOSIS, 93000: NORMAL
DIFFERENTIAL METHOD BLD: ABNORMAL
EOSINOPHIL # BLD: 0 K/UL (ref 0–0.4)
EOSINOPHIL NFR BLD: 0 % (ref 0–7)
ERYTHROCYTE [DISTWIDTH] IN BLOOD BY AUTOMATED COUNT: 13.8 % (ref 11.5–14.5)
GAS FLOW.O2 O2 DELIVERY SYS: ABNORMAL L/MIN
GAS FLOW.O2 SETTING OXYMISER: 4 L/M
GLOBULIN SER CALC-MCNC: 2.9 G/DL (ref 2–4)
GLSCOM COMMENTS: NORMAL
GLUCOSE BLD STRIP.AUTO-MCNC: 191 MG/DL (ref 65–100)
GLUCOSE BLD STRIP.AUTO-MCNC: 227 MG/DL (ref 65–100)
GLUCOSE BLD STRIP.AUTO-MCNC: 246 MG/DL (ref 65–100)
GLUCOSE SERPL-MCNC: 179 MG/DL (ref 65–100)
GLUCOSE, GLC: 198 MG/DL
GLUCOSE, GLC: 204 MG/DL
GLUCOSE, GLC: 215 MG/DL
HCO3 BLDV-SCNC: 22.2 MMOL/L (ref 23–28)
HCT VFR BLD AUTO: 35.8 % (ref 36.6–50.3)
HGB BLD-MCNC: 12.6 G/DL (ref 12.1–17)
HIGH TARGET, HITG: 140 MG/DL
IMM GRANULOCYTES # BLD AUTO: 0.1 K/UL (ref 0–0.04)
IMM GRANULOCYTES NFR BLD AUTO: 1 % (ref 0–0.5)
INR PPP: 1.2 (ref 0.9–1.1)
INSULIN ADMINSTERED, INSADM: 4.7 UNITS/HOUR
INSULIN ADMINSTERED, INSADM: 5.8 UNITS/HOUR
INSULIN ADMINSTERED, INSADM: 6.9 UNITS/HOUR
INSULIN ORDER, INSORD: 4.7 UNITS/HOUR
INSULIN ORDER, INSORD: 5.8 UNITS/HOUR
INSULIN ORDER, INSORD: 6.9 UNITS/HOUR
LOW TARGET, LOT: 100 MG/DL
LYMPHOCYTES # BLD: 0.7 K/UL (ref 0.8–3.5)
LYMPHOCYTES NFR BLD: 9 % (ref 12–49)
MAGNESIUM SERPL-MCNC: 2 MG/DL (ref 1.6–2.4)
MCH RBC QN AUTO: 31.9 PG (ref 26–34)
MCHC RBC AUTO-ENTMCNC: 35.2 G/DL (ref 30–36.5)
MCV RBC AUTO: 90.6 FL (ref 80–99)
MINUTES UNTIL NEXT BG, NBG: 60 MIN
MONOCYTES # BLD: 0.2 K/UL (ref 0–1)
MONOCYTES NFR BLD: 2 % (ref 5–13)
MULTIPLIER, MUL: 0.03
MULTIPLIER, MUL: 0.04
MULTIPLIER, MUL: 0.05
NEUTS SEG # BLD: 7.3 K/UL (ref 1.8–8)
NEUTS SEG NFR BLD: 88 % (ref 32–75)
NRBC # BLD: 0 K/UL (ref 0–0.01)
NRBC BLD-RTO: 0 PER 100 WBC
ORDER INITIALS, ORDINIT: NORMAL
P-R INTERVAL, ECG05: 198 MS
P-R INTERVAL, ECG05: 220 MS
PCO2 BLDV: 45.7 MMHG (ref 41–51)
PH BLDV: 7.29 [PH] (ref 7.32–7.42)
PLATELET # BLD AUTO: 209 K/UL (ref 150–400)
PMV BLD AUTO: 10.2 FL (ref 8.9–12.9)
PO2 BLDV: 38 MMHG (ref 25–40)
POTASSIUM SERPL-SCNC: 4.2 MMOL/L (ref 3.5–5.1)
PROT SERPL-MCNC: 6.1 G/DL (ref 6.4–8.2)
PROTHROMBIN TIME: 11.7 SEC (ref 9–11.1)
Q-T INTERVAL, ECG07: 440 MS
Q-T INTERVAL, ECG07: 474 MS
QRS DURATION, ECG06: 152 MS
QRS DURATION, ECG06: 152 MS
QTC CALCULATION (BEZET), ECG08: 522 MS
QTC CALCULATION (BEZET), ECG08: 523 MS
RBC # BLD AUTO: 3.95 M/UL (ref 4.1–5.7)
RBC MORPH BLD: ABNORMAL
RBC MORPH BLD: ABNORMAL
SAO2 % BLDV: 66 % (ref 65–88)
SERVICE CMNT-IMP: ABNORMAL
SODIUM SERPL-SCNC: 141 MMOL/L (ref 136–145)
SPECIMEN TYPE: ABNORMAL
THERAPEUTIC RANGE,PTTT: ABNORMAL SECS (ref 58–77)
VENTRICULAR RATE, ECG03: 73 BPM
VENTRICULAR RATE, ECG03: 85 BPM
WBC # BLD AUTO: 8.3 K/UL (ref 4.1–11.1)

## 2019-06-26 PROCEDURE — 77030005402 HC CATH RAD ART LN KT TELE -B

## 2019-06-26 PROCEDURE — 74011000250 HC RX REV CODE- 250: Performed by: THORACIC SURGERY (CARDIOTHORACIC VASCULAR SURGERY)

## 2019-06-26 PROCEDURE — C1769 GUIDE WIRE: HCPCS | Performed by: INTERNAL MEDICINE

## 2019-06-26 PROCEDURE — 93005 ELECTROCARDIOGRAM TRACING: CPT

## 2019-06-26 PROCEDURE — 77030013406 HC CATH CTRL EDWD -B

## 2019-06-26 PROCEDURE — 93308 TTE F-UP OR LMTD: CPT

## 2019-06-26 PROCEDURE — 77030005320 HC CATH PACE TEMP STJU -B: Performed by: INTERNAL MEDICINE

## 2019-06-26 PROCEDURE — 74011250636 HC RX REV CODE- 250/636

## 2019-06-26 PROCEDURE — 77030019702 HC WRP THER MENM -C: Performed by: INTERNAL MEDICINE

## 2019-06-26 PROCEDURE — 83735 ASSAY OF MAGNESIUM: CPT

## 2019-06-26 PROCEDURE — C1894 INTRO/SHEATH, NON-LASER: HCPCS | Performed by: INTERNAL MEDICINE

## 2019-06-26 PROCEDURE — 76060000034 HC ANESTHESIA 1.5 TO 2 HR: Performed by: INTERNAL MEDICINE

## 2019-06-26 PROCEDURE — 74011250636 HC RX REV CODE- 250/636: Performed by: NURSE PRACTITIONER

## 2019-06-26 PROCEDURE — 80053 COMPREHEN METABOLIC PANEL: CPT

## 2019-06-26 PROCEDURE — 82962 GLUCOSE BLOOD TEST: CPT

## 2019-06-26 PROCEDURE — 74011636637 HC RX REV CODE- 636/637: Performed by: THORACIC SURGERY (CARDIOTHORACIC VASCULAR SURGERY)

## 2019-06-26 PROCEDURE — 74011250636 HC RX REV CODE- 250/636: Performed by: THORACIC SURGERY (CARDIOTHORACIC VASCULAR SURGERY)

## 2019-06-26 PROCEDURE — 82803 BLOOD GASES ANY COMBINATION: CPT

## 2019-06-26 PROCEDURE — C1760 CLOSURE DEV, VASC: HCPCS | Performed by: INTERNAL MEDICINE

## 2019-06-26 PROCEDURE — 02RF38Z REPLACEMENT OF AORTIC VALVE WITH ZOOPLASTIC TISSUE, PERCUTANEOUS APPROACH: ICD-10-PCS | Performed by: INTERNAL MEDICINE

## 2019-06-26 PROCEDURE — 76010000129 HC CV SURG 1.5 TO 2 HR: Performed by: INTERNAL MEDICINE

## 2019-06-26 PROCEDURE — 85730 THROMBOPLASTIN TIME PARTIAL: CPT

## 2019-06-26 PROCEDURE — C1751 CATH, INF, PER/CENT/MIDLINE: HCPCS

## 2019-06-26 PROCEDURE — 65620000000 HC RM CCU GENERAL

## 2019-06-26 PROCEDURE — 77030004532 HC CATH ANGI DX IMP BSC -A: Performed by: INTERNAL MEDICINE

## 2019-06-26 PROCEDURE — 74011000250 HC RX REV CODE- 250

## 2019-06-26 PROCEDURE — 74011250637 HC RX REV CODE- 250/637: Performed by: THORACIC SURGERY (CARDIOTHORACIC VASCULAR SURGERY)

## 2019-06-26 PROCEDURE — 36415 COLL VENOUS BLD VENIPUNCTURE: CPT

## 2019-06-26 PROCEDURE — 74011250637 HC RX REV CODE- 250/637: Performed by: NURSE PRACTITIONER

## 2019-06-26 PROCEDURE — 74011250636 HC RX REV CODE- 250/636: Performed by: ANESTHESIOLOGY

## 2019-06-26 PROCEDURE — 71045 X-RAY EXAM CHEST 1 VIEW: CPT

## 2019-06-26 PROCEDURE — 74011000258 HC RX REV CODE- 258: Performed by: THORACIC SURGERY (CARDIOTHORACIC VASCULAR SURGERY)

## 2019-06-26 PROCEDURE — 85025 COMPLETE CBC W/AUTO DIFF WBC: CPT

## 2019-06-26 PROCEDURE — 77030011640 HC PAD GRND REM COVD -A: Performed by: INTERNAL MEDICINE

## 2019-06-26 PROCEDURE — 74011250637 HC RX REV CODE- 250/637: Performed by: ANESTHESIOLOGY

## 2019-06-26 PROCEDURE — C1892 INTRO/SHEATH,FIXED,PEEL-AWAY: HCPCS | Performed by: INTERNAL MEDICINE

## 2019-06-26 PROCEDURE — 85610 PROTHROMBIN TIME: CPT

## 2019-06-26 PROCEDURE — 77030038090 HC VLV HRT EVOLUTPRO-29-US MEDT -L: Performed by: INTERNAL MEDICINE

## 2019-06-26 PROCEDURE — 74011636637 HC RX REV CODE- 636/637: Performed by: NURSE PRACTITIONER

## 2019-06-26 PROCEDURE — 77030018729 HC ELECTRD DEFIB PAD CARD -B

## 2019-06-26 PROCEDURE — P9045 ALBUMIN (HUMAN), 5%, 250 ML: HCPCS | Performed by: THORACIC SURGERY (CARDIOTHORACIC VASCULAR SURGERY)

## 2019-06-26 DEVICE — VLV EVOLUTPRO-29-US PRO BLNK US
Type: IMPLANTABLE DEVICE | Site: AORTIC VALVE | Status: FUNCTIONAL
Brand: COREVALVE™ EVOLUT™ PRO

## 2019-06-26 RX ORDER — ALBUMIN HUMAN 50 G/1000ML
12.5 SOLUTION INTRAVENOUS ONCE
Status: COMPLETED | OUTPATIENT
Start: 2019-06-27 | End: 2019-06-27

## 2019-06-26 RX ORDER — MAGNESIUM SULFATE 1 G/100ML
1 INJECTION INTRAVENOUS ONCE
Status: COMPLETED | OUTPATIENT
Start: 2019-06-26 | End: 2019-06-26

## 2019-06-26 RX ORDER — SODIUM CHLORIDE 0.9 % (FLUSH) 0.9 %
5-40 SYRINGE (ML) INJECTION EVERY 8 HOURS
Status: DISCONTINUED | OUTPATIENT
Start: 2019-06-26 | End: 2019-06-29

## 2019-06-26 RX ORDER — PROPOFOL 10 MG/ML
INJECTION, EMULSION INTRAVENOUS AS NEEDED
Status: DISCONTINUED | OUTPATIENT
Start: 2019-06-26 | End: 2019-06-26 | Stop reason: HOSPADM

## 2019-06-26 RX ORDER — SODIUM CHLORIDE 0.9 % (FLUSH) 0.9 %
5-40 SYRINGE (ML) INJECTION AS NEEDED
Status: DISCONTINUED | OUTPATIENT
Start: 2019-06-26 | End: 2019-06-29

## 2019-06-26 RX ORDER — LANOLIN ALCOHOL/MO/W.PET/CERES
400 CREAM (GRAM) TOPICAL 2 TIMES DAILY
Status: DISCONTINUED | OUTPATIENT
Start: 2019-06-27 | End: 2019-06-29 | Stop reason: HOSPADM

## 2019-06-26 RX ORDER — PROPOFOL 10 MG/ML
INJECTION, EMULSION INTRAVENOUS
Status: DISCONTINUED | OUTPATIENT
Start: 2019-06-26 | End: 2019-06-26 | Stop reason: HOSPADM

## 2019-06-26 RX ORDER — MIDAZOLAM HYDROCHLORIDE 1 MG/ML
1 INJECTION, SOLUTION INTRAMUSCULAR; INTRAVENOUS AS NEEDED
Status: DISCONTINUED | OUTPATIENT
Start: 2019-06-26 | End: 2019-06-26 | Stop reason: HOSPADM

## 2019-06-26 RX ORDER — SODIUM CHLORIDE 9 MG/ML
25 INJECTION, SOLUTION INTRAVENOUS CONTINUOUS
Status: DISCONTINUED | OUTPATIENT
Start: 2019-06-26 | End: 2019-06-26 | Stop reason: HOSPADM

## 2019-06-26 RX ORDER — ACETAMINOPHEN 325 MG/1
650 TABLET ORAL
Status: DISCONTINUED | OUTPATIENT
Start: 2019-06-26 | End: 2019-06-29 | Stop reason: HOSPADM

## 2019-06-26 RX ORDER — INSULIN GLARGINE 100 [IU]/ML
1-50 INJECTION, SOLUTION SUBCUTANEOUS
Status: ACTIVE | OUTPATIENT
Start: 2019-06-26 | End: 2019-06-27

## 2019-06-26 RX ORDER — SODIUM CHLORIDE, SODIUM LACTATE, POTASSIUM CHLORIDE, CALCIUM CHLORIDE 600; 310; 30; 20 MG/100ML; MG/100ML; MG/100ML; MG/100ML
25 INJECTION, SOLUTION INTRAVENOUS CONTINUOUS
Status: DISCONTINUED | OUTPATIENT
Start: 2019-06-26 | End: 2019-06-26 | Stop reason: HOSPADM

## 2019-06-26 RX ORDER — SODIUM CHLORIDE 9 MG/ML
INJECTION, SOLUTION INTRAVENOUS
Status: DISCONTINUED | OUTPATIENT
Start: 2019-06-26 | End: 2019-06-26 | Stop reason: HOSPADM

## 2019-06-26 RX ORDER — TRAMADOL HYDROCHLORIDE 50 MG/1
50-100 TABLET ORAL
Status: DISCONTINUED | OUTPATIENT
Start: 2019-06-26 | End: 2019-06-29 | Stop reason: HOSPADM

## 2019-06-26 RX ORDER — DEXTROSE 50 % IN WATER (D50W) INTRAVENOUS SYRINGE
12.5-25 AS NEEDED
Status: DISCONTINUED | OUTPATIENT
Start: 2019-06-26 | End: 2019-06-29 | Stop reason: HOSPADM

## 2019-06-26 RX ORDER — DIPHENHYDRAMINE HCL 25 MG
25 CAPSULE ORAL
Status: DISCONTINUED | OUTPATIENT
Start: 2019-06-26 | End: 2019-06-29 | Stop reason: HOSPADM

## 2019-06-26 RX ORDER — NALOXONE HYDROCHLORIDE 0.4 MG/ML
0.4 INJECTION, SOLUTION INTRAMUSCULAR; INTRAVENOUS; SUBCUTANEOUS AS NEEDED
Status: DISCONTINUED | OUTPATIENT
Start: 2019-06-26 | End: 2019-06-29 | Stop reason: HOSPADM

## 2019-06-26 RX ORDER — HYDRALAZINE HYDROCHLORIDE 20 MG/ML
10 INJECTION INTRAMUSCULAR; INTRAVENOUS
Status: DISCONTINUED | OUTPATIENT
Start: 2019-06-26 | End: 2019-06-29

## 2019-06-26 RX ORDER — AMLODIPINE BESYLATE 5 MG/1
5 TABLET ORAL DAILY
Status: DISCONTINUED | OUTPATIENT
Start: 2019-06-26 | End: 2019-06-29 | Stop reason: HOSPADM

## 2019-06-26 RX ORDER — MORPHINE SULFATE 2 MG/ML
2 INJECTION, SOLUTION INTRAMUSCULAR; INTRAVENOUS
Status: DISCONTINUED | OUTPATIENT
Start: 2019-06-26 | End: 2019-06-29

## 2019-06-26 RX ORDER — SODIUM CHLORIDE 0.9 % (FLUSH) 0.9 %
5-40 SYRINGE (ML) INJECTION EVERY 8 HOURS
Status: DISCONTINUED | OUTPATIENT
Start: 2019-06-26 | End: 2019-06-26 | Stop reason: HOSPADM

## 2019-06-26 RX ORDER — CEFAZOLIN SODIUM/WATER 2 G/20 ML
2 SYRINGE (ML) INTRAVENOUS EVERY 8 HOURS
Status: DISPENSED | OUTPATIENT
Start: 2019-06-26 | End: 2019-06-27

## 2019-06-26 RX ORDER — CEFAZOLIN SODIUM/WATER 2 G/20 ML
2 SYRINGE (ML) INTRAVENOUS
Status: COMPLETED | OUTPATIENT
Start: 2019-06-26 | End: 2019-06-26

## 2019-06-26 RX ORDER — ALBUTEROL SULFATE 0.83 MG/ML
2.5 SOLUTION RESPIRATORY (INHALATION)
Status: DISCONTINUED | OUTPATIENT
Start: 2019-06-26 | End: 2019-06-29 | Stop reason: HOSPADM

## 2019-06-26 RX ORDER — ONDANSETRON 2 MG/ML
4 INJECTION INTRAMUSCULAR; INTRAVENOUS
Status: DISCONTINUED | OUTPATIENT
Start: 2019-06-26 | End: 2019-06-29 | Stop reason: HOSPADM

## 2019-06-26 RX ORDER — BUPIVACAINE HYDROCHLORIDE 5 MG/ML
INJECTION, SOLUTION EPIDURAL; INTRACAUDAL AS NEEDED
Status: DISCONTINUED | OUTPATIENT
Start: 2019-06-26 | End: 2019-06-26 | Stop reason: HOSPADM

## 2019-06-26 RX ORDER — MAGNESIUM SULFATE 100 %
4 CRYSTALS MISCELLANEOUS AS NEEDED
Status: DISCONTINUED | OUTPATIENT
Start: 2019-06-26 | End: 2019-06-29 | Stop reason: HOSPADM

## 2019-06-26 RX ORDER — AMOXICILLIN 250 MG
1 CAPSULE ORAL 2 TIMES DAILY
Status: DISCONTINUED | OUTPATIENT
Start: 2019-06-27 | End: 2019-06-29 | Stop reason: HOSPADM

## 2019-06-26 RX ORDER — SODIUM CHLORIDE 0.9 % (FLUSH) 0.9 %
5-40 SYRINGE (ML) INJECTION AS NEEDED
Status: DISCONTINUED | OUTPATIENT
Start: 2019-06-26 | End: 2019-06-26 | Stop reason: HOSPADM

## 2019-06-26 RX ORDER — HEPARIN SODIUM 1000 [USP'U]/ML
INJECTION, SOLUTION INTRAVENOUS; SUBCUTANEOUS AS NEEDED
Status: DISCONTINUED | OUTPATIENT
Start: 2019-06-26 | End: 2019-06-26 | Stop reason: HOSPADM

## 2019-06-26 RX ORDER — FENTANYL CITRATE 50 UG/ML
50 INJECTION, SOLUTION INTRAMUSCULAR; INTRAVENOUS AS NEEDED
Status: DISCONTINUED | OUTPATIENT
Start: 2019-06-26 | End: 2019-06-26 | Stop reason: HOSPADM

## 2019-06-26 RX ORDER — BACITRACIN 500 UNIT/G
1 PACKET (EA) TOPICAL AS NEEDED
Status: DISCONTINUED | OUTPATIENT
Start: 2019-06-26 | End: 2019-06-29 | Stop reason: HOSPADM

## 2019-06-26 RX ORDER — FACIAL-BODY WIPES
10 EACH TOPICAL DAILY PRN
Status: DISCONTINUED | OUTPATIENT
Start: 2019-06-26 | End: 2019-06-29 | Stop reason: HOSPADM

## 2019-06-26 RX ORDER — HYDRALAZINE HYDROCHLORIDE 20 MG/ML
20 INJECTION INTRAMUSCULAR; INTRAVENOUS
Status: DISCONTINUED | OUTPATIENT
Start: 2019-06-26 | End: 2019-06-29 | Stop reason: HOSPADM

## 2019-06-26 RX ORDER — GUAIFENESIN 100 MG/5ML
81 LIQUID (ML) ORAL DAILY
Status: DISCONTINUED | OUTPATIENT
Start: 2019-06-27 | End: 2019-06-29 | Stop reason: HOSPADM

## 2019-06-26 RX ORDER — ROPIVACAINE HYDROCHLORIDE 5 MG/ML
30 INJECTION, SOLUTION EPIDURAL; INFILTRATION; PERINEURAL AS NEEDED
Status: DISCONTINUED | OUTPATIENT
Start: 2019-06-26 | End: 2019-06-26 | Stop reason: HOSPADM

## 2019-06-26 RX ORDER — SODIUM CHLORIDE 9 MG/ML
9 INJECTION, SOLUTION INTRAVENOUS CONTINUOUS
Status: DISCONTINUED | OUTPATIENT
Start: 2019-06-26 | End: 2019-06-29

## 2019-06-26 RX ORDER — INSULIN LISPRO 100 [IU]/ML
INJECTION, SOLUTION INTRAVENOUS; SUBCUTANEOUS
Status: DISCONTINUED | OUTPATIENT
Start: 2019-06-26 | End: 2019-06-26

## 2019-06-26 RX ORDER — FAMOTIDINE 20 MG/1
20 TABLET, FILM COATED ORAL EVERY 12 HOURS
Status: DISCONTINUED | OUTPATIENT
Start: 2019-06-27 | End: 2019-06-29 | Stop reason: HOSPADM

## 2019-06-26 RX ORDER — PROTAMINE SULFATE 10 MG/ML
INJECTION, SOLUTION INTRAVENOUS AS NEEDED
Status: DISCONTINUED | OUTPATIENT
Start: 2019-06-26 | End: 2019-06-26 | Stop reason: HOSPADM

## 2019-06-26 RX ORDER — DIPHENHYDRAMINE HCL 50 MG
50 CAPSULE ORAL ONCE
Status: DISCONTINUED | OUTPATIENT
Start: 2019-06-26 | End: 2019-06-26 | Stop reason: HOSPADM

## 2019-06-26 RX ORDER — SODIUM CHLORIDE 450 MG/100ML
10 INJECTION, SOLUTION INTRAVENOUS CONTINUOUS
Status: DISCONTINUED | OUTPATIENT
Start: 2019-06-26 | End: 2019-06-26

## 2019-06-26 RX ORDER — ACETAMINOPHEN 325 MG/1
650 TABLET ORAL ONCE
Status: COMPLETED | OUTPATIENT
Start: 2019-06-26 | End: 2019-06-26

## 2019-06-26 RX ORDER — INSULIN LISPRO 100 [IU]/ML
INJECTION, SOLUTION INTRAVENOUS; SUBCUTANEOUS
Status: DISCONTINUED | OUTPATIENT
Start: 2019-06-26 | End: 2019-06-29 | Stop reason: HOSPADM

## 2019-06-26 RX ORDER — SIMVASTATIN 10 MG/1
10 TABLET, FILM COATED ORAL
Status: DISCONTINUED | OUTPATIENT
Start: 2019-06-26 | End: 2019-06-29

## 2019-06-26 RX ORDER — OXYCODONE AND ACETAMINOPHEN 5; 325 MG/1; MG/1
1-2 TABLET ORAL
Status: DISCONTINUED | OUTPATIENT
Start: 2019-06-26 | End: 2019-06-29 | Stop reason: HOSPADM

## 2019-06-26 RX ORDER — LIDOCAINE HYDROCHLORIDE 10 MG/ML
0.1 INJECTION, SOLUTION EPIDURAL; INFILTRATION; INTRACAUDAL; PERINEURAL AS NEEDED
Status: DISCONTINUED | OUTPATIENT
Start: 2019-06-26 | End: 2019-06-26 | Stop reason: HOSPADM

## 2019-06-26 RX ORDER — DEXMEDETOMIDINE HYDROCHLORIDE 100 UG/ML
INJECTION, SOLUTION INTRAVENOUS AS NEEDED
Status: DISCONTINUED | OUTPATIENT
Start: 2019-06-26 | End: 2019-06-26 | Stop reason: HOSPADM

## 2019-06-26 RX ADMIN — Medication 10 ML: at 21:19

## 2019-06-26 RX ADMIN — SODIUM CHLORIDE: 9 INJECTION, SOLUTION INTRAVENOUS at 10:56

## 2019-06-26 RX ADMIN — DEXMEDETOMIDINE HYDROCHLORIDE 12 MCG: 100 INJECTION, SOLUTION INTRAVENOUS at 11:09

## 2019-06-26 RX ADMIN — PROPOFOL 60 MCG/KG/MIN: 10 INJECTION, EMULSION INTRAVENOUS at 11:01

## 2019-06-26 RX ADMIN — SODIUM CHLORIDE 3 ML/KG/HR: 9 INJECTION, SOLUTION INTRAVENOUS at 09:57

## 2019-06-26 RX ADMIN — HEPARIN SODIUM 3000 UNITS: 1000 INJECTION, SOLUTION INTRAVENOUS; SUBCUTANEOUS at 11:53

## 2019-06-26 RX ADMIN — DIPHENHYDRAMINE HYDROCHLORIDE 25 MG: 25 CAPSULE ORAL at 22:31

## 2019-06-26 RX ADMIN — Medication 2 G: at 19:30

## 2019-06-26 RX ADMIN — AMLODIPINE BESYLATE 5 MG: 5 TABLET ORAL at 16:15

## 2019-06-26 RX ADMIN — HEPARIN SODIUM 5000 UNITS: 1000 INJECTION, SOLUTION INTRAVENOUS; SUBCUTANEOUS at 11:41

## 2019-06-26 RX ADMIN — MAGNESIUM SULFATE HEPTAHYDRATE 1 G: 1 INJECTION, SOLUTION INTRAVENOUS at 15:52

## 2019-06-26 RX ADMIN — INSULIN LISPRO 2 UNITS: 100 INJECTION, SOLUTION INTRAVENOUS; SUBCUTANEOUS at 18:10

## 2019-06-26 RX ADMIN — PROPOFOL 50 MG: 10 INJECTION, EMULSION INTRAVENOUS at 11:50

## 2019-06-26 RX ADMIN — SIMVASTATIN 10 MG: 20 TABLET, FILM COATED ORAL at 21:19

## 2019-06-26 RX ADMIN — SODIUM CHLORIDE 9 ML/HR: 900 INJECTION, SOLUTION INTRAVENOUS at 13:03

## 2019-06-26 RX ADMIN — DEXMEDETOMIDINE HYDROCHLORIDE 12 MCG: 100 INJECTION, SOLUTION INTRAVENOUS at 11:29

## 2019-06-26 RX ADMIN — FENTANYL CITRATE 100 MCG: 50 INJECTION, SOLUTION INTRAMUSCULAR; INTRAVENOUS at 10:20

## 2019-06-26 RX ADMIN — INSULIN LISPRO 2 UNITS: 100 INJECTION, SOLUTION INTRAVENOUS; SUBCUTANEOUS at 22:32

## 2019-06-26 RX ADMIN — SODIUM CHLORIDE 4.7 UNITS/HR: 900 INJECTION, SOLUTION INTRAVENOUS at 11:31

## 2019-06-26 RX ADMIN — PROPOFOL 50 MG: 10 INJECTION, EMULSION INTRAVENOUS at 11:29

## 2019-06-26 RX ADMIN — HYDRALAZINE HYDROCHLORIDE 10 MG: 20 INJECTION INTRAMUSCULAR; INTRAVENOUS at 16:15

## 2019-06-26 RX ADMIN — ACETAMINOPHEN 650 MG: 325 TABLET ORAL at 09:53

## 2019-06-26 RX ADMIN — PROTAMINE SULFATE 80 MG: 10 INJECTION, SOLUTION INTRAVENOUS at 12:06

## 2019-06-26 RX ADMIN — PHENYLEPHRINE HYDROCHLORIDE 40 MCG/MIN: 10 INJECTION INTRAMUSCULAR; INTRAVENOUS; SUBCUTANEOUS at 11:39

## 2019-06-26 RX ADMIN — MIDAZOLAM HYDROCHLORIDE 5 MG: 1 INJECTION, SOLUTION INTRAMUSCULAR; INTRAVENOUS at 10:20

## 2019-06-26 RX ADMIN — DEXMEDETOMIDINE HYDROCHLORIDE 16 MCG: 100 INJECTION, SOLUTION INTRAVENOUS at 11:03

## 2019-06-26 RX ADMIN — PROPOFOL 40 MG: 10 INJECTION, EMULSION INTRAVENOUS at 11:01

## 2019-06-26 RX ADMIN — Medication 2 G: at 11:20

## 2019-06-26 RX ADMIN — Medication 10 ML: at 13:35

## 2019-06-26 RX ADMIN — ALBUMIN (HUMAN) 12.5 G: 12.5 INJECTION, SOLUTION INTRAVENOUS at 23:17

## 2019-06-26 NOTE — OP NOTES
Pre-procedure Diagnoses   Aortic stenosis, severe [I35.0]   Post-procedure Diagnoses   Aortic stenosis, severe [I35.0]   Procedures   TRANSCATHETER AORTIC VALVE REPLACEMENT [[id number] (Custom)]                 []Mina copied text    []Nolan for details      Transcatheter Aortic Valve Replacement     Indication  The valve was placed for severe aortic stenosis. Patient was at low surgical risk due to co-morbid conditions. Co-Surgeon: Magan Lovelace MD.      Access  The valve was placed using a transfemoral approach. The 16F valve delivery sheath was placed in the right femoral artery via a percutaneous approach. 6 Telugu sheaths were placed in the left artery for angiography. 6 Telugu sheaths were placed in left femoral vein for SVC wire. Ultrasound was used for vascular access.      Procedure  A 6 Fr sheath was placed in the left femoral artery for aortography. Prior to placement of the aortic valve, a left heart catheterization and aortogram were performed. A balloon aortic valvuloplasty was not performed prior to valve placement. A 29 Evolut Pro aortic valve was then deployed without rapid ventricular pacing. Post-deployment balloon inflation was not performed. Cardiopulmonary bypass support was not used for hemodynamic support during the procedure.     Hemodynamics  The pre-TAVR hemodynamic assessment confirmed severe aortic stenosis with a gradient of 56 mmHg mean. Pre: PA 35/12, /24, Ao 110/64, MITZY 0.6. Post: PA 38/16, /24, Ao 142/70, mean gradient 7mmHg. The LVEDP was noted to be elevated and there was no aortic regurgitation. Post-deployment hemodynamics showed no transvalvular gradient and elevated LVEDP.     Angiography  1. Aortic angiography pre intervention demonstrates severe AS.     Conclusion:  Successful transcatheter aortic valve replacement with no paravalvular leak. Post-deployment TTE showed the valve was in good position with no aortic regurgitation.    LE angiography showed good hemostasis at right CFA access site without any complication and good distal flow.      Complications:  None.      Specimen removed:   None.      Estimated blood loss:   Minimal.        I was present for the entire procedure. I have edited the procedure note as appropriate.

## 2019-06-26 NOTE — Clinical Note
Left femoral artery. Accessed successfully. using fluoro and ultrasound guidance. Number of attempts =  1.  CHANEL

## 2019-06-26 NOTE — ANESTHESIA PROCEDURE NOTES
Arterial Line Placement    Start time: 6/26/2019 10:21 AM  End time: 6/26/2019 10:27 AM  Performed by: Shereen Cedillo MD  Authorized by:  Shreeen Cedillo MD     Pre-Procedure  Indications:  Arterial pressure monitoring  Preanesthetic Checklist: patient identified, risks and benefits discussed, anesthesia consent, site marked, patient being monitored, timeout performed and patient being monitored      Procedure:   Prep:  Chlorhexidine  Seldinger Technique?: Yes    Orientation:  Left  Location:  Radial artery  Catheter size:  20 G  Number of attempts:  1  Cont Cardiac Output Sensor: No      Assessment:   Post-procedure:  Line secured and sterile dressing applied  Patient Tolerance:  Patient tolerated the procedure well with no immediate complications Grossly Intact

## 2019-06-26 NOTE — PROGRESS NOTES
0830-Cardiac Surgery Care Coordinator- Met with Luke Mcardle and his wife in pre-op holding. Reviewed role of the Cardiac Surgery Co- Nurse. Reviewed plan of care and discussed day of surgery expectations. Mr Kelly Diallo admitted to having a cup of coffee at 0400 this am, bedside nurse aware and stated she has notified Anesthesia. Encouraged Jose Alfredo Mcardle to verbalize and emotional support given. Will continue to follow for educational and emotional needs. Will update family throughout surgery. 1100- Provided update from the OR, reviewed plan of care and offered emotional support. 1215- Met with Wenceslao Worley wife and [facility] . Update given, Encouraged her to verbalize and offered emotional support. Reinforced Surgical waiting room instructions, she is to wait in the main surgical waiting room until contacted by the nursing staff. 1330- Escorted Mrs Kelly Diallo to the bedside in CCU, reviewed plan of care and offered emotional support.  Balbina Stacy RN

## 2019-06-26 NOTE — DIABETES MGMT
[facility]     [facility] Cardiac Surgery Progress Note     Recommendations/ Comments: Pt arrived to CCU at 1220 on 6/26/2019. Consider continuing insulin gtt for at least 48hrs post-op and eating 50% solid foods then,  1) transition off gtt per Glucostabilizer Protocol   2) continue accu-checks and humalog correctional insulin ac & hs   3) ADA/AHA diet as diet advanced  4) hold home Metformin until d/c. Observe BG response once off gtt to determine hospital medication needs. DTC will follow. Pt on Prednisone prior to surgery    Insulin gtt should not be stopped until after 1220 on 6/28/2019. Could transition sooner if all criteria met per protocol. Currently on insulin gtt. At 1220  mg/dL, rate 6.9%  Noted BG  > 200 this AM.  Pt instructed to take prednisone 40 mg prior to surgery per PTA med list    Chart reviewed on Joanie Barker. Patient is 70 y.o. male s/p Cardiac surgery  POD 0 TAVR. Hx DM on Metformin 500 mg PTA  Also on Prednisone PTA      A1c:   Lab Results   Component Value Date/Time    Hemoglobin A1c 5.9 06/20/2019 10:03 AM         Recent Glucose Results:   Lab Results   Component Value Date/Time    GLUCPOC 227 (H) 06/26/2019 09:42 AM        Lab Results   Component Value Date/Time    Creatinine 1.11 06/20/2019 10:03 AM     Estimated Creatinine Clearance: 71.2 mL/min (based on SCr of 1.11 mg/dL). Active Orders   Diet    DIET NPO        PO intake: No data found. Will continue to follow as needed. Thank you.   Willis Mccurdy RN, CDE  Pager [phone]      Time spent: 6 min

## 2019-06-26 NOTE — ROUTINE PROCESS
13:00 Patient directly admitted form OR, waking up following commands. Primary Nurse Lilian Landrum RN and Alhaji Browning RN performed a dual skin assessment on this patient No impairment noted Current Bed:  
Total Care Harris score is 11 
 
13:30 Patients wife at bedside, updated and pleased with his recovery. 16:15 Hydralazine for elevated BP 
 
19:30 Bedside shift change report given to Eric Alicia (oncoming nurse) by MOM (offgoing nurse). Report included the following information SBAR, Kardex, Intake/Output, MAR, Recent Results, Med Rec Status, Cardiac Rhythm NSR/SB, Alarm Parameters , Pre Procedure Checklist, Procedure Verification and Quality Measures.

## 2019-06-26 NOTE — ANESTHESIA PROCEDURE NOTES
Central Line Placement    Start time: 6/26/2019 10:31 AM  End time: 6/26/2019 10:44 AM  Performed by: Elizbeth Dakins, MD  Authorized by: Elizbeth Dakins, MD     Indications: vascular access, central pressure monitoring and need for vasopressors (Pacing)  Preanesthetic Checklist: patient identified, risks and benefits discussed, anesthesia consent, site marked, patient being monitored and timeout performed      Pre-procedure: All elements of maximal sterile barrier technique followed?  Yes    2% Chlorhexidine for cutaneous antisepsis, Hand hygiene performed prior to catheter insertion and Ultrasound guidance    Sterile Ultrasound Technique followed?: Yes            Procedure:   Prep:  Chlorhexidine  Location:  Internal jugular  Orientation:  Right  Patient position:  Trendelenburg  Catheter type:  Double lumen  Catheter size:  9 Fr  Catheter length:  12 cm  Number of attempts:  1  Successful placement: Yes      Assessment:   Post-procedure:  Catheter secured and sterile dressing with CHG applied  Assessment:  Blood return through all ports  Insertion:  Uncomplicated  Patient tolerance:  Patient tolerated the procedure well with no immediate complications  9 Fr MAC and 8 fr Paceport PAC

## 2019-06-26 NOTE — ANESTHESIA POSTPROCEDURE EVALUATION
Post-Anesthesia Evaluation and Assessment    Patient: Danielle Warren MRN: [id number]  SSN: [id number]    YOB: 1950  Age: 70 y.o. Sex: male      I have evaluated the patient and they are stable and ready for discharge from the PACU. Cardiovascular Function/Vital Signs  Visit Vitals  /67   Pulse 64   Temp 36.3 °C (97.4 °F)   Resp 18   Ht 5' 7\" (1.702 m)   Wt 101.2 kg (223 lb)   SpO2 100%   BMI 34.93 kg/m²       Patient is status post MAC anesthesia for Procedure(s):  TRANSCATHER AORTIC VALVE REPLACEMENT, RIGHT TRANSFEMORAL 29 EVOLUT PRO. TTE BY ECHO TECH. .    Nausea/Vomiting: None    Postoperative hydration reviewed and adequate. Pain:  Pain Scale 1: Numeric (0 - 10) (06/26/19 1300)  Pain Intensity 1: 0 (06/26/19 1300)   Managed    Neurological Status:   Neuro (WDL): Within Defined Limits (06/26/19 0919)   At baseline    Mental Status, Level of Consciousness: Alert and  oriented to person, place, and time    Pulmonary Status:   O2 Device: Nasal cannula (06/26/19 1300)   Adequate oxygenation and airway patent    Complications related to anesthesia: None    Post-anesthesia assessment completed. No concerns    Signed By: Jasiel Noble MD     June 26, 2019              Procedure(s):  TRANSCATHER AORTIC VALVE REPLACEMENT, RIGHT TRANSFEMORAL 29 EVOLUT PRO. TTE BY ECHO TECH. .    MAC    <BSHSIANPOST>    Vitals Value Taken Time   /64 6/26/2019  2:00 PM   Temp     Pulse 65 6/26/2019  2:11 PM   Resp 13 6/26/2019  2:11 PM   SpO2 100 % 6/26/2019  2:11 PM   Vitals shown include unvalidated device data.

## 2019-06-26 NOTE — PROCEDURES
Transcatheter Aortic Valve Replacement    Indication  The valve was placed for severe aortic stenosis. Patient was at low surgical risk due to co-morbid conditions. Co-Surgeon: Jeison Monroy MD. Arsh Millan MD.     Access  The valve was placed using a transfemoral approach. The 16F valve delivery sheath was placed in the right femoral artery via a percutaneous approach. 6 English sheaths were placed in the left artery for angiography. 6 English sheaths were placed in left femoral vein for SVC wire. Ultrasound was used for vascular access. Procedure  A 6 Fr sheath was placed in the left femoral artery for aortography. Prior to placement of the aortic valve, a left heart catheterization and aortogram were performed. A balloon aortic valvuloplasty was not performed prior to valve placement. A 29 Evolut Pro aortic valve was then deployed without rapid ventricular pacing. Post-deployment balloon inflation was not performed. Cardiopulmonary bypass support was not used for hemodynamic support during the procedure. Hemodynamics  The pre-TAVR hemodynamic assessment confirmed severe aortic stenosis with a gradient of 56 mmHg mean. Pre: PA 35/12, /24, Ao 110/64, MITZY 0.6. Post: PA 38/16, /24, Ao 142/70, mean gradient 7mmHg. The LVEDP was noted to be elevated and there was no aortic regurgitation. Post-deployment hemodynamics showed no transvalvular gradient and elevated LVEDP. Angiography  1. Aortic angiography pre intervention demonstrates severe AS.     Conclusion:  Successful transcatheter aortic valve replacement with no paravalvular leak. Post-deployment TTE showed the valve was in good position with no aortic regurgitation. LE angiography showed good hemostasis at right CFA access site without any complication and good distal flow. Complications:  None. Specimen removed:   None. Estimated blood loss:   Minimal.      I was present for the entire procedure.  I have edited the procedure note as appropriate.     Tucker Espitia MD

## 2019-06-26 NOTE — Clinical Note
Left femoral vein. Accessed successfully. using fluoro and ultrasound guidance. Number of attempts =  1.  CHANEL

## 2019-06-26 NOTE — CARDIO/PULMONARY
Cardiac Rehab: Trans-catheter education folder given to Anny Mauricio. Introduced myself and my role. Briefly explained the cardiac rehab program then patient required bathroom assistance. Will follow for further discussion about cardiac rehab enrollment. It was suggested that I do education when the wife is present and she will be back tomorrow.  June Borrero RN

## 2019-06-26 NOTE — Clinical Note
Right femoral artery. Accessed successfully. using fluoro and ultrasound guidance. Number of attempts =  1.  Christiane Eric

## 2019-06-27 ENCOUNTER — APPOINTMENT (OUTPATIENT)
Dept: GENERAL RADIOLOGY | Age: 69
DRG: 267 | End: 2019-06-27
Attending: THORACIC SURGERY (CARDIOTHORACIC VASCULAR SURGERY)
Payer: MEDICARE

## 2019-06-27 ENCOUNTER — HOME HEALTH ADMISSION (OUTPATIENT)
Dept: HOME HEALTH SERVICES | Facility: HOME HEALTH | Age: 69
End: 2019-06-27
Payer: MEDICARE

## 2019-06-27 ENCOUNTER — APPOINTMENT (OUTPATIENT)
Dept: GENERAL RADIOLOGY | Age: 69
DRG: 267 | End: 2019-06-27
Attending: NURSE PRACTITIONER
Payer: MEDICARE

## 2019-06-27 LAB
ABO + RH BLD: NORMAL
ALBUMIN SERPL-MCNC: 2.6 G/DL (ref 3.5–5)
ALBUMIN/GLOB SERPL: 1.3 {RATIO} (ref 1.1–2.2)
ALP SERPL-CCNC: 48 U/L (ref 45–117)
ALT SERPL-CCNC: 12 U/L (ref 12–78)
ANION GAP SERPL CALC-SCNC: 8 MMOL/L (ref 5–15)
AST SERPL-CCNC: 30 U/L (ref 15–37)
ATRIAL RATE: 89 BPM
BILIRUB SERPL-MCNC: 0.4 MG/DL (ref 0.2–1)
BLD PROD TYP BPU: NORMAL
BLD PROD TYP BPU: NORMAL
BLOOD GROUP ANTIBODIES SERPL: NORMAL
BPU ID: NORMAL
BPU ID: NORMAL
BUN SERPL-MCNC: 14 MG/DL (ref 6–20)
BUN/CREAT SERPL: 17 (ref 12–20)
CALCIUM SERPL-MCNC: 6.5 MG/DL (ref 8.5–10.1)
CALCULATED P AXIS, ECG09: 79 DEGREES
CALCULATED R AXIS, ECG10: -24 DEGREES
CALCULATED T AXIS, ECG11: 104 DEGREES
CHLORIDE SERPL-SCNC: 116 MMOL/L (ref 97–108)
CO2 SERPL-SCNC: 20 MMOL/L (ref 21–32)
CREAT SERPL-MCNC: 0.83 MG/DL (ref 0.7–1.3)
CROSSMATCH RESULT,%XM: NORMAL
CROSSMATCH RESULT,%XM: NORMAL
DIAGNOSIS, 93000: NORMAL
ERYTHROCYTE [DISTWIDTH] IN BLOOD BY AUTOMATED COUNT: 14 % (ref 11.5–14.5)
GLOBULIN SER CALC-MCNC: 2 G/DL (ref 2–4)
GLUCOSE BLD STRIP.AUTO-MCNC: 143 MG/DL (ref 65–100)
GLUCOSE BLD STRIP.AUTO-MCNC: 152 MG/DL (ref 65–100)
GLUCOSE BLD STRIP.AUTO-MCNC: 154 MG/DL (ref 65–100)
GLUCOSE BLD STRIP.AUTO-MCNC: 168 MG/DL (ref 65–100)
GLUCOSE SERPL-MCNC: 103 MG/DL (ref 65–100)
HCT VFR BLD AUTO: 32.2 % (ref 36.6–50.3)
HGB BLD-MCNC: 11.3 G/DL (ref 12.1–17)
MAGNESIUM SERPL-MCNC: 1.7 MG/DL (ref 1.6–2.4)
MCH RBC QN AUTO: 31.6 PG (ref 26–34)
MCHC RBC AUTO-ENTMCNC: 35.1 G/DL (ref 30–36.5)
MCV RBC AUTO: 89.9 FL (ref 80–99)
NRBC # BLD: 0 K/UL (ref 0–0.01)
NRBC BLD-RTO: 0 PER 100 WBC
P-R INTERVAL, ECG05: 174 MS
PLATELET # BLD AUTO: 192 K/UL (ref 150–400)
PMV BLD AUTO: 10 FL (ref 8.9–12.9)
POTASSIUM SERPL-SCNC: 3.3 MMOL/L (ref 3.5–5.1)
PROT SERPL-MCNC: 4.6 G/DL (ref 6.4–8.2)
Q-T INTERVAL, ECG07: 424 MS
QRS DURATION, ECG06: 148 MS
QTC CALCULATION (BEZET), ECG08: 515 MS
RBC # BLD AUTO: 3.58 M/UL (ref 4.1–5.7)
SERVICE CMNT-IMP: ABNORMAL
SODIUM SERPL-SCNC: 144 MMOL/L (ref 136–145)
SPECIMEN EXP DATE BLD: NORMAL
STATUS OF UNIT,%ST: NORMAL
STATUS OF UNIT,%ST: NORMAL
UNIT DIVISION, %UDIV: 0
UNIT DIVISION, %UDIV: 0
VENTRICULAR RATE, ECG03: 89 BPM
WBC # BLD AUTO: 11.3 K/UL (ref 4.1–11.1)

## 2019-06-27 PROCEDURE — 80053 COMPREHEN METABOLIC PANEL: CPT

## 2019-06-27 PROCEDURE — 82962 GLUCOSE BLOOD TEST: CPT

## 2019-06-27 PROCEDURE — 65620000000 HC RM CCU GENERAL

## 2019-06-27 PROCEDURE — 74011250637 HC RX REV CODE- 250/637: Performed by: NURSE PRACTITIONER

## 2019-06-27 PROCEDURE — 74011636637 HC RX REV CODE- 636/637: Performed by: NURSE PRACTITIONER

## 2019-06-27 PROCEDURE — 36415 COLL VENOUS BLD VENIPUNCTURE: CPT

## 2019-06-27 PROCEDURE — 74011250636 HC RX REV CODE- 250/636

## 2019-06-27 PROCEDURE — 93005 ELECTROCARDIOGRAM TRACING: CPT

## 2019-06-27 PROCEDURE — 71045 X-RAY EXAM CHEST 1 VIEW: CPT

## 2019-06-27 PROCEDURE — 85027 COMPLETE CBC AUTOMATED: CPT

## 2019-06-27 PROCEDURE — 97530 THERAPEUTIC ACTIVITIES: CPT

## 2019-06-27 PROCEDURE — 74011250636 HC RX REV CODE- 250/636: Performed by: NURSE PRACTITIONER

## 2019-06-27 PROCEDURE — 97161 PT EVAL LOW COMPLEX 20 MIN: CPT

## 2019-06-27 PROCEDURE — 83735 ASSAY OF MAGNESIUM: CPT

## 2019-06-27 PROCEDURE — 74011000250 HC RX REV CODE- 250: Performed by: NURSE PRACTITIONER

## 2019-06-27 PROCEDURE — 74011250636 HC RX REV CODE- 250/636: Performed by: THORACIC SURGERY (CARDIOTHORACIC VASCULAR SURGERY)

## 2019-06-27 PROCEDURE — 97165 OT EVAL LOW COMPLEX 30 MIN: CPT

## 2019-06-27 PROCEDURE — 74011000258 HC RX REV CODE- 258: Performed by: NURSE PRACTITIONER

## 2019-06-27 RX ORDER — MAGNESIUM SULFATE 1 G/100ML
1 INJECTION INTRAVENOUS ONCE
Status: COMPLETED | OUTPATIENT
Start: 2019-06-27 | End: 2019-06-27

## 2019-06-27 RX ORDER — POTASSIUM CHLORIDE 29.8 MG/ML
20 INJECTION INTRAVENOUS
Status: COMPLETED | OUTPATIENT
Start: 2019-06-27 | End: 2019-06-27

## 2019-06-27 RX ORDER — DIPHENHYDRAMINE HYDROCHLORIDE 50 MG/ML
25 INJECTION, SOLUTION INTRAMUSCULAR; INTRAVENOUS ONCE
Status: COMPLETED | OUTPATIENT
Start: 2019-06-27 | End: 2019-06-27

## 2019-06-27 RX ORDER — DIPHENHYDRAMINE HYDROCHLORIDE 50 MG/ML
INJECTION, SOLUTION INTRAMUSCULAR; INTRAVENOUS
Status: COMPLETED
Start: 2019-06-27 | End: 2019-06-27

## 2019-06-27 RX ADMIN — SENNOSIDES,DOCUSATE SODIUM 1 TABLET: 8.6; 5 TABLET, FILM COATED ORAL at 08:04

## 2019-06-27 RX ADMIN — Medication 10 ML: at 14:02

## 2019-06-27 RX ADMIN — HYDRALAZINE HYDROCHLORIDE 20 MG: 20 INJECTION INTRAMUSCULAR; INTRAVENOUS at 22:02

## 2019-06-27 RX ADMIN — INSULIN LISPRO 2 UNITS: 100 INJECTION, SOLUTION INTRAVENOUS; SUBCUTANEOUS at 17:13

## 2019-06-27 RX ADMIN — HYDRALAZINE HYDROCHLORIDE 10 MG: 20 INJECTION INTRAMUSCULAR; INTRAVENOUS at 14:02

## 2019-06-27 RX ADMIN — HYDRALAZINE HYDROCHLORIDE 10 MG: 20 INJECTION INTRAMUSCULAR; INTRAVENOUS at 15:38

## 2019-06-27 RX ADMIN — SODIUM CHLORIDE 1 G: 900 INJECTION, SOLUTION INTRAVENOUS at 12:13

## 2019-06-27 RX ADMIN — POTASSIUM CHLORIDE 20 MEQ: 400 INJECTION, SOLUTION INTRAVENOUS at 10:51

## 2019-06-27 RX ADMIN — PHENYLEPHRINE HYDROCHLORIDE 30 MCG/MIN: 10 INJECTION INTRAVENOUS at 03:14

## 2019-06-27 RX ADMIN — SENNOSIDES,DOCUSATE SODIUM 1 TABLET: 8.6; 5 TABLET, FILM COATED ORAL at 17:13

## 2019-06-27 RX ADMIN — INSULIN LISPRO 2 UNITS: 100 INJECTION, SOLUTION INTRAVENOUS; SUBCUTANEOUS at 12:13

## 2019-06-27 RX ADMIN — INSULIN LISPRO 2 UNITS: 100 INJECTION, SOLUTION INTRAVENOUS; SUBCUTANEOUS at 08:03

## 2019-06-27 RX ADMIN — ASPIRIN 81 MG 81 MG: 81 TABLET ORAL at 08:04

## 2019-06-27 RX ADMIN — SIMVASTATIN 10 MG: 20 TABLET, FILM COATED ORAL at 21:44

## 2019-06-27 RX ADMIN — Medication 10 ML: at 22:04

## 2019-06-27 RX ADMIN — DIPHENHYDRAMINE HYDROCHLORIDE 25 MG: 50 INJECTION, SOLUTION INTRAMUSCULAR; INTRAVENOUS at 01:05

## 2019-06-27 RX ADMIN — Medication 10 ML: at 15:39

## 2019-06-27 RX ADMIN — MAGNESIUM OXIDE TAB 400 MG (241.3 MG ELEMENTAL MG) 400 MG: 400 (241.3 MG) TAB at 17:13

## 2019-06-27 RX ADMIN — POTASSIUM CHLORIDE 20 MEQ: 400 INJECTION, SOLUTION INTRAVENOUS at 12:13

## 2019-06-27 RX ADMIN — MAGNESIUM SULFATE HEPTAHYDRATE 1 G: 1 INJECTION, SOLUTION INTRAVENOUS at 08:04

## 2019-06-27 RX ADMIN — FAMOTIDINE 20 MG: 20 TABLET ORAL at 17:13

## 2019-06-27 RX ADMIN — Medication 2 G: at 10:51

## 2019-06-27 RX ADMIN — MORPHINE SULFATE 2 MG: 2 INJECTION, SOLUTION INTRAMUSCULAR; INTRAVENOUS at 02:15

## 2019-06-27 RX ADMIN — MAGNESIUM OXIDE TAB 400 MG (241.3 MG ELEMENTAL MG) 400 MG: 400 (241.3 MG) TAB at 08:04

## 2019-06-27 RX ADMIN — POTASSIUM CHLORIDE 20 MEQ: 400 INJECTION, SOLUTION INTRAVENOUS at 09:28

## 2019-06-27 NOTE — PROGRESS NOTES
0400 assumed care, VSS    0730 Bedside shift change report given to Kim RN (oncoming nurse) by Danay Chavez RN (offgoing nurse). Report included the following information SBAR, Kardex, Procedure Summary, Intake/Output, MAR, Recent Results and Cardiac Rhythm NSR.

## 2019-06-27 NOTE — PROGRESS NOTES
Reason for Admission:   Aortic stenosis                  RRAT Score:     13             Do you (patient/family) have any concerns for transition/discharge? Patient's wife states no concerns. Plan for utilizing home health: [facility]    Current Advanced Directive/Advance Care Plan:  Not on file            Transition of Care Plan:  CM spoke with patient's wife, Turner Galdamez  p: [phone], for initial assessment and home health choice. Patient currently asleep for assessment. Patient's wife states the followin. Patient baseline - Patient independent with ADLs without need for medical equipment. Patient does use cane PRN. Patient drives at baseline. Patient on room air at baseline. 2.  Discharge Plan - Home with Home Health. Possible need for PT and/or OT. CM to follow for next PT evaluation. CM sent referral to [facility] for SN and will send updated orders if PT recommended. CM to continue to follow. Nguyễn Steel, MPH    Care Management Interventions  PCP Verified by CM: Yes  Palliative Care Criteria Met (RRAT>21 & CHF Dx)?: No  Mode of Transport at Discharge:  Other (see comment)(spouse, private car)  Transition of Care Consult (CM Consult): Discharge Planning  MyChart Signup: No  Discharge Durable Medical Equipment: No  Health Maintenance Reviewed: Yes  Physical Therapy Consult: Yes  Occupational Therapy Consult: Yes  Speech Therapy Consult: No  Current Support Network: Lives with Spouse  Confirm Follow Up Transport: Family  Plan discussed with Pt/Family/Caregiver: Yes  Freedom of Choice Offered: Yes  Discharge Location  Discharge Placement: Home with home health

## 2019-06-27 NOTE — PROGRESS NOTES
Orders received, chart reviewed and patient evaluated by occupational therapy. Recommend patient to discharge home with assist from family, may benefit from [facility] pending progression with skilled acute occupational therapy. Recommend with nursing patient to complete as able in order to maintain strength, endurance and independence: OOB to chair 3x/day, ADLs with supervision/setup and mobilizing to the bathroom for toileting with 1-2 assist (2nd to manage lines). Thank you for your assistance. Full evaluation to follow.

## 2019-06-27 NOTE — DIABETES MGMT
[facility]     [facility] Cardiac Surgery Progress Note     Recommendations/ Comments: On gtt briefly for surgery TAVR - stopped yesterday at 1300  FBG today 154 mg/dL  BG's > 180 mg/dL yesterday; pt was on steroids prior to surgery     Current hospital DM medication:  Lispro normal sensitivity correction scale    Resume home Metformin on discharge    Chart reviewed on [address] Patient is 70 y.o. male s/p Cardiac surgery  POD 1 TAVR. Hx DM on Metformin 500 mg PTA  Also on Prednisone PTA      A1c:   Lab Results   Component Value Date/Time    Hemoglobin A1c 5.9 06/20/2019 10:03 AM         Recent Glucose Results:   Lab Results   Component Value Date/Time     (H) 06/27/2019 04:14 AM     (H) 06/26/2019 12:56 PM    GLUCPOC 154 (H) 06/27/2019 07:57 AM    GLUCPOC 246 (H) 06/26/2019 09:23 PM    GLUCPOC 191 (H) 06/26/2019 06:03 PM        Lab Results   Component Value Date/Time    Creatinine 0.83 06/27/2019 04:14 AM     Estimated Creatinine Clearance: 97.9 mL/min (based on SCr of 0.83 mg/dL). Active Orders   Diet    DIET DIABETIC CONSISTENT CARB Regular; 2 GM NA (House Low NA)        PO intake:   Patient Vitals for the past 72 hrs:   % Diet Eaten   06/27/19 0927 85 %       Will continue to follow as needed. Thank you.   Deshawn Epps RN, CDE  Pager [phone]      Time spent: 3 min

## 2019-06-27 NOTE — PROGRESS NOTES
1930 Bedside shift change report given by Kim SCHULTE (offgoing nurse). Report included the following information SBAR, Kardex, Procedure Summary, Intake/Output, MAR, Recent Results and Cardiac Rhythm NSR w/ 1st degree AV . 2000 pt sleeping on assessment, VSS, Pine @ 57    2130 pt up to chair for bath, tolerated activity well.    2200 PRN hydralazine given for /67    0330 pt requesting to sit up in chair, tolerated activity well. 0430 labs sent per order    0530 EKG completed    0630 PRN hydralazine given for /63    0730 Bedside shift change report given to Nate Dillon Dr (oncoming nurse) by   Yanick English nurse). Report included the following information SBAR, Kardex, Procedure Summary, Intake/Output, MAR, Recent Results and Cardiac Rhythm NSR w/ BBB.

## 2019-06-27 NOTE — PROGRESS NOTES
Problem: Mobility Impaired (Adult and Pediatric)  Goal: *Acute Goals and Plan of Care (Insert Text)  Description  Physical Therapy Goals  Initiated 6/27/2019  1. Patient will move from supine to sit and sit to supine, scoot up and down and roll side to side in bed with independence within 7 day(s). 2.  Patient will transfer from bed to chair and chair to bed with independence within 7 day(s). 3.  Patient will perform sit to stand with independence within 7 day(s). 4.  Patient will ambulate with independence for 300 feet with the least restrictive device within 7 day(s). 5.  Patient will ascend/descend ramp for home entrance/exit with independence within 7 day(s). Outcome: Progressing Towards Goal    PHYSICAL THERAPY EVALUATION  Patient: Timmy Guzman (68 y.o. male)  Date: 6/27/2019  Primary Diagnosis: Aortic stenosis [I35.0]  Procedure(s) (LRB):  TRANSCATHER AORTIC VALVE REPLACEMENT, RIGHT TRANSFEMORAL 29 EVOLUT PRO. TTE BY ECHO TECH. (N/A) 1 Day Post-Op   Precautions:  Bed Alarm    ASSESSMENT :  Based on the objective data described below, the patient presents with near-baseline functional mobility (though assessment limited by swan neela & arterial line) following TAVR, POD #1. Prior to admission, patient reports a hobby as a  (lying underneath cars, heavy lifting, squatting), living with his wife, and utilizing ramp into home (will sometimes use the steps for exercise). Overall, patient demonstrated supervision to contact guard level of assistance functional tasks (see below for details). During bed > chair transfer, patient slightly impulsive, requiring external pacing cues. Anticipate continued functional progress (x 1-2 more PT sessions) once de-lined. Patient may benefit from a HHPT safety evaluation pending tomorrow's session. Patient will benefit from skilled intervention to address the above impairments.   Patient?s rehabilitation potential is considered to be Good  Factors which may influence rehabilitation potential include:   ? None noted  ? Mental ability/status  ? Medical condition  ? Home/family situation and support systems  ? Safety awareness  ? Pain tolerance/management  ? Other:      PLAN :  Recommendations and Planned Interventions:  ?           Bed Mobility Training             ? Neuromuscular Re-Education  ? Transfer Training                   ? Orthotic/Prosthetic Training  ? Gait Training                         ? Modalities  ? Therapeutic Exercises           ? Edema Management/Control  ? Therapeutic Activities            ? Patient and Family Training/Education  ? Other (comment):    Frequency/Duration: Patient will be followed by physical therapy  daily to address goals. Discharge Recommendations: Home Health vs. None  Further Equipment Recommendations for Discharge: None      SUBJECTIVE:   Patient stated ? They wouldn't let me get out of the bed until you came. ?    OBJECTIVE DATA SUMMARY:   HISTORY:    Past Medical History:   Diagnosis Date    Arthritis     knees, back    Chronic pain     knees, back    DM (diabetes mellitus) ([facility] [address]     High cholesterol     HTN (hypertension)     currently on no meds    PE (pulmonary thromboembolism) ([facility] [address] 01/2017    bilateral with right heart strain.  Treated by Dr. Karyna Bazzi     Past Surgical History:   Procedure Laterality Date    HX AMPUTATION      vascular; Left great toe amputation    HX ORTHOPAEDIC Left     Left arm fracture & repair    HX OTHER SURGICAL  6/4/15    INCISION AND DRAINAGE, RESECTION OF REMAINING 1ST METATARSAL, INSERTION OF ANTIBIOTIC BEADS     Personal factors and/or comorbidities impacting plan of care: [facility]    Home Situation  Home Environment: Private residence  # Steps to Enter: 5  Rails to Enter: Yes  Hand Rails : Bilateral  One/Two Story Residence: One story  Living Alone: No  Support Systems: Spouse/Significant Other/Partner, Family member(s)  Patient Expects to be Discharged to[de-identified] Private residence  Current DME Used/Available at Home: Grab bars  Tub or Shower Type: Tub/Shower combination    EXAMINATION/PRESENTATION/DECISION MAKING:   Critical Behavior:  Neurologic State: Alert  Orientation Level: Oriented X4  Cognition: Appropriate decision making, Follows commands  Safety/Judgement: Awareness of environment, Fall prevention  Hearing: Auditory  Auditory Impairment: Hard of hearing, bilateral  Edema: Trace noted in LEs  Range Of Motion:  AROM: Generally decreased, functional(L charcot foot)    Strength:    Strength: Generally decreased, functional     Tone & Sensation:   Tone: Normal    Sensation: Intact        Coordination:  Coordination: Within functional limits  Vision:   Tracking: Able to track stimulus in all quadrants w/o difficulty  Diplopia: No  Acuity: Within Defined Limits  Corrective Lenses: Reading glasses  Functional Mobility:  Bed Mobility:  Supine to Sit: Contact guard assistance(Primarily for line management)  Sit to Supine: Contact guard assistance     Transfers:  Sit to Stand: Contact guard assistance(Primarily for line management)  Stand to Sit: Contact guard assistance(Primarily for line management)  Bed to Chair: Contact guard assistance(Limited by swan neela/arterial lines)    Balance:   Sitting: Intact; Without support  Standing: Impaired; Without support  Standing - Static: Good  Standing - Dynamic : Fair(General unsteadiness; Slight impulsivity)    Functional Measure:  Timed up and go:    Timed Get Up And Go Test: (Unable to complete this date)       < than 10 seconds=Normal  Greater then 13.5 seconds (in elderly)=Increased fall risk   Samia GREEN, Abhijeet Marcial. Predicting the probability for falls in community dwelling older adults using the Timed Up and Go Test. Phys Ther. 2000;80:896-903.       Based on the above components, the patient evaluation is determined to be of the following complexity level: LOW     Pain:  Pain Scale 1: Numeric (0 - 10)  Pain Intensity 1: 0    Activity Tolerance:  Please refer to the flowsheet for vital signs taken during this treatment. After treatment:   ?         Patient left in no apparent distress sitting up in chair  ? Patient left in no apparent distress in bed  ? Call bell left within reach  ? Nursing notified  ? Caregiver present  ? Chair alarm activated    COMMUNICATION/EDUCATION:   The patient?s plan of care was discussed with: Occupational Therapist and Registered Nurse. ?         Fall prevention education was provided and the patient/caregiver indicated understanding. ? Patient/family have participated as able in goal setting and plan of care. ?         Patient/family agree to work toward stated goals and plan of care. ?         Patient understands intent and goals of therapy, but is neutral about his/her participation. ? Patient is unable to participate in goal setting and plan of care.     Thank you for this referral.  Malaika Cunningham, PT, DPT   Time Calculation: 20 mins

## 2019-06-27 NOTE — PROGRESS NOTES
[facility] ICU Progress Note    Admit Date: 2019  POD:  1 Day Post-Op    Procedure:  Procedure(s):  TRANSCATHER AORTIC VALVE REPLACEMENT, RIGHT TRANSFEMORAL 29 EVOLUT PRO. TTE BY ECHO TECH. Subjective:   Pt seen with Dr. [facility] [employer]. Tmax 98.5, on RA. In bed. On kalpana 55. New LBBB post op. Complaining of rash and itching that started when he used his CHG wipes preop. Objective:   Vitals:  Blood pressure 108/46, pulse 92, temperature 98.2 °F (36.8 °C), resp. rate 22, height 5' 7\" (1.702 m), weight 225 lb 5 oz (102.2 kg), SpO2 96 %. Temp (24hrs), Av.9 °F (36.6 °C), Min:97.4 °F (36.3 °C), Max:98.2 °F (36.8 °C)    Hemodynamics:   CO: CO (l/min): 7.3 l/min   CI: CI (l/min/m2): 3.3 l/min/m2   CVP: CVP (mmHg): 6 mmHg (19 1100)   SVR: SVR (dyne*sec)/cm5: 1482 (dyne*sec)/cm5 (19 3610)   PAP Systolic: PAP Systolic: 25 (90/37/47 8187)   PAP Diastolic: PAP Diastolic: 13 (90/04/02 4326)   PVR:     SV02:     SCV02: SCVO2 (%): 66 % (19 1445)    EKG/Rhythm:  NSR , , LBBB    Oxygen Therapy:  Oxygen Therapy  O2 Sat (%): 96 % (19 1100)  Pulse via Oximetry: 91 beats per minute (19 1100)  O2 Device: Room air (19 0800)  O2 Flow Rate (L/min): 1 l/min (19 1800)    CXR:   CXR Results  (Last 48 hours)               19 0509  XR CHEST PORT Final result    Impression:  IMPRESSION:   No significant interval change. Narrative:  PORTABLE CHEST RADIOGRAPH/S: 2019 5:09 AM       Clinical history: Postoperative heart   INDICATION:   postop heart   COMPARISON: None       FINDINGS:   AP portable upright view of the chest demonstrates a stable  cardiopericardial   silhouette. The lungs are adequately expanded. There is no effusion,   consolidation, or pneumothorax. Felton-Ceferino catheter tip lies over the main   pulmonary artery. Mild interstitial edema. . Patient is on a cardiac monitor.             19 1307  XR CHEST PORT Final result    Impression: IMPRESSION:   1. Interval placement of a Palermo-Ceferino catheter as described above. No   pneumothorax detected. 2. Prominence of the basilar lung markings as described above. 3. Evidence of marked degenerative change involving the right shoulder. Narrative:  Portable chest single view dated 6/26/2019       Comparison chest dated 6/20/2019       History is postop heart       A single portable AP supine view of the chest was obtained. This film was   obtained during a less than optimal degree of inspiration. The cardiac   silhouette is normal in size. Some vascular crowding is noted at the lung bases. There continues to be some elevation of the right hemidiaphragm. There has been   interval placement of a Palermo-Ceferino catheter on the right. The tip of this   catheter overlies the right pulmonary artery. No pneumothorax is detected. There   is evidence of thoracic spondylosis. There is marked degenerative change   involving the right hip. Admission Weight: Last Weight   Weight: 223 lb (101.2 kg) Weight: 225 lb 5 oz (102.2 kg)     Intake / Output / Drain:  Current Shift: 06/27 0701 - 06/27 1900  In: 324 [P.O.:120; I.V.:204]  Out: -   Last 24 hrs.:     Intake/Output Summary (Last 24 hours) at 6/27/2019 1159  Last data filed at 6/27/2019 1000  Gross per 24 hour   Intake 1859.85 ml   Output 2375 ml   Net -515.15 ml       EXAM:  General:   Alert, NAD, generalized skin redness                                                                                           Lungs:   Clear to auscultation bilaterally. Incision:  Bilateral groin sites no bleeding/hematoma   Heart:  Regular rate and rhythm, S1, S2 normal, no murmur, click, rub or gallop. Abdomen:   Soft, non-tender. Bowel sounds normal. No masses,  No organomegaly. Extremities:  No edema. PPP. Neurologic:  Gross motor and sensory apparatus intact.      Labs:   Recent Labs     06/27/19  0757 06/27/19  0414  06/26/19  1256   WBC  --  11.3*  -- 8. 3   HGB  --  11.3*  --  12.6   HCT  --  32.2*  --  35.8*   PLT  --  192  --  209   NA  --  144  --  141   K  --  3.3*  --  4.2   BUN  --  14  --  16   CREA  --  0.83  --  1.10   GLU  --  103*  --  179*   GLUCPOC 154*  --    < >  --    INR  --   --   --  1.2*    < > = values in this interval not displayed. Assessment:     Active Problems: Aortic stenosis (2019)         Plan/Recommendations/Medical Decision Makin. AS s/p TAVR (Evolut): On ASA, rhythm changes post op -1st degree AVB and LBBB on initial post op EKG. OH improving -now 200 ms, cont to have LBBB. Monitor rhythm, keep paceport swan today. 2. DM II: on metformin PTA, cont SSI  3. HLD: on statin  4. Hx PE: during setting of profound illness/septoid. No longer anticoagulated with DOAC  5. Obesity: BMI 31  6. Atelectasis: Encourage I/S, on RA  7. Leukocytosis: Afebrile, monitor  8. Post op anemia st acute blood loss: Monitor H&H  9. Hypokalemia/hypomag/hypocalcemia: replete per orders, monitor  10. Hypotension: Resolving, kalpana gtt weaned off, monitor BP  11. Dispo: Cont ICU care, monitor rhythm. Keep PA line.         Signed By: Adrian Ayala NP

## 2019-06-27 NOTE — PROGRESS NOTES
0730 Received verbal bedside from [facility]. Assumed care of the pt, VSS, pt resting comfortably in bed.    0927 Godfrey off, /52.    1930 Bedside and Verbal shift change report given to FRANCA BURNETTE (oncoming nurse) by Mayuri Sanford RN (offgoing nurse). Report included the following information SBAR, Intake/Output, MAR, Recent Results and Cardiac Rhythm NSR.

## 2019-06-27 NOTE — PROGRESS NOTES
Physical Therapy  6/27/2019    Orders received, chart reviewed, and patient evaluated by physical therapy. Recommend patient to discharge home once medically stable- may benefit from HHPT pending progression with skilled acute physical therapy interventions. Full evaluation to follow. Recommend with nursing patient to complete as able in order to maintain strength, endurance and independence:     OOB to chair 3x/day, ADLs with supervision/setup and mobilizing to commode for toileting with A x 1, gait belt, and assist for swan neela/arterial line management. Thank you for your assistance.     Traci Kline, PT, DPT

## 2019-06-27 NOTE — PROGRESS NOTES
Problem: Self Care Deficits Care Plan (Adult)  Goal: *Acute Goals and Plan of Care (Insert Text)  Description  Occupational Therapy Goals  Initiated 6/27/2019  1. Patient will perform ADLs standing 5 mins without fatigue or LOB with supervision/set-up within 5 day(s). 2.  Patient will perform lower body ADLs with supervision/set-up within 5 day(s). 3.  Patient will perform gathering ADL items high and low 2/2 with supervision/set-up within 5 day(s). 4.  Patient will perform toilet transfers with supervision/set-up within 5 day(s). 5.  Patient will perform all aspects of toileting with supervision/set-up within 5 day(s). 6.  Patient will participate in cardiac/sternal upper extremity therapeutic exercise/activities to increase independence with ADLs with supervision/set-up for 5 minutes within 5 day(s). Outcome: Progressing Towards Goal    OCCUPATIONAL THERAPY EVALUATION  Patient: Kiran Mclean (24 y.o. male)  Date: 6/27/2019  Primary Diagnosis: Aortic stenosis [I35.0]  Procedure(s) (LRB):  TRANSCATHER AORTIC VALVE REPLACEMENT, RIGHT TRANSFEMORAL 29 EVOLUT PRO. TTE BY ECHO TECH. (N/A) 1 Day Post-Op   Precautions: falls       ASSESSMENT :  Based on the objective data described below, the patient presents with overall CGA for functional and bed mobility, setup-IND with upper body ADLs and setup with lower body ADLs s/p TAVR POD 1. Patient ADLs currently limited by multiple lines ([place]wan[place]-Ceferino, arterial line, etc), generalized weakness and decreased functional activity tolerance. Patient was IND and active PTA. Patient will likely not require f/u OT services upon D/C. Recommend with nursing patient to complete as able in order to maintain strength, endurance and independence: ADLs with supervision/setup, OOB to chair 3x/day and mobilizing to the bathroom for toileting with 1 assist (2nd for line management). Thank you for your assistance.        Patient will benefit from skilled intervention to address the above impairments. Patient?s rehabilitation potential is considered to be Good  Factors which may influence rehabilitation potential include:   ? None noted  ? Mental ability/status  ? Medical condition  ? Home/family situation and support systems  ? Safety awareness  ? Pain tolerance/management  ? Other:      PLAN :  Recommendations and Planned Interventions:  ?               Self Care Training                  ? Therapeutic Activities  ? Functional Mobility Training    ? Cognitive Retraining  ? Therapeutic Exercises           ? Endurance Activities  ? Balance Training                   ? Neuromuscular Re-Education  ? Visual/Perceptual Training     ? Home Safety Training  ? Patient Education                 ? Family Training/Education  ? Other (comment):    Frequency/Duration: Patient will be followed by occupational therapy 5 times a week to address goals. Discharge Recommendations: None  Further Equipment Recommendations for Discharge: none      SUBJECTIVE:   Patient stated ? I am ready to get up out of this bed and go walking. ?    OBJECTIVE DATA SUMMARY:   HISTORY:   Past Medical History:   Diagnosis Date    Arthritis     knees, back    Chronic pain     knees, back    DM (diabetes mellitus) ([facility] [address]     High cholesterol     HTN (hypertension)     currently on no meds    PE (pulmonary thromboembolism) ([facility] [address] 01/2017    bilateral with right heart strain.  Treated by Dr. Aman Lamar     Past Surgical History:   Procedure Laterality Date    HX AMPUTATION      vascular; Left great toe amputation    HX ORTHOPAEDIC Left     Left arm fracture & repair    HX OTHER SURGICAL  6/4/15    INCISION AND DRAINAGE, RESECTION OF REMAINING 1ST METATARSAL, INSERTION OF ANTIBIOTIC BEADS       Prior Level of Function/Environment/Context: Patient reports being IND and living with his wife in 1 level home. Patient does not use DME at baseline but reports having grab bars in the shower. Home Situation  Home Environment: Private residence  # Steps to Enter: 5  Rails to Enter: Yes  Hand Rails : Bilateral  One/Two Story Residence: One story  Living Alone: No  Support Systems: Spouse/Significant Other/Partner, Family member(s)  Patient Expects to be Discharged to[de-identified] Private residence  Current DME Used/Available at Home: Grab bars  Tub or Shower Type: Tub/Shower combination    Hand dominance: Right    EXAMINATION OF PERFORMANCE DEFICITS:  Cognitive/Behavioral Status:  Neurologic State: Alert  Orientation Level: Oriented X4  Cognition: Appropriate decision making; Follows commands  Perception: Appears intact  Perseveration: No perseveration noted  Safety/Judgement: Awareness of environment; Fall prevention    Skin: appears grossly intact, all lines intact    Edema: none noted in BUEs    Hearing: Auditory  Auditory Impairment: Hard of hearing, bilateral    Vision/Perceptual:    Tracking: Able to track stimulus in all quadrants w/o difficulty    Diplopia: No    Acuity: Within Defined Limits    Corrective Lenses: Reading glasses    Range of Motion:  In BUEs  AROM: Generally decreased, functional(L torn rotator cuff)    Strength: In BUEs  Strength: Generally decreased, functional    Coordination:  Coordination: Within functional limits  Fine Motor Skills-Upper: Left Intact; Right Intact    Gross Motor Skills-Upper: Left Intact; Right Intact    Tone & Sensation:  In BUEs  Tone: Normal  Sensation: Intact    Balance:  Sitting: Intact    Functional Mobility and Transfers for ADLs:  Bed Mobility:  Supine to Sit: Contact guard assistance  Sit to Supine: Contact guard assistance    Transfers: Toilet Transfer : Contact guard assistance;Minimum assistance; Additional time;Assist x1(Infer per obs of func reach, balance, strength)    ADL Assessment:  Feeding: Independent    Oral Facial Hygiene/Grooming: Setup(Infer per obs of func reach, BUE ROM)    Bathing: Setup(Infer if sitting )    Upper Body Dressing: Independent(Infer per obs of func reach, BUE ROM, strength)    Lower Body Dressing: Setup    Toileting: Setup(Infer per obs of func reach, BUE ROM, strength, balance)     ADL Intervention and task modifications:  Feeding  Feeding Assistance: Independent  Container Management: Independent  Cutting Food: Independent  Utensil Management: Independent  Food to Mouth: Independent  Drink to Mouth: Independent    Lower Body Dressing Assistance  Socks: Set-up  Leg Crossed Method Used: Yes  Position Performed: Long sitting on bed  Cues: Don;Verbal cues provided    Cognitive Retraining  Safety/Judgement: Awareness of environment; Fall prevention    Functional Measure:  Barthel Index:    Bathin  Bladder: 10  Bowels: 10  Groomin  Dressin  Feeding: 10  Mobility: 0  Stairs: 0  Toilet Use: 5  Transfer (Bed to Chair and Back): 10  Total: 55/100        Percentage of impairment   0%   1-19%   20-39%   40-59%   60-79%   80-99%   100%   Barthel Score 0-100 100 99-80 79-60 59-40 20-39 1-19   0     The Barthel ADL Index: Guidelines  1. The index should be used as a record of what a patient does, not as a record of what a patient could do. 2. The main aim is to establish degree of independence from any help, physical or verbal, however minor and for whatever reason. 3. The need for supervision renders the patient not independent. 4. A patient's performance should be established using the best available evidence. Asking the patient, friends/relatives and nurses are the usual sources, but direct observation and common sense are also important. However direct testing is not needed. 5. Usually the patient's performance over the preceding 24-48 hours is important, but occasionally longer periods will be relevant.   6. Middle categories imply that the patient supplies over 50 per cent of the effort. 7. Use of aids to be independent is allowed. Fran Turner., Barthel, D.W. (1217). Functional evaluation: the Barthel Index. [address] (14)2. CELI Garces, Brisa Kuhn., Artie Murciaman., Georgette, [address] (1999). Measuring the change indisability after inpatient rehabilitation; comparison of the responsiveness of the Barthel Index and Functional [place] Measure. Journal of Neurology, Neurosurgery, and Psychiatry, 66(4), 045-951. GENTRY Goodson, TROY Neal, & Silvano Griffin M.A. (2004.) Assessment of post-stroke quality of life in cost-effectiveness studies: The usefulness of the Barthel Index and the EuroQoL-5D. Quality of Life Research, 15, 040-63       Occupational Therapy Evaluation Charge Determination   History Examination Decision-Making   LOW Complexity : Brief history review  LOW Complexity : 1-3 performance deficits relating to physical, cognitive , or psychosocial skils that result in activity limitations and / or participation restrictions  LOW Complexity : No comorbidities that affect functional and no verbal or physical assistance needed to complete eval tasks       Based on the above components, the patient evaluation is determined to be of the following complexity level: LOW   Pain:  Pain Scale 1: Numeric (0 - 10)  Pain Intensity 1: 0  Pain Location 1: Generalized     Pain Description 1: Aching  Pain Intervention(s) 1: Medication (see MAR)    Activity Tolerance:   Fair, VSS  Please refer to the flowsheet for vital signs taken during this treatment. After treatment:   ? Patient left in no apparent distress sitting up in chair  ? Patient left in no apparent distress in bed  ? Call bell left within reach  ? Nursing notified  ? Caregiver present  ? Bed alarm activated    COMMUNICATION/EDUCATION:   The patient?s plan of care was discussed with: Physical Therapist and Registered Nurse.  ?  Home safety education was provided and the patient/caregiver indicated understanding. ? Patient/family have participated as able in goal setting and plan of care. ? Patient/family agree to work toward stated goals and plan of care. ? Patient understands intent and goals of therapy, but is neutral about his/her participation. ? Patient is unable to participate in goal setting and plan of care. This patient?s plan of care is appropriate for delegation to [facility].     Thank you for this referral.  Shawn Pierce, OT  Time Calculation: 12 mins

## 2019-06-27 NOTE — PROGRESS NOTES
2000 Assumed care of patient from [facility]    6454 c/o itching to bilateral UE, redness noted but no rash present. Patient states he thinks its from \"those wipes I used last night\" or chlorahexadine wipes. Order for PO benadryl received. Refused chlorahexadine bath- soap and water bath provided with assistance from RN    2315 -110, CVP 6-8, MAP <60. Orders received from Dr. Zully Burnett to give albumin 5% IV x1 now. 0100 Albumin effective to increase MAP >65. Continues to complain of itching to bilateral UE. No color change noted and no rash present. Stated oral benadryl \"helped a little\". Order received from IV benadryl 25mg x1. RN continues to question about when itching began- originally stated last night but now states \"since I came up here\" (to CCU bed 18). 0200 benadryl effective    0307 Refused ancef dose with concern that it is the cause of itching    0315 Neosynephrine started for MAP <60    0415 resting comfortably in bed, labs drawn. Neosynephrine @ 55 to maintain MAP >65    0430 Bedside and Verbal shift change report given to [address] [place] [address] (oncoming nurse) by Mila Hayes RN (offgoing nurse). Report included the following information SBAR, Kardex, MAR, Med Rec Status and Cardiac Rhythm NSR. Problem: Falls - Risk of  Goal: *Absence of Falls  Description  Document Reji Maddox Fall Risk and appropriate interventions in the flowsheet. Outcome: Progressing Towards Goal     Problem: Pressure Injury - Risk of  Goal: *Prevention of pressure injury  Description  Document Harris Scale and appropriate interventions in the flowsheet.   Outcome: Progressing Towards Goal     Problem: Post-procedure,Day of TAVR  Goal: *Stable Cardiac Rhythm  Outcome: Progressing Towards Goal  Goal: *Hemodynamically stable  Outcome: Progressing Towards Goal  Goal: *Procedure site is without bleeding and signs of infection six hours post sheath removal  Outcome: Progressing Towards Goal  Goal: *Pulses palpable, skin color within defined limits, skin temperature warm  Outcome: Progressing Towards Goal  Goal: *Bilateral lower extremities neurovascularly intact  Outcome: Progressing Towards Goal  Goal: Consults  Outcome: Progressing Towards Goal  Goal: Diagnostic Tests/Procedures  Outcome: Progressing Towards Goal  Goal: Nutrition/Diet  Outcome: Progressing Towards Goal  Goal: Medications  Outcome: Progressing Towards Goal  Goal: Respiratory  Outcome: Progressing Towards Goal  Goal: Treatments/Interventions/Procedures  Outcome: Progressing Towards Goal  Goal: Psychosocial Needs  Outcome: Progressing Towards Goal         Problem: Ventilator Management  Goal: *Patient maintains clear airway/free of aspiration  Outcome: Resolved/Met  Goal: *Absence of infection signs and symptoms  6/27/2019 0048 by Tulio Miner RN  Outcome: Resolved/Met  6/27/2019 0047 by Tulio Miner RN  Outcome: Resolved/Not Met  Goal: *Normal spontaneous ventilation  6/27/2019 0048 by Tulio Miner RN  Outcome: Resolved/Met  6/27/2019 0047 by Tulio Miner RN  Outcome: Resolved/Not Met     Problem: Patient Education: Go to Patient Education Activity  Goal: Patient/Family Education  6/27/2019 0048 by Tulio Miner RN  Outcome: Resolved/Met  6/27/2019 0047 by Tulio Miner RN  Outcome: Resolved/Not Met     Problem: Post-procedure,Day of TAVR  Goal: *Optimal pain control at patient's stated goal  Outcome: Resolved/Met  Goal: *Lungs clear or at baseline  Outcome: Resolved/Met   Problem: Post-procedure,Day of TAVR  Goal: *Demonstrates progressive activity  6/27/2019 0048 by Tulio Miner RN  Outcome: Not Met  6/27/2019 0047 by Tulio Miner RN  Outcome: Not Met  Goal: Activity/Safety  6/27/2019 0048 by Tulio Miner RN  Outcome: Not Met  6/27/2019 0047 by Tulio Miner RN  Outcome: Not Met  -patient has paceport swan in place, unable to get out of bed until removed by MD

## 2019-06-28 ENCOUNTER — APPOINTMENT (OUTPATIENT)
Dept: GENERAL RADIOLOGY | Age: 69
DRG: 267 | End: 2019-06-28
Attending: NURSE PRACTITIONER
Payer: MEDICARE

## 2019-06-28 ENCOUNTER — APPOINTMENT (OUTPATIENT)
Dept: NON INVASIVE DIAGNOSTICS | Age: 69
DRG: 267 | End: 2019-06-28
Attending: NURSE PRACTITIONER
Payer: MEDICARE

## 2019-06-28 LAB
ALBUMIN SERPL-MCNC: 3.3 G/DL (ref 3.5–5)
ALBUMIN/GLOB SERPL: 1.2 {RATIO} (ref 1.1–2.2)
ALP SERPL-CCNC: 63 U/L (ref 45–117)
ALT SERPL-CCNC: 14 U/L (ref 12–78)
ANION GAP SERPL CALC-SCNC: 6 MMOL/L (ref 5–15)
AST SERPL-CCNC: 19 U/L (ref 15–37)
ATRIAL RATE: 87 BPM
BILIRUB SERPL-MCNC: 1 MG/DL (ref 0.2–1)
BUN SERPL-MCNC: 13 MG/DL (ref 6–20)
BUN/CREAT SERPL: 12 (ref 12–20)
CALCIUM SERPL-MCNC: 8.6 MG/DL (ref 8.5–10.1)
CALCULATED P AXIS, ECG09: 63 DEGREES
CALCULATED R AXIS, ECG10: -18 DEGREES
CALCULATED T AXIS, ECG11: 98 DEGREES
CHLORIDE SERPL-SCNC: 107 MMOL/L (ref 97–108)
CO2 SERPL-SCNC: 25 MMOL/L (ref 21–32)
CREAT SERPL-MCNC: 1.09 MG/DL (ref 0.7–1.3)
DIAGNOSIS, 93000: NORMAL
ERYTHROCYTE [DISTWIDTH] IN BLOOD BY AUTOMATED COUNT: 13.9 % (ref 11.5–14.5)
GLOBULIN SER CALC-MCNC: 2.8 G/DL (ref 2–4)
GLUCOSE BLD STRIP.AUTO-MCNC: 136 MG/DL (ref 65–100)
GLUCOSE BLD STRIP.AUTO-MCNC: 160 MG/DL (ref 65–100)
GLUCOSE BLD STRIP.AUTO-MCNC: 167 MG/DL (ref 65–100)
GLUCOSE BLD STRIP.AUTO-MCNC: 199 MG/DL (ref 65–100)
GLUCOSE SERPL-MCNC: 181 MG/DL (ref 65–100)
HCT VFR BLD AUTO: 34.4 % (ref 36.6–50.3)
HGB BLD-MCNC: 12 G/DL (ref 12.1–17)
MAGNESIUM SERPL-MCNC: 2.1 MG/DL (ref 1.6–2.4)
MCH RBC QN AUTO: 31.8 PG (ref 26–34)
MCHC RBC AUTO-ENTMCNC: 34.9 G/DL (ref 30–36.5)
MCV RBC AUTO: 91.2 FL (ref 80–99)
NRBC # BLD: 0 K/UL (ref 0–0.01)
NRBC BLD-RTO: 0 PER 100 WBC
P-R INTERVAL, ECG05: 158 MS
PLATELET # BLD AUTO: 142 K/UL (ref 150–400)
PMV BLD AUTO: 10.6 FL (ref 8.9–12.9)
POTASSIUM SERPL-SCNC: 3.8 MMOL/L (ref 3.5–5.1)
PROT SERPL-MCNC: 6.1 G/DL (ref 6.4–8.2)
Q-T INTERVAL, ECG07: 402 MS
QRS DURATION, ECG06: 140 MS
QTC CALCULATION (BEZET), ECG08: 483 MS
RBC # BLD AUTO: 3.77 M/UL (ref 4.1–5.7)
SERVICE CMNT-IMP: ABNORMAL
SODIUM SERPL-SCNC: 138 MMOL/L (ref 136–145)
VENTRICULAR RATE, ECG03: 87 BPM
WBC # BLD AUTO: 8.1 K/UL (ref 4.1–11.1)

## 2019-06-28 PROCEDURE — 36415 COLL VENOUS BLD VENIPUNCTURE: CPT

## 2019-06-28 PROCEDURE — 97535 SELF CARE MNGMENT TRAINING: CPT

## 2019-06-28 PROCEDURE — 97530 THERAPEUTIC ACTIVITIES: CPT

## 2019-06-28 PROCEDURE — 93005 ELECTROCARDIOGRAM TRACING: CPT

## 2019-06-28 PROCEDURE — 97116 GAIT TRAINING THERAPY: CPT

## 2019-06-28 PROCEDURE — 71045 X-RAY EXAM CHEST 1 VIEW: CPT

## 2019-06-28 PROCEDURE — 74011636637 HC RX REV CODE- 636/637: Performed by: NURSE PRACTITIONER

## 2019-06-28 PROCEDURE — 80053 COMPREHEN METABOLIC PANEL: CPT

## 2019-06-28 PROCEDURE — 65660000001 HC RM ICU INTERMED STEPDOWN

## 2019-06-28 PROCEDURE — 74011000250 HC RX REV CODE- 250: Performed by: THORACIC SURGERY (CARDIOTHORACIC VASCULAR SURGERY)

## 2019-06-28 PROCEDURE — 74011250636 HC RX REV CODE- 250/636: Performed by: NURSE PRACTITIONER

## 2019-06-28 PROCEDURE — 85027 COMPLETE CBC AUTOMATED: CPT

## 2019-06-28 PROCEDURE — 82962 GLUCOSE BLOOD TEST: CPT

## 2019-06-28 PROCEDURE — 74011250636 HC RX REV CODE- 250/636

## 2019-06-28 PROCEDURE — 83735 ASSAY OF MAGNESIUM: CPT

## 2019-06-28 PROCEDURE — 74011250637 HC RX REV CODE- 250/637: Performed by: NURSE PRACTITIONER

## 2019-06-28 RX ORDER — POTASSIUM CHLORIDE 29.8 MG/ML
20 INJECTION INTRAVENOUS ONCE
Status: COMPLETED | OUTPATIENT
Start: 2019-06-28 | End: 2019-06-28

## 2019-06-28 RX ORDER — POTASSIUM CHLORIDE 29.8 MG/ML
INJECTION INTRAVENOUS
Status: COMPLETED
Start: 2019-06-28 | End: 2019-06-28

## 2019-06-28 RX ADMIN — INSULIN LISPRO 2 UNITS: 100 INJECTION, SOLUTION INTRAVENOUS; SUBCUTANEOUS at 12:44

## 2019-06-28 RX ADMIN — MAGNESIUM OXIDE TAB 400 MG (241.3 MG ELEMENTAL MG) 400 MG: 400 (241.3 MG) TAB at 08:59

## 2019-06-28 RX ADMIN — SODIUM CHLORIDE 250 ML: 900 INJECTION, SOLUTION INTRAVENOUS at 10:00

## 2019-06-28 RX ADMIN — INSULIN LISPRO 2 UNITS: 100 INJECTION, SOLUTION INTRAVENOUS; SUBCUTANEOUS at 17:36

## 2019-06-28 RX ADMIN — SENNOSIDES,DOCUSATE SODIUM 1 TABLET: 8.6; 5 TABLET, FILM COATED ORAL at 08:59

## 2019-06-28 RX ADMIN — FAMOTIDINE 20 MG: 20 TABLET ORAL at 21:04

## 2019-06-28 RX ADMIN — INSULIN LISPRO 2 UNITS: 100 INJECTION, SOLUTION INTRAVENOUS; SUBCUTANEOUS at 08:14

## 2019-06-28 RX ADMIN — Medication 10 ML: at 21:09

## 2019-06-28 RX ADMIN — SIMVASTATIN 10 MG: 20 TABLET, FILM COATED ORAL at 21:04

## 2019-06-28 RX ADMIN — SODIUM CHLORIDE 250 ML: 900 INJECTION, SOLUTION INTRAVENOUS at 11:06

## 2019-06-28 RX ADMIN — POTASSIUM CHLORIDE 20 MEQ: 29.8 INJECTION INTRAVENOUS at 06:35

## 2019-06-28 RX ADMIN — HYDRALAZINE HYDROCHLORIDE 20 MG: 20 INJECTION INTRAMUSCULAR; INTRAVENOUS at 06:34

## 2019-06-28 RX ADMIN — MAGNESIUM OXIDE TAB 400 MG (241.3 MG ELEMENTAL MG) 400 MG: 400 (241.3 MG) TAB at 17:21

## 2019-06-28 RX ADMIN — Medication 10 ML: at 14:00

## 2019-06-28 RX ADMIN — POTASSIUM CHLORIDE 20 MEQ: 400 INJECTION, SOLUTION INTRAVENOUS at 06:35

## 2019-06-28 RX ADMIN — BACITRACIN 1 PACKET: 500 OINTMENT TOPICAL at 13:21

## 2019-06-28 RX ADMIN — ASPIRIN 81 MG 81 MG: 81 TABLET ORAL at 08:59

## 2019-06-28 RX ADMIN — FAMOTIDINE 20 MG: 20 TABLET ORAL at 08:59

## 2019-06-28 NOTE — PROGRESS NOTES
CSS Progress Note    Admit Date: 2019  POD:  3 Day Post-Op    Procedure:  Procedure(s):  TRANSCATHER AORTIC VALVE REPLACEMENT, RIGHT TRANSFEMORAL 29 EVOLUT PRO. TTE BY ECHO TECH. Subjective:   Pt seen with Dr. Jose Angel Cunningham. Feels great today, vitals stable. No complaints. On RA, T max 99.1F     Objective:   Vitals:  Blood pressure 120/45, pulse (!) 103, temperature 99.1 °F (37.3 °C), resp. rate 18, height 5' 8\" (1.727 m), weight 220 lb (99.8 kg), SpO2 94 %. Temp (24hrs), Av.4 °F (36.9 °C), Min:97.5 °F (36.4 °C), Max:99.1 °F (37.3 °C)    EKG/Rhythm:  NSR LBBB unchanged    Oxygen Therapy:  Oxygen Therapy  O2 Sat (%): 94 % (19 0708)  Pulse via Oximetry: 82 beats per minute (19 1300)  O2 Device: Room air (19 0759)  O2 Flow Rate (L/min): 1 l/min (19 1800)    CXR:   CXR Results  (Last 48 hours)               19 0908  XR CHEST PA LAT Final result    Impression:  IMPRESSION: Lungs are clear except for minor right basilar atelectasis/effusion. Narrative:  EXAM:  XR CHEST PA LAT       INDICATION:   Post op heart- May travel without RN       COMPARISON: 2019. FINDINGS: PA and lateral radiographs of the chest demonstrate minor basilar   atelectasis/scarring on the right with slight blunting of the posterior right CP   angle. Otherwise lungs are clear. Heart size is normal. Patient is status post   TAVR. Bony structures are unchanged. There are severe degenerative changes   right glenohumeral joint. 19 0458  XR CHEST PORT Final result    Impression:  IMPRESSION:   Resolved interstitial edema. No other interval change. Narrative:  PORTABLE CHEST RADIOGRAPH/S: 2019 4:58 AM       Clinical history: Postoperative heart   INDICATION:   postop heart   COMPARISON: 2019       FINDINGS:   AP portable upright view of the chest demonstrates a stable  cardiopericardial   silhouette. The lungs are adequately expanded. Resolved interstitial edema. [place]-Ceferino catheter tip projects over the main pulmonary artery. . The osseous   structures are unremarkable. Patient is on a cardiac monitor. Glenohumeral   degenerative change on the right. 06/27/19 2152  XR CHEST PORT Final result    Impression:  IMPRESSION:   No significant interval change. Narrative:  PORTABLE CHEST RADIOGRAPH/S: 6/27/2019 9:52 PM       Clinical history: Routine   INDICATION:   Aortic stenosis. COMPARISON: 6/27/2019 at 4:44 AM       FINDINGS:   AP portable upright view of the chest demonstrates a stable  cardiopericardial   silhouette. The lungs are adequately expanded. Minimal interstitial edema. [place]-Ceferino catheter tip projects over the main pulmonary artery. . The osseous   structures are unremarkable. Patient is on a cardiac monitor. Glenohumeral   degenerative change on the right. Admission Weight: Last Weight   Weight: 223 lb (101.2 kg) Weight: 220 lb (99.8 kg)     Intake / Output / Drain:  Current Shift: 06/29 0701 - 06/29 1900  In: 240 [P.O.:240]  Out: -   Last 24 hrs.:     Intake/Output Summary (Last 24 hours) at 6/29/2019 1041  Last data filed at 6/29/2019 0759  Gross per 24 hour   Intake 960 ml   Output 1650 ml   Net -690 ml       EXAM:  General:  Alert, NAD                                                                                           Lungs:   Clear to auscultation bilaterally. Incision:  Bilateral groin sites no bleeding/hematoma, right groin ecchymotic. Heart:  Regular rate and rhythm, S1, S2 normal, no murmur, click, rub or gallop. Abdomen:   Soft, non-tender. Bowel sounds normal. No masses,  No organomegaly. Extremities:  No edema. PPP. Neurologic:  Gross motor and sensory apparatus intact.      Labs:   Recent Labs     06/29/19  0642 06/29/19  0121  06/26/19  1256   WBC  --  7.1   < > 8.3   HGB  --  12.2   < > 12.6   HCT  --  35.1*   < > 35.8*   PLT  --  149*   < > 209   NA  -- 140   < > 141   K  --  4.0   < > 4.2   BUN  --  11   < > 16   CREA  --  1.01   < > 1.10   GLU  --  138*   < > 179*   GLUCPOC 161*  --    < >  --    INR  --   --   --  1.2*    < > = values in this interval not displayed. Assessment:     Active Problems: Aortic stenosis (2019)         Plan/Recommendations/Medical Decision Makin. AS s/p TAVR ([place]): On ASA, rhythm changes post op -1st degree AVB and LBBB on initial post op EKG. SC improving -now 166 ms, cont to have LBBB, EKG unchanged. Post op TTE completed. 2. DM II: on metformin PTA - resume, cont SSI  3. HLD: on statin  4. Hx PE: during setting of profound illness/septoid. No longer anticoagulated with DOAC  5. Obesity: BMI 31  6. Atelectasis: Encourage I/S, on RA  7. Leukocytosis: Afebrile, monitor - resolved  8. Post op anemia st acute blood loss: n/a  9. Hypokalemia/hypomag/hypocalcemia: resolved  10. Hypotension: resolved. Avoid resuming lasix on d/c  11. Tachycardia: appears dry, give IV fluids, monitor, resolved. 12. Dispo: PT/OT, stable and ready for d/c home.     Signed By: Dayday Goode NP

## 2019-06-28 NOTE — PROGRESS NOTES
0730 Bedside and Verbal shift change report given to Kalina Albert (oncoming nurse) by Niels Delgado (offgoing nurse). Report included the following information SBAR, Kardex, ED Summary, Procedure Summary, Intake/Output, MAR, Accordion and Recent Results. 1345 A-line and [place]/MAC pulled    1930 Bedside and Verbal shift change report given to [facility] (oncoming nurse) by Kalina Albert (offgoing nurse). Report included the following information SBAR, Kardex, ED Summary, Procedure Summary, Intake/Output, MAR, Accordion and Recent Results.

## 2019-06-28 NOTE — PROGRESS NOTES
Problem: Self Care Deficits Care Plan (Adult)  Goal: *Acute Goals and Plan of Care (Insert Text)  Description  Occupational Therapy Goals  Initiated 6/27/2019  1. Patient will perform ADLs standing 5 mins without fatigue or LOB with supervision/set-up within 5 day(s). 2.  Patient will perform lower body ADLs with supervision/set-up within 5 day(s). 3.  Patient will perform gathering ADL items high and low 2/2 with supervision/set-up within 5 day(s). 4.  Patient will perform toilet transfers with supervision/set-up within 5 day(s). 5.  Patient will perform all aspects of toileting with supervision/set-up within 5 day(s). 6.  Patient will participate in cardiac/sternal upper extremity therapeutic exercise/activities to increase independence with ADLs with supervision/set-up for 5 minutes within 5 day(s). Outcome: Progressing Towards Goal   OCCUPATIONAL THERAPY TREATMENT  Patient: Primitivo Grimaldo (16 y.o. male)  Date: 6/28/2019  Diagnosis: Aortic stenosis [I35.0] <principal problem not specified>  Procedure(s) (LRB):  TRANSCATHER AORTIC VALVE REPLACEMENT, RIGHT TRANSFEMORAL 29 EVOLUT PRO. TTE BY ECHO TECH. (N/A) 2 Days Post-Op  Precautions: Bed Alarm, fall  Chart, occupational therapy assessment, plan of care, and goals were reviewed. ASSESSMENT:  Patient requires largely CGA for ADL transfers and tasks at this time however patient limited by decreased endurance, decreased activity tolerance (HR 130s with activity; RR 30s), decreased insight into deficits, decreased safety awareness, and impulsivity. Patient is highly motivated to regain functional independence and to participate in therapy, however requires repetitive verbal cues for safety and for utilizing energy conservation techniques throughout session. Recommend HHOT post discharge to maximize patient safety and independence with ADL transfers and tasks. Progression toward goals:  ?       Improving appropriately and progressing toward goals  ? Improving slowly and progressing toward goals  ? Not making progress toward goals and plan of care will be adjusted     PLAN:  Patient continues to benefit from skilled intervention to address the above impairments. Continue treatment per established plan of care. Discharge Recommendations:  Home Health  Further Equipment Recommendations for Discharge:  TBD      SUBJECTIVE:   Patient stated ? I could cry I am so happy I can move like this. ?    OBJECTIVE DATA SUMMARY:   Cognitive/Behavioral Status:  Neurologic State: Alert  Orientation Level: Oriented X4  Cognition: Appropriate for age attention/concentration  Perception: Appears intact  Perseveration: No perseveration noted  Safety/Judgement: Decreased insight into deficits; Decreased awareness of need for assistance;Decreased awareness of need for safety    Functional Mobility and Transfers for ADLs:      Transfers:  Sit to Stand: Contact guard assistance          Balance:  Sitting: Intact  Standing: Impaired  Standing - Static: Good  Standing - Dynamic : Fair    ADL Intervention:     OT facilitated functional mobility <> closet x CGA for practice with retrieval of clothing items in preparation for dressing tasks with patient reaching high/low x CGA and requiring verbal cues for using proper body mechanics. Patient simulated LB dressing x CGA and discussed home safety with OT. Needs reinforcement. Cognitive Retraining  Safety/Judgement: Decreased insight into deficits; Decreased awareness of need for assistance;Decreased awareness of need for safety    Pain:  Pain Scale 1: Numeric (0 - 10)  Pain Intensity 1: 0              Activity Tolerance:   HR 130s with activity. Please refer to the flowsheet for vital signs taken during this treatment. After treatment:   ? Patient left in no apparent distress sitting up in chair  ? Patient left in no apparent distress in bed  ? Call bell left within reach  ? Nursing notified  ? Caregiver present  ?  Bed alarm activated    COMMUNICATION/COLLABORATION:   The patient?s plan of care was discussed with: Physical Therapist and Registered Nurse    Yi Pickens  Time Calculation: 17 mins

## 2019-06-28 NOTE — PROGRESS NOTES
IDR/SLIDR Summary Patient: Dax Guzman MRN: [id number]    Age: 70 y.o. YOB: 1950 Room/Bed: [address] [place] [address] Admit Diagnosis: Aortic stenosis [I35.0]  Principal Diagnosis: <principal problem not specified>  
Goals: Stable VS 
Readmission: NO  Quality Measure: Not applicable VTE Prophylaxis: Mechanical 
Influenza Vaccine screening completed? YES Pneumococcal Vaccine screening completed? YES Mobility needs: No   Nutrition plan:No 
Consults: P. T and O.T. Financial concerns:No  Escalated to CM? NO 
RRAT Score: 13   Interventions:H2H Testing due for pt today? NO 
LOS: 1 days Expected length of stay TBD days Discharge plan: TBD   PCP: Gilbert Davis NP Transportation needs: Yes Days before discharge:two or more days before discharge Discharge disposition: Home Signed:  
 
Karthik Clayton 6/27/2019 
8:47 PM

## 2019-06-28 NOTE — INTERDISCIPLINARY ROUNDS
IDR/SLIDR Summary Patient: Mouna Worthington MRN: [id number]    Age: 70 y.o. YOB: 1950 Room/Bed: [address] [place] [address] Admit Diagnosis: Aortic stenosis [I35.0]  Principal Diagnosis: <principal problem not specified>  
Goals: stable vital signs Readmission: NO  Quality Measure: Not applicable VTE Prophylaxis: Mechanical  
Influenza Vaccine screening completed? YES Pneumococcal Vaccine screening completed? YES Mobility needs: No   Nutrition plan:No 
Consults: P. T and O.T. Financial concerns:No  Escalated to CM? NO 
RRAT Score: 13   Interventions:H2H Testing due for pt today? NO 
LOS: 2 days Expected length of stay TBD days Discharge plan: TBD   PCP: Prem Hall NP Transportation needs: No   
Days before discharge:one day until discharge Discharge disposition: Home Signed:  
 
Chelsea Doran 6/28/2019 5:10 AM

## 2019-06-28 NOTE — PROGRESS NOTES
Problem: Falls - Risk of  Goal: *Absence of Falls  Description  Document Margrett Bernheim Fall Risk and appropriate interventions in the flowsheet. Outcome: Progressing Towards Goal     Problem: Patient Education: Go to Patient Education Activity  Goal: Patient/Family Education  Outcome: Progressing Towards Goal     Problem: Pressure Injury - Risk of  Goal: *Prevention of pressure injury  Description  Document Harris Scale and appropriate interventions in the flowsheet.   Outcome: Progressing Towards Goal     Problem: Patient Education: Go to Patient Education Activity  Goal: Patient/Family Education  Outcome: Progressing Towards Goal     Problem: Patient Education:  Go to Education Activity  Goal: Patient/Family Education  Outcome: Progressing Towards Goal     Problem: Patient Education: Go to Patient Education Activity  Goal: Patient/Family Education  Outcome: Progressing Towards Goal     Problem: Post-procedure Day 1,TAVR  Goal: *Stable Cardiac Rhythm  Outcome: Progressing Towards Goal  Goal: *Hemodynamically stable  Outcome: Progressing Towards Goal  Goal: *Optimal pain control at patient's stated goal  Outcome: Progressing Towards Goal  Goal: *Lungs clear or at baseline  Outcome: Progressing Towards Goal  Goal: *Demonstrates progressive activity  Outcome: Progressing Towards Goal  Goal: *No signs of infection or puncture site complication  Outcome: Progressing Towards Goal  Goal: *Verbalizes and demonstrates puncture site care  Outcome: Progressing Towards Goal  Goal: Activity/Safety  Outcome: Progressing Towards Goal  Goal: Consults  Outcome: Progressing Towards Goal  Goal: Diagnostic Tests/Procedures  Outcome: Progressing Towards Goal  Goal: Nutrition/Diet  Outcome: Progressing Towards Goal  Goal: Discharge Planning  Outcome: Progressing Towards Goal  Goal: Medications  Outcome: Progressing Towards Goal  Goal: Respiratory  Outcome: Progressing Towards Goal  Goal: Treatments/Interventions/Procedures  Outcome: Progressing Towards Goal  Goal: Psychosocial Needs  Outcome: Progressing Towards Goal     Problem: Patient Education: Go to Patient Education Activity  Goal: Patient/Family Education  Outcome: Progressing Towards Goal     Problem: Patient Education: Go to Patient Education Activity  Goal: Patient/Family Education  Outcome: Progressing Towards Goal

## 2019-06-28 NOTE — PROGRESS NOTES
[facility] ICU Progress Note    Admit Date: 2019  POD:  2 Day Post-Op    Procedure:  Procedure(s):  TRANSCATHER AORTIC VALVE REPLACEMENT, RIGHT TRANSFEMORAL 29 EVOLUT PRO. TTE BY ECHO TECH. Subjective:   Pt seen with Dr. Sun Terrell. Tmax 98.8, on RA. Up in chair. Tachycardic and hypotensive after getting OOB, PAD and CVP low. NSR with LBBB -stable from yesterday. Objective:   Vitals:  Blood pressure 120/53, pulse 95, temperature 99.1 °F (37.3 °C), resp. rate 21, height 5' 7\" (1.702 m), weight 223 lb 8.7 oz (101.4 kg), SpO2 97 %. Temp (24hrs), Av.4 °F (36.9 °C), Min:98 °F (36.7 °C), Max:99.1 °F (37.3 °C)    Hemodynamics:   CO: CO (l/min): 9.1 l/min   CI: CI (l/min/m2): 4.3 l/min/m2   CVP: CVP (mmHg): 4 mmHg (19 1000)   SVR: SVR (dyne*sec)/cm5: 635 (dyne*sec)/cm5 (19 0008)   PAP Systolic: PAP Systolic: 17 ( 8389)   PAP Diastolic: PAP Diastolic: 5 (95/71/32 2171)   PVR:     SV02:     SCV02: SCVO2 (%): 66 % (19 1445)    EKG/Rhythm:  NSR LBBB    Oxygen Therapy:  Oxygen Therapy  O2 Sat (%): 97 % (19 1000)  Pulse via Oximetry: 89 beats per minute (19 1000)  O2 Device: Room air (19 0800)  O2 Flow Rate (L/min): 1 l/min (19 1800)    CXR:   CXR Results  (Last 48 hours)               19 0458  XR CHEST PORT Final result    Impression:  IMPRESSION:   Resolved interstitial edema. No other interval change. Narrative:  PORTABLE CHEST RADIOGRAPH/S: 2019 4:58 AM       Clinical history: Postoperative heart   INDICATION:   postop heart   COMPARISON: 2019       FINDINGS:   AP portable upright view of the chest demonstrates a stable  cardiopericardial   silhouette. The lungs are adequately expanded. Resolved interstitial edema. [place]-Ceferino catheter tip projects over the main pulmonary artery. . The osseous   structures are unremarkable. Patient is on a cardiac monitor. Glenohumeral   degenerative change on the right. 06/27/19 2152  XR CHEST PORT Final result    Impression:  IMPRESSION:   No significant interval change. Narrative:  PORTABLE CHEST RADIOGRAPH/S: 6/27/2019 9:52 PM       Clinical history: Routine   INDICATION:   Aortic stenosis. COMPARISON: 6/27/2019 at 4:44 AM       FINDINGS:   AP portable upright view of the chest demonstrates a stable  cardiopericardial   silhouette. The lungs are adequately expanded. Minimal interstitial edema. [place]-Ceferino catheter tip projects over the main pulmonary artery. . The osseous   structures are unremarkable. Patient is on a cardiac monitor. Glenohumeral   degenerative change on the right. 06/27/19 0509  XR CHEST PORT Final result    Impression:  IMPRESSION:   No significant interval change. Narrative:  PORTABLE CHEST RADIOGRAPH/S: 6/27/2019 5:09 AM       Clinical history: Postoperative heart   INDICATION:   postop heart   COMPARISON: None       FINDINGS:   AP portable upright view of the chest demonstrates a stable  cardiopericardial   silhouette. The lungs are adequately expanded. There is no effusion,   consolidation, or pneumothorax. [place]-Ceferino catheter tip lies over the main   pulmonary artery. Mild interstitial edema. . Patient is on a cardiac monitor. 06/26/19 1307  XR CHEST PORT Final result    Impression:  IMPRESSION:   1. Interval placement of a [place]-Ceferino catheter as described above. No   pneumothorax detected. 2. Prominence of the basilar lung markings as described above. 3. Evidence of marked degenerative change involving the right shoulder. Narrative:  Portable chest single view dated 6/26/2019       Comparison chest dated 6/20/2019       History is postop heart       A single portable AP supine view of the chest was obtained. This film was   obtained during a less than optimal degree of inspiration. The cardiac   silhouette is normal in size. Some vascular crowding is noted at the lung bases. There continues to be some elevation of the right hemidiaphragm. There has been   interval placement of a [place]-Ceferino catheter on the right. The tip of this   catheter overlies the right pulmonary artery. No pneumothorax is detected. There   is evidence of thoracic spondylosis. There is marked degenerative change   involving the right hip. Admission Weight: Last Weight   Weight: 223 lb (101.2 kg) Weight: 223 lb 8.7 oz (101.4 kg)     Intake / Output / Drain:  Current Shift:  0701 -  1900  In: -   Out: 250 [Urine:250]  Last 24 hrs.:     Intake/Output Summary (Last 24 hours) at 2019 1059  Last data filed at 2019 0819  Gross per 24 hour   Intake 409 ml   Output 2475 ml   Net -2066 ml       EXAM:  General:   Alert, NAD                                                                                           Lungs:   Clear to auscultation bilaterally. Incision:  Bilateral groin sites no bleeding/hematoma, right groin ecchymotic. Heart:  Regular rate and rhythm, S1, S2 normal, no murmur, click, rub or gallop. Abdomen:   Soft, non-tender. Bowel sounds normal. No masses,  No organomegaly. Extremities:  No edema. PPP. Neurologic:  Gross motor and sensory apparatus intact. Labs:   Recent Labs     19  0639 19  0437  19  1256   WBC  --  8.1   < > 8.3   HGB  --  12.0*   < > 12.6   HCT  --  34.4*   < > 35.8*   PLT  --  142*   < > 209   NA  --  138   < > 141   K  --  3.8   < > 4.2   BUN  --  13   < > 16   CREA  --  1.09   < > 1.10   GLU  --  181*   < > 179*   GLUCPOC 167*  --    < >  --    INR  --   --   --  1.2*    < > = values in this interval not displayed. Assessment:     Active Problems: Aortic stenosis (2019)         Plan/Recommendations/Medical Decision Makin. AS s/p TAVR (Evolut): On ASA, rhythm changes post op -1st degree AVB and LBBB on initial post op EKG. CO improving -now 160 ms, cont to have LBBB.  Monitor rhythm, dc paceport alberto today. Post op TTE ordered. 2. DM II: on metformin PTA -hold 48 hrs post op, cont SSI  3. HLD: on statin  4. Hx PE: during setting of profound illness/septoid. No longer anticoagulated with DOAC  5. Obesity: BMI 31  6. Atelectasis: Encourage I/S, on RA  7. Leukocytosis: Afebrile, monitor  8. Post op anemia st acute blood loss: Monitor H&H  9. Hypokalemia/hypomag/hypocalcemia: replete per orders, monitor  10. Hypotension: give IV fluids, encourage PO intake, resolving  11. Tachycardia: appears dry, give IV fluids, monitor, resolving. 12. Dispo: DC PA line, transfer to stepdown later today.         Signed By: Tawana Olson NP

## 2019-06-28 NOTE — CARDIO/PULMONARY
Cardiac Rehab: Attempted to see patient. He has been unavailable. Family not present. Will place [facility] cardiac rehab contact information on the AVS. Will continue to follow.  Bo Rucker RN

## 2019-06-28 NOTE — PROGRESS NOTES
CM notified of acceptance to [facility]. Discharge likely this weekend.     Bentley Merlos MPH

## 2019-06-28 NOTE — PROGRESS NOTES
Problem: Mobility Impaired (Adult and Pediatric)  Goal: *Acute Goals and Plan of Care (Insert Text)  Description  Physical Therapy Goals  Initiated 6/27/2019  1. Patient will move from supine to sit and sit to supine, scoot up and down and roll side to side in bed with independence within 7 day(s). 2.  Patient will transfer from bed to chair and chair to bed with independence within 7 day(s). 3.  Patient will perform sit to stand with independence within 7 day(s). 4.  Patient will ambulate with independence for 300 feet with the least restrictive device within 7 day(s). 5.  Patient will ascend/descend ramp for home entrance/exit with independence within 7 day(s). Outcome: Progressing Towards Goal     PHYSICAL THERAPY TREATMENT  Patient: Lolis Felder (62 y.o. male)  Date: 6/28/2019  Diagnosis: Aortic stenosis [I35.0] <principal problem not specified>  Procedure(s) (LRB):  TRANSCATHER AORTIC VALVE REPLACEMENT, RIGHT TRANSFEMORAL 29 EVOLUT PRO. TTE BY ECHO TECH. (N/A) 2 Days Post-Op  Precautions: Bed Alarm  Chart, physical therapy assessment, plan of care and goals were reviewed. ASSESSMENT:  Patient continues to illustrate decreased insight into his deficits (suspect he'll be an over-doer), decreased activity tolerance (HR 130s, RR 22-40s), decreased safety awareness, and impulsivity. Patient requires extensive and repetitive verbal cues for safety, for decreasing his gait velocity, and for implementing pursed-lip breathing and energy conservation techniques (ie - standing rest breaks). Patient remains highly motivated to regain his functional independence and would HIGHLY benefit from [address]  at discharge in order to safely progress his gait/ functional endurance, and to assist in monitoring/understanding of perceived dyspnea with progressive exertion. Progression toward goals:  ?    Improving appropriately and progressing toward goals  ? Improving slowly and progressing toward goals  ? Not making progress toward goals and plan of care will be adjusted     PLAN:  Patient continues to benefit from skilled intervention to address the above impairments. Continue treatment per established plan of care. Discharge Recommendations:  Home Health  Further Equipment Recommendations for Discharge:  None      SUBJECTIVE:   Patient stated Oh, I already know that, I'm fine.     OBJECTIVE DATA SUMMARY:   Critical Behavior:  Neurologic State: Alert  Orientation Level: Oriented X4  Cognition: Appropriate for age attention/concentration  Safety/Judgement: Decreased insight into deficits, Decreased awareness of need for assistance, Decreased awareness of need for safety  Functional Mobility Training:  Bed Mobility:  Supine to Sit: Supervision(+ External pacing for impulsivityi)    Transfers:  Sit to Stand: Supervision  Stand to Sit: Supervision    Balance:  Sitting: Intact  Standing: Impaired; Without support  Standing - Static: Good  Standing - Dynamic : Fair(Fluctuating gait velocity with anterior momentum)  Ambulation/Gait Training:  Distance (ft): 200 Feet (ft)  Assistive Device: Gait belt  Ambulation - Level of Assistance: Contact guard assistance;Stand-by assistance  Gait Abnormalities: Path deviations  Base of Support: Widened;Center of gravity altered  Speed/Elysia: Fluctuations; Accelerated  Step Length: Right shortened;Left shortened    Pain:  Pain Scale 1: Numeric (0 - 10)  Pain Intensity 1: 0    Activity Tolerance:  Please refer to the flowsheet for vital signs taken during this treatment. After treatment:   x    Patient left in no apparent distress sitting up in chair  ? Patient left in no apparent distress in bed  x    Call bell left within reach  x    Nursing notified  ? Caregiver present  ?     Bed alarm activated    COMMUNICATION/COLLABORATION:   The patients plan of care was discussed with: Occupational Therapist, Registered Nurse and     Bryan Karimi PT, DPT   Time Calculation: 23 mins

## 2019-06-29 ENCOUNTER — APPOINTMENT (OUTPATIENT)
Dept: GENERAL RADIOLOGY | Age: 69
DRG: 267 | End: 2019-06-29
Attending: NURSE PRACTITIONER
Payer: MEDICARE

## 2019-06-29 ENCOUNTER — APPOINTMENT (OUTPATIENT)
Dept: NON INVASIVE DIAGNOSTICS | Age: 69
DRG: 267 | End: 2019-06-29
Attending: NURSE PRACTITIONER
Payer: MEDICARE

## 2019-06-29 VITALS
OXYGEN SATURATION: 94 % | SYSTOLIC BLOOD PRESSURE: 120 MMHG | TEMPERATURE: 99.1 F | WEIGHT: 220 LBS | HEIGHT: 68 IN | DIASTOLIC BLOOD PRESSURE: 45 MMHG | RESPIRATION RATE: 18 BRPM | BODY MASS INDEX: 33.34 KG/M2 | HEART RATE: 103 BPM

## 2019-06-29 PROBLEM — Z95.2 S/P TAVR (TRANSCATHETER AORTIC VALVE REPLACEMENT): Status: ACTIVE | Noted: 2019-06-29

## 2019-06-29 LAB
ALBUMIN SERPL-MCNC: 3.4 G/DL (ref 3.5–5)
ALBUMIN/GLOB SERPL: 1.1 {RATIO} (ref 1.1–2.2)
ALP SERPL-CCNC: 72 U/L (ref 45–117)
ALT SERPL-CCNC: 16 U/L (ref 12–78)
ANION GAP SERPL CALC-SCNC: 6 MMOL/L (ref 5–15)
AST SERPL-CCNC: 20 U/L (ref 15–37)
ATRIAL RATE: 82 BPM
BILIRUB SERPL-MCNC: 1.1 MG/DL (ref 0.2–1)
BUN SERPL-MCNC: 11 MG/DL (ref 6–20)
BUN/CREAT SERPL: 11 (ref 12–20)
CALCIUM SERPL-MCNC: 8.5 MG/DL (ref 8.5–10.1)
CALCULATED P AXIS, ECG09: 60 DEGREES
CALCULATED R AXIS, ECG10: -27 DEGREES
CALCULATED T AXIS, ECG11: 98 DEGREES
CHLORIDE SERPL-SCNC: 109 MMOL/L (ref 97–108)
CO2 SERPL-SCNC: 25 MMOL/L (ref 21–32)
CREAT SERPL-MCNC: 1.01 MG/DL (ref 0.7–1.3)
DIAGNOSIS, 93000: NORMAL
ECHO AO ROOT DIAM: 3.31 CM
ECHO AV AREA PEAK VELOCITY: 3 CM2
ECHO AV AREA VTI: 3.1 CM2
ECHO AV MEAN GRADIENT: 5.8 MMHG
ECHO AV PEAK GRADIENT: 9.1 MMHG
ECHO AV PEAK VELOCITY: 150.75 CM/S
ECHO AV VTI: 26.95 CM
ECHO EST RA PRESSURE: 3 MMHG
ECHO LA AREA 4C: 21.5 CM2
ECHO LA MAJOR AXIS: 4.27 CM
ECHO LA TO AORTIC ROOT RATIO: 1.29
ECHO LA VOL 2C: 76.47 ML (ref 18–58)
ECHO LA VOL 4C: 64.04 ML (ref 18–58)
ECHO LA VOL BP: 75.77 ML (ref 18–58)
ECHO LA VOL/BSA BIPLANE: 35.6 ML/M2 (ref 16–28)
ECHO LA VOLUME INDEX A2C: 35.93 ML/M2 (ref 16–28)
ECHO LA VOLUME INDEX A4C: 30.09 ML/M2 (ref 16–28)
ECHO LV E' LATERAL VELOCITY: 7.23 CM/S
ECHO LV E' SEPTAL VELOCITY: 5.57 CM/S
ECHO LV INTERNAL DIMENSION DIASTOLIC: 4.38 CM (ref 4.2–5.9)
ECHO LV INTERNAL DIMENSION SYSTOLIC: 3.14 CM
ECHO LV IVSD: 1.16 CM (ref 0.6–1)
ECHO LV MASS 2D: 201.3 G (ref 88–224)
ECHO LV MASS INDEX 2D: 94.6 G/M2 (ref 49–115)
ECHO LV POSTERIOR WALL DIASTOLIC: 1.09 CM (ref 0.6–1)
ECHO LVOT DIAM: 2.19 CM
ECHO LVOT PEAK GRADIENT: 5.8 MMHG
ECHO LVOT PEAK VELOCITY: 120.68 CM/S
ECHO LVOT SV: 84.6 ML
ECHO LVOT VTI: 22.51 CM
ECHO MV A VELOCITY: 113.06 CM/S
ECHO MV AREA PHT: 4.3 CM2
ECHO MV AREA VTI: 3.5 CM2
ECHO MV E DECELERATION TIME (DT): 178.4 MS
ECHO MV E VELOCITY: 90.23 CM/S
ECHO MV E/A RATIO: 0.8
ECHO MV E/E' LATERAL: 12.48
ECHO MV E/E' RATIO (AVERAGED): 14.34
ECHO MV E/E' SEPTAL: 16.2
ECHO MV MAX VELOCITY: 114.47 CM/S
ECHO MV MEAN GRADIENT: 2.6 MMHG
ECHO MV PEAK GRADIENT: 5.2 MMHG
ECHO MV PRESSURE HALF TIME (PHT): 51.7 MS
ECHO MV REGURGITANT PEAK GRADIENT: 78 MMHG
ECHO MV REGURGITANT PEAK VELOCITY: 441.62 CM/S
ECHO MV VTI: 23.98 CM
ECHO PULMONARY ARTERY SYSTOLIC PRESSURE (PASP): 35.1 MMHG
ECHO PV MAX VELOCITY: 97 CM/S
ECHO PV PEAK GRADIENT: 3.8 MMHG
ECHO RIGHT VENTRICULAR SYSTOLIC PRESSURE (RVSP): 31.1 MMHG
ECHO RV INTERNAL DIMENSION: 4.34 CM
ECHO RV TAPSE: 3 CM (ref 1.5–2)
ECHO TV REGURGITANT MAX VELOCITY: 264.95 CM/S
ECHO TV REGURGITANT PEAK GRADIENT: 28.1 MMHG
ERYTHROCYTE [DISTWIDTH] IN BLOOD BY AUTOMATED COUNT: 13.8 % (ref 11.5–14.5)
GLOBULIN SER CALC-MCNC: 3.1 G/DL (ref 2–4)
GLUCOSE BLD STRIP.AUTO-MCNC: 161 MG/DL (ref 65–100)
GLUCOSE SERPL-MCNC: 138 MG/DL (ref 65–100)
HCT VFR BLD AUTO: 35.1 % (ref 36.6–50.3)
HGB BLD-MCNC: 12.2 G/DL (ref 12.1–17)
MAGNESIUM SERPL-MCNC: 2.1 MG/DL (ref 1.6–2.4)
MCH RBC QN AUTO: 31.6 PG (ref 26–34)
MCHC RBC AUTO-ENTMCNC: 34.8 G/DL (ref 30–36.5)
MCV RBC AUTO: 90.9 FL (ref 80–99)
NRBC # BLD: 0 K/UL (ref 0–0.01)
NRBC BLD-RTO: 0 PER 100 WBC
P-R INTERVAL, ECG05: 166 MS
PLATELET # BLD AUTO: 149 K/UL (ref 150–400)
PMV BLD AUTO: 10.2 FL (ref 8.9–12.9)
POTASSIUM SERPL-SCNC: 4 MMOL/L (ref 3.5–5.1)
PROT SERPL-MCNC: 6.5 G/DL (ref 6.4–8.2)
Q-T INTERVAL, ECG07: 404 MS
QRS DURATION, ECG06: 142 MS
QTC CALCULATION (BEZET), ECG08: 472 MS
RBC # BLD AUTO: 3.86 M/UL (ref 4.1–5.7)
SERVICE CMNT-IMP: ABNORMAL
SODIUM SERPL-SCNC: 140 MMOL/L (ref 136–145)
VENTRICULAR RATE, ECG03: 82 BPM
WBC # BLD AUTO: 7.1 K/UL (ref 4.1–11.1)

## 2019-06-29 PROCEDURE — 65660000001 HC RM ICU INTERMED STEPDOWN

## 2019-06-29 PROCEDURE — 71046 X-RAY EXAM CHEST 2 VIEWS: CPT

## 2019-06-29 PROCEDURE — 80053 COMPREHEN METABOLIC PANEL: CPT

## 2019-06-29 PROCEDURE — 74011636637 HC RX REV CODE- 636/637: Performed by: NURSE PRACTITIONER

## 2019-06-29 PROCEDURE — 74011250637 HC RX REV CODE- 250/637: Performed by: NURSE PRACTITIONER

## 2019-06-29 PROCEDURE — 36415 COLL VENOUS BLD VENIPUNCTURE: CPT

## 2019-06-29 PROCEDURE — 93005 ELECTROCARDIOGRAM TRACING: CPT

## 2019-06-29 PROCEDURE — 85027 COMPLETE CBC AUTOMATED: CPT

## 2019-06-29 PROCEDURE — 97116 GAIT TRAINING THERAPY: CPT

## 2019-06-29 PROCEDURE — 93306 TTE W/DOPPLER COMPLETE: CPT

## 2019-06-29 PROCEDURE — 82962 GLUCOSE BLOOD TEST: CPT

## 2019-06-29 PROCEDURE — 83735 ASSAY OF MAGNESIUM: CPT

## 2019-06-29 RX ORDER — AMOXICILLIN 250 MG
1 CAPSULE ORAL
Qty: 60 TAB | Refills: 0 | Status: SHIPPED | OUTPATIENT
Start: 2019-06-29 | End: 2019-07-29

## 2019-06-29 RX ORDER — SODIUM CHLORIDE 0.9 % (FLUSH) 0.9 %
5-40 SYRINGE (ML) INJECTION AS NEEDED
Status: DISCONTINUED | OUTPATIENT
Start: 2019-06-29 | End: 2019-06-29 | Stop reason: HOSPADM

## 2019-06-29 RX ORDER — POLYETHYLENE GLYCOL 3350 17 G/17G
17 POWDER, FOR SOLUTION ORAL
Qty: 14 PACKET | Refills: 0 | Status: SHIPPED | OUTPATIENT
Start: 2019-06-29 | End: 2019-07-13

## 2019-06-29 RX ORDER — METFORMIN HYDROCHLORIDE 500 MG/1
500 TABLET ORAL
COMMUNITY
End: 2019-07-22

## 2019-06-29 RX ORDER — SODIUM CHLORIDE 0.9 % (FLUSH) 0.9 %
5-40 SYRINGE (ML) INJECTION EVERY 8 HOURS
Status: DISCONTINUED | OUTPATIENT
Start: 2019-06-29 | End: 2019-06-29 | Stop reason: HOSPADM

## 2019-06-29 RX ADMIN — FAMOTIDINE 20 MG: 20 TABLET ORAL at 08:03

## 2019-06-29 RX ADMIN — ASPIRIN 81 MG 81 MG: 81 TABLET ORAL at 08:03

## 2019-06-29 RX ADMIN — Medication 10 ML: at 06:45

## 2019-06-29 RX ADMIN — INSULIN LISPRO 2 UNITS: 100 INJECTION, SOLUTION INTRAVENOUS; SUBCUTANEOUS at 06:51

## 2019-06-29 RX ADMIN — MAGNESIUM OXIDE TAB 400 MG (241.3 MG ELEMENTAL MG) 400 MG: 400 (241.3 MG) TAB at 08:03

## 2019-06-29 NOTE — PROGRESS NOTES
Problem: Mobility Impaired (Adult and Pediatric)  Goal: *Acute Goals and Plan of Care (Insert Text)  Description  Physical Therapy Goals  Initiated 6/27/2019  1. Patient will move from supine to sit and sit to supine, scoot up and down and roll side to side in bed with independence within 7 day(s). 2.  Patient will transfer from bed to chair and chair to bed with independence within 7 day(s). 3.  Patient will perform sit to stand with independence within 7 day(s). 4.  Patient will ambulate with independence for 300 feet with the least restrictive device within 7 day(s). 5.  Patient will ascend/descend ramp for home entrance/exit with independence within 7 day(s). Outcome: Progressing Towards Goal     PHYSICAL THERAPY TREATMENT  Patient: Carola Santos (14 y.o. male)  Date: 6/29/2019  Diagnosis: Aortic stenosis [I35.0] <principal problem not specified>  Procedure(s) (LRB):  TRANSCATHER AORTIC VALVE REPLACEMENT, RIGHT TRANSFEMORAL 29 EVOLUT PRO. TTE BY ECHO TECH. (N/A) 3 Days Post-Op  Precautions: Bed Alarm  Chart, physical therapy assessment, plan of care and goals were reviewed. ASSESSMENT:  Patient eager to mobilize. Continues to be slightly impulsive with mobility, appears to be his baseline. Overall mod I for bed mobility and transfers. Patient ambulated 350 feet without AD and supervision. Slight path deviations but no overt LOB-noted patient with L toe amputation impairing balance at baseline. Patient's -130bpm with ambulation, recovers to 105bpm upon sitting EOB and O2 sats 99% on RA. Patient has no concerns with discharging home. Recommend home safety evaluation as patient reports h/o of falls. Progression toward goals:  ?    Improving appropriately and progressing toward goals  ? Improving slowly and progressing toward goals  ?     Not making progress toward goals and plan of care will be adjusted     PLAN:  Patient continues to benefit from skilled intervention to address the above impairments. Continue treatment per established plan of care. Discharge Recommendations:  home safety evaluation   Further Equipment Recommendations for Discharge:  None      SUBJECTIVE:   Patient stated ? They say I walk fast but this is how I walk.?    OBJECTIVE DATA SUMMARY:   Critical Behavior:  Neurologic State: Alert, Appropriate for age  Orientation Level: Oriented X4  Cognition: Appropriate decision making, Appropriate for age attention/concentration, Appropriate safety awareness, Follows commands  Safety/Judgement: Decreased insight into deficits, Decreased awareness of need for assistance, Decreased awareness of need for safety  Functional Mobility Training:  Bed Mobility:     Supine to Sit: Modified independent  Sit to Supine: Modified independent           Transfers:  Sit to Stand: Modified independent  Stand to Sit: Modified independent  Stand Pivot Transfers: Modified independent                          Balance:  Sitting: Intact  Standing: Impaired  Standing - Static: Good  Standing - Dynamic : Fair(slightly impulsive, h/o L toe amputation)  Ambulation/Gait Training:  Distance (ft): 350 Feet (ft)     Ambulation - Level of Assistance: Supervision        Gait Abnormalities: Altered arm swing;Decreased step clearance; Path deviations        Base of Support: Widened;Center of gravity altered     Speed/Elysia: Fluctuations; Accelerated                       Stairs:              Neuro Re-Education:    Therapeutic Exercises:     Pain:  Pain Scale 1: Numeric (0 - 10)  Pain Intensity 1: 0              Activity Tolerance:   -130bpm with activity, recovers to 105bpm with seated rest  Please refer to the flowsheet for vital signs taken during this treatment. After treatment:   ?    Patient left in no apparent distress sitting up in chair  ? Patient left in no apparent distress in bed  ? Call bell left within reach  ? Nursing notified  ? Caregiver present  ?     Bed alarm activated    COMMUNICATION/COLLABORATION:   The patient?s plan of care was discussed with: Registered Nurse    Sandra Watson, PT   Time Calculation: 10 mins

## 2019-06-29 NOTE — DISCHARGE INSTRUCTIONS
[facility]    [address]  [address]                                                           [address], [address] [facility]  Office- [phone]  Fax- [phone]        Office- [phone]  Fax- [phone]  _________________________________________________________________  Dr. Claudell Pod, NP Odalys Kmi, [address]  Dr. Ebonie Sheikh, NP  ROBYN Mccabe Dr., NP April Licea, [address]  Dr. Jigar Noguera, NP Luis M Buitrago Lim____________ Ras Bellamy, NP_______ Mikhail Smoker, PA__    Name:Liborio Kasper [facility]     Surgery & Date: Procedure(s):  TRANSCATHER AORTIC VALVE REPLACEMENT, RIGHT TRANSFEMORAL 29 EVOLUT PRO. TTE BY ECHO TECH. Discharge Date: 6/29/19    MEDICATIONS:  Please refer to your After Visit Summary for your medication list.   DO NOT TAKE ANY MEDICATIONS THAT ARE NOT ON THIS LIST    INSTRUCTIONS:  1. NO SMOKING OR TOBACCO PRODUCTS  2. Do not follow the activity/exercise instructions in your discharge book given to you as an inpatient. You have no activity restrictions. 3. You may shower. Wash all incisions twice daily with mild soap and water. No lotions, ointments or powder. 4. Call the office immediately for any redness, swelling, or drainage from your incision. 5. Take your temperature daily and call for a temperature of 101 degrees or higher or for any symptoms that make you think you have and infection. 6. Weigh yourself each morning. Call if you gain more than 5 pounds in 48 hours. 7. Use the incentive spirometer 6-8 times a day-10 breaths each time. 8. Walk several hundred feet several times daily. DIET  Eat an American Heart Association/American Diabetic Association diet. If you are having trouble with your appetite, eat what you can.   Try eating small, frequent meals throughout the day. ACTIVITY  1. You have no activity restrictions. You may resume your daily activities at home, based on your comfort level. You may also drive. FOLLOW UP  1. Our office will call you on Monday to give you your follow appointments. Our office is located in [address] [place] [address] on the 4th floor, [address].   2. You will be receiving a call before your 3 day follow up appointment to begin cardiac rehab. They are located in the [facility] [employer] on [facility]. Their phone number is [phone]. Please call if you have not been contacted 2-3 weeks after discharge from the hospital.  3. We will make an appointment for you with your cardiologist in 4-5 weeks. 4. Consult you primary care physician regarding your influenza &   pneumovax vaccines. 5.   Please bring all medications with you to your appointment.     Signature:___________________________________________________

## 2019-06-29 NOTE — DISCHARGE SUMMARY
[facility] [place] Discharge Summary     Patient ID:  Junius Holstein  [id number]  88 y.o.  1950    Admit date: 6/26/2019    Discharge date: 6/29/2019     Admitting Physician: Miranda Sharpe MD     Referring Cardiologist:  Dr. Corita Galeazzi    PCP:  Awilda Enciso NP    Admitting Diagnoses:   1. AS  Discharge Diagnoses:     Hospital Problems  Date Reviewed: 4/29/2019          Codes Class Noted POA    S/P TAVR (transcatheter aortic valve replacement) ICD-10-CM: Z95.2  ICD-9-CM: V43.3  6/29/2019 Unknown        Aortic stenosis ICD-10-CM: I35.0  ICD-9-CM: 424.1  6/26/2019 Unknown              Discharged Condition: improved    Disposition: home, see patient instructions for treatment and plan    Procedures for this admission:  Procedure(s):  TRANSCATHER AORTIC VALVE REPLACEMENT, RIGHT TRANSFEMORAL 29 EVOLUT PRO. TTE BY ECHO TECH. Discharge Medications:      My Medications      START taking these medications      Instructions Each Dose to Equal Morning Noon Evening Bedtime   polyethylene glycol 17 gram packet  Commonly known as:  MIRALAX    Your last dose was: Your next dose is: Take 1 Packet by mouth daily as needed for Other (Constipation) for up to 14 days. 17 g                 senna-docusate 8.6-50 mg per tablet  Commonly known as:  PERICOLACE    Your last dose was: Your next dose is: Take 1 Tab by mouth two (2) times daily as needed for Constipation for up to 30 days. 1 Tab                    CONTINUE taking these medications      Instructions Each Dose to Equal Morning Noon Evening Bedtime   ACCU-CHEK KIRILL PLUS TEST STRP strip  Generic drug:  glucose blood VI test strips    Your last dose was: Your next dose is:                          ACCU-CHEK SOFTCLIX LANCETS Misc  Generic drug:  lancets    Your last dose was: Your next dose is:                          aspirin, buffered 81 mg Tab    Your last dose was: Your next dose is: Take  by mouth daily. HYDROcodone-acetaminophen 5-325 mg per tablet  Commonly known as:  [address]    Your last dose was: Your next dose is: Take 1 Tab by mouth every eight (8) hours as needed. 1 Tab                 metFORMIN 500 mg tablet  Commonly known as:  GLUCOPHAGE    Your last dose was: Your next dose is: Take 500 mg by mouth daily (with breakfast). 500 mg                 multivitamin tablet  Commonly known as:  ONE A DAY    Your last dose was: Your next dose is: Take 1 Tab by mouth daily. 1 Tab                 OTHER    Your last dose was: Your next dose is:          daily. Indications: Tumeric                  simvastatin 10 mg tablet  Commonly known as:  ZOCOR    Your last dose was: Your next dose is: Take  by mouth nightly. STOP taking these medications    chlorhexidine 0.12 % solution  Commonly known as:  PERIDEX        furosemide 40 mg tablet  Commonly known as:  LASIX        mupirocin 2 % ointment  Commonly known as:  Krishna Milton              Where to Get Your Medications      These medications were sent to [id number], [address]  [address], [address] [address]    Phone:  [phone]   · polyethylene glycol 17 gram packet  · senna-docusate 8.6-50 mg per tablet       HPI:  68 y.o.  male with PMHx of AS, HTN, HLD, DM, Provoked Bilateral PE's with concern for right heart strain and left lower lobe infarction (2017) in the setting of discitis and C. Diff colitis, s/p Left Great Toe Amputation after diabetic foot infection that was [facility] by Dr. Percy Yee interventional evaluation of his AS. He was seen at the [facility] valve clinic last week and deemed to be a low risk surgical candidate and he is here today for interventional evaluation and consideration of enrollment in our Low Risk Clinical TAVR Trial (LR AMRIK).       Mr. Brian Molina has followed with Dr. Radha Abdalla for some time. He has remained relatively asymptomatic until recently. He is fatigue and experiencing some dyspnea on exertion. He has some osteoarthritis and a right rotator cuff injury, but his wife feels he is much more fatigued then he was a few months prior. He denies chest tightness, lightheadedness and syncope. He denies orthopnea and PND.     HE CLEARLY HAD A CONTRAST DYE REACTION AND HAS NOTED REDNESS AND BLISTERING THAT APPEARED AFTER HIS CTA     He has indiscretions with his Type 2 DM and his blood glucoses do run high at times. Hospital Course:   On 6/26/19 the pt underwent TAVR procedure performed by Dr. Diana Tsang and Dr. Milagro Thompson. Please see operative report for full details. He was transferred to the ICU in stable condition on no drips. He was extubated on 6/26/19. POD#1: 1. AS s/p TAVR (Evolut): On ASA, rhythm changes post op -1st degree AVB and LBBB on initial post op EKG. MS improving -now 200 ms, cont to have LBBB. Monitor rhythm, keep paceport swan today. 2. DM II: on metformin PTA, cont SSI  3. HLD: on statin  4. Hx PE: during setting of profound illness/septoid. No longer anticoagulated with DOAC  5. Obesity: BMI 31  6. Atelectasis: Encourage I/S, on RA  7. Leukocytosis: Afebrile, monitor  8. Post op anemia st acute blood loss: Monitor H&H  9. Hypokalemia/hypomag/hypocalcemia: replete per orders, monitor  10. Hypotension: Resolving, kalpana gtt weaned off, monitor BP  11. Dispo: Cont ICU care, monitor rhythm. Keep PA line. POD#2: 1. AS s/p TAVR (Evolut): On ASA, rhythm changes post op -1st degree AVB and LBBB on initial post op EKG. MS improving -now 160 ms, cont to have LBBB. Monitor rhythm, dc paceport swan today. Post op TTE ordered. 2. DM II: on metformin PTA -hold 48 hrs post op, cont SSI  3. HLD: on statin  4. Hx PE: during setting of profound illness/septoid. No longer anticoagulated with DOAC  5. Obesity: BMI 31  6. Atelectasis: Encourage I/S, on RA  7.  Leukocytosis: Afebrile, monitor  8. Post op anemia st acute blood loss: Monitor H&H  9. Hypokalemia/hypomag/hypocalcemia: replete per orders, monitor  10. Hypotension: give IV fluids, encourage PO intake, resolving  11. Tachycardia: appears dry, give IV fluids, monitor, resolving. 12. Dispo: DC PA line, transfer to stepdown later today. POD#3: 1. AS s/p TAVR (Evolut): On ASA, rhythm changes post op -1st degree AVB and LBBB on initial post op EKG. DE improving -now 166 ms, cont to have LBBB, EKG unchanged. Post op TTE completed. 2. DM II: on metformin PTA - resume, cont SSI  3. HLD: on statin  4. Hx PE: during setting of profound illness/septoid. No longer anticoagulated with DOAC  5. Obesity: BMI 31  6. Atelectasis: Encourage I/S, on RA  7. Leukocytosis: Afebrile, monitor - resolved  8. Post op anemia st acute blood loss: n/a  9. Hypokalemia/hypomag/hypocalcemia: resolved  10. Hypotension: resolved. Avoid resuming lasix on d/c  11. Tachycardia: appears dry, give IV fluids, monitor, resolved. 12. Dispo: PT/OT, stable and ready for d/c home. Referral to outpatient cardiac rehab made. Discharge Vital Signs:   Visit Vitals  /45   Pulse (!) 103   Temp 99.1 °F (37.3 °C)   Resp 18   Ht 5' 8\" (1.727 m)   Wt 220 lb (99.8 kg)   SpO2 94%   BMI 33.45 kg/m²       Labs:   Recent Labs     06/29/19  0642 06/29/19  0121  06/26/19  1256   WBC  --  7.1   < > 8.3   HGB  --  12.2   < > 12.6   HCT  --  35.1*   < > 35.8*   PLT  --  149*   < > 209   NA  --  140   < > 141   K  --  4.0   < > 4.2   BUN  --  11   < > 16   CREA  --  1.01   < > 1.10   GLU  --  138*   < > 179*   GLUCPOC 161*  --    < >  --    INR  --   --   --  1.2*    < > = values in this interval not displayed. Diagnostics:   PA/lateral: Lungs are clear except for minor right basilar atelectasis/effusion. Patient Instructions/Follow Up Care:  Discharge instructions were reviewed with the patient and family present. Questions were also answered at this time. Prescriptions and medications were reviewed. The patient has a follow up appointment with the Nurse Practitioner or Physician's Assistant next week and with Dr. The [place] of [place] in about 1 month. The patient was also instructed to follow up with his primary care physician as needed. The patient and family were encouraged to call with any questions or concerns.        Signed:  Kennedy Richard NP  6/29/2019  10:50 AM

## 2019-06-29 NOTE — PROGRESS NOTES
Spiritual Care Partner Volunteer visited patient in Rm [id number] on 6/29/19. Documented by:   Chaplain Gonzalez MDiv, MACE  [phone] [phone])

## 2019-06-29 NOTE — PROGRESS NOTES
1930 sbar received from hai fischer     2000 pt sleeping comfortably in bed. VSS    0110 TRANSFER - OUT REPORT:    Verbal report given to Gaby Alvarez (name) on [facility]  being transferred to [facility](unit) for routine progression of care       Report consisted of patients Situation, Background, Assessment and   Recommendations(SBAR). Information from the following report(s) SBAR, Procedure Summary, MAR, Recent Results and Cardiac Rhythm nsr was reviewed with the receiving nurse. Lines:   Peripheral IV 06/28/19 Right Arm (Active)   Site Assessment Clean, dry, & intact 6/28/2019  8:00 PM   Phlebitis Assessment 0 6/28/2019  8:00 PM   Infiltration Assessment 0 6/28/2019  8:00 PM   Dressing Status Clean, dry, & intact 6/28/2019  8:00 PM   Dressing Type Transparent 6/28/2019  8:00 PM   Hub Color/Line Status Blue 6/28/2019  8:00 PM   Action Taken Open ports on tubing capped 6/28/2019  8:00 PM   Alcohol Cap Used Yes 6/28/2019  8:00 PM        Opportunity for questions and clarification was provided.       Patient transported with:   Monitor  Registered Nurse              Problem: Post-procedure Day 1,TAVR  Goal: *Stable Cardiac Rhythm  Outcome: Resolved/Met  Goal: *Hemodynamically stable  Outcome: Resolved/Met  Goal: *Optimal pain control at patient's stated goal  Outcome: Resolved/Met  Goal: *Lungs clear or at baseline  Outcome: Resolved/Met  Goal: *Demonstrates progressive activity  Outcome: Resolved/Met  Goal: *No signs of infection or puncture site complication  Outcome: Resolved/Met  Goal: *Verbalizes and demonstrates puncture site care  Outcome: Resolved/Met  Goal: Activity/Safety  Outcome: Resolved/Met  Goal: Consults  Outcome: Resolved/Met  Goal: Diagnostic Tests/Procedures  Outcome: Resolved/Met  Goal: Nutrition/Diet  Outcome: Resolved/Met  Goal: Discharge Planning  Outcome: Resolved/Met  Goal: Medications  Outcome: Resolved/Met  Goal: Respiratory  Outcome: Resolved/Met  Goal: Treatments/Interventions/Procedures  Outcome: Resolved/Met  Goal: Psychosocial Needs  Outcome: Resolved/Met

## 2019-06-29 NOTE — PROGRESS NOTES
PT Note:    Chart reviewed and attempted to see for PT treatment. Patient currently [facility] for ECHO and xray. Will continue to follow.

## 2019-06-29 NOTE — PROGRESS NOTES
0730:  Bedside shift change report given to Arlen Cook RN (oncoming nurse) by Panda Tompkins RN (offgoing nurse). Report included the following information SBAR, Kardex, Procedure Summary, Intake/Output, MAR and Recent Results. 0845:  Patient off tele for xray and echo. 1219:  Patient back on unit, placed back on tele. 8446:  Patient working with PT.    [phone]:  RN called [address] to notify of patient discharge. 1100: I have reviewed discharge instructions with the patient. The patient verbalized understanding. IV and tele removed. Problem: Falls - Risk of  Goal: *Absence of Falls  Description  Document Hima Bernheim Fall Risk and appropriate interventions in the flowsheet. Outcome: Progressing Towards Goal     Problem: Pressure Injury - Risk of  Goal: *Prevention of pressure injury  Description  Document Harris Scale and appropriate interventions in the flowsheet.   Outcome: Progressing Towards Goal     Problem: Post-procedure,Day 2/Day of Discharge,TAVR  Goal: *Stable Cardiac Rhythm  Outcome: Progressing Towards Goal  Goal: *Hemodynamically stable  Outcome: Progressing Towards Goal

## 2019-06-30 ENCOUNTER — HOME CARE VISIT (OUTPATIENT)
Dept: SCHEDULING | Facility: HOME HEALTH | Age: 69
End: 2019-06-30
Payer: MEDICARE

## 2019-06-30 VITALS
HEART RATE: 77 BPM | TEMPERATURE: 97.9 F | HEIGHT: 68 IN | WEIGHT: 220.02 LBS | RESPIRATION RATE: 18 BRPM | DIASTOLIC BLOOD PRESSURE: 74 MMHG | SYSTOLIC BLOOD PRESSURE: 124 MMHG | BODY MASS INDEX: 33.35 KG/M2 | OXYGEN SATURATION: 99 %

## 2019-06-30 PROCEDURE — 3331090002 HH PPS REVENUE DEBIT

## 2019-06-30 PROCEDURE — G0299 HHS/HOSPICE OF RN EA 15 MIN: HCPCS

## 2019-06-30 PROCEDURE — 400013 HH SOC

## 2019-06-30 PROCEDURE — 65660000001 HC RM ICU INTERMED STEPDOWN

## 2019-06-30 PROCEDURE — 3331090001 HH PPS REVENUE CREDIT

## 2019-07-01 ENCOUNTER — TELEPHONE (OUTPATIENT)
Dept: CASE MANAGEMENT | Age: 69
End: 2019-07-01

## 2019-07-01 PROCEDURE — 3331090001 HH PPS REVENUE CREDIT

## 2019-07-01 PROCEDURE — 3331090002 HH PPS REVENUE DEBIT

## 2019-07-01 PROCEDURE — 65660000001 HC RM ICU INTERMED STEPDOWN

## 2019-07-01 NOTE — TELEPHONE ENCOUNTER
Cardiac Surgery Discharge - Follow up call placed to Peggy Oleary. Reviewed plan of care after discharge and encouraged Peggy Oleary to verbalize. Discussed precautions and reviewed medications, patient without questions regarding medications. Encouraged continued use of the incentive spirometer. Confirmed follow up appts and reinforced importance of wearing red reminder bracelet. Mr Akilah Rod believes he may have cut off bracelet. Will provide him with another bracelet at this follow up appt. Peggy Oleary is without questions or concerns.  Laury Yang RN

## 2019-07-02 ENCOUNTER — OFFICE VISIT (OUTPATIENT)
Dept: CARDIOLOGY CLINIC | Age: 69
End: 2019-07-02

## 2019-07-02 ENCOUNTER — HOME CARE VISIT (OUTPATIENT)
Dept: SCHEDULING | Facility: HOME HEALTH | Age: 69
End: 2019-07-02
Payer: MEDICARE

## 2019-07-02 VITALS
TEMPERATURE: 97.7 F | BODY MASS INDEX: 33.45 KG/M2 | RESPIRATION RATE: 18 BRPM | WEIGHT: 220 LBS | SYSTOLIC BLOOD PRESSURE: 145 MMHG | OXYGEN SATURATION: 98 % | DIASTOLIC BLOOD PRESSURE: 70 MMHG | HEART RATE: 68 BPM

## 2019-07-02 DIAGNOSIS — Z95.2 S/P TAVR (TRANSCATHETER AORTIC VALVE REPLACEMENT): ICD-10-CM

## 2019-07-02 DIAGNOSIS — I35.0 AORTIC VALVE STENOSIS, MODERATE: Primary | ICD-10-CM

## 2019-07-02 PROCEDURE — 3331090002 HH PPS REVENUE DEBIT

## 2019-07-02 PROCEDURE — 3331090001 HH PPS REVENUE CREDIT

## 2019-07-02 PROCEDURE — 65660000001 HC RM ICU INTERMED STEPDOWN

## 2019-07-02 NOTE — PROGRESS NOTES
Patient: Timmy Guzman   Age: 70 y.o. Patient Care Team:  Dena Taylor NP as PCP - General (Nurse Practitioner)  Ryan Leary MD as Surgeon (General Surgery)  Elise Flores MD (Cardiology)  Jonathan Burton RN as Staff Nurse    PCP: Dena Taylor NP    Cardiologist: Dutch Fuentes    Diagnosis/Reason for Consultation: The primary encounter diagnosis was Aortic valve stenosis, moderate. A diagnosis of S/P TAVR (transcatheter aortic valve replacement) was also pertinent to this visit. Problem List:   Patient Active Problem List   Diagnosis Code    High cholesterol E78.00    Heart murmur R01.1    LU (dyspnea on exertion) R06.09    Leg fatigue R29.898    DM (diabetes mellitus) ([facility] [address] E11.9    Diabetic foot ulcer (HCC) E11.621, L97.509    Cellulitis of left lower leg L03. 80    Spinal stenosis of lumbar region at multiple levels M48.061    HTN (hypertension) I10    Aortic valve stenosis, moderate I35.0    Abscess of foot including toes L02.619    Sepsis (HCC) A41.9    Amputated great toe of left foot (HCC) Y45.277I    Abscess of left foot L02.612    Discitis of lumbar region M46.46    Clostridium difficile colitis A04.72    Pulmonary emboli (HCC) I26.99    S/P cardiac cath Z98.890    Aortic stenosis I35.0    S/P TAVR (transcatheter aortic valve replacement) Z95.2         HPI: 70 y.o.  male s/p POD# 6 s/p TAVR (26mm Evolut Pro via R femoral artery) for severe and symptomatic AS on 6/26/2019 as part of our [facility] clinical trial. There were no intra-op complications. He developed a new LBBB and 1st AVB and kept pacing capability and monitoring an extra day. He was discharged to home on POD#3 with a resolved 1st AVB. He is here today for his initial post-op evaluation. Mr. Noemi Rowland reports doing quite well. He is walking more in his yard than he did pre-op and feels better doing it. He reports an improvement in his overall energy as well as his activity tolerance.   He denies any SOB/LU, palpitations,  lightheadednes, syncope, falls, CP, chest tightness, LE or abdoniminal edema, orthopnea or PND. He reports a slightly decreased appetite but routine BMs w/ stool softeners. He denies any blood/blackness/tarriness of his stools. Mr David Singh weighs himself daily and his wt is steady since discharge. He was not discharged w/ his diuretic (Lx 40mg) as he was admitted with. His BP is also a bit higher today than pre-op off the diuretic. He denies any edema and drinks for thirst (5-6 17 oz bottles of water/crystal light). He does not check his BG but was asked to do so at least a few mornings a week prior to breakfast/juice. He is accompanied by his wife today and is very interested in resuming his normal household/yard chores and working on his tractors. PMHx of AS, HTN, HLD, DM (diet), Provoked Bilateral PE's with concern for right heart strain and left lower lobe infarction (2017) in the setting of discitis and C. Diff colitis, s/p Left Great Toe Amputation after diabetic foot infection     Past Medical History:   Diagnosis Date    Arthritis     knees, back    Chronic pain     knees, back    DM (diabetes mellitus) ([facility] [address]     High cholesterol     HTN (hypertension)     currently on no meds    PE (pulmonary thromboembolism) ([facility] [address] 01/2017    bilateral with right heart strain. Treated by Dr. Jamel Allen       Past Surgical History:   Procedure Laterality Date    HX AMPUTATION      vascular; Left great toe amputation    HX ORTHOPAEDIC Left     Left arm fracture & repair    HX OTHER SURGICAL  6/4/15    INCISION AND DRAINAGE, RESECTION OF REMAINING 1ST METATARSAL, INSERTION OF ANTIBIOTIC BEADS      Social History     Tobacco Use    Smoking status: Former Smoker     Packs/day: 3.00     Years: 20.00     Pack years: 60.00     Types: Cigarettes    Smokeless tobacco: Never Used    Tobacco comment: stopped smoking about 30 years ago   Substance Use Topics    Alcohol use:  Yes Alcohol/week: 0.5 oz     Types: 1 Cans of beer per week     Comment: occasionally      Family History   Problem Relation Age of Onset    Heart Disease Mother     Hypertension Mother     Diabetes Mother     Heart Disease Father      Prior to Admission medications    Medication Sig Start Date End Date Taking? Authorizing Provider   TURMERIC PO Take 500 mg by mouth daily. Yes Provider, Historical   aspirin delayed-release 81 mg tablet Take 81 mg by mouth daily. Yes Provider, Historical   polyethylene glycol (MIRALAX) 17 gram packet Take 1 Packet by mouth daily as needed for Other (Constipation) for up to 14 days. 6/29/19 7/13/19 Yes Gabrielle De Santiago NP   ACCU-CHEK KIRILL PLUS TEST STRP strip  3/5/19  Yes Provider, Historical   ACCU-CHEK SOFTCLIX LANCETS misc  3/5/19  Yes Provider, Historical   HYDROcodone-acetaminophen (NORCO) 5-325 mg per tablet Take 1 Tab by mouth every eight (8) hours as needed. 8/6/18  Yes Provider, Historical   multivitamin (ONE A DAY) tablet Take 1 Tab by mouth daily. Yes Provider, Historical   simvastatin (ZOCOR) 10 mg tablet Take  by mouth nightly. Yes Provider, Historical   metFORMIN (GLUCOPHAGE) 500 mg tablet Take 500 mg by mouth daily (with breakfast). Provider, Historical   senna-docusate (PERICOLACE) 8.6-50 mg per tablet Take 1 Tab by mouth two (2) times daily as needed for Constipation for up to 30 days.  6/29/19 7/29/19  Wenceslao Francois NP       Allergies   Allergen Reactions    Chlorhexidine Towelette Rash    Contrast Agent [Iodine] Other (comments)     He had flushing, blistering and scabbing on chest and both arms right after Cath and CTA/ His iv infiltrated with the administration of the dye    Vancomycin Rash     Patient broke out in large hive below IV site and c/o itching to area, patient states that \"that it was more likely an infiltration as opposed to an actual reaction\"       Current Medications:   Current Outpatient Medications   Medication Sig Dispense Refill    TURMERIC PO Take 500 mg by mouth daily.  aspirin delayed-release 81 mg tablet Take 81 mg by mouth daily.  polyethylene glycol (MIRALAX) 17 gram packet Take 1 Packet by mouth daily as needed for Other (Constipation) for up to 14 days. 14 Packet 0    ACCU-CHEK KIRILL PLUS TEST STRP strip       ACCU-CHEK SOFTCLIX LANCETS misc       HYDROcodone-acetaminophen (NORCO) 5-325 mg per tablet Take 1 Tab by mouth every eight (8) hours as needed.  multivitamin (ONE A DAY) tablet Take 1 Tab by mouth daily.  simvastatin (ZOCOR) 10 mg tablet Take  by mouth nightly.  metFORMIN (GLUCOPHAGE) 500 mg tablet Take 500 mg by mouth daily (with breakfast).  senna-docusate (PERICOLACE) 8.6-50 mg per tablet Take 1 Tab by mouth two (2) times daily as needed for Constipation for up to 30 days. 60 Tab 0       Vitals: Blood pressure 145/70, pulse 68, temperature 97.7 °F (36.5 °C), resp. rate 18, weight 220 lb (99.8 kg), SpO2 98 %. Allergies: is allergic to chlorhexidine towelette; contrast agent [iodine]; and vancomycin.     Review of Systems: Pertinent Positives per HPI   [x]Total of 13 systems reviewed as follows:  Constitutional: Negative fever, negative chills  Eyes:   Negative for amauroses fugax  ENT:   Negative sore throat,oral absecess  Endocrine Negative for thyroid replacement Rx; goiter  Respiratory:  Negative chronic cough,sputum production  Cards:   Negative for palpitations, lower extremity edema, varicosities, claudication  GI:   Negative for dysphagia, bleeding, nausea, vomiting, diarrhea, and abdominal pain  Genitourinary: Negative for frequency, dysuria  Integument:  Negative for rash and pruritus  Hematologic:  Negative for easy bruising; bleeding dyscarsia  Musculoskel: Negative for muscle weakness inhibiting ambulation  Neurological:  Negative for stroke, TIA, syncope, dizziness  Behavl/Psych: Negative for feelings of anxiety, depression     Cardiovascular Testing:   EKG 6/29/2019 (discharge): SR 85 bpm. LBBB.  msec    TTE 6/29/2019 (discharge): Interpretation Summary   · Mitral Valve: Mitral valve thickening. Mild mitral annular calcification. Mild mitral valve regurgitation. · Aortic Valve: Prosthetic aortic valve. There is a prosthetic aortic valve from prior TAVR procedure. Prosthesis is normal.  · Left Ventricle: Normal cavity size, wall thickness and systolic function (ejection fraction normal). Estimated left ventricular ejection fraction is 56 - 60%. Mild (grade 1) left ventricular diastolic dysfunction. Echo Findings     Left Ventricle Normal cavity size, wall thickness and systolic function (ejection fraction normal). The estimated ejection fraction is 56 - 60%. There is mild (grade 1) left ventricular diastolic dysfunction. Left Atrium Normal cavity size. Right Ventricle Normal cavity size, wall thickness and global systolic function. Right Atrium Normal cavity size. Aortic Valve No stenosis and no regurgitation. Prosthetic aortic valve. There is a TAVR present. The prosthetic valve is normal.   Mitral Valve No stenosis. Mitral valve thickening. Mild mitral annular calcification. Mild regurgitation. Tricuspid Valve Normal valve structure and no stenosis. Trace tricuspid valve regurgitation. Pulmonary arterial systolic pressure is 71.4 mmHg. Pulmonic Valve Pulmonic valve not well visualized. Normal valve structure, no stenosis and no regurgitation. Aorta Normal aortic root. IVC/Hepatic Veins Normal structure. Normal central venous pressure (0-5 mmHg); IVC diameter is less than 21 mm and collapses more than 50% with respiration. Pericardium Normal pericardium and no evidence of pericardial effusion.      2D Volume Measurements      ESV ESV Index   LA Biplane 75.77 mL (Range: 18 - 58)       35.6 ml/m2 (Range: 16 - 28)         LA A4C 64.04 mL (Range: 18 - 58)       30.09 ml/m2 (Range: 16 - 28)         LA A2C 76.47 mL (Range: 18 - 58)       35.93 ml/m2 (Range: 16 - 28)               2D/M-Mode Measurements     Dimensions   Measurement Value (Range)   LVIDs 3.14 cm      LVIDd 4.38 cm (4.2 - 5.9)      IVSd 1.16 cm (0.6 - 1.0)      LVPWd 1.09 cm (0.6 - 1.0)      LV Mass .3 g (88 - 224)      LV Mass AL Index 94.6 g/m2 (49 - 115)      Left Atrium Major Axis 4.27 cm      LA Area 4C 21.5 cm2      Left Atrium to Aortic Root Ratio 1.29       Tapse 3 cm (1.5 - 2.0)      RVIDd 4.34 cm                Aorta Measurements     Aorta   Measurement Value (Range)   Ao Root D 3.31 cm             Aortic Valve Measurements     Stenosis   Aortic Valve Systolic Peak Velocity 194.58 cm/s      AoV PG 9.1 mmHg      Aortic Valve Systolic Mean Gradient 5.8 mmHg      AoV VTI 26.95 cm      Aortic Valve Area by Continuity of VTI 3.1 cm2      Aortic Valve Area by Continuity of Peak Velocity 3 cm2       LVOT   LVOT Peak Velocity 120.68 cm/s      LVOT Peak Gradient 5.8 mmHg      LVOT VTI 22.51 cm      LVOT d 2.19 cm              Mitral Valve Measurements     Stenosis   MV Mean Gradient 2.6 mmHg      MV Peak Gradient 5.2 mmHg      Mitral Valve Pressure Half-time 51.7 ms      Mitral Valve Max Velocity 114.47 cm/s      MVA (PHT) 4.3 cm2       Regurgitation   Mitral Regurgitant Peak Velocity 441.62 cm/s      MVA VTI 3.5 cm2      MR Peak Gradient 78 mmHg              Tricuspid Valve Measurements     Stenosis   Est. RA Pressure 3 mmHg      PASP 35.1 mmHg      RVSP 31.1 mmHg       Regurgitation   Triscuspid Valve Regurgitation Peak Gradient 28.1 mmHg      TR Max Velocity 264.95 cm/s              Pulmonary Measurements     Stenosis/Regurgitation   PV peak gradient 3.8 mmHg      Pulmonic Valve Max Velocity 97 cm/s              Diastolic Filling/Shunts     Diastolic Filling   LV E' Septal Velocity 5.57 cm/s      E/E' septal 16.2       LV E' Lateral Velocity 7.23 cm/s      E/E' lateral 12.48       E/E' ratio (averaged) 14.34       Mitral Valve E Wave Deceleration Time 178.4 ms      MV E Antonio 90.23 cm/s      MV A Antonio 113.06 cm/s      MV E/A 0.8        Shunt   LVOT SV 84.6 ml              Physical Exam:  General: Well nourished well groomed gentleman appearing stated age accompanied by his wife  Neuro: A&OX3. ANAND. PERRL. Steady un-assisted gait  Head:Normocephalic. Atraumatic. Symmetrical  Neck: Trachea Midline  Resp: CTA B. No Adv BS/cough/sputum/tachypnea with seated conversation  CV: S1S2 RRR. No M/R/G/JVD/carotid bruits. Pink/warm/dry extremities. No LE peripheral edema. L ankle arias than R d/t altered vasculature s/p toe amputation  GI:Benign ab. Soft. NT/ND. Active BS  : Voids  Integ: B groin puncture sites open to air and healing well. Mild tenderness with palpation. No palpable masses. No s/s of infection. Musculo/Skeletal: FROM in all major joints. Good muscle tone. Missing toes on L foot    Assessment/Plan:   1.  AS s/p TAVR (26mm Evolut Pro via R femoral artery) on 6/26/2019 as part of our [facility] clinical trial - Doing quite well. Encouraged to increase daily cumulative activity. New LBBB at discharge. ASA 81 mg daily for valve patency. Only has 1 tooth. Dicussed good oral hygiene/brushing gums and endocarditis risks. Has 30 day f/u on  7/22 at 1230 w/ TTE per TVT Registry Guidelines. Will need f/u with primary cardiologist 1-2 months after this. 2.HTN - slightly elevated today but discharged w/o furosemide. Wt unchanged. Pt to monitor wt and BP (By [place]) and call if wt up > 5 lbs or SBP > 160.  3. DM - pt had been taken off metformin by PCP prior to TAVR d/t good HgbA1C. Not currently taking. Asked him to start taking fasting BG several times/week. Pt reports dizziness if BG < 130  4.  [facility] trial participant - further f/u per study protocol

## 2019-07-03 PROCEDURE — 3331090002 HH PPS REVENUE DEBIT

## 2019-07-03 PROCEDURE — 3331090001 HH PPS REVENUE CREDIT

## 2019-07-04 PROCEDURE — 3331090002 HH PPS REVENUE DEBIT

## 2019-07-04 PROCEDURE — 3331090001 HH PPS REVENUE CREDIT

## 2019-07-05 ENCOUNTER — HOME CARE VISIT (OUTPATIENT)
Dept: SCHEDULING | Facility: HOME HEALTH | Age: 69
End: 2019-07-05
Payer: MEDICARE

## 2019-07-05 PROCEDURE — 3331090001 HH PPS REVENUE CREDIT

## 2019-07-05 PROCEDURE — G0300 HHS/HOSPICE OF LPN EA 15 MIN: HCPCS

## 2019-07-05 PROCEDURE — 3331090002 HH PPS REVENUE DEBIT

## 2019-07-06 PROCEDURE — 3331090001 HH PPS REVENUE CREDIT

## 2019-07-06 PROCEDURE — 3331090002 HH PPS REVENUE DEBIT

## 2019-07-07 VITALS
OXYGEN SATURATION: 98 % | HEART RATE: 65 BPM | TEMPERATURE: 97.1 F | RESPIRATION RATE: 18 BRPM | WEIGHT: 217 LBS | DIASTOLIC BLOOD PRESSURE: 72 MMHG | SYSTOLIC BLOOD PRESSURE: 126 MMHG | BODY MASS INDEX: 32.99 KG/M2

## 2019-07-07 PROCEDURE — 3331090001 HH PPS REVENUE CREDIT

## 2019-07-07 PROCEDURE — 3331090002 HH PPS REVENUE DEBIT

## 2019-07-08 PROCEDURE — 3331090002 HH PPS REVENUE DEBIT

## 2019-07-08 PROCEDURE — 3331090001 HH PPS REVENUE CREDIT

## 2019-07-09 ENCOUNTER — HOME CARE VISIT (OUTPATIENT)
Dept: SCHEDULING | Facility: HOME HEALTH | Age: 69
End: 2019-07-09
Payer: MEDICARE

## 2019-07-09 PROCEDURE — 3331090001 HH PPS REVENUE CREDIT

## 2019-07-09 PROCEDURE — 3331090002 HH PPS REVENUE DEBIT

## 2019-07-10 ENCOUNTER — HOME CARE VISIT (OUTPATIENT)
Dept: SCHEDULING | Facility: HOME HEALTH | Age: 69
End: 2019-07-10
Payer: MEDICARE

## 2019-07-10 PROCEDURE — 3331090002 HH PPS REVENUE DEBIT

## 2019-07-10 PROCEDURE — 3331090001 HH PPS REVENUE CREDIT

## 2019-07-11 PROCEDURE — 3331090001 HH PPS REVENUE CREDIT

## 2019-07-11 PROCEDURE — 3331090002 HH PPS REVENUE DEBIT

## 2019-07-12 ENCOUNTER — HOME CARE VISIT (OUTPATIENT)
Dept: SCHEDULING | Facility: HOME HEALTH | Age: 69
End: 2019-07-12
Payer: MEDICARE

## 2019-07-12 VITALS
RESPIRATION RATE: 18 BRPM | TEMPERATURE: 97.8 F | SYSTOLIC BLOOD PRESSURE: 120 MMHG | DIASTOLIC BLOOD PRESSURE: 78 MMHG | OXYGEN SATURATION: 96 % | HEART RATE: 78 BPM

## 2019-07-12 PROCEDURE — G0299 HHS/HOSPICE OF RN EA 15 MIN: HCPCS

## 2019-07-12 PROCEDURE — 3331090002 HH PPS REVENUE DEBIT

## 2019-07-12 PROCEDURE — 3331090003 HH PPS REVENUE ADJ

## 2019-07-12 PROCEDURE — 3331090001 HH PPS REVENUE CREDIT

## 2019-07-13 PROCEDURE — 3331090001 HH PPS REVENUE CREDIT

## 2019-07-13 PROCEDURE — 3331090002 HH PPS REVENUE DEBIT

## 2019-07-14 PROCEDURE — 3331090002 HH PPS REVENUE DEBIT

## 2019-07-14 PROCEDURE — 3331090001 HH PPS REVENUE CREDIT

## 2019-07-22 ENCOUNTER — HOSPITAL ENCOUNTER (OUTPATIENT)
Dept: NON INVASIVE DIAGNOSTICS | Age: 69
Discharge: HOME | End: 2019-07-22
Attending: NURSE PRACTITIONER
Payer: MEDICARE

## 2019-07-22 ENCOUNTER — OFFICE VISIT (OUTPATIENT)
Dept: CARDIOLOGY CLINIC | Age: 69
End: 2019-07-22

## 2019-07-22 VITALS
BODY MASS INDEX: 33.34 KG/M2 | SYSTOLIC BLOOD PRESSURE: 140 MMHG | RESPIRATION RATE: 16 BRPM | HEART RATE: 62 BPM | OXYGEN SATURATION: 97 % | HEIGHT: 68 IN | TEMPERATURE: 97.3 F | WEIGHT: 220 LBS | DIASTOLIC BLOOD PRESSURE: 70 MMHG

## 2019-07-22 DIAGNOSIS — I35.0 AORTIC VALVE STENOSIS, MODERATE: Primary | ICD-10-CM

## 2019-07-22 DIAGNOSIS — Z95.2 S/P TAVR (TRANSCATHETER AORTIC VALVE REPLACEMENT): Primary | ICD-10-CM

## 2019-07-22 DIAGNOSIS — E11.8 TYPE 2 DIABETES MELLITUS WITH COMPLICATION, WITHOUT LONG-TERM CURRENT USE OF INSULIN (HCC): ICD-10-CM

## 2019-07-22 DIAGNOSIS — I10 ESSENTIAL HYPERTENSION: ICD-10-CM

## 2019-07-22 DIAGNOSIS — R06.09 DOE (DYSPNEA ON EXERTION): ICD-10-CM

## 2019-07-22 DIAGNOSIS — Z95.2 S/P TAVR (TRANSCATHETER AORTIC VALVE REPLACEMENT): ICD-10-CM

## 2019-07-22 DIAGNOSIS — I35.0 AORTIC VALVE STENOSIS, MODERATE: ICD-10-CM

## 2019-07-22 DIAGNOSIS — Z00.6 CLINICAL TRIAL PARTICIPANT: ICD-10-CM

## 2019-07-22 DIAGNOSIS — E78.00 HIGH CHOLESTEROL: ICD-10-CM

## 2019-07-22 LAB
ATRIAL RATE: 59 BPM
CALCULATED P AXIS, ECG09: 64 DEGREES
CALCULATED R AXIS, ECG10: 81 DEGREES
CALCULATED T AXIS, ECG11: 74 DEGREES
DIAGNOSIS, 93000: NORMAL
P-R INTERVAL, ECG05: 174 MS
Q-T INTERVAL, ECG07: 400 MS
QRS DURATION, ECG06: 108 MS
QTC CALCULATION (BEZET), ECG08: 396 MS
VENTRICULAR RATE, ECG03: 59 BPM

## 2019-07-22 PROCEDURE — 93306 TTE W/DOPPLER COMPLETE: CPT

## 2019-07-22 PROCEDURE — 93005 ELECTROCARDIOGRAM TRACING: CPT

## 2019-07-22 NOTE — PROGRESS NOTES
Patient: Narayan Dpuree   Age: 78 y.o. Patient Care Team:  Joseline Cantu NP as PCP - General (Nurse Practitioner)  Isidoro Howard MD as Surgeon (General Surgery)  Immanuel Chatman MD (Cardiology)  Danny Boast, RN as Staff Nurse    PCP: Joseline Cantu NP    Cardiologist: Marlene Nur    Diagnosis/Reason for Consultation: The primary encounter diagnosis was Aortic valve stenosis, moderate. Diagnoses of S/P TAVR (transcatheter aortic valve replacement), Type 2 diabetes mellitus with complication, without long-term current use of insulin ([address] [address] Essential hypertension, High cholesterol, and Clinical trial participant were also pertinent to this visit. Problem List:   Patient Active Problem List   Diagnosis Code    High cholesterol E78.00    Heart murmur R01.1    LU (dyspnea on exertion) R06.09    Leg fatigue R29.898    DM (diabetes mellitus) ([facility] [address] E11.9    Diabetic foot ulcer (HCC) E11.621, L97.509    Cellulitis of left lower leg L03. 80    Spinal stenosis of lumbar region at multiple levels M48.061    HTN (hypertension) I10    Aortic valve stenosis, moderate I35.0    Abscess of foot including toes L02.619    Sepsis (HCC) A41.9    Amputated great toe of left foot (HCC) S19.520W    Abscess of left foot L02.612    Discitis of lumbar region M46.46    Clostridium difficile colitis A04.72    Pulmonary emboli (HCC) I26.99    S/P cardiac cath Z98.890    Aortic stenosis I35.0    S/P TAVR (transcatheter aortic valve replacement) Z95.2    Claudication of both lower extremities (HCC) I73.9      HPI: 78 y.o.  male s/p TAVR (26mm Evolut Pro via R femoral artery) for severe and symptomatic AS on 6/26/2019 as part of our [facility] clinical trial. There were no intra-op complications. He developed a new LBBB and 1st AVB and kept pacing capability and monitoring an extra day. He was discharged to home on POD#3 with a resolved 1st AVB.  He is here today for his 30 day post-op evaluation.     Mr. Adrián Alcaraz reports doing quite well but states his activity is now limited by his knee pain. He is walking more in his yard than he did pre-op and feels better doing it. He reports an improvement in his overall energy as well as his activity tolerance. He has been out in the extreme heat the past few days and admitted to some mild lightheadedness then but not routinely, after he cooled off or prior. He denies any SOB/LU, palpitations, syncope, falls, CP, chest tightness, LE or abdoniminal edema, orthopnea or PND. He reports a normal appetite. He denies any blood/blackness/tarriness of his stools. He has been checking his am BG a few times/week and reports -170.      Mr Adrián Alcaraz weighs himself daily and his wt is steady since discharge. He was not discharged w/ his diuretic (Lx 40mg) as he was admitted with. His BP is also a bit higher today than pre-op off the diuretic. He denies any edema and drinks for thirst (5-6 17 oz bottles of water/crystal light).        PMHx of AS, HTN, HLD, DM (diet), Provoked Bilateral PE's with concern for right heart strain and left lower lobe infarction (2017) in the setting of discitis and C. Diff colitis, s/p Left Great Toe Amputation after diabetic foot infection     NYHA Classification: I   Class I (Mild): No limitation of physical activity. Ordinary physical activity does not cause undue fatigue, palpitation, or dyspnea. Angina Classification: 0   Class 0: No symptoms    Past Medical History:   Diagnosis Date    Arthritis     knees, back    Chronic pain     knees, back    DM (diabetes mellitus) ([address]     High cholesterol     HTN (hypertension)     currently on no meds    PE (pulmonary thromboembolism) ([facility] 01/2017    bilateral with right heart strain.  Treated by Dr. Anupama Rhodes       Past Surgical History:   Procedure Laterality Date    HX AMPUTATION      vascular; Left great toe amputation    HX ORTHOPAEDIC Left     Left arm fracture & repair    HX OTHER SURGICAL  6/4/15    INCISION AND DRAINAGE, RESECTION OF REMAINING 1ST METATARSAL, INSERTION OF ANTIBIOTIC BEADS      Social History     Tobacco Use    Smoking status: Former Smoker     Packs/day: 3.00     Years: 20.00     Pack years: 60.00     Types: Cigarettes    Smokeless tobacco: Never Used    Tobacco comment: stopped smoking about 30 years ago   Substance Use Topics    Alcohol use: Yes     Alcohol/week: 0.8 standard drinks     Types: 1 Cans of beer per week     Comment: occasionally      Family History   Problem Relation Age of Onset    Heart Disease Mother     Hypertension Mother     Diabetes Mother     Heart Disease Father      Prior to Admission medications    Medication Sig Start Date End Date Taking? Authorizing Provider   simvastatin (ZOCOR) 10 mg tablet Take 10 mg by mouth nightly. 7/8/19  Yes Provider, Historical   HYDROcodone-acetaminophen (NORCO) 5-325 mg per tablet Take 1 Tab by mouth every eight (8) hours as needed for Pain. 7/8/19  Yes Provider, Historical   TURMERIC PO Take 500 mg by mouth daily. Yes Provider, Historical   aspirin delayed-release 81 mg tablet Take 81 mg by mouth daily. Yes Provider, Historical   ACCU-CHEK KIRILL PLUS TEST STRP strip  3/5/19  Yes Provider, Historical   ACCU-CHEK SOFTCLIX LANCETS misc  3/5/19  Yes Provider, Historical   multivitamin (ONE A DAY) tablet Take 1 Tab by mouth daily. Yes Provider, Historical   furosemide (LASIX) 40 mg tablet Take 20 mg by mouth daily.  6/28/19   Provider, Historical       Allergies   Allergen Reactions    Chlorhexidine Towelette Rash    Contrast Agent [Iodine] Other (comments)     He had flushing, blistering and scabbing on chest and both arms right after Cath and CTA/ His iv infiltrated with the administration of the dye    Vancomycin Rash     Patient broke out in large hive below IV site and c/o itching to area, patient states that \"that it was more likely an infiltration as opposed to an actual reaction\" Current Medications:   Current Outpatient Medications   Medication Sig Dispense Refill    simvastatin (ZOCOR) 10 mg tablet Take 10 mg by mouth nightly.  HYDROcodone-acetaminophen (NORCO) 5-325 mg per tablet Take 1 Tab by mouth every eight (8) hours as needed for Pain.  TURMERIC PO Take 500 mg by mouth daily.  aspirin delayed-release 81 mg tablet Take 81 mg by mouth daily.  ACCU-CHEK KIRILL PLUS TEST STRP strip       ACCU-CHEK SOFTCLIX LANCETS misc       multivitamin (ONE A DAY) tablet Take 1 Tab by mouth daily.  furosemide (LASIX) 40 mg tablet Take 20 mg by mouth daily. Vitals: Blood pressure 140/70, pulse 62, temperature 97.3 °F (36.3 °C), temperature source Oral, resp. rate 16, height 5' 8\" (1.727 m), weight 220 lb (99.8 kg), SpO2 97 %. Allergies: is allergic to chlorhexidine towelette; contrast agent [iodine]; and vancomycin. Review of Systems: Pertinent Positives per HPI   [x]Total of 13 systems reviewed as follows:  Constitutional: Negative fever, negative chills  Eyes:               Negative for amauroses fugax  ENT:                Negative sore throat,oral absecess  Endocrine        Negative for thyroid replacement Rx; goiter  Respiratory:     Negative chronic cough,sputum production  Cards:              Negative for palpitations, lower extremity edema, varicosities, claudication  GI:                   Negative for dysphagia, bleeding, nausea, vomiting, diarrhea, and abdominal pain  Genitourinary: Negative for frequency, dysuria  Integument:     Negative for rash and pruritus  Hematologic:   Negative for easy bruising; bleeding dyscarsia  Musculoskel:   Negative for muscle weakness inhibiting ambulation  Neurological:   Negative for stroke, TIA, syncope, dizziness  Behavl/Psych: Negative for feelings of anxiety, depression      Cardiovascular Testing:   EKG 6/29/2019 (discharge): SR 85 bpm. LBBB.  msec    EKG (today): SB 59 bpm. Inferior Q waves.  No blocks/axis deviations     TTE 6/29/2019 (discharge): Interpretation Summary   · Mitral Valve: Mitral valve thickening. Mild mitral annular calcification. Mild mitral valve regurgitation. · Aortic Valve: Prosthetic aortic valve. There is a prosthetic aortic valve from prior TAVR procedure. Prosthesis is normal.  · Left Ventricle: Normal cavity size, wall thickness and systolic function (ejection fraction normal). Estimated left ventricular ejection fraction is 56 - 60%. Mild (grade 1) left ventricular diastolic dysfunction. Echo Findings    Left Ventricle Normal cavity size, wall thickness and systolic function (ejection fraction normal). The estimated ejection fraction is 56 - 60%. There is mild (grade 1) left ventricular diastolic dysfunction. Left Atrium Normal cavity size. Right Ventricle Normal cavity size, wall thickness and global systolic function. Right Atrium Normal cavity size. Aortic Valve No stenosis and no regurgitation. Prosthetic aortic valve. There is a TAVR present. The prosthetic valve is normal.   Mitral Valve No stenosis. Mitral valve thickening. Mild mitral annular calcification. Mild regurgitation. Tricuspid Valve Normal valve structure and no stenosis. Trace tricuspid valve regurgitation. Pulmonary arterial systolic pressure is 72.9 mmHg. Pulmonic Valve Pulmonic valve not well visualized. Normal valve structure, no stenosis and no regurgitation. Aorta Normal aortic root. IVC/Hepatic Veins Normal structure. Normal central venous pressure (0-5 mmHg); IVC diameter is less than 21 mm and collapses more than 50% with respiration.    Pericardium Normal pericardium and no evidence of pericardial effusion.      2D Volume Measurements        ESV ESV Index   LA Biplane 75.77 mL (Range: 18 - 58)       35.6 ml/m2 (Range: 16 - 28)         LA A4C 64.04 mL (Range: 18 - 58)       30.09 ml/m2 (Range: 16 - 28)         LA A2C 76.47 mL (Range: 18 - 58)       35.93 ml/m2 (Range: 16 - 28)     2D/M-Mode Measurements        Dimensions   Measurement Value (Range)   LVIDs 3.14 cm      LVIDd 4.38 cm (4.2 - 5.9)      IVSd 1.16 cm (0.6 - 1.0)      LVPWd 1.09 cm (0.6 - 1.0)      LV Mass .3 g (88 - 224)      LV Mass AL Index 94.6 g/m2 (49 - 115)      Left Atrium Major Axis 4.27 cm      LA Area 4C 21.5 cm2      Left Atrium to Aortic Root Ratio 1.29       Tapse 3 cm (1.5 - 2.0)      RVIDd 4.34 cm                  Aorta Measurements    Aorta   Measurement Value (Range)   Ao Root D 3.31 cm              Aortic Valve Measurements        Stenosis   Aortic Valve Systolic Peak Velocity 743.70 cm/s      AoV PG 9.1 mmHg      Aortic Valve Systolic Mean Gradient 5.8 mmHg      AoV VTI 26.95 cm      Aortic Valve Area by Continuity of VTI 3.1 cm2      Aortic Valve Area by Continuity of Peak Velocity 3 cm2           LVOT   LVOT Peak Velocity 120.68 cm/s      LVOT Peak Gradient 5.8 mmHg      LVOT VTI 22.51 cm      LVOT d 2.19 cm                Mitral Valve Measurements        Stenosis   MV Mean Gradient 2.6 mmHg      MV Peak Gradient 5.2 mmHg      Mitral Valve Pressure Half-time 51.7 ms      Mitral Valve Max Velocity 114.47 cm/s      MVA (PHT) 4.3 cm2           Regurgitation   Mitral Regurgitant Peak Velocity 441.62 cm/s      MVA VTI 3.5 cm2      MR Peak Gradient 78 mmHg                Tricuspid Valve Measurements        Stenosis   Est. RA Pressure 3 mmHg      PASP 35.1 mmHg      RVSP 31.1 mmHg           Regurgitation   Triscuspid Valve Regurgitation Peak Gradient 28.1 mmHg      TR Max Velocity 264.95 cm/s                Pulmonary Measurements        Stenosis/Regurgitation   PV peak gradient 3.8 mmHg      Pulmonic Valve Max Velocity 97 cm/s                Diastolic Filling/Shunts        Diastolic Filling   LV E' Septal Velocity 5.57 cm/s      E/E' septal 16.2       LV E' Lateral Velocity 7.23 cm/s      E/E' lateral 12.48       E/E' ratio (averaged) 14.34       Mitral Valve E Wave Deceleration Time 178.4 ms      MV E Antonio 90.23 cm/s      MV A Antonio 113.06 cm/s      MV E/A 0.8            Shunt   LVOT SV 84.6 ml               TTE (today): Images reviewed by Dr. Lyndsey Phan. Report not available at time of note.     Physical Exam:  General: Well nourished well groomed gentleman appearing stated age accompanied by his wife  Neuro: A&OX3. ANAND. PERRL. Steady un-assisted gait  Head:Normocephalic. Atraumatic. Symmetrical  Neck: Trachea Midline  Resp: CTA B. No Adv BS/cough/sputum/tachypnea with seated conversation  CV: S1S2 RRR. No M/R/G/JVD/carotid bruits. Pink/warm/dry extremities. No LE peripheral edema. L ankle arias than R d/t altered vasculature s/p toe amputation  GI:Benign ab. Soft. NT/ND. Active BS  : Voids  Integ: B groin puncture sites open to air and healing well. Mild tenderness with palpation. No palpable masses. No s/s of infection. Musculo/Skeletal: FROM in all major joints. Good muscle tone. Missing toes on L foot    Clinic Evaluation:   KCCQ-12: scanned into EMR    5 meter gait: 7.07 seconds      Assessment/Plan:   1.  AS s/p TAVR (26mm Evolut Pro via R femoral artery) on 6/26/2019 as part of our [facility] clinical trial - Doing quite well. Encouraged to increase daily cumulative activity. New LBBB at discharge resolved on today's EKG. ASA 81 mg daily for valve patency. Only has 1 tooth. Dicussed good oral hygiene/brushing gums and endocarditis risks. Has f/u with primary cardiologist at end of this month. RTC at one yr anniversary for eval and TTE per TVT Registry Guidelines. 2.HTN - slightly elevated today. Since sees primary cards in less than a week will defer for their judgement/plan  3. DM - pt had been taken off metformin by PCP prior to TAVR d/t good HgbA1C. Not currently taking. Asked him to start taking fasting BG several times/week. Pt reports dizziness if BG < 130. AM -170 per pt/wife and he checks q 2-3 days. 4. LR AMRIK trial participant - further f/u per study protocol   5.  Further care/plan per  Lois Valdez

## 2019-07-22 NOTE — PROGRESS NOTES
Pt seen and echo reviewed  He is doing well and notes symx improvement   See full note by Natalie Eaton  Echo quality sub optimal and pressure tracings not accurate   Will repeat study today  Follow up in one year

## 2019-07-23 LAB
ECHO AV AREA PEAK VELOCITY: 1.3 CM2
ECHO AV AREA VTI: 1.6 CM2
ECHO AV AREA/BSA PEAK VELOCITY: 0.5 CM2/M2
ECHO AV AREA/BSA VTI: 0.7 CM2/M2
ECHO AV MEAN GRADIENT: 4.7 MMHG
ECHO AV PEAK GRADIENT: 13 MMHG
ECHO AV PEAK VELOCITY: 180 CM/S
ECHO AV VTI: 38.4 CM
ECHO EST RA PRESSURE: 3 MMHG
ECHO LV INTERNAL DIMENSION DIASTOLIC: 3.61 CM (ref 4.2–5.9)
ECHO LV INTERNAL DIMENSION SYSTOLIC: 2.75 CM
ECHO LV IVSD: 0.65 CM (ref 0.6–1)
ECHO LV MASS 2D: 87.3 G (ref 88–224)
ECHO LV MASS INDEX 2D: 41 G/M2 (ref 49–115)
ECHO LV POSTERIOR WALL DIASTOLIC: 0.98 CM (ref 0.6–1)
ECHO LVOT DIAM: 1.8 CM
ECHO LVOT PEAK GRADIENT: 3.2 MMHG
ECHO LVOT PEAK VELOCITY: 90 CM/S
ECHO LVOT SV: 60.1 ML
ECHO LVOT VTI: 23.6 CM
ECHO PULMONARY ARTERY SYSTOLIC PRESSURE (PASP): 21.8 MMHG
ECHO RIGHT VENTRICULAR SYSTOLIC PRESSURE (RVSP): 21.8 MMHG
ECHO RV INTERNAL DIMENSION: 3.09 CM
ECHO RV TAPSE: 2.98 CM (ref 1.5–2)
ECHO TV REGURGITANT MAX VELOCITY: 216.84 CM/S
ECHO TV REGURGITANT PEAK GRADIENT: 18.8 MMHG

## 2019-07-26 ENCOUNTER — TELEPHONE (OUTPATIENT)
Dept: CARDIAC REHAB | Age: 69
End: 2019-07-26

## 2019-07-26 NOTE — TELEPHONE ENCOUNTER
7/26/2019 Cardiac Rehab: Called . Dax Guzman to discuss participation in the Cardiac Rehab Program following TAVR on 6/26/2019. Mr. Amisha Beckham wife is without questions or concerns. Left  message with her to get Mr. Gay Morse to call us when he has a chance. Mrs. Gay Morse said he has gone back to work even though he retired before his hospital stay and he is feeling pretty well.  Chidi Bowen

## 2019-07-30 ENCOUNTER — OFFICE VISIT (OUTPATIENT)
Dept: CARDIOLOGY CLINIC | Age: 69
End: 2019-07-30

## 2019-07-30 VITALS
DIASTOLIC BLOOD PRESSURE: 64 MMHG | HEIGHT: 68 IN | OXYGEN SATURATION: 96 % | SYSTOLIC BLOOD PRESSURE: 120 MMHG | BODY MASS INDEX: 33.3 KG/M2 | RESPIRATION RATE: 16 BRPM | HEART RATE: 70 BPM | WEIGHT: 219.7 LBS

## 2019-07-30 DIAGNOSIS — E78.00 HIGH CHOLESTEROL: Chronic | ICD-10-CM

## 2019-07-30 DIAGNOSIS — I10 HYPERTENSION, UNSPECIFIED TYPE: Primary | ICD-10-CM

## 2019-07-30 DIAGNOSIS — I73.9 CLAUDICATION OF BOTH LOWER EXTREMITIES (HCC): ICD-10-CM

## 2019-07-30 DIAGNOSIS — Z95.2 S/P TAVR (TRANSCATHETER AORTIC VALVE REPLACEMENT): ICD-10-CM

## 2019-07-30 RX ORDER — FUROSEMIDE 40 MG/1
20 TABLET ORAL DAILY
COMMUNITY
Start: 2019-06-28

## 2019-07-30 NOTE — PROGRESS NOTES
Chief Complaint   Patient presents with    Hypertension     3 month follow up     1. Have you been to the ER, urgent care clinic since your last visit? Hospitalized since your last visit? No    2. Have you seen or consulted any other health care providers outside of the [address] [place] [address] since your last visit? Include any pap smears or colon screening.  No

## 2019-07-30 NOTE — PROGRESS NOTES
7/30/2019 10:35 AM      Subjective:     Jesusita Lowe is here for f/u visit. Since TAVR feels much better. Main complaint today is LE pain, especially on walking. He denies chest pain, chest pressure/discomfort, dyspnea, palpitations, irregular heart beats, near-syncope, syncope, fatigue, orthopnea, paroxysmal nocturnal dyspnea, exertional chest pressure/discomfort, lower extremity edema. Visit Vitals  /64 (BP 1 Location: Left arm, BP Patient Position: Sitting)   Pulse 70   Resp 16   Ht 5' 8\" (1.727 m)   Wt 219 lb 11.2 oz (99.7 kg)   SpO2 96%   BMI 33.41 kg/m²     Current Outpatient Medications   Medication Sig    furosemide (LASIX) 40 mg tablet Take 20 mg by mouth daily.  simvastatin (ZOCOR) 10 mg tablet Take 10 mg by mouth nightly.  HYDROcodone-acetaminophen (NORCO) 5-325 mg per tablet Take 1 Tab by mouth every eight (8) hours as needed for Pain.  TURMERIC PO Take 500 mg by mouth daily.  aspirin delayed-release 81 mg tablet Take 81 mg by mouth daily.  ACCU-CHEK KIRILL PLUS TEST STRP strip     ACCU-CHEK SOFTCLIX LANCETS misc     multivitamin (ONE A DAY) tablet Take 1 Tab by mouth daily. No current facility-administered medications for this visit. Objective:      Visit Vitals  /64 (BP 1 Location: Left arm, BP Patient Position: Sitting)   Pulse 70   Resp 16   Ht 5' 8\" (1.727 m)   Wt 219 lb 11.2 oz (99.7 kg)   SpO2 96%   BMI 33.41 kg/m²       Data Review:     EKG: Normal sinus rhythm, no acute st/t changes    Reviewed and/or ordered active problem list, medication list tests    Past Medical History:   Diagnosis Date    Arthritis     knees, back    Chronic pain     knees, back    [facility]     High cholesterol     HTN (hypertension)     currently on no meds    PE (pulmonary thromboembolism) ([facility] [address] 01/2017    bilateral with right heart strain.  Treated by Dr. Cristina Gómez      Past Surgical History:   Procedure Laterality Date    HX AMPUTATION      vascular; Left great toe amputation    HX ORTHOPAEDIC Left     Left arm fracture & repair    HX OTHER SURGICAL  6/4/15    INCISION AND DRAINAGE, RESECTION OF REMAINING 1ST METATARSAL, INSERTION OF ANTIBIOTIC BEADS     Allergies   Allergen Reactions    Chlorhexidine Towelette Rash    Contrast Agent [Iodine] Other (comments)     He had flushing, blistering and scabbing on chest and both arms right after Cath and CTA/ His iv infiltrated with the administration of the dye    Vancomycin Rash     Patient broke out in large hive below IV site and c/o itching to area, patient states that \"that it was more likely an infiltration as opposed to an actual reaction\"      Family History   Problem Relation Age of Onset    Heart Disease Mother     Hypertension Mother     Diabetes Mother     Heart Disease Father       Social History     Socioeconomic History    Marital status:      Spouse name: Not on file    Number of children: Not on file    Years of education: Not on file    Highest education level: Not on file   Occupational History    Not on file   Social Needs    Financial resource strain: Not on file    Food insecurity:     Worry: Not on file     Inability: Not on file    Transportation needs:     Medical: Not on file     Non-medical: Not on file   Tobacco Use    Smoking status: Former Smoker     Packs/day: 3.00     Years: 20.00     Pack years: 60.00     Types: Cigarettes    Smokeless tobacco: Never Used    Tobacco comment: stopped smoking about 30 years ago   Substance and Sexual Activity    Alcohol use:  Yes     Alcohol/week: 0.8 standard drinks     Types: 1 Cans of beer per week     Comment: occasionally    Drug use: Yes     Types: Prescription, OTC    Sexual activity: Not on file   Lifestyle    Physical activity:     Days per week: Not on file     Minutes per session: Not on file    Stress: Not on file   Relationships    Social connections:     Talks on phone: Not on file Gets together: Not on file     Attends Holiness service: Not on file     Active member of club or organization: Not on file     Attends meetings of clubs or organizations: Not on file     Relationship status: Not on file    Intimate partner violence:     Fear of current or ex partner: Not on file     Emotionally abused: Not on file     Physically abused: Not on file     Forced sexual activity: Not on file   Other Topics Concern    Not on file   Social History Narrative    Not on file         Review of Systems     General: Not Present- Anorexia, Chills, Dietary Changes, Fatigue, Fever, Medication Changes, Night Sweats, Weight Gain > 10lbs. and Weight Loss > 10lbs. .  Skin: Not Present- Bruising and Excessive Sweating. HEENT: Not Present- Headache, Visual Loss and Vertigo. Respiratory: Not Present- Cough, Decreased Exercise Tolerance, Difficulty Breathing, Snoring and Wheezing. Cardiovascular: Not Present- Abnormal Blood Pressure, Chest Pain, Difficulty Breathing On Exertion, Edema, Fainting / Blacking Out, Irregular Heart Beat, Orthopnea, Palpitations, Paroxysmal Nocturnal Dyspnea, Rapid Heart Rate, Shortness of Breath and Swelling of Extremities. Gastrointestinal: Not Present- Black, Tarry Stool, Bloody Stool, Diarrhea, Hematemesis, Rectal Bleeding and Vomiting. Musculoskeletal: Not Present- Muscle Pain and Muscle Weakness. Neurological: Not Present- Dizziness. Psychiatric: Not Present- Depression. Endocrine: Not Present- Cold Intolerance, Heat Intolerance and Thyroid Problems. Hematology: Not Present- Abnormal Bleeding, Anemia, Blood Clots and Easy Bruising.       Physical Exam   The physical exam findings are as follows:       General   Mental Status - Alert. General Appearance - Not in acute distress. Chest and Lung Exam   Inspection: Accessory muscles - No use of accessory muscles in breathing.   Auscultation:   Breath sounds: - Normal.      Cardiovascular   Inspection: Jugular vein - Bilateral - Inspection Normal.  Palpation/Percussion:   Apical Impulse: - Normal.  Auscultation: Rhythm - Regular. Heart Sounds - S1 WNL and S2 WNL. No S3 or S4. Murmurs & Other Heart Sounds: Auscultation of the heart reveals - No Murmurs. Carotid arteries - No Carotid bruit. Peripheral Vascular   Upper Extremity: Inspection - Bilateral - No Cyanotic nailbeds or Digital clubbing. Lower Extremity:   Palpation: 2+ b/l PT, 1+ b/l DP. Edema - Bilateral - No edema. Assessment:       ICD-10-CM ICD-9-CM    1. Hypertension, unspecified type I10 401.9 AMB POC EKG ROUTINE W/ 12 LEADS, INTER & REP      LIPID PANEL      ANKLE BRACHIAL INDEX   2. S/P TAVR (transcatheter aortic valve replacement) Z95.2 V43.3 LIPID PANEL      ANKLE BRACHIAL INDEX   3. High cholesterol E78.00 272.0 LIPID PANEL      ANKLE BRACHIAL INDEX   4. Claudication of both lower extremities (HCC) I73.9 443.9 LIPID PANEL      ANKLE BRACHIAL INDEX       Plan:     1. S/p TAVR. Echo noted. Stable. 2. Hypertension: Controlled. Continue current medications  3. Hyperlipidemia: On statin. Check labs. 4. Leg claudication. Check LOREAN. H/o arthritis.

## 2019-07-31 LAB
CHOLEST SERPL-MCNC: 120 MG/DL (ref 100–199)
HDLC SERPL-MCNC: 55 MG/DL
INTERPRETATION, 910389: NORMAL
LDLC SERPL CALC-MCNC: 52 MG/DL (ref 0–99)
TRIGL SERPL-MCNC: 66 MG/DL (ref 0–149)
VLDLC SERPL CALC-MCNC: 13 MG/DL (ref 5–40)

## 2019-08-01 ENCOUNTER — TELEPHONE (OUTPATIENT)
Dept: CARDIOLOGY CLINIC | Age: 69
End: 2019-08-01

## 2019-08-01 NOTE — TELEPHONE ENCOUNTER
----- Message from Tangela Tellez MD sent at 8/1/2019  8:09 AM EDT -----  Inform him labs are k      Called pt,verified pt with two pt identifiers, told pt that his labs are normal. Pt verbalized understanding.

## 2019-09-11 ENCOUNTER — TELEPHONE (OUTPATIENT)
Dept: CARDIAC REHAB | Age: 69
End: 2019-09-11

## 2019-09-11 NOTE — TELEPHONE ENCOUNTER
9/11/2019 Cardiac Rehab: Called Mr. Kiran Mclean to discuss participation in the Cardiac Rehab Program followingTAVR on 6/26/19. Left voicemail message. Luiza Baker     7/26/2019 Cardiac Rehab: Called Mr. Kiran Mclean to discuss participation in the Cardiac Rehab Program following TAVR on 6/26/2019. Mr. Giselle Santiago wife is without questions or concerns. Left  message with her to get Mr. Mathew Christopher to call us when he has a chance. Mrs. Mathew Christopher said he has gone back to work even though he retired before his hospital stay and he is feeling pretty well.  Luiza Baker      Electronically signed by Osmani Kline at 07/26/19 [id number]

## 2020-04-17 ENCOUNTER — VIRTUAL VISIT (OUTPATIENT)
Dept: CARDIOLOGY CLINIC | Age: 70
End: 2020-04-17

## 2020-04-17 VITALS — WEIGHT: 219 LBS | BODY MASS INDEX: 33.19 KG/M2 | HEIGHT: 68 IN

## 2020-04-17 DIAGNOSIS — Z95.2 S/P TAVR (TRANSCATHETER AORTIC VALVE REPLACEMENT): ICD-10-CM

## 2020-04-17 DIAGNOSIS — I10 ESSENTIAL HYPERTENSION: ICD-10-CM

## 2020-04-17 DIAGNOSIS — I35.0 NONRHEUMATIC AORTIC VALVE STENOSIS: Primary | ICD-10-CM

## 2020-04-17 DIAGNOSIS — E78.00 HIGH CHOLESTEROL: Chronic | ICD-10-CM

## 2020-04-17 NOTE — PROGRESS NOTES
Valerie Gomez is a 78 y.o. male who as seen by synchronous (real-time) audio-video technology on 4/17/2020 4/17/2020 9:38 AM      Subjective:     Valerie Gomez doing very well. He denies chest pain, chest pressure/discomfort, dyspnea, palpitations, irregular heart beats, near-syncope, syncope, fatigue, orthopnea, paroxysmal nocturnal dyspnea, exertional chest pressure/discomfort, claudication, lower extremity edema, tachypnea. Visit Vitals  Ht 5' 8\" (1.727 m)   Wt 219 lb (99.3 kg) Comment: at home   BMI 33.30 kg/m²     Current Outpatient Medications   Medication Sig    furosemide (LASIX) 40 mg tablet Take 20 mg by mouth daily.  simvastatin (ZOCOR) 10 mg tablet Take 10 mg by mouth nightly.  HYDROcodone-acetaminophen (NORCO) 5-325 mg per tablet Take 1 Tab by mouth every eight (8) hours as needed for Pain.  TURMERIC PO Take 500 mg by mouth daily.  aspirin delayed-release 81 mg tablet Take 81 mg by mouth daily.  ACCU-CHEK KIRILL PLUS TEST STRP strip     ACCU-CHEK SOFTCLIX LANCETS misc     multivitamin (ONE A DAY) tablet Take 1 Tab by mouth daily. No current facility-administered medications for this visit. Objective:      Visit Vitals  Ht 5' 8\" (1.727 m)   Wt 219 lb (99.3 kg)   BMI 33.30 kg/m²       Data Review:   Labs:  No results found for this or any previous visit (from the past 24 hour(s)). Reviewed and/or ordered active problem list, medication list tests    Past Medical History:   Diagnosis Date    Arthritis     knees, back    Chronic pain     knees, back    DM (diabetes mellitus) ([facility]     High cholesterol     HTN (hypertension)     currently on no meds    PE (pulmonary thromboembolism) ([facility] [address] 01/2017    bilateral with right heart strain.  Treated by Dr. Verena Johns      Past Surgical History:   Procedure Laterality Date    HX AMPUTATION      vascular; Left great toe amputation    HX ORTHOPAEDIC Left     Left arm fracture & repair    HX OTHER SURGICAL  6/4/15    INCISION AND DRAINAGE, RESECTION OF REMAINING 1ST METATARSAL, INSERTION OF ANTIBIOTIC BEADS     Allergies   Allergen Reactions    Chlorhexidine Towelette Rash    Contrast Agent [Iodine] Other (comments)     He had flushing, blistering and scabbing on chest and both arms right after Cath and CTA/ His iv infiltrated with the administration of the dye    Vancomycin Rash     Patient broke out in large hive below IV site and c/o itching to area, patient states that \"that it was more likely an infiltration as opposed to an actual reaction\"      Family History   Problem Relation Age of Onset    Heart Disease Mother     Hypertension Mother     Diabetes Mother     Heart Disease Father       Social History     Socioeconomic History    Marital status:      Spouse name: Not on file    Number of children: Not on file    Years of education: Not on file    Highest education level: Not on file   Occupational History    Not on file   Social Needs    Financial resource strain: Not on file    Food insecurity     Worry: Not on file     Inability: Not on file   [employer] needs     Medical: Not on file     Non-medical: Not on file   Tobacco Use    Smoking status: Former Smoker     Packs/day: 3.00     Years: 20.00     Pack years: 60.00     Types: Cigarettes    Smokeless tobacco: Never Used    Tobacco comment: stopped smoking about 30 years ago   Substance and Sexual Activity    Alcohol use:  Yes     Alcohol/week: 0.8 standard drinks     Types: 1 Cans of beer per week     Comment: occasionally    Drug use: Yes     Types: Prescription, OTC    Sexual activity: Not on file   Lifestyle    Physical activity     Days per week: Not on file     Minutes per session: Not on file    Stress: Not on file   Relationships    Social connections     Talks on phone: Not on file     Gets together: Not on file     Attends Confucianist service: Not on file     Active member of club or organization: Not on file     Attends meetings of clubs or organizations: Not on file     Relationship status: Not on file    Intimate partner violence     Fear of current or ex partner: Not on file     Emotionally abused: Not on file     Physically abused: Not on file     Forced sexual activity: Not on file   Other Topics Concern    Not on file   Social History Narrative    Not on file         Review of Systems     General: Not Present- Anorexia, Chills, Dietary Changes, Fatigue, Fever, Medication Changes, Night Sweats, Weight Gain > 10lbs. and Weight Loss > 10lbs. .  Skin: Not Present- Bruising and Excessive Sweating. HEENT: Not Present- Headache, Visual Loss and Vertigo. Respiratory: Not Present- Cough, Decreased Exercise Tolerance, Difficulty Breathing, Snoring and Wheezing. Cardiovascular: Not Present- Abnormal Blood Pressure, Chest Pain, Claudications, Difficulty Breathing On Exertion, Edema, Fainting / Blacking Out, Irregular Heart Beat, Night Cramps, Orthopnea, Palpitations, Paroxysmal Nocturnal Dyspnea, Rapid Heart Rate, Shortness of Breath and Swelling of Extremities. Gastrointestinal: Not Present- Black, Tarry Stool, Bloody Stool, Diarrhea, Hematemesis, Rectal Bleeding and Vomiting. Musculoskeletal: Not Present- Muscle Pain and Muscle Weakness. Neurological: Not Present- Dizziness. Psychiatric: Not Present- Depression. Endocrine: Not Present- Cold Intolerance, Heat Intolerance and Thyroid Problems. Hematology: Not Present- Abnormal Bleeding, Anemia, Blood Clots and Easy Bruising. Physical Exam   The physical exam findings are as follows:       General   Mental Status - Alert. General Appearance - Not in acute distress. Chest and Lung Exam   Inspection: Accessory muscles - No use of accessory muscles in breathing. No audible wheezing. Peripheral Vascular   Upper Extremity: Inspection - Bilateral - No Cyanotic nailbeds or Digital clubbing.   Lower Extremity:   Palpation: Edema - Bilateral - No edema. Assessment:       ICD-10-CM ICD-9-CM    1. Nonrheumatic aortic valve stenosis I35.0 424.1    2. Essential hypertension I10 401.9    3. High cholesterol E78.00 272.0    4. S/P TAVR (transcatheter aortic valve replacement) Z95.2 V43.3        Plan:     1. S/p TAVR. Last Echo noted. Stable. 2. Hypertension: Controlled. Continue current medications  3. Hyperlipidemia: On statin. Last FLP noted. 4. Normal LORENA after last visit. No further vascular evaluation needed. Pursuant to the emergency declaration under the [address] [place] [address], [facility] waiver authority and the [employer] Act, this Virtual Visit was conducted, with patient's consent, to reduce the patient's risk of exposure to COVID-19 and provide continuity of care for an established patient. Services were provided through a video synchronous discussion virtually to substitute for in-person clinic visit.

## 2020-07-29 ENCOUNTER — TELEPHONE (OUTPATIENT)
Dept: CARDIOLOGY CLINIC | Age: 70
End: 2020-07-29

## 2020-08-03 DIAGNOSIS — Z00.6 CLINICAL TRIAL PARTICIPANT: ICD-10-CM

## 2020-08-03 DIAGNOSIS — I35.0 NONRHEUMATIC AORTIC VALVE STENOSIS: Primary | ICD-10-CM

## 2020-08-03 DIAGNOSIS — Z95.2 S/P TAVR (TRANSCATHETER AORTIC VALVE REPLACEMENT): ICD-10-CM

## 2020-08-03 DIAGNOSIS — I10 ESSENTIAL HYPERTENSION: ICD-10-CM

## 2020-08-03 DIAGNOSIS — E11.8 TYPE 2 DIABETES MELLITUS WITH COMPLICATION, WITHOUT LONG-TERM CURRENT USE OF INSULIN (HCC): ICD-10-CM

## 2020-08-03 DIAGNOSIS — I73.9 CLAUDICATION OF BOTH LOWER EXTREMITIES (HCC): ICD-10-CM

## 2020-08-10 ENCOUNTER — OFFICE VISIT (OUTPATIENT)
Dept: CARDIOLOGY CLINIC | Age: 70
End: 2020-08-10
Payer: MEDICARE

## 2020-08-10 ENCOUNTER — HOSPITAL ENCOUNTER (OUTPATIENT)
Dept: NON INVASIVE DIAGNOSTICS | Age: 70
Discharge: HOME | End: 2020-08-10
Attending: NURSE PRACTITIONER
Payer: MEDICARE

## 2020-08-10 VITALS
RESPIRATION RATE: 16 BRPM | HEIGHT: 68 IN | BODY MASS INDEX: 33.3 KG/M2 | SYSTOLIC BLOOD PRESSURE: 140 MMHG | TEMPERATURE: 97.7 F | DIASTOLIC BLOOD PRESSURE: 80 MMHG | HEART RATE: 63 BPM | OXYGEN SATURATION: 99 %

## 2020-08-10 VITALS — SYSTOLIC BLOOD PRESSURE: 140 MMHG | BODY MASS INDEX: 33.3 KG/M2 | HEIGHT: 68 IN | DIASTOLIC BLOOD PRESSURE: 80 MMHG

## 2020-08-10 DIAGNOSIS — Z95.2 S/P TAVR (TRANSCATHETER AORTIC VALVE REPLACEMENT): ICD-10-CM

## 2020-08-10 DIAGNOSIS — I73.9 CLAUDICATION OF BOTH LOWER EXTREMITIES (HCC): ICD-10-CM

## 2020-08-10 DIAGNOSIS — Z00.6 CLINICAL TRIAL PARTICIPANT: ICD-10-CM

## 2020-08-10 DIAGNOSIS — I10 ESSENTIAL HYPERTENSION: ICD-10-CM

## 2020-08-10 DIAGNOSIS — Z95.2 S/P TAVR (TRANSCATHETER AORTIC VALVE REPLACEMENT): Primary | ICD-10-CM

## 2020-08-10 DIAGNOSIS — E11.8 TYPE 2 DIABETES MELLITUS WITH COMPLICATION, WITHOUT LONG-TERM CURRENT USE OF INSULIN (HCC): ICD-10-CM

## 2020-08-10 DIAGNOSIS — I35.0 NONRHEUMATIC AORTIC VALVE STENOSIS: ICD-10-CM

## 2020-08-10 DIAGNOSIS — Z95.2 HEART VALVE REPLACED: ICD-10-CM

## 2020-08-10 LAB
ATRIAL RATE: 55 BPM
CALCULATED P AXIS, ECG09: 78 DEGREES
CALCULATED R AXIS, ECG10: 65 DEGREES
CALCULATED T AXIS, ECG11: 60 DEGREES
DIAGNOSIS, 93000: NORMAL
ECHO AV AREA PEAK VELOCITY: 1.35 CM2
ECHO AV AREA VTI: 1.47 CM2
ECHO AV MEAN GRADIENT: 9.58 MMHG
ECHO AV PEAK GRADIENT: 16.86 MMHG
ECHO AV PEAK VELOCITY: 205.33 CM/S
ECHO AV VTI: 46.56 CM
ECHO LA AREA 4C: 19.77 CM2
ECHO LA VOL 4C: 65.04 ML (ref 18–58)
ECHO LVOT DIAM: 1.92 CM
ECHO LVOT PEAK GRADIENT: 3.71 MMHG
ECHO LVOT PEAK VELOCITY: 96.36 CM/S
ECHO LVOT SV: 68.2 ML
ECHO LVOT VTI: 23.64 CM
ECHO MV A VELOCITY: 120.46 CM/S
ECHO MV AREA PHT: 1.86 CM2
ECHO MV AREA VTI: 1.54 CM2
ECHO MV E VELOCITY: 95.9 CM/S
ECHO MV E/A RATIO: 0.8
ECHO MV MAX VELOCITY: 135.21 CM/S
ECHO MV MEAN GRADIENT: 3.13 MMHG
ECHO MV PEAK GRADIENT: 7.31 MMHG
ECHO MV PRESSURE HALF TIME (PHT): 0.12 S
ECHO MV VTI: 44.16 CM
ECHO RV INTERNAL DIMENSION: 3.39 CM
ECHO RV TAPSE: 2.9 CM (ref 1.5–2)
ECHO TV REGURGITANT MAX VELOCITY: 229.85 CM/S
ECHO TV REGURGITANT PEAK GRADIENT: 21.13 MMHG
P-R INTERVAL, ECG05: 180 MS
Q-T INTERVAL, ECG07: 422 MS
QRS DURATION, ECG06: 106 MS
QTC CALCULATION (BEZET), ECG08: 403 MS
VENTRICULAR RATE, ECG03: 55 BPM

## 2020-08-10 PROCEDURE — G8427 DOCREV CUR MEDS BY ELIG CLIN: HCPCS | Performed by: NURSE PRACTITIONER

## 2020-08-10 PROCEDURE — 99214 OFFICE O/P EST MOD 30 MIN: CPT | Performed by: NURSE PRACTITIONER

## 2020-08-10 PROCEDURE — 1101F PT FALLS ASSESS-DOCD LE1/YR: CPT | Performed by: NURSE PRACTITIONER

## 2020-08-10 PROCEDURE — G8754 DIAS BP LESS 90: HCPCS | Performed by: NURSE PRACTITIONER

## 2020-08-10 PROCEDURE — 2022F DILAT RTA XM EVC RTNOPTHY: CPT | Performed by: NURSE PRACTITIONER

## 2020-08-10 PROCEDURE — G8753 SYS BP > OR = 140: HCPCS | Performed by: NURSE PRACTITIONER

## 2020-08-10 PROCEDURE — 93005 ELECTROCARDIOGRAM TRACING: CPT

## 2020-08-10 PROCEDURE — 93306 TTE W/DOPPLER COMPLETE: CPT

## 2020-08-10 PROCEDURE — 3046F HEMOGLOBIN A1C LEVEL >9.0%: CPT | Performed by: NURSE PRACTITIONER

## 2020-08-10 PROCEDURE — G8432 DEP SCR NOT DOC, RNG: HCPCS | Performed by: NURSE PRACTITIONER

## 2020-08-10 PROCEDURE — G8536 NO DOC ELDER MAL SCRN: HCPCS | Performed by: NURSE PRACTITIONER

## 2020-08-10 PROCEDURE — G8417 CALC BMI ABV UP PARAM F/U: HCPCS | Performed by: NURSE PRACTITIONER

## 2020-08-10 PROCEDURE — 3017F COLORECTAL CA SCREEN DOC REV: CPT | Performed by: NURSE PRACTITIONER

## 2020-08-10 NOTE — PROGRESS NOTES
Patient: Lj Vazquez   Age: 69 y.o. Patient Care Team:  Flor Erazo NP as PCP - General (Nurse Practitioner)  Aidan Argueta MD as Surgeon (General Surgery)  Francis Baron MD (Cardiology)  Esha Robin, RN as Staff Nurse    PCP: Flor Erazo NP    Cardiologist: Varun Araujo    Diagnosis/Reason for Consultation: The primary encounter diagnosis was S/P TAVR (transcatheter aortic valve replacement). Diagnoses of Essential hypertension, Type 2 diabetes mellitus with complication, without long-term current use of insulin ([facility] [address] and Clinical trial participant were also pertinent to this visit. Problem List:   Patient Active Problem List   Diagnosis Code    High cholesterol E78.00    Heart murmur R01.1    LU (dyspnea on exertion) R06.00    Leg fatigue R29.898    DM (diabetes mellitus) ([facility] [address] E11.9    Diabetic foot ulcer ([facility] E11.621, L97.509    Cellulitis of left lower leg L03. 80    Spinal stenosis of lumbar region at multiple levels M48.061    HTN (hypertension) I10    Aortic valve stenosis, moderate I35.0    Abscess of foot including toes L02.619    Sepsis (HCC) A41.9    Amputated great toe of left foot (HCC) B59.177C    Abscess of left foot L02.612    Discitis of lumbar region M46.46    Clostridium difficile colitis A04.72    Pulmonary emboli (HCC) I26.99    S/P cardiac cath Z98.890    Aortic stenosis I35.0    S/P TAVR (transcatheter aortic valve replacement) Z95.2    Claudication of both lower extremities (HCC) I73.9      HPI: 70 y.o.  male s/p TAVR (26mm Evolut Pro via R femoral artery) for severe and symptomatic AS on 6/26/2019 as part of our [facility] clinical trial. There were no intra-op complications. He developed a new LBBB and 1st AVB and kept pacing capability and monitoring an extra day. Yared Lott was discharged to home on POD#3 with a resolved 1st AVB. He is here today for his 1 yr post-op evaluation.     Mr. Brea Solorio continues to report doing well but states his activity is limited by his knee pain. He gets injections by Dr. Faby Melgar with [facility] that provide some relief. He was able to push mow his grass yesterday w/o any fatigue, SOB/LU, CP, chest tightness, palpitations, or lightheadedness. He is able to complete all activities that he wants w/o limitations. He further denies any orthopnea, PND, LE edema, abdominal edema or blood/blackness/tarriness of his stools. He reports a good appetite and denies any wt loss/gain. He reports routine BMs. He does not check his BG routinely but carries mint candies in his pocket if he feels his BG is getting low. Rayshawn Maza is accompanied by his wife today. He has a cane but is independent in every way.      PMHx of AS, HTN, HLD, DM (diet), Provoked Bilateral PE's with concern for right heart strain and left lower lobe infarction (2017) in the setting of discitis and C. Diff colitis, s/p Left Great Toe Amputation after diabetic foot infection      NYHA Classification: I              Class I (Mild): No limitation of physical activity. Ordinary physical activity does not cause undue fatigue, palpitation, or dyspnea. Angina Classification: 0              Class 0: No symptoms    Past Medical History:   Diagnosis Date    Arthritis     knees, back    Chronic pain     knees, back    DM (diabetes mellitus) ([facility] [address]     High cholesterol     HTN (hypertension)     currently on no meds    PE (pulmonary thromboembolism) ([facility] [address] 01/2017    bilateral with right heart strain.  Treated by Dr. Sanjay Cruz       Past Surgical History:   Procedure Laterality Date    HX AMPUTATION      vascular; Left great toe amputation    HX ORTHOPAEDIC Left     Left arm fracture & repair    HX OTHER SURGICAL  6/4/15    INCISION AND DRAINAGE, RESECTION OF REMAINING 1ST METATARSAL, INSERTION OF ANTIBIOTIC BEADS      Social History     Tobacco Use    Smoking status: Former Smoker     Packs/day: 3.00     Years: 20.00     Pack years: 60.00     Types: Cigarettes    Smokeless tobacco: Never Used    Tobacco comment: stopped smoking about 30 years ago   Substance Use Topics    Alcohol use: Yes     Alcohol/week: 0.8 standard drinks     Types: 1 Cans of beer per week     Comment: occasionally      Family History   Problem Relation Age of Onset    Heart Disease Mother     Hypertension Mother     Diabetes Mother     Heart Disease Father      Prior to Admission medications    Medication Sig Start Date End Date Taking? Authorizing Provider   furosemide (LASIX) 40 mg tablet Take 20 mg by mouth daily. 6/28/19  Yes Provider, Historical   simvastatin (ZOCOR) 10 mg tablet Take 10 mg by mouth nightly. 7/8/19  Yes Provider, Historical   HYDROcodone-acetaminophen (NORCO) 5-325 mg per tablet Take 1 Tab by mouth every eight (8) hours as needed for Pain. 7/8/19  Yes Provider, Historical   TURMERIC PO Take 500 mg by mouth daily. Yes Provider, Historical   aspirin delayed-release 81 mg tablet Take 81 mg by mouth daily. Yes Provider, Historical   ACCU-CHEK KIRILL PLUS TEST STRP strip  3/5/19  Yes Provider, Historical   ACCU-CHEK SOFTCLIX LANCETS misc  3/5/19  Yes Provider, Historical   multivitamin (ONE A DAY) tablet Take 1 Tab by mouth daily. Yes Provider, Historical       Allergies   Allergen Reactions    Chlorhexidine Towelette Rash    Contrast Agent [Iodine] Other (comments)     He had flushing, blistering and scabbing on chest and both arms right after Cath and CTA/ His iv infiltrated with the administration of the dye    Vancomycin Rash     Patient broke out in large hive below IV site and c/o itching to area, patient states that \"that it was more likely an infiltration as opposed to an actual reaction\"       Current Medications:   Current Outpatient Medications   Medication Sig Dispense Refill    furosemide (LASIX) 40 mg tablet Take 20 mg by mouth daily.  simvastatin (ZOCOR) 10 mg tablet Take 10 mg by mouth nightly.       HYDROcodone-acetaminophen ([facility]) 5-325 mg per tablet Take 1 Tab by mouth every eight (8) hours as needed for Pain.  TURMERIC PO Take 500 mg by mouth daily.  aspirin delayed-release 81 mg tablet Take 81 mg by mouth daily.  ACCU-CHEK KIRILL PLUS TEST STRP strip       ACCU-CHEK SOFTCLIX LANCETS misc       multivitamin (ONE A DAY) tablet Take 1 Tab by mouth daily. Vitals: Blood pressure 140/80, pulse 63, temperature 97.7 °F (36.5 °C), temperature source Oral, resp. rate 16, height 5' 8\" (1.727 m), SpO2 99 %. Allergies: is allergic to chlorhexidine towelette; contrast agent [iodine]; and vancomycin. Review of Systems: Pertinent Positives per HPI   [x]? Total of 13 systems reviewed as follows:  Constitutional: Negative fever, negative chills  Eyes:               Negative for amauroses fugax  ENT:                Negative sore throat,oral absecess  Endocrine        Negative for thyroid replacement Rx; goiter  Respiratory:     Negative chronic cough,sputum production  Cards:              Negative for palpitations, lower extremity edema, varicosities, claudication  GI:                   Negative for dysphagia, bleeding, nausea, vomiting, diarrhea, and abdominal pain  Genitourinary: Negative for frequency, dysuria  Integument:     Negative for rash and pruritus  Hematologic:   Negative for easy bruising; bleeding dyscarsia  Musculoskel:   Negative for muscle weakness inhibiting ambulation  Neurological:   Negative for stroke, TIA, syncope, dizziness  Behavl/Psych: Negative for feelings of anxiety, depression     Cardiovascular Testing:   EKG 6/29/2019 (discharge): SR 85 bpm. LBBB.  msec     EKG (7/19/2020): SB 59 bpm. Inferior Q waves. No blocks/axis deviations    EKG (today): pending    TTE today (1 yr f/u): performed with results pending     TTE 7/22/2019:  Interpretation Summary   · Left Ventricle: Normal cavity size, wall thickness, systolic function (ejection fraction normal) and diastolic function.  Estimated left ventricular ejection fraction is 56 - 60%. · Aortic Valve: There is a prosthetic aortic valve from prior TAVR procedure. Prosthesis is normal. Prosthetic valve function is sufficient. Echo Findings   Left Ventricle  Normal cavity size, wall thickness, systolic function (ejection fraction normal) and diastolic function. The estimated ejection fraction is 56 - 60%. Left Atrium  Normal cavity size. Right Ventricle  Normal cavity size and global systolic function. Right Atrium  Normal cavity size. Aortic Valve  No stenosis and no regurgitation. Prosthetic aortic valve. Aortic valve peak gradient is 22 mmHg. Aortic valve mean gradient is 13 mmHg. Aortic valve area is 1.4 cm2. Aortic valve peak velocity is 2.4 cm/s. There is a TAVR present. Prosthetic valve insufficiency not present. The prosthetic valve is normal.    Mitral Valve  Normal valve structure and no stenosis. Trace regurgitation. Tricuspid Valve  Normal valve structure and no stenosis. Trace tricuspid valve regurgitation. Pulmonary arterial systolic pressure is 02.7 mmHg. There is no evidence of pulmonary hypertension. Pulmonic Valve  Normal valve structure, no stenosis and no regurgitation. Aorta  Normal aortic root. Pericardium  No evidence of pericardial effusion.       Aortic Valve Measurements   Stenosis    Aortic Valve Systolic Peak Velocity  342 cm/s       AoV PG  13 mmHg       Aortic Valve Systolic Mean Gradient  4.7 mmHg       AoV VTI  38.4 cm       Aortic Valve Area by Continuity of VTI  1.6 cm2       Aortic Valve Area by Continuity of Peak Velocity  1.3 cm2        LVOT    LVOT Peak Velocity  90 cm/s       LVOT Peak Gradient  3.2 mmHg       LVOT VTI  23.6 cm       LVOT d  1.8 cm                  Tricuspid Valve Measurements   Stenosis    Est. RA Pressure  3 mmHg       PASP  21.8 mmHg       RVSP  21.8 mmHg        Regurgitation    Triscuspid Valve Regurgitation Peak Gradient  18.8 mmHg       TR Max Velocity  216.84 cm/s Diastolic Filling/Shunts   Shunt    LVOT SV  60.1 ml               TTE 6/29/2019 (discharge):  Interpretation Summary   · Mitral Valve: Mitral valve thickening. Mild mitral annular calcification. Mild mitral valve regurgitation. · Aortic Valve: Prosthetic aortic valve. There is a prosthetic aortic valve from prior TAVR procedure. Prosthesis is normal.  · Left Ventricle: Normal cavity size, wall thickness and systolic function (ejection fraction normal). Estimated left ventricular ejection fraction is 56 - 60%. Mild (grade 1) left ventricular diastolic dysfunction. Echo Findings    Left Ventricle Normal cavity size, wall thickness and systolic function (ejection fraction normal). The estimated ejection fraction is 56 - 60%. There is mild (grade 1) left ventricular diastolic dysfunction. Left Atrium Normal cavity size. Right Ventricle Normal cavity size, wall thickness and global systolic function. Right Atrium Normal cavity size. Aortic Valve No stenosis and no regurgitation. Prosthetic aortic valve. There is a TAVR present. The prosthetic valve is normal.   Mitral Valve No stenosis. Mitral valve thickening. Mild mitral annular calcification. Mild regurgitation. Tricuspid Valve Normal valve structure and no stenosis. Trace tricuspid valve regurgitation. Pulmonary arterial systolic pressure is 26.1 mmHg. Pulmonic Valve Pulmonic valve not well visualized. Normal valve structure, no stenosis and no regurgitation. Aorta Normal aortic root. IVC/Hepatic Veins Normal structure. Normal central venous pressure (0-5 mmHg); IVC diameter is less than 21 mm and collapses more than 50% with respiration.    Pericardium Normal pericardium and no evidence of pericardial effusion.      2D Volume Measurements        ESV ESV Index   LA Biplane 75.77 mL (Range: 18 - 58)       35.6 ml/m2 (Range: 16 - 28)         LA A4C 64.04 mL (Range: 18 - 58)       30.09 ml/m2 (Range: 16 - 28)         LA A2C 76.47 mL (Range: 18 - 58)       35.93 ml/m2 (Range: 16 - 28)                2D/M-Mode Measurements          Dimensions   Measurement Value (Range)   LVIDs 3.14 cm      LVIDd 4.38 cm (4.2 - 5.9)      IVSd 1.16 cm (0.6 - 1.0)      LVPWd 1.09 cm (0.6 - 1.0)      LV Mass .3 g (88 - 224)      LV Mass AL Index 94.6 g/m2 (49 - 115)      Left Atrium Major Axis 4.27 cm      LA Area 4C 21.5 cm2      Left Atrium to Aortic Root Ratio 1.29       Tapse 3 cm (1.5 - 2.0)      RVIDd 4.34 cm                  Aorta Measurements        Aorta   Measurement Value (Range)   Ao Root D 3.31 cm              Aortic Valve Measurements          Stenosis   Aortic Valve Systolic Peak Velocity 632.04 cm/s      AoV PG 9.1 mmHg      Aortic Valve Systolic Mean Gradient 5.8 mmHg      AoV VTI 26.95 cm      Aortic Valve Area by Continuity of VTI 3.1 cm2      Aortic Valve Area by Continuity of Peak Velocity 3 cm2             LVOT   LVOT Peak Velocity 120.68 cm/s      LVOT Peak Gradient 5.8 mmHg      LVOT VTI 22.51 cm      LVOT d 2.19 cm                Mitral Valve Measurements          Stenosis   MV Mean Gradient 2.6 mmHg      MV Peak Gradient 5.2 mmHg      Mitral Valve Pressure Half-time 51.7 ms      Mitral Valve Max Velocity 114.47 cm/s      MVA (PHT) 4.3 cm2             Regurgitation   Mitral Regurgitant Peak Velocity 441.62 cm/s      MVA VTI 3.5 cm2      MR Peak Gradient 78 mmHg                Tricuspid Valve Measurements          Stenosis   Est. RA Pressure 3 mmHg      PASP 35.1 mmHg      RVSP 31.1 mmHg             Regurgitation   Triscuspid Valve Regurgitation Peak Gradient 28.1 mmHg      TR Max Velocity 264.95 cm/s                Pulmonary Measurements          Stenosis/Regurgitation   PV peak gradient 3.8 mmHg      Pulmonic Valve Max Velocity 97 cm/s                Diastolic Filling/Shunts          Diastolic Filling   LV E' Septal Velocity 5.57 cm/s      E/E' septal 16.2       LV E' Lateral Velocity 7.23 cm/s      E/E' lateral 12.48       E/E' ratio (averaged) 14.34       Mitral Valve E Wave Deceleration Time 178.4 ms      MV E Antonio 90.23 cm/s      MV A Antonio 113.06 cm/s      MV E/A 0.8              Shunt   LVOT SV 84.6 ml                 Physical Exam:  General: Well nourished well groomed gentleman appearing stated age accompanied by his wife  Neuro: A&OX3. ANAND. PERRL. Steady assisted gait w/ ane  Head:Normocephalic. Atraumatic. Symmetrical  Neck: Trachea Midline  Resp: CTA B. No Adv BS/cough/sputum/tachypnea with seated conversation  CV: S1S2 RRR. No M/R/G/JVD/carotid bruits. Pink/warm/dry extremities. No LE peripheral edema. L ankle arias than R d/t altered vasculature s/p toe amputation  GI:Benign ab. Soft. NT/ND. Active BS  : Voids  Integ: No obvious skin break down. No s/s of infection. Musculo/Skeletal: Limited ROM in all major joints. Good muscle tone. Missing toes on L foot     Clinic Evaluation:   KCCQ-12: scanned into EMR     5 meter gait: 7.18 seconds with cane     Assessment/Plan:   1.  AS s/p TAVR (26mm Evolut Pro via R femoral artery) on 6/26/2019 as part of our LR [facility] clinical trial - Continues to do well and remains as active as he can with painful R knee. ASA 81 mg daily for valve patency. Only has 1 tooth. Dicussed good oral hygiene/brushing gums and endocarditis risks. Further care per primary cardiologist.  2.HTN - management per primary cardiologist  3. DM - no oral meds currently. Managed by PCP  4.  LR AMRIK trial participant - further f/u per study protocol

## 2020-08-20 NOTE — TELEPHONE ENCOUNTER
Mr Russelllia Kaylee completed [facility] TAVR followup appointment on August 10th with UDAY Ovalle and [facility].

## 2020-10-22 ENCOUNTER — OFFICE VISIT (OUTPATIENT)
Dept: CARDIOLOGY CLINIC | Age: 70
End: 2020-10-22
Payer: MEDICARE

## 2020-10-22 ENCOUNTER — TELEPHONE (OUTPATIENT)
Dept: CARDIOLOGY CLINIC | Age: 70
End: 2020-10-22

## 2020-10-22 VITALS
HEIGHT: 68 IN | HEART RATE: 76 BPM | OXYGEN SATURATION: 98 % | BODY MASS INDEX: 30.11 KG/M2 | DIASTOLIC BLOOD PRESSURE: 70 MMHG | SYSTOLIC BLOOD PRESSURE: 122 MMHG | RESPIRATION RATE: 18 BRPM | WEIGHT: 198.7 LBS

## 2020-10-22 DIAGNOSIS — I10 ESSENTIAL HYPERTENSION: ICD-10-CM

## 2020-10-22 DIAGNOSIS — Z95.2 S/P TAVR (TRANSCATHETER AORTIC VALVE REPLACEMENT): Primary | ICD-10-CM

## 2020-10-22 DIAGNOSIS — I35.0 NONRHEUMATIC AORTIC VALVE STENOSIS: ICD-10-CM

## 2020-10-22 DIAGNOSIS — E78.2 MIXED HYPERLIPIDEMIA: ICD-10-CM

## 2020-10-22 PROCEDURE — G8417 CALC BMI ABV UP PARAM F/U: HCPCS | Performed by: INTERNAL MEDICINE

## 2020-10-22 PROCEDURE — 1101F PT FALLS ASSESS-DOCD LE1/YR: CPT | Performed by: INTERNAL MEDICINE

## 2020-10-22 PROCEDURE — G8754 DIAS BP LESS 90: HCPCS | Performed by: INTERNAL MEDICINE

## 2020-10-22 PROCEDURE — G8510 SCR DEP NEG, NO PLAN REQD: HCPCS | Performed by: INTERNAL MEDICINE

## 2020-10-22 PROCEDURE — G8536 NO DOC ELDER MAL SCRN: HCPCS | Performed by: INTERNAL MEDICINE

## 2020-10-22 PROCEDURE — 93000 ELECTROCARDIOGRAM COMPLETE: CPT | Performed by: INTERNAL MEDICINE

## 2020-10-22 PROCEDURE — 99214 OFFICE O/P EST MOD 30 MIN: CPT | Performed by: INTERNAL MEDICINE

## 2020-10-22 PROCEDURE — 3017F COLORECTAL CA SCREEN DOC REV: CPT | Performed by: INTERNAL MEDICINE

## 2020-10-22 PROCEDURE — G8752 SYS BP LESS 140: HCPCS | Performed by: INTERNAL MEDICINE

## 2020-10-22 PROCEDURE — G8427 DOCREV CUR MEDS BY ELIG CLIN: HCPCS | Performed by: INTERNAL MEDICINE

## 2020-10-22 NOTE — TELEPHONE ENCOUNTER
----- Message from Cristy Richardson NP sent at 10/22/2020  3:23 PM EDT -----  Pls get labs from PCP      Called PCP office to get most recent labs/lipid panel faxed over to us. She advised she would get that faxed over. Spoke to April.

## 2020-10-22 NOTE — PROGRESS NOTES
[address] [facility]  [phone]     Subjective:      Erika Lopez is a 69 y.o. male is here for routine f/u. Last seen by us via VV in 4/2020 and valve clinic 8/2020. He remains in usual state of health. He is always moving / doing yard work / gardening. No exertional symptoms. The patient denies chest pain/ shortness of breath, orthopnea, PND, LE edema, palpitations, syncope, or presyncope.        Patient Active Problem List    Diagnosis Date Noted    Diabetic foot ulcer ([address] 06/03/2015     Priority: 1 - One     Class: Chronic    Cellulitis of left lower leg 06/03/2015     Priority: 1 - One     Class: Present on Admission    Abscess of foot including toes 06/03/2015     Priority: 1 - One     Class: Present on Admission    Sepsis ([address] 06/03/2015     Priority: 1 - One     Class: Present on Admission    Spinal stenosis of lumbar region at multiple levels 06/03/2015     Priority: 2 - Two     Class: Chronic    HTN (hypertension) 06/03/2015     Priority: 2 - Two     Class: Chronic    Amputated great toe of left foot ([address] 06/03/2015     Priority: 2 - Two     Class: Present on Admission    Claudication of both lower extremities ([address] 07/30/2019    S/P TAVR (transcatheter aortic valve replacement) 06/29/2019    Aortic stenosis 06/26/2019    S/P cardiac cath 05/13/2019    Pulmonary emboli ([address] 01/02/2017    Clostridium difficile colitis 11/22/2016    Discitis of lumbar region 10/14/2016    Abscess of left foot 06/26/2015    High cholesterol 06/03/2015     Class: Chronic    DM (diabetes mellitus) ([address] 06/03/2015     Class: Chronic    Aortic valve stenosis, moderate 06/03/2015     Class: Chronic    Heart murmur 11/20/2014    LU (dyspnea on exertion) 11/20/2014    Leg fatigue 11/20/2014      Taylor Argueta NP  Past Medical History:   Diagnosis Date    Arthritis     knees, back    Chronic pain     knees, back    DM (diabetes mellitus) ([address]     High cholesterol     HTN (hypertension)     currently on no meds    PE (pulmonary thromboembolism) [facility] 01/2017    bilateral with right heart strain. Treated by Dr. Arvie Oppenheim Valvular heart disease       Past Surgical History:   Procedure Laterality Date    HX AMPUTATION      vascular; Left great toe amputation    HX ORTHOPAEDIC Left     Left arm fracture & repair    HX OTHER SURGICAL  6/4/15    INCISION AND DRAINAGE, RESECTION OF REMAINING 1ST METATARSAL, INSERTION OF ANTIBIOTIC BEADS     Allergies   Allergen Reactions    Chlorhexidine Towelette Rash    Contrast Agent [Iodine] Other (comments)     He had flushing, blistering and scabbing on chest and both arms right after Cath and CTA/ His iv infiltrated with the administration of the dye    Vancomycin Rash     Patient broke out in large hive below IV site and c/o itching to area, patient states that \"that it was more likely an infiltration as opposed to an actual reaction\"      Family History   Problem Relation Age of Onset    Heart Disease Mother     Hypertension Mother     Diabetes Mother     Heart Disease Father       Social History     Socioeconomic History    Marital status:      Spouse name: Not on file    Number of children: Not on file    Years of education: Not on file    Highest education level: Not on file   Occupational History    Not on file   Social Needs    Financial resource strain: Not on file    Food insecurity     Worry: Not on file     Inability: Not on file    Transportation needs     Medical: Not on file     Non-medical: Not on file   Tobacco Use    Smoking status: Former Smoker     Packs/day: 3.00     Years: 20.00     Pack years: 60.00     Types: Cigarettes     Last attempt to quit: 10/22/2020    Smokeless tobacco: Never Used    Tobacco comment: stopped smoking about 30 years ago   Substance and Sexual Activity    Alcohol use:  Yes     Alcohol/week: 0.8 standard drinks     Types: 1 Cans of beer per week Comment: occasionally    Drug use: Yes     Types: Prescription, OTC    Sexual activity: Not on file   Lifestyle    Physical activity     Days per week: Not on file     Minutes per session: Not on file    Stress: Not on file   Relationships    Social connections     Talks on phone: Not on file     Gets together: Not on file     Attends Jewish service: Not on file     Active member of club or organization: Not on file     Attends meetings of clubs or organizations: Not on file     Relationship status: Not on file    Intimate partner violence     Fear of current or ex partner: Not on file     Emotionally abused: Not on file     Physically abused: Not on file     Forced sexual activity: Not on file   Other Topics Concern    Not on file   Social History Narrative    Not on file      Current Outpatient Medications   Medication Sig    furosemide (LASIX) 40 mg tablet Take 20 mg by mouth daily.  simvastatin (ZOCOR) 10 mg tablet Take 10 mg by mouth nightly.  HYDROcodone-acetaminophen (NORCO) 5-325 mg per tablet Take 1 Tab by mouth every eight (8) hours as needed for Pain.  TURMERIC PO Take 500 mg by mouth daily.  aspirin delayed-release 81 mg tablet Take 81 mg by mouth daily.  ACCU-CHEK KIRILL PLUS TEST STRP strip     ACCU-CHEK SOFTCLIX LANCETS misc     multivitamin (ONE A DAY) tablet Take 1 Tab by mouth daily. No current facility-administered medications for this visit. Review of Symptoms:  11 systems reviewed, negative other than as stated in the HPI    Physical ExamPhysical Exam:    Vitals:    10/22/20 1454 10/22/20 1503   BP: 128/70 122/70   Pulse: 76    Resp: 18    SpO2: 98%    Weight: 198 lb 11.2 oz (90.1 kg)    Height: 5' 8\" (1.727 m)      Body mass index is 30.21 kg/m². General PE  Gen:  NAD  Mental Status - Alert. General Appearance - Not in acute distress.    HEENT:  PERRL, no carotid bruits or JVD  Chest and Lung Exam   Inspection: Accessory muscles - No use of accessory muscles in breathing. Auscultation:   Breath sounds: - Normal.   Cardiovascular   Inspection: Jugular vein - Bilateral - Inspection Normal.   Palpation/Percussion:   Apical Impulse: - Normal.   Auscultation: Rhythm - Regular. Heart Sounds - S1 WNL and S2 WNL. No S3 or S4. Murmurs & Other Heart Sounds: Auscultation of the heart reveals - No Murmurs. Peripheral Vascular   Upper Extremity: Inspection - Bilateral - No Cyanotic nailbeds or Digital clubbing. Lower Extremity:   Palpation: Edema - Bilateral - No edema. Abdomen:   Soft, non-tender, bowel sounds are active. Neuro: A&O times 3, CN and motor grossly WNL    Labs:   Lab Results   Component Value Date/Time    Cholesterol, total 120 07/30/2019 11:11 AM    Cholesterol, total 118 04/10/2015 10:15 AM    HDL Cholesterol 55 07/30/2019 11:11 AM    HDL Cholesterol 42 04/10/2015 10:15 AM    LDL, calculated 52 07/30/2019 11:11 AM    LDL, calculated 54 04/10/2015 10:15 AM    Triglyceride 66 07/30/2019 11:11 AM    Triglyceride 109 04/10/2015 10:15 AM     Lab Results   Component Value Date/Time     01/03/2017 04:54 AM     Lab Results   Component Value Date/Time    Sodium 140 06/29/2019 01:21 AM    Potassium 4.0 06/29/2019 01:21 AM    Chloride 109 (H) 06/29/2019 01:21 AM    CO2 25 06/29/2019 01:21 AM    Anion gap 6 06/29/2019 01:21 AM    Glucose 138 (H) 06/29/2019 01:21 AM    BUN 11 06/29/2019 01:21 AM    Creatinine 1.01 06/29/2019 01:21 AM    BUN/Creatinine ratio 11 (L) 06/29/2019 01:21 AM    GFR est AA >60 06/29/2019 01:21 AM    GFR est non-AA >60 06/29/2019 01:21 AM    Calcium 8.5 06/29/2019 01:21 AM    Bilirubin, total 1.1 (H) 06/29/2019 01:21 AM    Alk. phosphatase 72 06/29/2019 01:21 AM    Protein, total 6.5 06/29/2019 01:21 AM    Albumin 3.4 (L) 06/29/2019 01:21 AM    Globulin 3.1 06/29/2019 01:21 AM    A-G Ratio 1.1 06/29/2019 01:21 AM    ALT (SGPT) 16 06/29/2019 01:21 AM       EKG:  SR     Assessment:     Assessment:      1.  S/P TAVR (transcatheter aortic valve replacement)    2. Nonrheumatic aortic valve stenosis    3. Essential hypertension    4. Mixed hyperlipidemia        Orders Placed This Encounter    AMB POC EKG ROUTINE W/ 12 LEADS, INTER & REP     Order Specific Question:   Reason for Exam:     Answer:   routine        Plan:     1.  AS s/p TAVR (26mm Evolut Pro via R femoral artery) on 6/26/2019: Noted recent echo 8/2020. Continue ASA 81 mg daily for valve patency  2. Bp controlled  3. HLD: 7/19 LDL 52 On statin Labs and lipids per PCP. Will request labs  4. Normal LORENA after last visit. No further vascular evaluation needed    Continue current care and f/u in 6 months. Yohannes Luciano NP       Patient seen and examined by me with nurse practitioner. I personally performed all components of the history, physical, and medical decision making and agree with the assessment and plan as noted.     Dalia Pedro MD

## 2020-10-22 NOTE — PROGRESS NOTES
1. Have you been to the ER, urgent care clinic since your last visit? Hospitalized since your last visit? No    2. Have you seen or consulted any other health care providers outside of the [address] [place] [address] since your last visit? Include any pap smears or colon screening. No     Chief Complaint   Patient presents with    Valvular Heart Disease     6 mo appt. Denied cardiac symptoms.

## 2020-10-27 NOTE — TELEPHONE ENCOUNTER
Called back to PCP office and left message to fax over most recent labs/lipid panel to us. Advised I did call last week but the labs were not faxed. Advised to fax or call if needed.

## 2020-10-29 NOTE — TELEPHONE ENCOUNTER
Message   Received: Yesterday   Message Contents   MD Roby Cardoso LPN    Caller: Unspecified (1 week ago,  4:23 PM)               Yes. CMP and FLP. Called pt,verified pt with two pt identifiers, pt put wife on phone and I advised her that pt's PCP does not have any recent labs on him in over a year. Advised we can order the labs or he can f/u with PCP if he wants. She advised that pt has not seen his pcp in a while and she would make an appt for his PCP office to get a check up and check his labs. Advised he will need a lipid panel, cmp, ck. Spelled out for wife. Advised to have them send us a copy once done. Wife verbalized understanding and had no further questions.

## 2021-02-02 ENCOUNTER — TRANSCRIBE ORDER (OUTPATIENT)
Dept: SCHEDULING | Age: 71
End: 2021-02-02

## 2021-02-02 DIAGNOSIS — R31.0 GROSS HEMATURIA: Primary | ICD-10-CM

## 2021-02-08 ENCOUNTER — HOSPITAL ENCOUNTER (OUTPATIENT)
Dept: ULTRASOUND IMAGING | Age: 71
Discharge: HOME | End: 2021-02-08
Attending: UROLOGY
Payer: MEDICARE

## 2021-02-08 ENCOUNTER — HOSPITAL ENCOUNTER (OUTPATIENT)
Dept: CT IMAGING | Age: 71
End: 2021-02-08
Attending: UROLOGY
Payer: MEDICARE

## 2021-02-08 ENCOUNTER — HOSPITAL ENCOUNTER (OUTPATIENT)
Dept: CT IMAGING | Age: 71
Discharge: HOME | End: 2021-02-08
Attending: UROLOGY
Payer: MEDICARE

## 2021-02-08 DIAGNOSIS — R31.0 GROSS HEMATURIA: ICD-10-CM

## 2021-02-08 PROCEDURE — 74176 CT ABD & PELVIS W/O CONTRAST: CPT

## 2021-02-08 PROCEDURE — 76870 US EXAM SCROTUM: CPT

## 2021-02-11 ENCOUNTER — OFFICE VISIT (OUTPATIENT)
Dept: SURGERY | Age: 71
End: 2021-02-11
Payer: MEDICARE

## 2021-02-11 VITALS
OXYGEN SATURATION: 96 % | TEMPERATURE: 97.5 F | HEIGHT: 68 IN | HEART RATE: 74 BPM | RESPIRATION RATE: 18 BRPM | DIASTOLIC BLOOD PRESSURE: 60 MMHG | BODY MASS INDEX: 31.22 KG/M2 | SYSTOLIC BLOOD PRESSURE: 138 MMHG | WEIGHT: 206 LBS

## 2021-02-11 DIAGNOSIS — K40.90 RIGHT INGUINAL HERNIA: Primary | ICD-10-CM

## 2021-02-11 PROCEDURE — 99205 OFFICE O/P NEW HI 60 MIN: CPT | Performed by: SURGERY

## 2021-02-11 RX ORDER — TAMSULOSIN HYDROCHLORIDE 0.4 MG/1
CAPSULE ORAL
COMMUNITY
Start: 2021-02-02

## 2021-02-11 NOTE — PROGRESS NOTES
HISTORY OF PRESENT ILLNESS  David Ospina is a 69 y.o. male. I saw him back in 2017 for a hernia, that was not long after he had a PE and was on Xarelto, so surgery was delayed, he did not follow-up after that. Had a CT scan 2/2021: Large right inguinal hernia containing small bowel and right colon    Us 2/2021:    IMPRESSION  1. Right inguinal hernia with intrascrotal extension of bowel. 2. 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 3. No testicular torsion, mass or inflammation. Evaluated by Dr. Brian Lou. Wants the hernia treated first and can readdress the hydrocele/spermatocele if still an issue. Right ing hernia  Present for 4 years  Pain getting worse  occ nausea  BMs daily    Heart valve  follwed by Dr Trace Avalos    Hx incontinence    ____________________________________________________________________________  Patient presents with:  Possible Hernia: Seen at the request of [facility] [employer] hernia. /60 (BP 1 Location: Left upper arm, BP Patient Position: Sitting, BP Cuff Size: Large adult)   Pulse 74   Temp 97.5 °F (36.4 °C) (Temporal)   Resp 18   Ht 5' 8\" (1.727 m)   Wt 93.4 kg (206 lb)   SpO2 96%   BMI 31.32 kg/m²   Past Medical History:  No date: Arthritis      Comment:  knees, back  No date: Burning with urination  No date: Chronic pain      Comment:  knees, back  No date: DM (diabetes mellitus) (HCC)  No date: High cholesterol  No date: HTN (hypertension)      Comment:  currently on no meds  01/2017: PE (pulmonary thromboembolism) (HCC)      Comment:  bilateral with right heart strain.  Treated by Dr. Nay Mary  No date: Valvular heart disease  Past Surgical History:  No date: HX AMPUTATION      Comment:  vascular; Left great toe amputation  No date: HX ORTHOPAEDIC; Left      Comment:  Left arm fracture & repair  6/4/15: HX OTHER SURGICAL      Comment:  INCISION AND DRAINAGE, RESECTION OF REMAINING 1ST                METATARSAL, INSERTION OF ANTIBIOTIC BEADS  Social History    Socioeconomic History      Marital status:       Spouse name: Not on file      Number of children: Not on file      Years of education: Not on file      Highest education level: Not on file    Tobacco Use      Smoking status: Former Smoker        Packs/day: 3.00        Years: 20.00        Pack years: 61        Types: Cigarettes        Quit date: 10/22/2020        Years since quittin.3      Smokeless tobacco: Never Used      Tobacco comment: stopped smoking about 30 years ago    Substance and Sexual Activity      Alcohol use: Yes        Alcohol/week: 0.8 standard drinks        Types: 1 Cans of beer per week        Comment: occasionally      Drug use: Yes        Types: Prescription, OTC    Review of patient's family history indicates:  Problem: Heart Disease      Relation: Mother          Age of Onset: (Not Specified)  Problem: Hypertension      Relation: Mother          Age of Onset: (Not Specified)  Problem: Diabetes      Relation: Mother          Age of Onset: (Not Specified)  Problem: Heart Disease      Relation: Father          Age of Onset: (Not Specified)    Current Outpatient Medications:    tamsulosin (FLOMAX) 0.4 mg capsule,     furosemide (LASIX) 40 mg tablet, Take 20 mg by mouth daily.   simvastatin (ZOCOR) 10 mg tablet, Take 10 mg by mouth nightly.   HYDROcodone-acetaminophen (NORCO) 5-325 mg per tablet, Take 1 Tab by mouth every eight (8) hours as needed for Pain.   TURMERIC PO, Take 500 mg by mouth daily.   aspirin delayed-release 81 mg tablet, Take 81 mg by mouth daily.   ACCU-CHEK KIRILL PLUS TEST STRP strip,     ACCU-CHEK SOFTCLIX LANCETS misc,     multivitamin (ONE A DAY) tablet, Take 1 Tab by mouth daily. No current facility-administered medications for this visit.      Allergies:  -- Chlorhexidine Towelette -- Rash   -- Contrast Agent (Iodine) -- Other (comments)    --  He had flushing, blistering and scabbing on chest             and both arms right after Cath and CTA/ His iv             infiltrated with the administration of the dye   -- Vancomycin -- Rash    --  Patient broke out in large hive below IV site and             c/o itching to area, patient states that \"that it             was more likely an infiltration as opposed to an             actual reaction\"  _____________________________________________________________________________          Possible Hernia  The history is provided by the patient and spouse. This is a new problem. The current episode started more than 1 week ago. The problem occurs daily. The problem has been gradually worsening. Pertinent negatives include no chest pain, no abdominal pain, no headaches and no shortness of breath. The symptoms are aggravated by exertion. The symptoms are relieved by rest. The treatment provided no relief. Review of Systems   Constitutional: Negative for chills, fever and weight loss. HENT: Negative for ear pain. Eyes: Negative for pain. Respiratory: Negative for shortness of breath. Cardiovascular: Negative for chest pain. Gastrointestinal: Negative for abdominal pain and blood in stool. Genitourinary: Negative for hematuria. Musculoskeletal: Negative for joint pain. Skin: Negative for rash. Neurological: Negative for dizziness, focal weakness, seizures and headaches. Endo/Heme/Allergies: Does not bruise/bleed easily. Psychiatric/Behavioral: The patient does not have insomnia. Physical Exam  Constitutional:       General: He is not in acute distress. Appearance: He is well-developed. He is not diaphoretic. HENT:      Head: Normocephalic and atraumatic. Mouth/Throat:      Pharynx: No oropharyngeal exudate. Eyes:      Pupils: Pupils are equal, round, and reactive to light. Neck:      Musculoskeletal: Normal range of motion. Trachea: No tracheal deviation. Cardiovascular:      Rate and Rhythm: Normal rate and regular rhythm.       Heart sounds: Normal heart sounds. No murmur. Pulmonary:      Effort: Pulmonary effort is normal. No respiratory distress. Breath sounds: Normal breath sounds. No wheezing. Abdominal:      General: Bowel sounds are normal. There is no distension. Palpations: Abdomen is soft. There is no mass. Tenderness: There is no abdominal tenderness. There is no guarding or rebound. Musculoskeletal: Normal range of motion. General: No tenderness. Lymphadenopathy:      Cervical: No cervical adenopathy. Skin:     General: Skin is warm. Findings: No erythema or rash. Neurological:      Mental Status: He is alert and oriented to person, place, and time. Psychiatric:         Behavior: Behavior normal.         ASSESSMENT and PLAN    ICD-10-CM ICD-9-CM    1. Right inguinal hernia  K40.90 550.90       Large hernia containing bowel and colon. Prosper Lubin is having symptoms. I have recommended to him that we proceed with surgery. I had an extensive discussion with him regarding the risks, benefits, and alternatives of proceeding with a Laparoscopic right, possible bilateral Inguinal Hernia Repair with Mesh, Robot Assisted. Risks,benefits, and alternatives were discussed including the risk of anesthesia, bleeding, infection, including mesh infection, chronic orchialgia, neuralgia, other pain syndromes, testicular ischemia, conversion to open, injury to bowel, and recurrence were discussed. The patient was counseled at length about the risks of santiago Covid-19 during their perioperative period and any recovery window from their procedure. He was made aware that santiago Covid-19  may worsen their prognosis for recovering from their procedure and lend to a higher morbidity and/or mortality risk. All material risks, benefits, and reasonable alternatives including postponing the procedure were discussed. He wishes to proceed with the procedure at this time.     I reviewed with Prosper Amee his increased risk of bleeding secondary to Asprin use. I have asked him to discontinue the Asprin for 2 days prior to surgery to reduce this risk. We will ask for a risk assessment with cardiology and we will proceed with surgery provided he is an acceptable surgical risk. He is in agreement to proceed. All questions were answered. Rosalie Willoughby will undergo preoperative evaluation and will proceed provided he is medically stable. We will schedule him at his earliest convenience. Thank you for this consult. I had an extensive and thorough discussion with Rosalie Willoughby regarding current diagnosis and treatment recommendations. Total time spend with him was 60 minutes.   This included the following:  preparing to see the patient (reviewing prior records and tests),  performing a medically appropriate examination and/or evaluation,  counseling and educating the patient/family/caregiver,  documenting clinical information in the electronic or other health record,  independently interpreting results and communicating results to the patient/family/caregiver,  referring and communicating with other health care professionals,  care coordination

## 2021-02-11 NOTE — Clinical Note
2/11/2021 Patient: Valerie Gomez YOB: 1950 Date of Visit: 2/11/2021 Jean Dove NP 
[address] [facility] [address] [address] Fax: [phone] Sun Tao MD 
[address] [place] [address] [address] [address] [address] [employer] Dear UDAY Thomas MD, Thank you for referring Mr. Charles Arriola to [address] for evaluation. My notes for this consultation are attached. If you have questions, please do not hesitate to call me. I look forward to following your patient along with you. Sincerely, Radha Siddiqui MD

## 2021-03-01 ENCOUNTER — OFFICE VISIT (OUTPATIENT)
Dept: CARDIOLOGY CLINIC | Age: 71
End: 2021-03-01
Payer: MEDICARE

## 2021-03-01 ENCOUNTER — TELEPHONE (OUTPATIENT)
Dept: CARDIOLOGY CLINIC | Age: 71
End: 2021-03-01

## 2021-03-01 VITALS
HEIGHT: 68 IN | HEART RATE: 69 BPM | OXYGEN SATURATION: 98 % | WEIGHT: 209 LBS | RESPIRATION RATE: 18 BRPM | BODY MASS INDEX: 31.67 KG/M2 | DIASTOLIC BLOOD PRESSURE: 66 MMHG | SYSTOLIC BLOOD PRESSURE: 108 MMHG

## 2021-03-01 DIAGNOSIS — I10 ESSENTIAL HYPERTENSION: ICD-10-CM

## 2021-03-01 DIAGNOSIS — E78.2 MIXED HYPERLIPIDEMIA: ICD-10-CM

## 2021-03-01 DIAGNOSIS — Z01.810 PREOP CARDIOVASCULAR EXAM: Primary | ICD-10-CM

## 2021-03-01 DIAGNOSIS — Z95.2 S/P TAVR (TRANSCATHETER AORTIC VALVE REPLACEMENT): ICD-10-CM

## 2021-03-01 PROCEDURE — 1101F PT FALLS ASSESS-DOCD LE1/YR: CPT | Performed by: INTERNAL MEDICINE

## 2021-03-01 PROCEDURE — 3017F COLORECTAL CA SCREEN DOC REV: CPT | Performed by: INTERNAL MEDICINE

## 2021-03-01 PROCEDURE — G8536 NO DOC ELDER MAL SCRN: HCPCS | Performed by: INTERNAL MEDICINE

## 2021-03-01 PROCEDURE — 99214 OFFICE O/P EST MOD 30 MIN: CPT | Performed by: INTERNAL MEDICINE

## 2021-03-01 PROCEDURE — 93000 ELECTROCARDIOGRAM COMPLETE: CPT | Performed by: INTERNAL MEDICINE

## 2021-03-01 PROCEDURE — G8427 DOCREV CUR MEDS BY ELIG CLIN: HCPCS | Performed by: INTERNAL MEDICINE

## 2021-03-01 PROCEDURE — G8510 SCR DEP NEG, NO PLAN REQD: HCPCS | Performed by: INTERNAL MEDICINE

## 2021-03-01 PROCEDURE — G8754 DIAS BP LESS 90: HCPCS | Performed by: INTERNAL MEDICINE

## 2021-03-01 PROCEDURE — G8752 SYS BP LESS 140: HCPCS | Performed by: INTERNAL MEDICINE

## 2021-03-01 PROCEDURE — G8417 CALC BMI ABV UP PARAM F/U: HCPCS | Performed by: INTERNAL MEDICINE

## 2021-03-01 NOTE — PROGRESS NOTES
Chief Complaint   Patient presents with    Pre-op Exam     Cardiac clearance hernia surgery, Pt c/o bilateral leg swelling. 1. Have you been to the ER, urgent care clinic since your last visit? Hospitalized since your last visit? No    2. Have you seen or consulted any other health care providers outside of the [address] [place] [address] since your last visit? Include any pap smears or colon screening.  No

## 2021-03-01 NOTE — TELEPHONE ENCOUNTER
----- Message from Rianna Hyman, NP sent at 3/1/2021  9:13 AM EST -----  Labs pls from pcp thanks      Called and left message at PCP office to fax over most recent labs or call me if needed.

## 2021-03-01 NOTE — PROGRESS NOTES
[address]  [phone]     Subjective:      Benson Carbajal is a 69 y.o. male is here for routine f/u and seeking cardiac clearance for pending hernia surgery to be performed by Dr Aniyah Siegel. He has pmhx nonobstructive CAD, AS s/p TAVR, HTN, DM (diet controlled) and HLD. Last seen by us in 10/2020. He remains in usual state of health. No exertional symptoms when he does yardwork. Walks with a cane. He is already holding his ASA for surgery. The patient denies chest pain/ shortness of breath, orthopnea, PND, LE edema, palpitations, syncope, or presyncope.        Patient Active Problem List    Diagnosis Date Noted    Diabetic foot ulcer ([facility] 06/03/2015     Priority: 1 - One     Class: Chronic    Cellulitis of left lower leg 06/03/2015     Priority: 1 - One     Class: Present on Admission    Abscess of foot including toes 06/03/2015     Priority: 1 - One     Class: Present on Admission    Sepsis ([facility] 06/03/2015     Priority: 1 - One     Class: Present on Admission    Spinal stenosis of lumbar region at multiple levels 06/03/2015     Priority: 2 - Two     Class: Chronic    HTN (hypertension) 06/03/2015     Priority: 2 - Two     Class: Chronic    Amputated great toe of left foot ([address] 06/03/2015     Priority: 2 - Two     Class: Present on Admission    Claudication of both lower extremities ([facility] 07/30/2019    S/P TAVR (transcatheter aortic valve replacement) 06/29/2019    Aortic stenosis 06/26/2019    S/P cardiac cath 05/13/2019    Pulmonary emboli ([address] 01/02/2017    Clostridium difficile colitis 11/22/2016    Discitis of lumbar region 10/14/2016    Abscess of left foot 06/26/2015    High cholesterol 06/03/2015     Class: Chronic    DM (diabetes mellitus) ([facility] 06/03/2015     Class: Chronic    Aortic valve stenosis, moderate 06/03/2015     Class: Chronic    Heart murmur 11/20/2014    LU (dyspnea on exertion) 11/20/2014    Leg fatigue 11/20/2014      Lysle Lanes, NP  Past Medical History:   Diagnosis Date    Arthritis     knees, back    Burning with urination     Chronic pain     knees, back    DM (diabetes mellitus) ([facility]     High cholesterol     HTN (hypertension)     currently on no meds    PE (pulmonary thromboembolism) ([facility] 01/2017    bilateral with right heart strain.  Treated by Dr. Jimmy Childress Valvular heart disease       Past Surgical History:   Procedure Laterality Date    HX AMPUTATION      vascular; Left great toe amputation    HX HEART VALVE SURGERY  2019    HX ORTHOPAEDIC Left     Left arm fracture & repair    HX OTHER SURGICAL  6/4/15    INCISION AND DRAINAGE, RESECTION OF REMAINING 1ST METATARSAL, INSERTION OF ANTIBIOTIC BEADS     Allergies   Allergen Reactions    Chlorhexidine Towelette Rash    Contrast Agent [Iodine] Other (comments)     He had flushing, blistering and scabbing on chest and both arms right after Cath and CTA/ His iv infiltrated with the administration of the dye    Vancomycin Rash     Patient broke out in large hive below IV site and c/o itching to area, patient states that \"that it was more likely an infiltration as opposed to an actual reaction\"      Family History   Problem Relation Age of Onset    Heart Disease Mother     Hypertension Mother     Diabetes Mother     Heart Disease Father       Social History     Socioeconomic History    Marital status:      Spouse name: Not on file    Number of children: Not on file    Years of education: Not on file    Highest education level: Not on file   Occupational History    Not on file   Social Needs    Financial resource strain: Not on file    Food insecurity     Worry: Not on file     Inability: Not on file    Transportation needs     Medical: Not on file     Non-medical: Not on file   Tobacco Use    Smoking status: Former Smoker     Packs/day: 3.00     Years: 20.00     Pack years: 60.00     Types: Cigarettes     Quit date: 10/22/2020 Years since quittin.3    Smokeless tobacco: Never Used    Tobacco comment: stopped smoking about 30 years ago   Substance and Sexual Activity    Alcohol use: Yes     Alcohol/week: 0.8 standard drinks     Types: 1 Cans of beer per week     Comment: occasionally    Drug use: Yes     Types: Prescription, OTC    Sexual activity: Not on file   Lifestyle    Physical activity     Days per week: Not on file     Minutes per session: Not on file    Stress: Not on file   Relationships    Social connections     Talks on phone: Not on file     Gets together: Not on file     Attends [employer] service: Not on file     Active member of club or organization: Not on file     Attends meetings of clubs or organizations: Not on file     Relationship status: Not on file    Intimate partner violence     Fear of current or ex partner: Not on file     Emotionally abused: Not on file     Physically abused: Not on file     Forced sexual activity: Not on file   Other Topics Concern    Not on file   Social History Narrative    Not on file      Current Outpatient Medications   Medication Sig    tamsulosin (FLOMAX) 0.4 mg capsule     furosemide (LASIX) 40 mg tablet Take 20 mg by mouth daily.  simvastatin (ZOCOR) 10 mg tablet Take 10 mg by mouth nightly.  HYDROcodone-acetaminophen (NORCO) 5-325 mg per tablet Take 1 Tab by mouth every eight (8) hours as needed for Pain.  TURMERIC PO Take 500 mg by mouth daily.  ACCU-CHEK KIRILL PLUS TEST STRP strip     ACCU-CHEK SOFTCLIX LANCETS misc     multivitamin (ONE A DAY) tablet Take 1 Tab by mouth daily.  aspirin delayed-release 81 mg tablet Take 81 mg by mouth daily. No current facility-administered medications for this visit.           Review of Symptoms:  11 systems reviewed, negative other than as stated in the HPI    Physical ExamPhysical Exam:    Vitals:    21 0843   BP: 108/66   Pulse: 69   Resp: 18   SpO2: 98%   Weight: 209 lb (94.8 kg)   Height: 5' 8\" (1.727 m)     Body mass index is 31.78 kg/m². General PE  Gen:  NAD  Mental Status - Alert. General Appearance - Not in acute distress. HEENT:  PERRL, no carotid bruits or JVD  Chest and Lung Exam   Inspection: Accessory muscles - No use of accessory muscles in breathing. Auscultation:   Breath sounds: - Normal.   Cardiovascular   Inspection: Jugular vein - Bilateral - Inspection Normal.   Palpation/Percussion:   Apical Impulse: - Normal.   Auscultation: Rhythm - Regular. Heart Sounds - S1 WNL and S2 WNL. No S3 or S4. Murmurs & Other Heart Sounds: Auscultation of the heart reveals - 1/6 EVAN   Peripheral Vascular   Upper Extremity: Inspection - Bilateral - No Cyanotic nailbeds or Digital clubbing. Lower Extremity:   Palpation: Edema - Bilateral - No edema. Abdomen:   Soft, non-tender, bowel sounds are active. Neuro: A&O times 3, CN and motor grossly WNL    Labs:   Lab Results   Component Value Date/Time    Cholesterol, total 120 07/30/2019 11:11 AM    Cholesterol, total 118 04/10/2015 10:15 AM    HDL Cholesterol 55 07/30/2019 11:11 AM    HDL Cholesterol 42 04/10/2015 10:15 AM    LDL, calculated 52 07/30/2019 11:11 AM    LDL, calculated 54 04/10/2015 10:15 AM    Triglyceride 66 07/30/2019 11:11 AM    Triglyceride 109 04/10/2015 10:15 AM     Lab Results   Component Value Date/Time     01/03/2017 04:54 AM     Lab Results   Component Value Date/Time    Sodium 140 06/29/2019 01:21 AM    Potassium 4.0 06/29/2019 01:21 AM    Chloride 109 (H) 06/29/2019 01:21 AM    CO2 25 06/29/2019 01:21 AM    Anion gap 6 06/29/2019 01:21 AM    Glucose 138 (H) 06/29/2019 01:21 AM    BUN 11 06/29/2019 01:21 AM    Creatinine 1.01 06/29/2019 01:21 AM    BUN/Creatinine ratio 11 (L) 06/29/2019 01:21 AM    GFR est AA >60 06/29/2019 01:21 AM    GFR est non-AA >60 06/29/2019 01:21 AM    Calcium 8.5 06/29/2019 01:21 AM    Bilirubin, total 1.1 (H) 06/29/2019 01:21 AM    Alk.  phosphatase 72 06/29/2019 01:21 AM    Protein, total 6.5 06/29/2019 01:21 AM    Albumin 3.4 (L) 06/29/2019 01:21 AM    Globulin 3.1 06/29/2019 01:21 AM    A-G Ratio 1.1 06/29/2019 01:21 AM    ALT (SGPT) 16 06/29/2019 01:21 AM       EKG:  SR     Assessment:     Assessment:      1. Preop cardiovascular exam    2. Essential hypertension    3. S/P TAVR (transcatheter aortic valve replacement)    4. Mixed hyperlipidemia        Orders Placed This Encounter    AMB POC EKG ROUTINE W/ 12 LEADS, INTER & REP     Order Specific Question:   Reason for Exam:     Answer:   preop cardiac clearance        Plan:     1.  AS s/p TAVR (26mm Evolut Pro via R femoral artery) on 6/26/2019: Noted recent echo 8/2020. Continue ASA 81 mg daily for valve patency  2. Bp controlled  3. HLD: 7/19 LDL 52 On statin Labs and lipids per PCP. Had labs recently with pcp, will request again. 4. Normal LORENA after last visit. No further vascular evaluation needed  5. preop clearance: Nonobstructive CAD per cardiac cath in 5/19: Mid LAD lesion 30% stenosed. Echo 8/2020 with preserved LVEF. Low cardiac risk for hernia surgery. Already holding ASA in anticipation for surgery, resume post op     Continue current care and f/u in 6 months. Addendum, received labs from PCP  HLD: 10/2020 LDL 59 On statin     Sarmad Smith NP       Patient seen and examined by me with nurse practitioner. I personally performed all components of the history, physical, and medical decision making and agree with the assessment and plan as noted.     Yeny Li MD

## 2021-03-31 PROBLEM — Z01.810 PREOP CARDIOVASCULAR EXAM: Status: RESOLVED | Noted: 2021-03-01 | Resolved: 2021-03-31

## 2021-07-13 ENCOUNTER — TELEPHONE (OUTPATIENT)
Dept: CARDIOLOGY CLINIC | Age: 71
End: 2021-07-13

## 2021-07-13 NOTE — TELEPHONE ENCOUNTER
Returned call to April at PCP office, she advised pt was seen today and he is unclear on how he is taking his lasix 40 mg tab. At first he said daily then he told his wife he is taking every other day and then he said 1/2 tab daily. Advised we have the 1/2 tab daily in our system. She advised pt had his bottle and it was last filled last year for 90 day and is not empty. She just wanted to give us an FYI on the medication and how the pt is or not taking it at the moment for his next visit. She verbalized understanding.

## 2021-07-13 NOTE — TELEPHONE ENCOUNTER
Liset Smith from [place] [address] called. She says pt pcp normally fills Furosemide 40mg for pt. But the pt has not been taking this medication properly. They wanted to pass that message along please advise. You can callback April at [phone] @ [facility] [phone].     Thank Tashi Hernandez

## 2022-03-18 PROBLEM — Z95.2 S/P TAVR (TRANSCATHETER AORTIC VALVE REPLACEMENT): Status: ACTIVE | Noted: 2019-06-29

## 2022-03-18 PROBLEM — I73.9 CLAUDICATION OF BOTH LOWER EXTREMITIES (HCC): Status: ACTIVE | Noted: 2019-07-30

## 2022-03-18 PROBLEM — Z98.890 S/P CARDIAC CATH: Status: ACTIVE | Noted: 2019-05-13

## 2022-03-19 PROBLEM — I26.99 PULMONARY EMBOLI (HCC): Status: ACTIVE | Noted: 2017-01-02

## 2022-03-19 PROBLEM — I35.0 AORTIC STENOSIS: Status: ACTIVE | Noted: 2019-06-26

## 2022-03-19 PROBLEM — E78.2 MIXED HYPERLIPIDEMIA: Status: ACTIVE | Noted: 2021-03-01

## 2022-03-23 NOTE — Clinical Note
Multiple views of the right coronary artery obtained using hand injection. Question of..try OTC Lactaide milk.

## 2022-07-11 ENCOUNTER — OFFICE VISIT (OUTPATIENT)
Dept: SURGERY | Age: 72
End: 2022-07-11
Payer: MEDICARE

## 2022-07-11 VITALS
DIASTOLIC BLOOD PRESSURE: 64 MMHG | SYSTOLIC BLOOD PRESSURE: 109 MMHG | OXYGEN SATURATION: 94 % | HEART RATE: 65 BPM | WEIGHT: 191.8 LBS | RESPIRATION RATE: 16 BRPM | TEMPERATURE: 97.5 F | HEIGHT: 68 IN | BODY MASS INDEX: 29.07 KG/M2

## 2022-07-11 DIAGNOSIS — K40.90 RIGHT INGUINAL HERNIA: Primary | ICD-10-CM

## 2022-07-11 PROCEDURE — 99215 OFFICE O/P EST HI 40 MIN: CPT | Performed by: SURGERY

## 2022-07-11 PROCEDURE — G8754 DIAS BP LESS 90: HCPCS | Performed by: SURGERY

## 2022-07-11 PROCEDURE — 3017F COLORECTAL CA SCREEN DOC REV: CPT | Performed by: SURGERY

## 2022-07-11 PROCEDURE — 1101F PT FALLS ASSESS-DOCD LE1/YR: CPT | Performed by: SURGERY

## 2022-07-11 PROCEDURE — 1123F ACP DISCUSS/DSCN MKR DOCD: CPT | Performed by: SURGERY

## 2022-07-11 PROCEDURE — G8427 DOCREV CUR MEDS BY ELIG CLIN: HCPCS | Performed by: SURGERY

## 2022-07-11 PROCEDURE — G8417 CALC BMI ABV UP PARAM F/U: HCPCS | Performed by: SURGERY

## 2022-07-11 PROCEDURE — G8432 DEP SCR NOT DOC, RNG: HCPCS | Performed by: SURGERY

## 2022-07-11 PROCEDURE — G8752 SYS BP LESS 140: HCPCS | Performed by: SURGERY

## 2022-07-11 PROCEDURE — G8536 NO DOC ELDER MAL SCRN: HCPCS | Performed by: SURGERY

## 2022-07-11 NOTE — PROGRESS NOTES
I saw him back in 2017 for a hernia, that was not long after he had a PE and was on Xarelto, so surgery was delayed, he did not follow-up after that.     Had a CT scan 2/2021: Large right inguinal hernia containing small bowel and right colon     Us 2/2021:    IMPRESSION  1. Right inguinal hernia with intrascrotal extension of bowel. 2. 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 3. No testicular torsion, mass or inflammation. Last seen 2/11/2021 to be considered for a right inguinal hernia repair. Also being followed by Dr. Angie Trevino for the hydrocele/spermatocele. Once the hernia is fixed can address this later if it still an issue. Got cardiac clearance for the surgery on 3/1/2021 but never scheduled. His wife says \"something more important always came up so he never scheduled\"  Back today to be reconsidered for surgery. Exam: Appears comfortable. Large right inguinal hernia not reducible. No acute skin changes          ASSESSMENT and PLAN      ICD-10-CM ICD-9-CM     1. Right inguinal hernia  K40.90 550.90        Large hernia containing bowel and colon.     Spring Mcadams is having symptoms. I have recommended to him that we proceed with surgery.     I had an extensive discussion with him regarding the risks, benefits, and alternatives of proceeding with a Laparoscopic right, possible bilateral Inguinal Hernia Repair with Mesh, Robot Assisted. Risks,benefits, and alternatives were discussed including the risk of anesthesia, bleeding, infection, including mesh infection, chronic orchialgia, neuralgia, other pain syndromes, testicular ischemia, conversion to open, injury to bowel, and recurrence were discussed.        I reviewed with Spring Mcadams his increased risk of bleeding secondary to Asprin use.   I have asked him to discontinue the Asprin for 2 days prior to surgery to reduce this risk.     He is in agreement to proceed.     All questions were answered.     Spring Mcadams will undergo preoperative evaluation and will proceed provided he is medically stable.     We will schedule him at his earliest convenience.         I had an extensive and thorough discussion with Pawel Hughes regarding current diagnosis and treatment recommendations. Total time spend with him was 45 minutes.   This included the following:  preparing to see the patient (reviewing prior records and tests),  performing a medically appropriate examination and/or evaluation,  counseling and educating the patient/family/caregiver,  documenting clinical information in the electronic or other health record,  independently interpreting results and communicating results to the patient/family/caregiver,  ordering medications, tests, or procedures,  referring and communicating with other health care professionals,  care coordination

## 2022-07-11 NOTE — PROGRESS NOTES
Chief Complaint   Patient presents with    Possible Hernia     seen at the request of Dr Jamal Lowery for evaluation of possible Rt inguinal hernia       Visit Vitals  /64 (BP 1 Location: Left arm, BP Patient Position: Sitting)   Pulse 65   Temp 97.5 °F (36.4 °C) (Temporal)   Resp 16   Ht 5' 8\" (1.727 m)   Wt 87 kg (191 lb 12.8 oz)   SpO2 94%   BMI 29.16 kg/m²

## 2022-07-11 NOTE — Clinical Note
7/11/2022    Patient: Sam Capone   YOB: 1950   Date of Visit: 7/11/2022     Heidi Guerrero NP  [address]  [address] [address] [facility] [address]  Via Fax: [phone]     Bree Chavez MD  [address] [facility] [address] [facility]  [address] [facility] [address]    Dear UDAY Dumont MD,      Thank you for referring Mr. Becca Gonsalez to [address] [place] [address] for evaluation. My notes for this consultation are attached. If you have questions, please do not hesitate to call me. I look forward to following your patient along with you.       Sincerely,    Carrie Guzman MD

## 2022-07-19 NOTE — PERIOP NOTES
LM with Dr. Raj García office inquiring if Dr. Erich Galdamez was requesting cardiac clearance for surgery on 7/25/22. Clearance obtained from Dr. Mccarthy Been when surgery was previously scheduled in 3/2021. Pt has not seen Dr. Mccarthy Been since 3/2021 as confirmed with Dr. Chicho Wiggins office. Requested call back for clarification. Sleep Apnea score sent to PCP Aroldo Marsh NP for review and follow-up. Fax confirmed.

## 2022-07-19 NOTE — PERIOP NOTES
[facility]  Preoperative Instructions        Surgery Date 7/25/22         Time of Arrival:  To Be Called  Contact # [phone]    1. On the day of your surgery, please report to the Surgical Services Registration Desk and sign in at your designated time. The Surgery Center is located to the right of the Emergency Room. 2. You must have someone with you to drive you home. You should not drive a car for 24 hours following surgery. Please make arrangements for a friend or family member to stay with you for the first 24 hours after your surgery. 3. Do not have anything to eat or drink (including water, gum, mints, coffee, juice) after midnight ?7/24/22   . ? This may not apply to medications prescribed by your physician. ?(Please note below the special instructions with medications to take the morning of your procedure.)    4. We recommend you do not drink any alcoholic beverages for 24 hours before and after your surgery. 5. Contact your surgeons office for instructions on the following medications: non-steroidal anti-inflammatory drugs (i.e. Advil, Aleve), vitamins, and supplements. (Some surgeons will want you to stop these medications prior to surgery and others may allow you to take them)  **If you are currently taking Plavix, Coumadin, Aspirin and/or other blood-thinning agents, contact your surgeon for instructions. ** Your surgeon will partner with the physician prescribing these medications to determine if it is safe to stop or if you need to continue taking. Please do not stop taking these medications without instructions from your surgeon    6. Wear comfortable clothes. Wear glasses instead of contacts. Do not bring any money or jewelry. Please bring picture ID, insurance card, and any prearranged co-payment or hospital payment. Do not wear make-up, particularly mascara the morning of your surgery.   Do not wear nail polish, particularly if you are having foot /hand surgery. Wear your hair loose or down, no ponytails, buns, bladimir pins or clips. All body piercings must be removed. Please shower with antibacterial soap for three consecutive days before and on the morning of surgery, but do not apply any lotions, powders or deodorants after the shower on the day of surgery. Please use a fresh towels after each shower. Please sleep in clean clothes and change bed linens the night before surgery. Please do not shave for 48 hours prior to surgery. Shaving of the face is acceptable. 7. You should understand that if you do not follow these instructions your surgery may be cancelled. If your physical condition changes (I.e. fever, cold or flu) please contact your surgeon as soon as possible. 8. It is important that you be on time. If a situation occurs where you may be late, please call [phone] (OR Holding Area). 9. If you have any questions and or problems, please call [phone] (Pre-admission Testing). 10. Your surgery time may be subject to change. You will receive a phone call the evening prior if your time changes. 11.  If having outpatient surgery, you must have someone to drive you here, stay with you during the duration of your stay, and to drive you home at time of discharge. 12.  Due to current COVID restrictions, only ONE adult may accompany you the day of the procedure. We have limited seating available. If our waiting room is at capacity, your ride may be asked to remain in their vehicle. No children are allowed in the waiting room. Special Instructions: Stop Aspirin per Dr. Clary Lott instructions. TAKE ALL MEDICATIONS DAY OF SURGERY EXCEPT: Vitamin      I understand a pre-operative phone call will be made to verify my surgery time. In the event that I am not available, I give permission for a message to be left on my answering service and/or with another person?  yes         ___________________      __________   _________ (Signature of Patient)             (Witness)                (Date and Time)

## 2022-07-21 ENCOUNTER — TELEPHONE (OUTPATIENT)
Dept: SURGERY | Age: 72
End: 2022-07-21

## 2022-07-21 NOTE — PERIOP NOTES
Melissa from Dr. Ruby Browning - pt does not need cardiac clearance for surgery 7/25 - no active issues per patient.

## 2022-07-21 NOTE — TELEPHONE ENCOUNTER
PAT had called asking if Dr J Luis Oh would like cardiac clearance prior to surgery. I spoke with Dr J Luis Oh and he said that would be up to anesthesia.  Left voice mail for VANESSA

## 2022-07-22 ENCOUNTER — TELEPHONE (OUTPATIENT)
Dept: SURGERY | Age: 72
End: 2022-07-22

## 2022-07-22 NOTE — TELEPHONE ENCOUNTER
Spoke with patients wife because patient was busy. I reminded her that he is not to take his aspirin Sat, Sun or Mon am. She said she remembered.

## 2022-07-25 ENCOUNTER — ANESTHESIA (OUTPATIENT)
Dept: SURGERY | Age: 72
End: 2022-07-25
Payer: MEDICARE

## 2022-07-25 ENCOUNTER — HOSPITAL ENCOUNTER (OUTPATIENT)
Age: 72
Setting detail: OUTPATIENT SURGERY
Discharge: HOME | End: 2022-07-25
Attending: SURGERY | Admitting: SURGERY
Payer: MEDICARE

## 2022-07-25 ENCOUNTER — ANESTHESIA EVENT (OUTPATIENT)
Dept: SURGERY | Age: 72
End: 2022-07-25
Payer: MEDICARE

## 2022-07-25 VITALS
HEART RATE: 78 BPM | OXYGEN SATURATION: 98 % | HEIGHT: 69 IN | DIASTOLIC BLOOD PRESSURE: 58 MMHG | SYSTOLIC BLOOD PRESSURE: 116 MMHG | BODY MASS INDEX: 28.47 KG/M2 | RESPIRATION RATE: 14 BRPM | WEIGHT: 192.24 LBS | TEMPERATURE: 97.9 F

## 2022-07-25 DIAGNOSIS — R52 PAIN: Primary | ICD-10-CM

## 2022-07-25 LAB
GLUCOSE BLD STRIP.AUTO-MCNC: 133 MG/DL (ref 65–117)
GLUCOSE BLD STRIP.AUTO-MCNC: 220 MG/DL (ref 65–117)
SERVICE CMNT-IMP: ABNORMAL
SERVICE CMNT-IMP: ABNORMAL

## 2022-07-25 PROCEDURE — 77030010507 HC ADH SKN DERMBND J&J -B: Performed by: SURGERY

## 2022-07-25 PROCEDURE — 77030032060 HC PWDR HEMSTAT ARISTA ASRB 3GM BARD -C: Performed by: SURGERY

## 2022-07-25 PROCEDURE — 77030008606 HC TRCR ENDOSC KII AMR -B: Performed by: SURGERY

## 2022-07-25 PROCEDURE — 77030038613 HC SUT PDS STRATA SPIRL J&J -B: Performed by: SURGERY

## 2022-07-25 PROCEDURE — 76210000001 HC OR PH I REC 2.5 TO 3 HR: Performed by: SURGERY

## 2022-07-25 PROCEDURE — 76010000876 HC OR TIME 2 TO 2.5HR INTENSV - TIER 2: Performed by: SURGERY

## 2022-07-25 PROCEDURE — C1781 MESH (IMPLANTABLE): HCPCS | Performed by: SURGERY

## 2022-07-25 PROCEDURE — 76060000035 HC ANESTHESIA 2 TO 2.5 HR: Performed by: SURGERY

## 2022-07-25 PROCEDURE — 77030035277 HC OBTRTR BLDELSS DISP INTU -B: Performed by: SURGERY

## 2022-07-25 PROCEDURE — 74011250636 HC RX REV CODE- 250/636: Performed by: ANESTHESIOLOGY

## 2022-07-25 PROCEDURE — 74011250637 HC RX REV CODE- 250/637: Performed by: SURGERY

## 2022-07-25 PROCEDURE — 74011250636 HC RX REV CODE- 250/636: Performed by: NURSE ANESTHETIST, CERTIFIED REGISTERED

## 2022-07-25 PROCEDURE — 49650 LAP ING HERNIA REPAIR INIT: CPT | Performed by: SURGERY

## 2022-07-25 PROCEDURE — 74011000250 HC RX REV CODE- 250: Performed by: NURSE ANESTHETIST, CERTIFIED REGISTERED

## 2022-07-25 PROCEDURE — 82962 GLUCOSE BLOOD TEST: CPT

## 2022-07-25 PROCEDURE — 77030019908 HC STETH ESOPH SIMS -A: Performed by: NURSE ANESTHETIST, CERTIFIED REGISTERED

## 2022-07-25 PROCEDURE — 88302 TISSUE EXAM BY PATHOLOGIST: CPT

## 2022-07-25 PROCEDURE — 77030002933 HC SUT MCRYL J&J -A: Performed by: SURGERY

## 2022-07-25 PROCEDURE — 77030027743 HC APPL F/HEMSTAT BARD -B: Performed by: SURGERY

## 2022-07-25 PROCEDURE — 77030008684 HC TU ET CUF COVD -B: Performed by: NURSE ANESTHETIST, CERTIFIED REGISTERED

## 2022-07-25 PROCEDURE — 77030018673: Performed by: SURGERY

## 2022-07-25 PROCEDURE — 77030013079 HC BLNKT BAIR HGGR 3M -A: Performed by: NURSE ANESTHETIST, CERTIFIED REGISTERED

## 2022-07-25 PROCEDURE — 77030016151 HC PROTCTR LNS DFOG COVD -B: Performed by: SURGERY

## 2022-07-25 PROCEDURE — 2709999900 HC NON-CHARGEABLE SUPPLY: Performed by: SURGERY

## 2022-07-25 PROCEDURE — 77030020703 HC SEAL CANN DISP INTU -B: Performed by: SURGERY

## 2022-07-25 PROCEDURE — S2900 ROBOTIC SURGICAL SYSTEM: HCPCS | Performed by: SURGERY

## 2022-07-25 PROCEDURE — 77030026438 HC STYL ET INTUB CARD -A: Performed by: NURSE ANESTHETIST, CERTIFIED REGISTERED

## 2022-07-25 PROCEDURE — 76210000022 HC REC RM PH II 1.5 TO 2 HR: Performed by: SURGERY

## 2022-07-25 PROCEDURE — 74011000250 HC RX REV CODE- 250: Performed by: SURGERY

## 2022-07-25 DEVICE — GORE SYNECOR PREPERITONEAL 12CMX15CMRECTANGLE BIOMATERIAL
Type: IMPLANTABLE DEVICE | Site: INGUINAL | Status: FUNCTIONAL
Brand: GORE SYNECOR PREPERITONEAL BIOMATERIAL

## 2022-07-25 RX ORDER — MORPHINE SULFATE 2 MG/ML
2 INJECTION, SOLUTION INTRAMUSCULAR; INTRAVENOUS
Status: DISCONTINUED | OUTPATIENT
Start: 2022-07-25 | End: 2022-07-25 | Stop reason: HOSPADM

## 2022-07-25 RX ORDER — SODIUM CHLORIDE 0.9 % (FLUSH) 0.9 %
5-40 SYRINGE (ML) INJECTION EVERY 8 HOURS
Status: DISCONTINUED | OUTPATIENT
Start: 2022-07-25 | End: 2022-07-25 | Stop reason: HOSPADM

## 2022-07-25 RX ORDER — ONDANSETRON 4 MG/1
4 TABLET, ORALLY DISINTEGRATING ORAL
Qty: 8 TABLET | Refills: 0 | Status: SHIPPED | OUTPATIENT
Start: 2022-07-25 | End: 2022-08-18 | Stop reason: ALTCHOICE

## 2022-07-25 RX ORDER — SODIUM CHLORIDE 9 MG/ML
50 INJECTION, SOLUTION INTRAVENOUS CONTINUOUS
Status: DISCONTINUED | OUTPATIENT
Start: 2022-07-25 | End: 2022-07-25 | Stop reason: HOSPADM

## 2022-07-25 RX ORDER — HYDROMORPHONE HYDROCHLORIDE 2 MG/ML
INJECTION, SOLUTION INTRAMUSCULAR; INTRAVENOUS; SUBCUTANEOUS AS NEEDED
Status: DISCONTINUED | OUTPATIENT
Start: 2022-07-25 | End: 2022-07-25 | Stop reason: HOSPADM

## 2022-07-25 RX ORDER — FENTANYL CITRATE 50 UG/ML
INJECTION, SOLUTION INTRAMUSCULAR; INTRAVENOUS AS NEEDED
Status: DISCONTINUED | OUTPATIENT
Start: 2022-07-25 | End: 2022-07-25 | Stop reason: HOSPADM

## 2022-07-25 RX ORDER — LIDOCAINE HYDROCHLORIDE 10 MG/ML
0.1 INJECTION, SOLUTION EPIDURAL; INFILTRATION; INTRACAUDAL; PERINEURAL AS NEEDED
Status: DISCONTINUED | OUTPATIENT
Start: 2022-07-25 | End: 2022-07-25 | Stop reason: HOSPADM

## 2022-07-25 RX ORDER — LIDOCAINE HYDROCHLORIDE 20 MG/ML
INJECTION, SOLUTION EPIDURAL; INFILTRATION; INTRACAUDAL; PERINEURAL AS NEEDED
Status: DISCONTINUED | OUTPATIENT
Start: 2022-07-25 | End: 2022-07-25 | Stop reason: HOSPADM

## 2022-07-25 RX ORDER — ONDANSETRON 2 MG/ML
4 INJECTION INTRAMUSCULAR; INTRAVENOUS AS NEEDED
Status: DISCONTINUED | OUTPATIENT
Start: 2022-07-25 | End: 2022-07-25 | Stop reason: HOSPADM

## 2022-07-25 RX ORDER — SODIUM CHLORIDE 0.9 % (FLUSH) 0.9 %
5-40 SYRINGE (ML) INJECTION AS NEEDED
Status: DISCONTINUED | OUTPATIENT
Start: 2022-07-25 | End: 2022-07-25 | Stop reason: HOSPADM

## 2022-07-25 RX ORDER — OXYCODONE AND ACETAMINOPHEN 5; 325 MG/1; MG/1
1 TABLET ORAL AS NEEDED
Status: DISCONTINUED | OUTPATIENT
Start: 2022-07-25 | End: 2022-07-25 | Stop reason: HOSPADM

## 2022-07-25 RX ORDER — SODIUM CHLORIDE, SODIUM LACTATE, POTASSIUM CHLORIDE, CALCIUM CHLORIDE 600; 310; 30; 20 MG/100ML; MG/100ML; MG/100ML; MG/100ML
75 INJECTION, SOLUTION INTRAVENOUS CONTINUOUS
Status: DISCONTINUED | OUTPATIENT
Start: 2022-07-25 | End: 2022-07-25 | Stop reason: HOSPADM

## 2022-07-25 RX ORDER — ROCURONIUM BROMIDE 10 MG/ML
INJECTION, SOLUTION INTRAVENOUS AS NEEDED
Status: DISCONTINUED | OUTPATIENT
Start: 2022-07-25 | End: 2022-07-25 | Stop reason: HOSPADM

## 2022-07-25 RX ORDER — MIDAZOLAM HYDROCHLORIDE 1 MG/ML
1 INJECTION, SOLUTION INTRAMUSCULAR; INTRAVENOUS AS NEEDED
Status: DISCONTINUED | OUTPATIENT
Start: 2022-07-25 | End: 2022-07-25 | Stop reason: HOSPADM

## 2022-07-25 RX ORDER — MIDAZOLAM HYDROCHLORIDE 1 MG/ML
0.5 INJECTION, SOLUTION INTRAMUSCULAR; INTRAVENOUS
Status: DISCONTINUED | OUTPATIENT
Start: 2022-07-25 | End: 2022-07-25 | Stop reason: HOSPADM

## 2022-07-25 RX ORDER — BUPIVACAINE HYDROCHLORIDE AND EPINEPHRINE 5; 5 MG/ML; UG/ML
INJECTION, SOLUTION EPIDURAL; INTRACAUDAL; PERINEURAL AS NEEDED
Status: DISCONTINUED | OUTPATIENT
Start: 2022-07-25 | End: 2022-07-25 | Stop reason: HOSPADM

## 2022-07-25 RX ORDER — TAMSULOSIN HYDROCHLORIDE 0.4 MG/1
0.4 CAPSULE ORAL ONCE
Status: COMPLETED | OUTPATIENT
Start: 2022-07-25 | End: 2022-07-25

## 2022-07-25 RX ORDER — DIPHENHYDRAMINE HYDROCHLORIDE 50 MG/ML
12.5 INJECTION, SOLUTION INTRAMUSCULAR; INTRAVENOUS AS NEEDED
Status: DISCONTINUED | OUTPATIENT
Start: 2022-07-25 | End: 2022-07-25 | Stop reason: HOSPADM

## 2022-07-25 RX ORDER — ONDANSETRON 2 MG/ML
INJECTION INTRAMUSCULAR; INTRAVENOUS AS NEEDED
Status: DISCONTINUED | OUTPATIENT
Start: 2022-07-25 | End: 2022-07-25 | Stop reason: HOSPADM

## 2022-07-25 RX ORDER — POLYETHYLENE GLYCOL 3350 17 G/17G
17 POWDER, FOR SOLUTION ORAL DAILY
Status: SHIPPED | COMMUNITY
Start: 2022-07-25

## 2022-07-25 RX ORDER — SODIUM CHLORIDE, SODIUM LACTATE, POTASSIUM CHLORIDE, CALCIUM CHLORIDE 600; 310; 30; 20 MG/100ML; MG/100ML; MG/100ML; MG/100ML
25 INJECTION, SOLUTION INTRAVENOUS CONTINUOUS
Status: DISCONTINUED | OUTPATIENT
Start: 2022-07-25 | End: 2022-07-25 | Stop reason: HOSPADM

## 2022-07-25 RX ORDER — BETHANECHOL CHLORIDE 10 MG/1
10 TABLET ORAL
Status: DISCONTINUED | OUTPATIENT
Start: 2022-07-25 | End: 2022-07-25 | Stop reason: HOSPADM

## 2022-07-25 RX ORDER — HYDROMORPHONE HYDROCHLORIDE 1 MG/ML
0.2 INJECTION, SOLUTION INTRAMUSCULAR; INTRAVENOUS; SUBCUTANEOUS
Status: DISCONTINUED | OUTPATIENT
Start: 2022-07-25 | End: 2022-07-25 | Stop reason: HOSPADM

## 2022-07-25 RX ORDER — DEXAMETHASONE SODIUM PHOSPHATE 4 MG/ML
INJECTION, SOLUTION INTRA-ARTICULAR; INTRALESIONAL; INTRAMUSCULAR; INTRAVENOUS; SOFT TISSUE AS NEEDED
Status: DISCONTINUED | OUTPATIENT
Start: 2022-07-25 | End: 2022-07-25 | Stop reason: HOSPADM

## 2022-07-25 RX ORDER — PROPOFOL 10 MG/ML
INJECTION, EMULSION INTRAVENOUS AS NEEDED
Status: DISCONTINUED | OUTPATIENT
Start: 2022-07-25 | End: 2022-07-25 | Stop reason: HOSPADM

## 2022-07-25 RX ORDER — CEFAZOLIN SODIUM/WATER 2 G/20 ML
SYRINGE (ML) INTRAVENOUS AS NEEDED
Status: DISCONTINUED | OUTPATIENT
Start: 2022-07-25 | End: 2022-07-25 | Stop reason: HOSPADM

## 2022-07-25 RX ORDER — FENTANYL CITRATE 50 UG/ML
25 INJECTION, SOLUTION INTRAMUSCULAR; INTRAVENOUS
Status: DISCONTINUED | OUTPATIENT
Start: 2022-07-25 | End: 2022-07-25 | Stop reason: HOSPADM

## 2022-07-25 RX ORDER — FENTANYL CITRATE 50 UG/ML
50 INJECTION, SOLUTION INTRAMUSCULAR; INTRAVENOUS AS NEEDED
Status: DISCONTINUED | OUTPATIENT
Start: 2022-07-25 | End: 2022-07-25 | Stop reason: HOSPADM

## 2022-07-25 RX ORDER — OXYCODONE HYDROCHLORIDE 5 MG/1
5 TABLET ORAL
Qty: 15 TABLET | Refills: 0 | Status: SHIPPED | OUTPATIENT
Start: 2022-07-25 | End: 2022-08-01

## 2022-07-25 RX ORDER — ACETAMINOPHEN 500 MG
1000 TABLET ORAL 4 TIMES DAILY
Status: SHIPPED | COMMUNITY
Start: 2022-07-25

## 2022-07-25 RX ADMIN — PROPOFOL 100 MG: 10 INJECTION, EMULSION INTRAVENOUS at 07:35

## 2022-07-25 RX ADMIN — SUGAMMADEX 200 MG: 100 INJECTION, SOLUTION INTRAVENOUS at 09:27

## 2022-07-25 RX ADMIN — Medication 3 AMPULE: at 07:11

## 2022-07-25 RX ADMIN — SODIUM CHLORIDE, POTASSIUM CHLORIDE, SODIUM LACTATE AND CALCIUM CHLORIDE 25 ML/HR: 600; 310; 30; 20 INJECTION, SOLUTION INTRAVENOUS at 07:11

## 2022-07-25 RX ADMIN — PROPOFOL 30 MG: 10 INJECTION, EMULSION INTRAVENOUS at 07:58

## 2022-07-25 RX ADMIN — HYDROMORPHONE HYDROCHLORIDE 0.2 MG: 2 INJECTION, SOLUTION INTRAMUSCULAR; INTRAVENOUS; SUBCUTANEOUS at 09:00

## 2022-07-25 RX ADMIN — BETHANECHOL CHLORIDE 10 MG: 10 TABLET ORAL at 11:45

## 2022-07-25 RX ADMIN — PROPOFOL 30 MG: 10 INJECTION, EMULSION INTRAVENOUS at 08:38

## 2022-07-25 RX ADMIN — ROCURONIUM BROMIDE 5 MG: 10 INJECTION INTRAVENOUS at 07:35

## 2022-07-25 RX ADMIN — PROPOFOL 125 MCG/KG/MIN: 10 INJECTION, EMULSION INTRAVENOUS at 07:36

## 2022-07-25 RX ADMIN — Medication 2 G: at 07:53

## 2022-07-25 RX ADMIN — DEXAMETHASONE SODIUM PHOSPHATE 4 MG: 4 INJECTION, SOLUTION INTRAMUSCULAR; INTRAVENOUS at 07:55

## 2022-07-25 RX ADMIN — FENTANYL CITRATE 50 MCG: 50 INJECTION, SOLUTION INTRAMUSCULAR; INTRAVENOUS at 07:27

## 2022-07-25 RX ADMIN — FENTANYL CITRATE 50 MCG: 50 INJECTION, SOLUTION INTRAMUSCULAR; INTRAVENOUS at 07:33

## 2022-07-25 RX ADMIN — ONDANSETRON HYDROCHLORIDE 4 MG: 2 INJECTION, SOLUTION INTRAMUSCULAR; INTRAVENOUS at 07:55

## 2022-07-25 RX ADMIN — SODIUM CHLORIDE, POTASSIUM CHLORIDE, SODIUM LACTATE AND CALCIUM CHLORIDE: 600; 310; 30; 20 INJECTION, SOLUTION INTRAVENOUS at 09:18

## 2022-07-25 RX ADMIN — TAMSULOSIN HYDROCHLORIDE 0.4 MG: 0.4 CAPSULE ORAL at 10:45

## 2022-07-25 RX ADMIN — LIDOCAINE HYDROCHLORIDE 100 MG: 20 INJECTION, SOLUTION EPIDURAL; INFILTRATION; INTRACAUDAL; PERINEURAL at 07:35

## 2022-07-25 RX ADMIN — HYDROMORPHONE HYDROCHLORIDE 0.4 MG: 2 INJECTION, SOLUTION INTRAMUSCULAR; INTRAVENOUS; SUBCUTANEOUS at 08:41

## 2022-07-25 RX ADMIN — BETHANECHOL CHLORIDE 10 MG: 10 TABLET ORAL at 10:46

## 2022-07-25 RX ADMIN — SODIUM CHLORIDE 50 MCG/MIN: 900 INJECTION, SOLUTION INTRAVENOUS at 07:35

## 2022-07-25 RX ADMIN — ROCURONIUM BROMIDE 45 MG: 10 INJECTION INTRAVENOUS at 07:36

## 2022-07-25 NOTE — PERIOP NOTES
3121 =  PT DENIES FEVER, COLD, COUGH, SOB, N/V, DIARRHEA. .. PREOP TCHING DONE - PT VERBALIZES UNDERSTANDING.    STRETCHER IN LOWEST POSITION, CB IN PLACE AND S RUP X2

## 2022-07-25 NOTE — ANESTHESIA POSTPROCEDURE EVALUATION
Procedure(s):  ROBOTIC ASSISTED LAPAROSCOPIC RIGHT, REPAIR WITH MESH.     general    Anesthesia Post Evaluation      Multimodal analgesia: multimodal analgesia used between 6 hours prior to anesthesia start to PACU discharge  Patient location during evaluation: PACU  Patient participation: complete - patient participated  Level of consciousness: awake  Pain management: adequate  Airway patency: patent  Anesthetic complications: no  Cardiovascular status: acceptable  Respiratory status: acceptable  Hydration status: acceptable  Comments: Seen, no complaints   Post anesthesia nausea and vomiting:  none  Final Post Anesthesia Temperature Assessment:  Normothermia (36.0-37.5 degrees C)      INITIAL Post-op Vital signs:   Vitals Value Taken Time   /47 07/25/22 1025   Temp 36.1 °C (96.9 °F) 07/25/22 0943   Pulse 83 07/25/22 1025   Resp 13 07/25/22 1025   SpO2 96 % 07/25/22 1025 No

## 2022-07-25 NOTE — PERIOP NOTES
Received pt in recovery room, pt very sleepy,minimal response, pt is snoring. Blood sugar checked by fingerstick, result is 220, no new orders received. Pt now starting to open eyes more frequently, still pretty drowsy and sleeping. Pt denies any pain and follows commands. Anesthesiologist and Dr. Serenity Jewell by to  check on pt. 1030-Called and updated pt's wife on pt;s status. 1052-Pt waking up more now, able to tolerate ice chips and sips of water. Pt able to swallow flomax and urecholine as ordered. 1110-Bladder scanned pt for a baseline, no urine in bladder at this time. Will continue to infuse iv fluids and give more oral fluids and repeat urecholine and bladder scan if needed. Pt responding more now, but if not talking to, pt dozes back off.    1220-Pt transported to phase 2 for discharge planning to home. Pt voided x 1 in urinal 200ml caterina color and clear.   VSS

## 2022-07-25 NOTE — H&P
Pre-Op History and Physical    Subjective:     Nuzhat Posada is a 67 y.o. male who is here for Procedure(s):  ROBOTIC ASSISTED LAPAROSCOPIC RIGHT, POSSIBLE BILATERAL, INGUINAL HERNIA REPAIR WITH MESH    Past Medical History:   Diagnosis Date    Arthritis     knees, back    Burning with urination     Chronic pain     knees, back    DM (diabetes mellitus) ([facility] [address]     Type II - diet controlled    High cholesterol     HTN (hypertension)     currently on no meds    PE (pulmonary thromboembolism) ([facility] [address] 2017    bilateral with right heart strain. Treated by Dr. Reginaldo Robles    Valvular heart disease       Past Surgical History:   Procedure Laterality Date    HX AMPUTATION      vascular; Left great toe amputation    HX HEART CATHETERIZATION      HX HEART VALVE SURGERY  2019    TAVR    HX ORTHOPAEDIC Left     Left arm fracture & repair ORIF    HX OTHER SURGICAL  2015    INCISION AND DRAINAGE, RESECTION OF REMAINING 1ST METATARSAL, INSERTION OF ANTIBIOTIC BEADS     Family History   Problem Relation Age of Onset    Heart Disease Mother     Hypertension Mother     Diabetes Mother     Heart Disease Father       Social History     Tobacco Use    Smoking status: Former     Packs/day: 3.00     Years: 20.00     Pack years: 60.00     Types: Cigarettes     Quit date:      Years since quittin.5    Smokeless tobacco: Never    Tobacco comments:     stopped smoking about 30 years ago   Substance Use Topics    Alcohol use: Yes     Comment: occasionally       Prior to Admission medications    Medication Sig Start Date End Date Taking? Authorizing Provider   tamsulosin [facility]) 0.4 mg capsule  21  Yes Provider, Historical   furosemide (LASIX) 40 mg tablet Take 20 mg by mouth daily. 19  Yes Provider, Historical   simvastatin (ZOCOR) 10 mg tablet Take 10 mg by mouth nightly. 19  Yes Provider, Historical   HYDROcodone-acetaminophen (NORCO) 5-325 mg per tablet Take 1 Tab by mouth every eight (8) hours as needed for Pain. 7/8/19  Yes Provider, Historical   aspirin delayed-release 81 mg tablet Take 81 mg by mouth daily. Yes Provider, Historical   multivitamin (ONE A DAY) tablet Take 1 Tab by mouth daily. Yes Provider, Historical   ACCU-CHEK KIRILL PLUS TEST STRP strip  3/5/19   Provider, Historical   ACCU-CHEK SOFTCLIX LANCETS misc  3/5/19   Provider, Historical     Allergies   Allergen Reactions    Chlorhexidine Towelette Rash    Contrast Agent [Iodine] Other (comments)     He had flushing, blistering and scabbing on chest and both arms right after Cath and CTA/ His iv infiltrated with the administration of the dye    Vancomycin Rash     Patient broke out in large hive below IV site and c/o itching to area, patient states that \"that it was more likely an infiltration as opposed to an actual reaction\"        Review of Systems:  A comprehensive review of systems was negative except for that written in the History of Present Illness. Objective: Intake and Output:    No intake/output data recorded. No intake/output data recorded. Physical Exam:   Lungs: clear to auscultation bilaterally  Heart: regular rate and rhythm  Abdomen: soft, non-tender. No masses,  no organomegaly        Data Review:   Recent Results (from the past 24 hour(s))   GLUCOSE, POC    Collection Time: 07/25/22  7:01 AM   Result Value Ref Range    Glucose (POC) 133 (H) 65 - 117 mg/dL    Performed by Ector Schumacher            Assessment:     Active Problems:    * No active hospital problems. *      Plan:     I had an extensive discussion with Ashley Watson regarding the risks, benefits, and alternatives of proceeding with a  Procedure(s):  ROBOTIC ASSISTED LAPAROSCOPIC RIGHT, POSSIBLE BILATERAL, INGUINAL HERNIA REPAIR WITH MESH.   Risks, benefits, and alternatives of surgery including the risk of anesthesia, bleeding, infection, injury to bowel, recurrence, chronic pain, and the lack of symptomatic improvement were discussed and Ashley Watson is in agreement to proceed.     Site marked with Cassy Padilla        Signed By: Mayte Alvarenga MD     July 25, 2022

## 2022-07-25 NOTE — OP NOTES
OPERATIVE REPORT  7/25/2022    PREOPERATIVE DIAGNOSES:    right inguinal hernia. POSTOPERATIVE DIAGNOSES:   Very large RIGHT indirect inguinal hernia. OPERATIVE PROCEDURE:  1. Laparoscopic repair of right inguinal hernia with mesh, robot assisted. SURGEON:  Otoniel Key MD    OR STAFF: Georgina-1MMarlee Coy  Scrub Tech-1: Anthony Valadez  Surg Asst-1: Luna Laurent RN    ANESTHESIA:  General plus local.    Specimens:   ID Type Source Tests Collected by Time Destination   1 : right inguinal hernia sac Preservative Hernia Sac  Hodan Clay MD 7/25/2022 7874 Pathology        COMPLICATIONS:  None. ESTIMATED BLOOD LOSS:  Minimal.       Event Time In   Incision Start 0757   Incision Close 0935        Implants:   Implant Name Type Inv. Item Serial No.  Lot No. LRB No. Used Action   MESH [facility] PRE RECT [id number] -- [facility] - [id number]  MESH SYNECR PRE RECT [id number] -- [facility] [id number]  [employer] [id number] Right 1 Implanted       INDICATION FOR PROCEDURE: Alan Dailey is a 67 y.o. male presented to the office with groin pain. On exam, he was noted to have a hernia. He is brought to the operating room for repair. Risks, benefits and alternatives were discussed. Consent form was placed in the chart. DESCRIPTION OF PROCEDURE: The patient was brought to operating room number 9, with consent form signed and on the chart and the site of surgery marked. After satisfactory induction of general anesthesia, the patient was kept in the supine position, bilateral arms tucked to his sides, with appropriate padding. The patient's abdomen was prepped and draped sterilely, including use of Ioban. After appropriate time-out was held, the skin was infused with local anesthetic, an incision was made on the left abdomen and an 8 mm optical entry trocar was placed intra-abdominally under visualization, and pneumoperitoneum was achieved.  The abdomen was explored, with findings noted above. An 8 mm robotic port was placed on the right side and an 8 mm robotic port placed at the umbilicus. The patient was placed in Trendelenburg position, the robot was docked and robotic instruments were inserted. The abdomen was explored, with findings noted as above. There was a large right indirect hernia containing a loop of small bowel and colon. The peritoneum was incised, and the peritoneal flap was created on the Right. This was carried down towards Nicho's ligament medially and the abdominal side wall laterally. Appropriate landmarks were identified. The cord structures were dissected free of the peritoneum, and the sac and peritoneum was reduced and taken down beyond the splaying of the cord. There large defect was plicate with a 3-0 PDS barbed suture. Once dissection was completed, a 10 cm x 15 cm mesh was inserted and set into position. It was secured with a 3-0 PDS suture on Nicho's ligament and an additional suture to the medial anterior abdominal wall. It was noted to be lying appropriately and in good position. Hemostasis was ensured with 3 gms of Renae. A 3-0 barbed absorbable suture was used to reapproximate the peritoneum. An additional 3-0 barbed PDS was used to close the peritoneal defect created at the hernia sac. Once completed, the ports were removed sequentially under visualization, and the pneumoperitoneum was allowed to escape. All skin incisions were closed with subcuticular Monocryl and Dermabond. The patient remained stable during my presence in the operating room.       Marty Espinal MD

## 2022-07-25 NOTE — DISCHARGE INSTRUCTIONS
Dr. Holguin Later Discharge Instructions for:  Otto Turcios    MRN: [id number] : 1950    Admitted: 2022  Discharged: 2022       What to do at [address]: Resume your usual diet    Recommended activity: Lift less than 10 lbs for 4 weeks. Follow-up with Dr. Danielle Russell in 2-3 weeks. Call [phone] for an appointment. Inguinal Hernia Repair: What to Expect at [address] are likely to have pain for the next few days. You may also feel like you have the flu, and you may have a low fever and feel tired and nauseated. This is common. You should feel better after a few days and will probably feel much better in 7 days. For several weeks you may feel twinges or pulling in the hernia repair when you move. You may have some bruising on the scrotum and along the penis. This is normal.    Men will need to wear a jockstrap or briefs, not boxers, for scrotal support for several days after a groin (inguinal) hernia repair. [place] bicycle shorts may provide good support. This care sheet gives you a general idea about how long it will take for you to recover. But each person recovers at a different pace. Follow the steps below to get better as quickly as possible. How can you care for yourself at home? Activity  Rest when you feel tired. Getting enough sleep will help you recover. You can try to sleep with your head up in a recliner chair if that is more comfortable. Try to walk each day. Start by walking a little more than you did the day before. Bit by bit, increase the amount you walk. Walking boosts blood flow and helps prevent pneumonia and constipation. Put ice or a cold pack on the area of your hernia repair for 10 to 15 minutes at a time. Try to do this every 1 to 2 hours for the first 24 hours (when you are awake) or until the swelling goes down. Put a thin cloth between the ice and your skin.   Avoid strenuous activities, such as biking, jogging, weight lifting, or aerobic exercise, until your doctor says it is okay. Avoid lifting anything that would make you strain. This may include heavy grocery bags and milk containers, a heavy briefcase or backpack, cat litter or dog food bags, a child, or a vacuum . You may drive when you are no longer taking pain medicine and can quickly move your foot from the gas pedal to the brake. You must also be able to sit comfortably for a long period of time. Most people are able to return to work within 1 to 2 weeks after surgery. You may shower. Pat the cut (incision) dry. Do not take a bath for 2 weeks. You can have sex again when it feels comfortable to do so. Diet  You can eat your normal diet. If your stomach is upset, try bland, low-fat foods like plain rice, broiled chicken, toast, and yogurt. Drink plenty of fluids (unless your doctor tells you not to). You may notice that your bowel movements are not regular right after your surgery. This is common. Avoid constipation and straining with bowel movements. You may want to take a fiber supplement every day. If you have not had a bowel movement after a couple of days, ask your doctor about taking a mild laxative. Medicines  Take pain medicines exactly as directed. If the doctor gave you a prescription medicine for pain, take it as prescribed. If you are not taking a prescription pain medicine, take an over-the-counter medicine such as acetaminophen (Tylenol), ibuprofen (Advil, Motrin), or naproxen (Aleve). Read and follow all instructions on the label. Do not take two or more pain medicines at the same time unless the doctor told you to. Many pain medicines have acetaminophen, which is Tylenol. Too much acetaminophen (Tylenol) can be harmful. If your doctor prescribed antibiotics, take them as directed. Do not stop taking them just because you feel better. You need to take the full course of antibiotics.   If you think your pain medicine is making you sick to your stomach: Take your medicine after meals (unless your doctor has told you not to). Ask your doctor for a different pain medicine. Incision care  If you have strips of tape on the cut (incision) the doctor made, leave the tape on for a week or until it falls off. If you have staples closing the cut, you will need to visit your doctor in 1 to 2 weeks to have them removed. Wash the area daily with warm, soapy water and pat it dry. Follow-up care is a key part of your treatment and safety. Be sure to make and go to all appointments, and call your doctor if you are having problems. Its also a good idea to know your test results and keep a list of the medicines you take. When should you call for help? Call 911 anytime you think you may need emergency care. For example, call if:  You pass out (lose consciousness). You have sudden chest pain and shortness of breath, or you cough up blood. You have severe pain in your belly. Call your doctor now or seek immediate medical care if:  You are sick to your stomach and cannot keep fluids down. You have signs of a blood clot, such as:  Pain in your calf, back of the knee, thigh, or groin. Redness and swelling in your leg or groin. You have trouble passing urine or stool, especially if you have mild pain or swelling in your lower belly. You have hot flashes, sweating, flushing, or a fast or pounding heartbeat. Bright red blood has soaked through the bandage over your incision. Watch closely for any changes in your health, and be sure to contact your doctor if:  Your swelling is getting worse. Your swelling is not going down.     DISCHARGE SUMMARY from Nurse    PATIENT INSTRUCTIONS:    After general anesthesia or intravenous sedation, for 24 hours or while taking prescription narcotics:    Have someone responsible help you with your care  Limit your activities  Do not drive and operate hazardous machinery  Do not make important personal, legal or business decisions  Do not drink alcoholic beverages  If you have not urinated within 8 hours after discharge, please contact your surgeon on call  Resume your medications unless otherwise instructed    From general anesthesia, intravenous sedation, or while taking prescription narcotics, you may experience:    Drowsiness, dizziness, sleepiness, or confusion  Difficulty remembering or delayed reaction times  Difficulty with your balance, especially while walking, move slowly and carefully, do not make sudden position changes  Difficulty focusing or blurred vision    You may not be aware of slight changes in your behavior and/or your reaction time because of the medication used during and after your procedure. Report the following to your surgeon:  Excessive pain, swelling, redness or odor of or around the surgical area  Temperature over 100.5  Nausea and vomiting lasting longer than 4 hours or if unable to take medications  Any signs of decreased circulation or nerve impairment to extremity: change in color, persistent numbness, tingling, coldness or increase pain  Any questions or concerns         IF YOU REPORT TO AN EMERGENCY ROOM, DOCTOR'S OFFICE OR HOSPITAL WITHIN 24 HOURS AFTER YOUR PROCEDURE, BRING THIS SHEET AND YOUR AFTER VISIT SUMMARY WITH YOU AND GIVE IT TO THE PHYSICIAN OR NURSE ATTENDING YOU. These are general instructions for a healthy lifestyle (if applicable): No smoking/ No tobacco products/ Avoid exposure to secondhand smoke  Surgeon General's Warning:  Quitting smoking now greatly reduces serious risk to your health.     Obesity, smoking, and sedentary lifestyle greatly increases your risk for illness    A healthy diet, regular physical exercise & weight monitoring are important for maintaining a healthy lifestyle    You may be retaining fluid if you have a history of heart failure or if you experience any of the following symptoms:  Weight gain of 3 pounds or more overnight or 5 pounds in a week, increased swelling in our hands or feet or shortness of breath while lying flat in bed. Please call your doctor as soon as you notice any of these symptoms; do not wait until your next office visit. A common side effect of anesthesia following surgery is nausea and/or vomiting. In order to decrease symptoms, it is wise to avoid foods that are high in fat, greasy foods, milk products, and spicy foods for the first 24 hours. Acceptable foods for the first 24 hours following surgery include but are not limited to:    soup  broth  toast   crackers   applesauce  bananas   mashed potatoes,  soft or scrambled eggs  oatmeal  jello    It is important to eat when taking your pain medication. This will help to prevent nausea. If possible, please try to time your meals with your medications. It is very important to stay hydrated following surgery. Sip fluids frequently while awake. Avoid acidic drinks such as citrus juices and soda for 24 hours. Carbonated beverages may cause bloating and gas. Acceptable fluids include:    water (flavor packets may add variety)  coffee or tea (in moderation)  Gatorade  Aime-Aid  apple juice  cranberry juice    You are encouraged to cough and deep breathe every hour when awake. This will help to prevent respiratory complications following anesthesia. You may want to hug a pillow when coughing and sneezing to add additional support to the surgical area and to decrease discomfort if you had abdominal or chest surgery. If you are discharged home with support stockings, you may remove them after 24 hours. Support stockings are used to help prevent blood clots in the legs following surgery. TO PREVENT AN INFECTION      [address] YOUR HANDS    To prevent infection, good handwashing is the most important thing you or your caregiver can do. Wash your hands with soap and water or use the hand  we gave you before you touch any wounds.     SHOWER    Use the antibacterial soap we gave you when you take a shower. Shower with this soap until your wounds are healed. To reach all areas of your body, you may need someone to help you. Dont forget to clean your belly button with every shower. USE CLEAN SHEETS    Use freshly cleaned sheets on your bed after surgery. To keep the surgery site clean, do not allow pets to sleep with you while your wound is still healing. STOP SMOKING    Stop smoking, or at least cut back on smoking    Smoking slows your healing. CONTROL YOUR BLOOD SUGAR    High blood sugars slow wound healing. If you are diabetic, control your blood sugar levels before and after your surgery. Please take time to review all of your Home Care Instructions and Medication Information sheets provided in your discharge packet. If you have any questions, please contact your surgeon's office. Thank you. The discharge information has been reviewed with the patient and instruction recipient. The patient and instruction recipient verbalized understanding. Discharge medications reviewed with the patient and instruction recipient and appropriate educational materials and side effects teaching were provided. Please provide this summary of care documentation to your next provider. Oxycodone, Rapid Release (ETH-Oxydose, Oxy IR, Roxicodone) - (By mouth)   Why this medicine is used:   Treats moderate to severe pain. This medicine is a narcotic pain reliever. Contact a nurse or doctor right away if you have:   Fast or slow heart beat, shallow breathing, blue lips, skin or fingernails  Anxiety, restlessness, fever, sweating, muscle spasms, twitching, seeing or hearing things that are not there  Extreme weakness, shallow breathing, slow heartbeat  Severe confusion, lightheadedness, dizziness, fainting  Sweating or cold, clammy skin, seizures  Severe constipation, stomach pain, nausea, vomiting     Common side effects:  Mild constipation  Sleepiness, arlene  © 2017 [facility] Information is for End User's use only and may not be sold, redistributed or otherwise used for commercial purposes.

## 2022-07-25 NOTE — ANESTHESIA PREPROCEDURE EVALUATION
Relevant Problems   No relevant active problems       Anesthetic History   No history of anesthetic complications            Review of Systems / Medical History  Patient summary reviewed, nursing notes reviewed and pertinent labs reviewed    Pulmonary          Shortness of breath      Comments: Smoking Status:Former - 60 pack years  Quit Smokin80     Neuro/Psych             Comments: Chronic pain (G89.29)  knees, back   Spinal stenosis of lumbar region at multiple levels Cardiovascular    Hypertension  Valvular problems/murmurs: aortic stenosis        Hyperlipidemia    Exercise tolerance: <4 METS  Comments: S/P TAVR   GI/Hepatic/Renal  Within defined limits              Endo/Other    Diabetes: well controlled    Arthritis     Other Findings   Comments: H/o DVT/PE           Physical Exam    Airway  Mallampati: II  TM Distance: 4 - 6 cm  Neck ROM: decreased range of motion   Mouth opening: Normal     Cardiovascular    Rhythm: regular  Rate: normal         Dental    Dentition: Edentulous  Comments: 1 lower right tooth   Pulmonary  Breath sounds clear to auscultation          Prolonged expiration     Abdominal  GI exam deferred       Other Findings            Anesthetic Plan    ASA: 3  Anesthesia type: general          Induction: Intravenous  Anesthetic plan and risks discussed with: Patient

## 2022-07-25 NOTE — PERIOP NOTES
Stephen Bobby  1950  [id number]    Situation:  Verbal report given from: Konstantin Gautam RN  Procedure: Procedure(s):  ROBOTIC ASSISTED LAPAROSCOPIC RIGHT, REPAIR WITH MESH    Background:    Preoperative diagnosis: RIGHT INGUINAL HERNIA    Postoperative diagnosis: RIGHT INGUINAL HERNIA    :  Dr. Leonarda Montgomery    Assistant(s): Circ-1: Bebe Pool Tech-1: Leopold Shaper  Surg Asst-1: Ollie Proctor RN    Specimens:   ID Type Source Tests Collected by Time Destination   1 : right inguinal hernia sac Preservative Hernia Sac  Chio Ann MD 7/25/2022 5502 Pathology       Assessment:  Intra-procedure medications         Anesthesia gave intra-procedure sedation and medications, see anesthesia flow sheet     Intravenous fluids: LR@ KVO     Vital signs stable       Recommendation:

## 2022-08-18 ENCOUNTER — OFFICE VISIT (OUTPATIENT)
Dept: SURGERY | Age: 72
End: 2022-08-18
Payer: MEDICARE

## 2022-08-18 VITALS
WEIGHT: 196.4 LBS | DIASTOLIC BLOOD PRESSURE: 71 MMHG | TEMPERATURE: 97.2 F | SYSTOLIC BLOOD PRESSURE: 139 MMHG | HEART RATE: 62 BPM | OXYGEN SATURATION: 97 % | HEIGHT: 69 IN | RESPIRATION RATE: 18 BRPM | BODY MASS INDEX: 29.09 KG/M2

## 2022-08-18 DIAGNOSIS — Z09 POSTOPERATIVE EXAMINATION: Primary | ICD-10-CM

## 2022-08-18 PROCEDURE — 99024 POSTOP FOLLOW-UP VISIT: CPT | Performed by: SURGERY

## 2022-08-18 NOTE — PROGRESS NOTES
Chief Complaint   Patient presents with    Surgical Follow-up     2w ING hernia repair       1. Have you been to the ER, urgent care clinic since your last visit? Hospitalized since your last visit? No    2. Have you seen or consulted any other health care providers outside of the [address] [place] [address] since your last visit? Include any pap smears or colon screening.  No

## 2022-08-18 NOTE — Clinical Note
8/18/2022    Patient: Ashley Watson   YOB: 1950   Date of Visit: 8/18/2022     Jennyfer Molina NP  [address]  [address] [facility] [address]  Via Fax: [phone]     Justa Watson MD  [address] [place] [address] [address] [place] [address]  Via In [facility] [address]    Dear UDAY Edwards MD,      Thank you for referring Mr. Jannet Lan to [address] [place] [address] for evaluation. My notes for this consultation are attached. If you have questions, please do not hesitate to call me. I look forward to following your patient along with you.       Sincerely,    Swetha Dunlap MD

## 2022-08-18 NOTE — PROGRESS NOTES
Chief Complaint   Patient presents with    Surgical Follow-up     2w ING hernia repair   1. Laparoscopic repair of right inguinal hernia with mesh, robot assisted. [employer]      Tolerating PO  Pain controlled    Has been active. Probably doing more than he should including lifting 30 or 40 pounds. complete resolution of preoperative symptoms    Physical Exam:   Abdominal exam: soft  non-distended  appropriatly tender.   Wound: clean, dry, no drainage    Doing well overall  Would continue restricted activity for a total of 4 weeks  Follow-up: areli Sierra MD FACS

## 2023-04-27 ENCOUNTER — APPOINTMENT (OUTPATIENT)
Dept: GENERAL RADIOLOGY | Age: 73
DRG: 617 | End: 2023-04-27
Attending: STUDENT IN AN ORGANIZED HEALTH CARE EDUCATION/TRAINING PROGRAM
Payer: MEDICARE

## 2023-04-27 ENCOUNTER — HOSPITAL ENCOUNTER (INPATIENT)
Age: 73
LOS: 4 days | Discharge: HOME HEALTH | DRG: 617 | End: 2023-05-02
Attending: STUDENT IN AN ORGANIZED HEALTH CARE EDUCATION/TRAINING PROGRAM | Admitting: STUDENT IN AN ORGANIZED HEALTH CARE EDUCATION/TRAINING PROGRAM
Payer: MEDICARE

## 2023-04-27 DIAGNOSIS — L08.9 WOUND INFECTION: Primary | ICD-10-CM

## 2023-04-27 DIAGNOSIS — T14.8XXA WOUND INFECTION: Primary | ICD-10-CM

## 2023-04-27 PROBLEM — M79.673 FOOT PAIN: Status: ACTIVE | Noted: 2023-04-27

## 2023-04-27 PROBLEM — L03.90 CELLULITIS: Status: ACTIVE | Noted: 2023-04-27

## 2023-04-27 LAB
ALBUMIN SERPL-MCNC: 3.6 G/DL (ref 3.5–5)
ALBUMIN/GLOB SERPL: 1.2 (ref 1.1–2.2)
ALP SERPL-CCNC: 77 U/L (ref 45–117)
ALT SERPL-CCNC: 17 U/L (ref 12–78)
ANION GAP SERPL CALC-SCNC: 6 MMOL/L (ref 5–15)
AST SERPL-CCNC: 16 U/L (ref 15–37)
BASOPHILS # BLD: 0.1 K/UL (ref 0–0.1)
BASOPHILS NFR BLD: 1 % (ref 0–1)
BILIRUB SERPL-MCNC: 1.2 MG/DL (ref 0.2–1)
BUN SERPL-MCNC: 21 MG/DL (ref 6–20)
BUN/CREAT SERPL: 18 (ref 12–20)
CALCIUM SERPL-MCNC: 8.4 MG/DL (ref 8.5–10.1)
CHLORIDE SERPL-SCNC: 104 MMOL/L (ref 97–108)
CO2 SERPL-SCNC: 27 MMOL/L (ref 21–32)
CREAT SERPL-MCNC: 1.18 MG/DL (ref 0.7–1.3)
CRP SERPL-MCNC: 1.26 MG/DL (ref 0–0.6)
DIFFERENTIAL METHOD BLD: NORMAL
EOSINOPHIL # BLD: 0.3 K/UL (ref 0–0.4)
EOSINOPHIL NFR BLD: 6 % (ref 0–7)
ERYTHROCYTE [DISTWIDTH] IN BLOOD BY AUTOMATED COUNT: 13.6 % (ref 11.5–14.5)
ERYTHROCYTE [SEDIMENTATION RATE] IN BLOOD: 8 MM/HR (ref 0–20)
GLOBULIN SER CALC-MCNC: 3 G/DL (ref 2–4)
GLUCOSE BLD STRIP.AUTO-MCNC: 191 MG/DL (ref 65–117)
GLUCOSE SERPL-MCNC: 137 MG/DL (ref 65–100)
HCT VFR BLD AUTO: 37.2 % (ref 36.6–50.3)
HGB BLD-MCNC: 13.5 G/DL (ref 12.1–17)
IMM GRANULOCYTES # BLD AUTO: 0 K/UL (ref 0–0.04)
IMM GRANULOCYTES NFR BLD AUTO: 0 % (ref 0–0.5)
LYMPHOCYTES # BLD: 1.6 K/UL (ref 0.8–3.5)
LYMPHOCYTES NFR BLD: 28 % (ref 12–49)
MCH RBC QN AUTO: 32.7 PG (ref 26–34)
MCHC RBC AUTO-ENTMCNC: 36.3 G/DL (ref 30–36.5)
MCV RBC AUTO: 90.1 FL (ref 80–99)
MONOCYTES # BLD: 0.6 K/UL (ref 0–1)
MONOCYTES NFR BLD: 10 % (ref 5–13)
NEUTS SEG # BLD: 3.1 K/UL (ref 1.8–8)
NEUTS SEG NFR BLD: 55 % (ref 32–75)
NRBC # BLD: 0 K/UL (ref 0–0.01)
NRBC BLD-RTO: 0 PER 100 WBC
PLATELET # BLD AUTO: 207 K/UL (ref 150–400)
PMV BLD AUTO: 10.2 FL (ref 8.9–12.9)
POTASSIUM SERPL-SCNC: 3.7 MMOL/L (ref 3.5–5.1)
PROT SERPL-MCNC: 6.6 G/DL (ref 6.4–8.2)
RBC # BLD AUTO: 4.13 M/UL (ref 4.1–5.7)
SERVICE CMNT-IMP: ABNORMAL
SODIUM SERPL-SCNC: 137 MMOL/L (ref 136–145)
WBC # BLD AUTO: 5.7 K/UL (ref 4.1–11.1)

## 2023-04-27 PROCEDURE — 85025 COMPLETE CBC W/AUTO DIFF WBC: CPT

## 2023-04-27 PROCEDURE — 96374 THER/PROPH/DIAG INJ IV PUSH: CPT

## 2023-04-27 PROCEDURE — 87040 BLOOD CULTURE FOR BACTERIA: CPT

## 2023-04-27 PROCEDURE — 85652 RBC SED RATE AUTOMATED: CPT

## 2023-04-27 PROCEDURE — 36415 COLL VENOUS BLD VENIPUNCTURE: CPT

## 2023-04-27 PROCEDURE — G0378 HOSPITAL OBSERVATION PER HR: HCPCS

## 2023-04-27 PROCEDURE — 86140 C-REACTIVE PROTEIN: CPT

## 2023-04-27 PROCEDURE — 74011250636 HC RX REV CODE- 250/636: Performed by: NURSE PRACTITIONER

## 2023-04-27 PROCEDURE — 80053 COMPREHEN METABOLIC PANEL: CPT

## 2023-04-27 PROCEDURE — 74011000258 HC RX REV CODE- 258: Performed by: NURSE PRACTITIONER

## 2023-04-27 PROCEDURE — 74011250637 HC RX REV CODE- 250/637: Performed by: NURSE PRACTITIONER

## 2023-04-27 PROCEDURE — 99285 EMERGENCY DEPT VISIT HI MDM: CPT

## 2023-04-27 PROCEDURE — 0Y6V0Z0 DETACHMENT AT RIGHT 4TH TOE, COMPLETE, OPEN APPROACH: ICD-10-PCS | Performed by: PODIATRIST

## 2023-04-27 PROCEDURE — 96376 TX/PRO/DX INJ SAME DRUG ADON: CPT

## 2023-04-27 PROCEDURE — 73630 X-RAY EXAM OF FOOT: CPT

## 2023-04-27 PROCEDURE — 82962 GLUCOSE BLOOD TEST: CPT

## 2023-04-27 PROCEDURE — 74011000250 HC RX REV CODE- 250: Performed by: NURSE PRACTITIONER

## 2023-04-27 RX ORDER — SODIUM CHLORIDE 0.9 % (FLUSH) 0.9 %
5-40 SYRINGE (ML) INJECTION EVERY 8 HOURS
Status: DISCONTINUED | OUTPATIENT
Start: 2023-04-27 | End: 2023-05-02 | Stop reason: HOSPADM

## 2023-04-27 RX ORDER — ATORVASTATIN CALCIUM 10 MG/1
10 TABLET, FILM COATED ORAL
Status: DISCONTINUED | OUTPATIENT
Start: 2023-04-27 | End: 2023-05-02 | Stop reason: HOSPADM

## 2023-04-27 RX ORDER — ONDANSETRON 2 MG/ML
4 INJECTION INTRAMUSCULAR; INTRAVENOUS
Status: DISCONTINUED | OUTPATIENT
Start: 2023-04-27 | End: 2023-05-02 | Stop reason: HOSPADM

## 2023-04-27 RX ORDER — ACETAMINOPHEN 325 MG/1
650 TABLET ORAL
Status: DISCONTINUED | OUTPATIENT
Start: 2023-04-27 | End: 2023-05-02 | Stop reason: HOSPADM

## 2023-04-27 RX ORDER — TAMSULOSIN HYDROCHLORIDE 0.4 MG/1
0.4 CAPSULE ORAL DAILY
Status: DISCONTINUED | OUTPATIENT
Start: 2023-04-28 | End: 2023-05-02 | Stop reason: HOSPADM

## 2023-04-27 RX ORDER — FUROSEMIDE 20 MG/1
20 TABLET ORAL DAILY
Status: DISCONTINUED | OUTPATIENT
Start: 2023-04-28 | End: 2023-05-02 | Stop reason: HOSPADM

## 2023-04-27 RX ORDER — HYDROCODONE BITARTRATE AND ACETAMINOPHEN 5; 325 MG/1; MG/1
1 TABLET ORAL
Status: DISCONTINUED | OUTPATIENT
Start: 2023-04-27 | End: 2023-05-02 | Stop reason: HOSPADM

## 2023-04-27 RX ORDER — ENOXAPARIN SODIUM 100 MG/ML
40 INJECTION SUBCUTANEOUS EVERY 24 HOURS
Status: DISCONTINUED | OUTPATIENT
Start: 2023-04-27 | End: 2023-04-29

## 2023-04-27 RX ORDER — ASPIRIN 81 MG/1
81 TABLET ORAL DAILY
Status: DISCONTINUED | OUTPATIENT
Start: 2023-04-28 | End: 2023-05-02 | Stop reason: HOSPADM

## 2023-04-27 RX ORDER — SODIUM CHLORIDE 0.9 % (FLUSH) 0.9 %
5-40 SYRINGE (ML) INJECTION AS NEEDED
Status: DISCONTINUED | OUTPATIENT
Start: 2023-04-27 | End: 2023-05-02 | Stop reason: HOSPADM

## 2023-04-27 RX ADMIN — CEFAZOLIN 1 G: 1 INJECTION, POWDER, FOR SOLUTION INTRAMUSCULAR; INTRAVENOUS at 18:12

## 2023-04-27 RX ADMIN — HYDROCODONE BITARTRATE AND ACETAMINOPHEN 1 TABLET: 5; 325 TABLET ORAL at 19:00

## 2023-04-27 RX ADMIN — SODIUM CHLORIDE, PRESERVATIVE FREE 10 ML: 5 INJECTION INTRAVENOUS at 18:13

## 2023-04-27 RX ADMIN — ENOXAPARIN SODIUM 40 MG: 100 INJECTION SUBCUTANEOUS at 18:12

## 2023-04-27 RX ADMIN — ATORVASTATIN CALCIUM 10 MG: 10 TABLET, FILM COATED ORAL at 21:40

## 2023-04-27 RX ADMIN — CEFAZOLIN 1 G: 1 INJECTION, POWDER, FOR SOLUTION INTRAMUSCULAR; INTRAVENOUS at 21:37

## 2023-04-27 RX ADMIN — SODIUM CHLORIDE, PRESERVATIVE FREE 10 ML: 5 INJECTION INTRAVENOUS at 21:41

## 2023-04-27 NOTE — Clinical Note
Patient Class:: OBSERVATION [104]   Type of Bed: Surgical [18]   Cardiac Monitoring Required?: No   Reason for Observation: concern for wound infection   Admitting Diagnosis: Cellulitis [192319]   Admitting Diagnosis: Foot pain [196001]   Admitting Physician: JODEE CHAVIRA [[id number]]   Attending Physician: JODEE CHAVIRA [[id number]]

## 2023-04-27 NOTE — ED NOTES
eTRANSFER SBAR NOTE    IP UNIT CALLED NOTE IS READY: Yes Spoke to (receiving unit sec, tech or Nurse) Mckenna    IF there are questions Call transferring nurse (your name) Norris RN  at phone # [phone]    SITUATION/BACKGROUND:    Patient is being transferred to [facility] 2 General Surgery, Room# 3114    Patient's Chief Complaint on arrival to ED was wound check  and is admitted for wound infection.     CODE STATUS: Full Code    VITAL SIGNS (MUST BE WITHIN 1 HOUR OF TRANSFER TO IP UNIT:    TEMP: 97.7  PULSE: 94  RESP: 16  BP: 108/59  PAIN SCORE: 3  MEWS: 2     ISOLATION/PRECAUTIONS: No  ISOLATION TYPE:     Called outstanding consults: No      Are there sign and held orders that need to be released? No   Are all STAT orders completed: Yes    STAT labs collected: Yes  REPEAT LACTIC ACID DUE? No  TIME DUE:   Critical Labs Results? No  What?    PHARMACY NEEDS:  Are there any titrating drips? No   If so what?     Are there STAT meds  that need to be given? No     The following personal items will be sent with the patient during transfer to the floor:  All valuables:   ITEM:    ITEM:    ITEM:           ASSESSMENT:    CIWA Assessment: No  Last Score:     NEURO:   NIH SCORE:        NATI SCREENING:          NEURO ASSESSMENT:        If a Stroke Case ... When are the next V/S, nuero, NIH due?     Is patient impulsive? No   Is patient oriented? Yes   Do they follow commands? Yes  Is the patient ambulatory? Yes Device need     FALL RISK? Yes   Is the [place] 1 Assessment completed? Yes  INTERVENTIONS:     INTEGUMENTARY:   IS THE PATIENT UNDRESSED? No   WOUNDS PRESENT? Yes On admit to ED Yes, No   ARE THE WOUNDS DOCUMENTED? Yes    RESPIRATORY:   Is patient on Oxygen? No    OXYGEN: Oxygen Therapy  O2 Device: None (Room air) (04/27/23 1300)  IS patient on VENT? No      CARDIAC:   Is cardiac monitoring ordered? No  Last Rhythm:   Patient to transfer with tele box on? No   Is patient using a LIFE VEST? No     LINE ACCESS:       /GI:   CONTINENT  BOWEL/BLADDER? No   URINARY OUTPUT: voiding   Written Order for [place] Cath? No   CHRONIC OR ACUTE?    If CHRONIC, is it 3 days old, was it changed prior to specimen collection? No   WAS UA WITH REFLEX SENT TO LAB? No  IF NO,  COLLECT AND SEND PRIOR TO TRANSPORT TO INPATIENT AREA    RESTRAINTS IN USE: No      IS DOCUMENTATION COMPLETE: No   Is there a current Order? No  When does it ?     Additional details as Needed:

## 2023-04-27 NOTE — CONSULTS
Was sent to the ED by me for admission for diabetic foot infected right toe ulcer with bone exposed most likely OM    Will see patient today

## 2023-04-27 NOTE — H&P
Hospitalist Admission Note    NAME: Liborio Iniguez   :  1950   MRN:  [id number]     Date/Time:  2023 1:57 PM    Patient PCP: Lily Reed NP  ______________________________________________________________________  Given the patient's current clinical presentation, I have a high level of concern for decompensation if discharged from the emergency department.  Complex decision making was performed, which includes reviewing the patient's available past medical records, laboratory results, and x-ray films.       Discussed assessment of this patient's clinical condition and plan of care with Dr. Johnny Ghotra which is as follows.    Assessment / Plan:    Right foot cellulitis  Right foot pain  XR foot Rt 3V:  No radiographic evidence of osteomyelitis.  Blood culture: pending  MRSA screen: pending  Procalcitonin: pending  - podiatry consult  - antibiotic coverage with Cefazolin, add Vancomycin if MRSA +  - pain management  - wound care consult    Hyperlipidemia  - continue statin    DM, diet controlled  - check A1C    BPH  - continue Flomax    Medical Decision Making:    Labs reviewed by myself   - CBC, BMP    Diagnostic data reviewed by myself   - R foot XR x 3V    Toxic drug monitoring    - none    Discussed case with attending MD    MDM Discussion   - course of treatment    Given the patient's current clinical presentation, I have a high level of concern for decompensation if discharged from the emergency department.  Patient will be admitted as an observation and estimated LOS 24-48 hours     Code Status: Full  Surrogate Decision Maker: Kim Iniguez, spouse ([phone])    DVT Prophylaxis: Lovenox  GI Prophylaxis: not indicated    Baseline: lives at home with spouse, uses cane with ambulation      Subjective:   CHIEF COMPLAINT: chronic right foot wound, right foot pain    HISTORY OF PRESENT ILLNESS:     Liborio Iniguez is a 72 y.o.   male who presents with right foot wound x 2  months now accompanied by progressive pain. Patient went to see his podiatrist, Dr. Godoy yesterday, started him on Keflex and recommends him to come to ED for osteomyelitis workup. Reports 1 episode of chills, denies fever, headache, SOB, LU, CP, abdominal pain, nausea, vomiting, diarrhea, melena, dysuria, hematuria, and focal weakness. Past medical history is significant for diet controlled DM, hypertension, hyperlipidemia and BPH.    We were asked to admit for work up and evaluation of the above problems.     Past Medical History:   Diagnosis Date    Arthritis     knees, back    Burning with urination     Chronic pain     knees, back    DM (diabetes mellitus) (HCC)     Type II - diet controlled    High cholesterol     HTN (hypertension)     currently on no meds    PE (pulmonary thromboembolism) ([facility]) 2017    bilateral with right heart strain. Treated by Dr. Tariq    Valvular heart disease         Past Surgical History:   Procedure Laterality Date    HX AMPUTATION      vascular; Left great toe amputation    HX HEART CATHETERIZATION      HX HEART VALVE SURGERY  2019    TAVR    HX HERNIA REPAIR  2022    Laparoscopic repair of right inguinal hernia with mesh, robot assisted    HX ORTHOPAEDIC Left     Left arm fracture & repair ORIF    HX OTHER SURGICAL  2015    INCISION AND DRAINAGE, RESECTION OF REMAINING 1ST METATARSAL, INSERTION OF ANTIBIOTIC BEADS       Social History     Tobacco Use    Smoking status: Former     Packs/day: 3.00     Years: 20.00     Pack years: 60.00     Types: Cigarettes     Quit date:      Years since quittin.3    Smokeless tobacco: Never    Tobacco comments:     stopped smoking about 30 years ago   Substance Use Topics    Alcohol use: Yes     Comment: occasionally        Family History   Problem Relation Age of Onset    Heart Disease Mother     Hypertension Mother     Diabetes Mother     Heart Disease Father      Allergies   Allergen Reactions    Chlorhexidine  Towelette Rash    Contrast Agent [Iodine] Other (comments)     He had flushing, blistering and scabbing on chest and both arms right after Cath and CTA/ His iv infiltrated with the administration of the dye    Vancomycin Rash     Patient broke out in large hive below IV site and c/o itching to area, patient states that \"that it was more likely an infiltration as opposed to an actual reaction\"        Prior to Admission medications    Medication Sig Start Date End Date Taking? Authorizing Provider   polyethylene glycol (MIRALAX) 17 gram packet Take 1 Packet by mouth in the morning. 7/25/22   Rubén Torres MD   acetaminophen (TYLENOL) 500 mg tablet Take 2 Tablets by mouth four (4) times daily. 7/25/22   Rubén Torres MD   tamsulosin (FLOMAX) 0.4 mg capsule  2/2/21   Provider, Historical   furosemide (LASIX) 40 mg tablet Take 20 mg by mouth daily. 6/28/19   Provider, Historical   simvastatin (ZOCOR) 10 mg tablet Take 10 mg by mouth nightly. 7/8/19   Provider, Historical   HYDROcodone-acetaminophen (NORCO) 5-325 mg per tablet Take 1 Tab by mouth every eight (8) hours as needed for Pain. 7/8/19   Provider, Historical   aspirin delayed-release 81 mg tablet Take 81 mg by mouth daily.    Provider, Historical   ACCU-CHEK KIRILL PLUS TEST STRP strip  3/5/19   Provider, Historical   ACCU-CHEK SOFTCLIX LANCETS misc  3/5/19   Provider, Historical   multivitamin (ONE A DAY) tablet Take 1 Tab by mouth daily.    Provider, Historical       REVIEW OF SYSTEMS:     I am not able to complete the review of systems because:   The patient is intubated and sedated    The patient has altered mental status due to his acute medical problems    The patient has baseline aphasia from prior stroke(s)    The patient has baseline dementia and is not reliable historian    The patient is in acute medical distress and unable to provide information           Total of 12 systems reviewed as follows:       POSITIVE= bolded text  Negative = text not  bolded  General:  fever, chills, sweats, generalized weakness, weight loss/gain,      loss of appetite   Eyes:    blurred vision, eye pain, loss of vision, double vision  ENT:    rhinorrhea, pharyngitis   Respiratory:   cough, sputum production, SOB, LU, wheezing, pleuritic pain   Cardiology:   chest pain, palpitations, orthopnea, PND, edema, syncope   Gastrointestinal:  abdominal pain , N/V, diarrhea, dysphagia, constipation, bleeding   Genitourinary:  frequency, urgency, dysuria, hematuria, incontinence   Muskuloskeletal :  arthralgia, myalgia, back pain, right foot pain  Hematology:  easy bruising, nose or gum bleeding, lymphadenopathy   Dermatological: rash, right toe #3 and #4 ulceration with redness and mild swelling, pruritis, color change / jaundice  Endocrine:   hot flashes or polydipsia   Neurological:  headache, dizziness, confusion, focal weakness, paresthesia,     Speech difficulties, memory loss, gait difficulty  Psychological: Feelings of anxiety, depression, agitation    Objective:   VITALS:    Visit Vitals  BP (!) 108/59 (BP 1 Location: Left upper arm)   Pulse 94   Temp 97.7 °F (36.5 °C)   Resp 18   Ht 5' 9\" (1.753 m)   Wt 89.1 kg (196 lb 6.9 oz)   SpO2 97%   BMI 29.01 kg/m²       PHYSICAL EXAM:    General:    Alert, cooperative, no distress, appears stated age.     HEENT: Atraumatic, anicteric sclerae, pink conjunctivae     No oral ulcers, mucosa moist, throat clear, dentition fair  Neck:  Supple, symmetrical,  thyroid: non tender  Lungs:   Clear to auscultation bilaterally.  No Wheezing or Rhonchi. No rales.  Chest wall:  No tenderness  No Accessory muscle use.  Heart:   Regular  rhythm,  No  murmur   No edema  Abdomen:   Soft, non-tender. Not distended.  Bowel sounds normal  Extremities: No cyanosis.  No clubbing, R toe #3 and #4 digit open wound with mild swelling and redness    Skin turgor normal, Capillary refill normal, Radial dial pulse 2+  Skin:     Not pale.  Not Jaundiced  No rashes    Psych:  Good insight.  Not depressed.  Not anxious or agitated.  Neurologic: EOMs intact. No facial asymmetry. No aphasia or slurred speech. Symmetrical strength, Sensation grossly intact. Alert and oriented X 4.     _______________________________________________________________________  Care Plan discussed with:    Comments   Patient y    Family  y wife   RN     Care Manager                    Consultant:      _______________________________________________________________________  Expected  Disposition:   Home with Family y   HH/PT/OT/RN    SNF/LTC    HATTIE    ________________________________________________________________________      Comments    y Reviewed previous records   >50% of visit spent in counseling and coordination of care y Discussion with patient and/or family and questions answered       ________________________________________________________________________  Signed: Janelle Winkler NP    Procedures: see electronic medical records for all procedures/Xrays and details which were not copied into this note but were reviewed prior to creation of Plan.    LAB DATA REVIEWED:    No results found for this or any previous visit (from the past 24 hour(s)).

## 2023-04-27 NOTE — ED PROVIDER NOTES
[facility] EMERGENCY DEPT  EMERGENCY DEPARTMENT ENCOUNTER       Pt Name: Liborio Iniguez  MRN: [id number]  Birthdate 1950  Date of evaluation: 4/27/2023  Provider: Jeremy Ceja MD   PCP: Lily Reed NP  Note Started: 2:23 PM 4/27/23     CHIEF COMPLAINT       Chief Complaint   Patient presents with    Foot Pain     Patient ambulatory with cane to ED c/o R foot infection. Patient reports that foot infection began several weeks ago, and foot doctor told him to be seen in ED.         HISTORY OF PRESENT ILLNESS: 1 or more elements      History From: Patient and Patient's Wife  HPI Limitations : Patient's Age and Other (hearing loss)     Liborio Iniguez is a 72 y.o. male who presents for admission to the hospital for suspected osteomyelitis due to chronic diabetic foot wound.  His outpatient podiatrist saw him yesterday and recommended he come to the ER for hospital admission.  He has had wounds on the toes of his right foot for the last 2 months.  He has been applying mupirocin and started Keflex last night.  Yesterday morning he had chills but he has not had a fever.  He has not had arthralgias, myalgias, malaise, nausea, or vomiting.  Pertinent past medical history includes diabetes mellitus currently diet controlled not on any insulin or metformin.     Nursing Notes were all reviewed and agreed with or any disagreements were addressed in the HPI.     REVIEW OF SYSTEMS      Review of Systems   Constitutional:  Negative for chills and fever.   Gastrointestinal:  Negative for nausea and vomiting.   Skin:  Positive for wound.      Positives and Pertinent negatives as per HPI.    PAST HISTORY     Past Medical History:  Past Medical History:   Diagnosis Date    Arthritis     knees, back    Burning with urination     Chronic pain     knees, back    DM (diabetes mellitus) ([facility])     Type II - diet controlled    High cholesterol     HTN (hypertension)     currently on no meds    PE (pulmonary thromboembolism) ([facility]) 01/2017     bilateral with right heart strain. Treated by Dr. Tariq    Valvular heart disease        Past Surgical History:  Past Surgical History:   Procedure Laterality Date    HX AMPUTATION      vascular; Left great toe amputation    HX HEART CATHETERIZATION  2019    HX HEART VALVE SURGERY  2019    TAVR    HX HERNIA REPAIR  2022    Laparoscopic repair of right inguinal hernia with mesh, robot assisted    HX ORTHOPAEDIC Left     Left arm fracture & repair ORIF    HX OTHER SURGICAL  2015    INCISION AND DRAINAGE, RESECTION OF REMAINING 1ST METATARSAL, INSERTION OF ANTIBIOTIC BEADS       Family History:  Family History   Problem Relation Age of Onset    Heart Disease Mother     Hypertension Mother     Diabetes Mother     Heart Disease Father        Social History:  Social History     Tobacco Use    Smoking status: Former     Packs/day: 3.00     Years: 20.00     Pack years: 60.00     Types: Cigarettes     Quit date:      Years since quittin.3    Smokeless tobacco: Never    Tobacco comments:     stopped smoking about 30 years ago   Vaping Use    Vaping Use: Never used   Substance Use Topics    Alcohol use: Yes     Comment: occasionally    Drug use: Yes     Types: Prescription, OTC       Allergies:  Allergies   Allergen Reactions    Chlorhexidine Towelette Rash    Contrast Agent [Iodine] Other (comments)     He had flushing, blistering and scabbing on chest and both arms right after Cath and CTA/ His iv infiltrated with the administration of the dye    Vancomycin Rash     Patient broke out in large hive below IV site and c/o itching to area, patient states that \"that it was more likely an infiltration as opposed to an actual reaction\"       CURRENT MEDICATIONS      Previous Medications    ACCU-CHEK KIRILL PLUS TEST STRP STRIP        ACCU-CHEK SOFTCLIX LANCETS MISC        ACETAMINOPHEN (TYLENOL) 500 MG TABLET    Take 2 Tablets by mouth four (4) times daily.    ASPIRIN DELAYED-RELEASE 81 MG TABLET    Take 81 mg by  mouth daily.    FUROSEMIDE (LASIX) 40 MG TABLET    Take 20 mg by mouth daily.    HYDROCODONE-ACETAMINOPHEN (NORCO) 5-325 MG PER TABLET    Take 1 Tab by mouth every eight (8) hours as needed for Pain.    MULTIVITAMIN (ONE A DAY) TABLET    Take 1 Tab by mouth daily.    POLYETHYLENE GLYCOL (MIRALAX) 17 GRAM PACKET    Take 1 Packet by mouth in the morning.    SIMVASTATIN (ZOCOR) 10 MG TABLET    Take 10 mg by mouth nightly.    TAMSULOSIN (FLOMAX) 0.4 MG CAPSULE           PHYSICAL EXAM      ED Triage Vitals   ED Encounter Vitals Group      BP 04/27/23 1259 109/65      Pulse (Heart Rate) 04/27/23 1259 89      Resp Rate 04/27/23 1259 17      Temp 04/27/23 1259 98.1 °F (36.7 °C)      Temp src --       O2 Sat (%) 04/27/23 1259 99 %      Weight 04/27/23 1300 196 lb 6.9 oz      Height 04/27/23 1300 5' 9\"        Physical Exam  Constitutional:       General: He is not in acute distress.     Appearance: He is not toxic-appearing.   HENT:      Head: Normocephalic and atraumatic.      Nose: Nose normal. No congestion.      Mouth/Throat:      Mouth: Mucous membranes are moist.      Pharynx: Oropharynx is clear.   Eyes:      Conjunctiva/sclera: Conjunctivae normal.      Pupils: Pupils are equal, round, and reactive to light.   Cardiovascular:      Rate and Rhythm: Normal rate.   Pulmonary:      Effort: Pulmonary effort is normal. No respiratory distress.   Abdominal:      General: Abdomen is flat. There is no distension.   Musculoskeletal:         General: No deformity.      Cervical back: Neck supple. No rigidity.      Comments: Chronic diabetic foot wounds of the second, third, and fourth toes of the right foot   Skin:     General: Skin is warm and dry.   Neurological:      Mental Status: He is alert.      GCS: GCS eye subscore is 4. GCS verbal subscore is 5. GCS motor subscore is 6.      Cranial Nerves: No facial asymmetry.   Psychiatric:         Mood and Affect: Mood normal.         Behavior: Behavior normal.        DIAGNOSTIC  RESULTS   LABS:     Recent Results (from the past 12 hour(s))   CBC WITH AUTOMATED DIFF    Collection Time: 04/27/23  1:44 PM   Result Value Ref Range    WBC 5.7 4.1 - 11.1 K/uL    RBC 4.13 4.10 - 5.70 M/uL    HGB 13.5 12.1 - 17.0 g/dL    HCT 37.2 36.6 - 50.3 %    MCV 90.1 80.0 - 99.0 FL    MCH 32.7 26.0 - 34.0 PG    MCHC 36.3 30.0 - 36.5 g/dL    RDW 13.6 11.5 - 14.5 %    PLATELET 207 150 - 400 K/uL    MPV 10.2 8.9 - 12.9 FL    NRBC 0.0 0  WBC    ABSOLUTE NRBC 0.00 0.00 - 0.01 K/uL    NEUTROPHILS 55 32 - 75 %    LYMPHOCYTES 28 12 - 49 %    MONOCYTES 10 5 - 13 %    EOSINOPHILS 6 0 - 7 %    BASOPHILS 1 0 - 1 %    IMMATURE GRANULOCYTES 0 0.0 - 0.5 %    ABS. NEUTROPHILS 3.1 1.8 - 8.0 K/UL    ABS. LYMPHOCYTES 1.6 0.8 - 3.5 K/UL    ABS. MONOCYTES 0.6 0.0 - 1.0 K/UL    ABS. EOSINOPHILS 0.3 0.0 - 0.4 K/UL    ABS. BASOPHILS 0.1 0.0 - 0.1 K/UL    ABS. IMM. GRANS. 0.0 0.00 - 0.04 K/UL    DF AUTOMATED          EKG:      RADIOLOGY:  Non-plain film images such as CT, Ultrasound and MRI are read by the radiologist. Plain radiographic images are visualized and preliminarily interpreted by the ED Provider with the below findings:          Interpretation per the Radiologist below, if available at the time of this note:     XR FOOT RT MIN 3 V    Result Date: 4/27/2023  EXAM: XR FOOT RT MIN 3 V INDICATION: concern for right 4th toe osteomyelitis. COMPARISON: None. FINDINGS: Three views of the right foot demonstrate no acute fracture. Hallux valgus with flexion of the second and third phalanges. Moderate osteoarthritis of first MTP joint. No radiographic evidence of osteomyelitis.. The soft tissues are within normal limits.     No radiographic evidence of osteomyelitis.       PROCEDURES   Unless otherwise noted below, none  Procedures     CRITICAL CARE TIME         SCREENINGS             No data recorded         EMERGENCY DEPARTMENT COURSE and DIFFERENTIAL DIAGNOSIS/MDM   Vitals:    Vitals:    04/27/23 1259 04/27/23 1300   BP:  109/65 (!) 108/59   Pulse: 89 94   Resp: 17 18   Temp: 98.1 °F (36.7 °C) 97.7 °F (36.5 °C)   SpO2: 99% 97%   Weight:  89.1 kg (196 lb 6.9 oz)   Height:  5' 9\" (1.753 m)        Patient was given the following medications:  Medications   sodium chloride (NS) flush 5-40 mL (has no administration in time range)   sodium chloride (NS) flush 5-40 mL (has no administration in time range)   ceFAZolin (ANCEF) 1 g in 0.9% sodium chloride (MBP/ADV) 50 mL MBP (has no administration in time range)   enoxaparin (LOVENOX) injection 40 mg (has no administration in time range)       CONSULTS: (Who and What was discussed)  IP CONSULT TO PODIATRY    Chronic Conditions: Diabetes mellitus, hypertension, dyslipidemia, diabetic foot wounds    Social Determinants affecting Dx or Tx: None    Records Reviewed (source and summary of external notes): Prior medical records    CC/HPI Summary, DDx, ED Course, and Reassessment: 72-year-old male with chronic diabetic foot wounds referred to the ER for hospital admission by his outpatient podiatrist.  He is not systemically ill or toxic appearing.  He is not septic.  No indication for empiric broad-spectrum antibiotics.  Will obtain blood cultures and lab work.  Will obtain x-ray to evaluate for possible osteomyelitis although I anticipate he will need MRI.  Consult placed to his outpatient podiatrist.  I spoke with the hospitalist who agreed to admit.    ED Course as of 04/27/23 1427   Thu Apr 27, 2023   1308 Patient saw Dr. Godoy about yesterday.  Dr. Godoy wanted him to go to [facility] for admission with IV antibiotics.  Patient is not septic, no fevers, looks well on exam and has been suffering from toe infection of his right fourth toe for the past 2 months.  Will initiate infectious work-up, obtain x-ray imaging, and consult hospital medicine for admission.  Will place consult to podiatry to evaluate.  Do not think he needs immediate IV antibiotics in the ED and is better served by seeing  podiatry, getting debridement, and getting targeted therapy as he does have presentation consistent with sepsis.  Have ordered Xrs but may need MRI. [WB]      ED Course User Index  [WB] Leon Hung MD       Disposition Considerations (Tests not done, Shared Decision Making, Pt Expectation of Test or Tx.): See above    FINAL IMPRESSION     1. Wound infection          DISPOSITION/PLAN   Admitted    Admit Note: Pt is being admitted by hospital medicine. The results of their tests and reason(s) for their admission have been discussed with pt and/or available family. They convey agreement and understanding for the need to be admitted and for the admission diagnosis.     I am the Primary Clinician of Record.   Jeremy Ceja MD (electronically signed)    (Please note that parts of this dictation were completed with voice recognition software. Quite often unanticipated grammatical, syntax, homophones, and other interpretive errors are inadvertently transcribed by the computer software. Please disregards these errors. Please excuse any errors that have escaped final proofreading.)    I personally performed a history and physical examination of the patient and discussed the management with the resident. I have found the following on physical exam:    Nontoxic and well appearing  Right toe ulcerations with possible exposed bone, some erythema, no purulence  No tenderness to palpation    I have reviewed the resident's note and agree with the resident's findings, including all diagnostic interpretations, treatment and plan of care, except as documented below. I was present during the key portions of separately billed procedures.    Leon Hung MD

## 2023-04-28 PROBLEM — M86.10 ACUTE OSTEOMYELITIS (HCC): Status: ACTIVE | Noted: 2023-04-28

## 2023-04-28 LAB
ALBUMIN SERPL-MCNC: 3.3 G/DL (ref 3.5–5)
ALBUMIN/GLOB SERPL: 1.3 (ref 1.1–2.2)
ALP SERPL-CCNC: 72 U/L (ref 45–117)
ALT SERPL-CCNC: 14 U/L (ref 12–78)
ANION GAP SERPL CALC-SCNC: 6 MMOL/L (ref 5–15)
AST SERPL-CCNC: 10 U/L (ref 15–37)
BACTERIA SPEC CULT: NORMAL
BACTERIA SPEC CULT: NORMAL
BASOPHILS # BLD: 0.1 K/UL (ref 0–0.1)
BASOPHILS NFR BLD: 1 % (ref 0–1)
BILIRUB SERPL-MCNC: 0.6 MG/DL (ref 0.2–1)
BUN SERPL-MCNC: 23 MG/DL (ref 6–20)
BUN/CREAT SERPL: 22 (ref 12–20)
CALCIUM SERPL-MCNC: 8.2 MG/DL (ref 8.5–10.1)
CHLORIDE SERPL-SCNC: 105 MMOL/L (ref 97–108)
CO2 SERPL-SCNC: 30 MMOL/L (ref 21–32)
CREAT SERPL-MCNC: 1.06 MG/DL (ref 0.7–1.3)
DIFFERENTIAL METHOD BLD: ABNORMAL
EOSINOPHIL # BLD: 0.5 K/UL (ref 0–0.4)
EOSINOPHIL NFR BLD: 10 % (ref 0–7)
ERYTHROCYTE [DISTWIDTH] IN BLOOD BY AUTOMATED COUNT: 13.5 % (ref 11.5–14.5)
EST. AVERAGE GLUCOSE BLD GHB EST-MCNC: 114 MG/DL
GLOBULIN SER CALC-MCNC: 2.5 G/DL (ref 2–4)
GLUCOSE SERPL-MCNC: 133 MG/DL (ref 65–100)
HBA1C MFR BLD: 5.6 % (ref 4–5.6)
HCT VFR BLD AUTO: 35.1 % (ref 36.6–50.3)
HGB BLD-MCNC: 12.7 G/DL (ref 12.1–17)
IMM GRANULOCYTES # BLD AUTO: 0 K/UL (ref 0–0.04)
IMM GRANULOCYTES NFR BLD AUTO: 0 % (ref 0–0.5)
LYMPHOCYTES # BLD: 1.6 K/UL (ref 0.8–3.5)
LYMPHOCYTES NFR BLD: 35 % (ref 12–49)
MCH RBC QN AUTO: 32.8 PG (ref 26–34)
MCHC RBC AUTO-ENTMCNC: 36.2 G/DL (ref 30–36.5)
MCV RBC AUTO: 90.7 FL (ref 80–99)
MONOCYTES # BLD: 0.7 K/UL (ref 0–1)
MONOCYTES NFR BLD: 14 % (ref 5–13)
NEUTS SEG # BLD: 1.9 K/UL (ref 1.8–8)
NEUTS SEG NFR BLD: 40 % (ref 32–75)
NRBC # BLD: 0 K/UL (ref 0–0.01)
NRBC BLD-RTO: 0 PER 100 WBC
PLATELET # BLD AUTO: 187 K/UL (ref 150–400)
PMV BLD AUTO: 10.3 FL (ref 8.9–12.9)
POTASSIUM SERPL-SCNC: 3.4 MMOL/L (ref 3.5–5.1)
PROCALCITONIN SERPL-MCNC: <0.05 NG/ML
PROT SERPL-MCNC: 5.8 G/DL (ref 6.4–8.2)
RBC # BLD AUTO: 3.87 M/UL (ref 4.1–5.7)
SERVICE CMNT-IMP: NORMAL
SODIUM SERPL-SCNC: 141 MMOL/L (ref 136–145)
WBC # BLD AUTO: 4.7 K/UL (ref 4.1–11.1)

## 2023-04-28 PROCEDURE — 96376 TX/PRO/DX INJ SAME DRUG ADON: CPT

## 2023-04-28 PROCEDURE — 65270000029 HC RM PRIVATE

## 2023-04-28 PROCEDURE — 74011000250 HC RX REV CODE- 250: Performed by: STUDENT IN AN ORGANIZED HEALTH CARE EDUCATION/TRAINING PROGRAM

## 2023-04-28 PROCEDURE — 74011000258 HC RX REV CODE- 258: Performed by: STUDENT IN AN ORGANIZED HEALTH CARE EDUCATION/TRAINING PROGRAM

## 2023-04-28 PROCEDURE — 74011000250 HC RX REV CODE- 250: Performed by: NURSE PRACTITIONER

## 2023-04-28 PROCEDURE — 96375 TX/PRO/DX INJ NEW DRUG ADDON: CPT

## 2023-04-28 PROCEDURE — 80053 COMPREHEN METABOLIC PANEL: CPT

## 2023-04-28 PROCEDURE — 76937 US GUIDE VASCULAR ACCESS: CPT

## 2023-04-28 PROCEDURE — 83036 HEMOGLOBIN GLYCOSYLATED A1C: CPT

## 2023-04-28 PROCEDURE — 74011250636 HC RX REV CODE- 250/636: Performed by: NURSE PRACTITIONER

## 2023-04-28 PROCEDURE — 74011250637 HC RX REV CODE- 250/637: Performed by: STUDENT IN AN ORGANIZED HEALTH CARE EDUCATION/TRAINING PROGRAM

## 2023-04-28 PROCEDURE — 74011250636 HC RX REV CODE- 250/636: Performed by: STUDENT IN AN ORGANIZED HEALTH CARE EDUCATION/TRAINING PROGRAM

## 2023-04-28 PROCEDURE — 36415 COLL VENOUS BLD VENIPUNCTURE: CPT

## 2023-04-28 PROCEDURE — 85025 COMPLETE CBC W/AUTO DIFF WBC: CPT

## 2023-04-28 PROCEDURE — 74011000258 HC RX REV CODE- 258: Performed by: NURSE PRACTITIONER

## 2023-04-28 PROCEDURE — 84145 PROCALCITONIN (PCT): CPT

## 2023-04-28 PROCEDURE — G0378 HOSPITAL OBSERVATION PER HR: HCPCS

## 2023-04-28 PROCEDURE — 74011250637 HC RX REV CODE- 250/637: Performed by: NURSE PRACTITIONER

## 2023-04-28 RX ORDER — VANCOMYCIN 2 GRAM/500 ML IN 0.9 % SODIUM CHLORIDE INTRAVENOUS
2000 ONCE
Status: COMPLETED | OUTPATIENT
Start: 2023-04-28 | End: 2023-04-29

## 2023-04-28 RX ORDER — METRONIDAZOLE 500 MG/100ML
500 INJECTION, SOLUTION INTRAVENOUS EVERY 12 HOURS
Status: DISCONTINUED | OUTPATIENT
Start: 2023-04-28 | End: 2023-05-02

## 2023-04-28 RX ADMIN — HYDROCODONE BITARTRATE AND ACETAMINOPHEN 1 TABLET: 5; 325 TABLET ORAL at 21:03

## 2023-04-28 RX ADMIN — VANCOMYCIN HYDROCHLORIDE 2000 MG: 10 INJECTION, POWDER, LYOPHILIZED, FOR SOLUTION INTRAVENOUS at 13:00

## 2023-04-28 RX ADMIN — CEFAZOLIN 1 G: 1 INJECTION, POWDER, FOR SOLUTION INTRAMUSCULAR; INTRAVENOUS at 06:23

## 2023-04-28 RX ADMIN — POTASSIUM BICARBONATE 40 MEQ: 782 TABLET, EFFERVESCENT ORAL at 16:43

## 2023-04-28 RX ADMIN — SODIUM CHLORIDE 2 G: 9 INJECTION INTRAMUSCULAR; INTRAVENOUS; SUBCUTANEOUS at 10:56

## 2023-04-28 RX ADMIN — SODIUM CHLORIDE, PRESERVATIVE FREE 10 ML: 5 INJECTION INTRAVENOUS at 06:23

## 2023-04-28 RX ADMIN — METRONIDAZOLE 500 MG: 500 INJECTION, SOLUTION INTRAVENOUS at 11:11

## 2023-04-28 RX ADMIN — FUROSEMIDE 20 MG: 20 TABLET ORAL at 09:00

## 2023-04-28 RX ADMIN — SODIUM CHLORIDE, PRESERVATIVE FREE 10 ML: 5 INJECTION INTRAVENOUS at 22:37

## 2023-04-28 RX ADMIN — SODIUM CHLORIDE, PRESERVATIVE FREE 10 ML: 5 INJECTION INTRAVENOUS at 14:00

## 2023-04-28 RX ADMIN — TAMSULOSIN HYDROCHLORIDE 0.4 MG: 0.4 CAPSULE ORAL at 10:56

## 2023-04-28 RX ADMIN — METRONIDAZOLE 500 MG: 500 INJECTION, SOLUTION INTRAVENOUS at 21:06

## 2023-04-28 RX ADMIN — ASPIRIN 81 MG: 81 TABLET, COATED ORAL at 10:56

## 2023-04-28 RX ADMIN — CEFEPIME 2 G: 2 INJECTION, POWDER, FOR SOLUTION INTRAVENOUS at 22:30

## 2023-04-28 NOTE — PROGRESS NOTES
End of Shift Note    Bedside shift change report given to aaliyah hameed (oncoming nurse) by Shana Johnson RN (offgoing nurse).  Report included the following information SBAR    Shift worked:  7p-7a     Shift summary and any significant changes:     No acute changes over night      Concerns for physician to address:  No     Zone phone for oncoming shift:          Activity:  Activity Level: Up ad duncan  Number times ambulated in hallways past shift: 0  Number of times OOB to chair past shift: 0    Cardiac:   Cardiac Monitoring: No           Access:  Current line(s): PIV     Genitourinary:   Urinary status: voiding    Respiratory:   O2 Device: None (Room air)  Chronic home O2 use?: NO  Incentive spirometer at bedside: N/A       GI:  Last Bowel Movement Date: 04/27/23  Current diet:  ADULT DIET Regular  Passing flatus: YES  Tolerating current diet: YES       Pain Management:   Patient states pain is manageable on current regimen: YES    Skin:  Harris Score: 22  Interventions: increase time out of bed    Patient Safety:  Fall Score:    Interventions: gripper socks       Length of Stay:  Expected LOS: - - -  Actual LOS: 0      Shana Johnson RN

## 2023-04-28 NOTE — PROGRESS NOTES
Problem: Pain  Goal: *Control of Pain  Outcome: Progressing Towards Goal  Goal: *PALLIATIVE CARE:  Alleviation of Pain  Outcome: Progressing Towards Goal     Problem: Patient Education: Go to Patient Education Activity  Goal: Patient/Family Education  Outcome: Progressing Towards Goal     Problem: Falls - Risk of  Goal: *Absence of Falls  Description: Document Mahad Fall Risk and appropriate interventions in the flowsheet.  Outcome: Progressing Towards Goal  Note: Fall Risk Interventions:

## 2023-04-28 NOTE — WOUND CARE
Wound care consulted for \"chronic non-healing right foot wound\" present on admission. Chart reviewed. Pt. Is also being seen by his podiatrist, Dr. Godoy who is doing an amputation tomorrow afternoon. Pt. NPO after MN tonight for surgery tomorrow.   Will sign off for now.   Meredith Escalante RN, BSN, CWON

## 2023-04-28 NOTE — PROGRESS NOTES
Transition of Care Plan:    RUR:OBS  Disposition:Home vs HH  If SNF or IPR: Date FOC offered:  Date FOC received:  Date authorization started with reference number:  Date authorization received and expires:  Accepting facility:  Follow up appointments:  DME needed:TBD  Transportation at Discharge:  Keys or means to access home:        IM Medicare Letter:n/a  Is patient a [facility] and [facility] with [facility]?                If yes, was  transfer form completed and [facility] notified?   Caregiver Contact:  Discharge Caregiver contacted prior to discharge?   Care Conference needed?:      Patient to have toe amputated on Sat.  CM will continue to follow.    Tanisha Josue  Ext [phone]

## 2023-04-28 NOTE — PROGRESS NOTES
Seen patient in the room known patient has an bone exposed wound right 4th toe    Will defer MRI due to contraindications     Scheduled for amputation of the toe tomorrow afternoon, patient verbally consented for the same     Placed npo and consent orders

## 2023-04-28 NOTE — PROGRESS NOTES
Pharmacy Antimicrobial Kinetic Dosing    Indication for Antimicrobials: SSTI, OM (DB foot with exposed bone on 4th Rt toe)    Current Regimen of Each Antimicrobial:  Vancomycin 2 gm x1, then 750 mg IV q12h; Start Date ; Day 1  Cefepime 2 gm IV q8h.  Start date ; day 1.   Metronidazole 500 mg IV q12h. Start date ; day 1    Previous Antimicrobial Therapy:  Cefazolin -     Goal Level: AUC: 400-600 mg/hr/Liter/day    Date Dose & Interval Measured (mcg/mL) Predicted AUC/SHAHBAZ                       Date & time of next level: vanc tr , 1330    Dosing calculator used: SP[employer] GAMES calculator    Significant Cultures:    blood. NG. Pending   MRSA: pending    Conditions for Dosing Consideration: None    Labs:  Recent Labs     23  0247 23  1344   CREA 1.06 1.18   BUN 23* 21*   PCT <0.05  --      Recent Labs     23  0247 23  1344   WBC 4.7 5.7     Temp (24hrs), Av.8 °F (36.6 °C), Min:97.5 °F (36.4 °C), Max:98.1 °F (36.7 °C)        Creatinine Clearance (mL/min):   CrCl (Adjusted Body Weight): 68.5 mL/min   If actual weight < IBW: CrCl (Actual Body Weight) 78.2    Impression/Plan:   Podiatry on board for Rt-toe amputation planned   Vancomycin loaded, then 750 mg IV q12h for an expected AUC of 440  Cefepime 2 gm IV q8h per renal protocol  Metronidazole 500 mg IV q12h  BMP daily  Antimicrobial stop date: Vancomycin, planned 7 days, cefepime TBD     Pharmacy will follow daily and adjust medications as appropriate for renal function and/or serum levels.    Thank you,  Loki Benjamin, PHARMD

## 2023-04-28 NOTE — PROGRESS NOTES
Hospitalist Progress Note    NAME: Liborio Iniguez   :  1950   MRN:  [id number]       Assessment / Plan:  Right fourth Toe osteomyelitis:    XR foot Rt 3V:  No radiographic evidence of osteomyelitis.    Xray negative however, high clinical suspicion exposed bone  Discussed with podiatrist Dr. Godoy today  MRI couldn't be done ?d/t contraindication  Podiatry planning on toe amputation in AM  Blood culture: pending  MRSA screen: pending  Procalcitonin:low  Abx switched to vancomycin + cefepime + flagyl    Hypokalemia:  Repleted, repeat in AM     Hyperlipidemia  Continue statin     DM, diet controlled  Hba1c: 5.6     BPH  Continue Flomax     Medical Decision Making:     Labs reviewed by myself   - CBC, BMP     Diagnostic data reviewed by myself   - R foot XR x 3V     Toxic drug monitoring    Check Vancomycin trough     Code Status: Full  Surrogate Decision Maker: Kim Iniguez, spouse ([phone])     DVT Prophylaxis: Lovenox held today  GI Prophylaxis: not indicated  GENEVA:   Barriers: Toe amputation in AM, and will need cultures to finalize     Subjective:     Chief Complaint / Reason for Physician Visit  Seen and evaluated today  Denies much pain      Review of Systems:  Symptom Y/N Comments  Symptom Y/N Comments   Fever/Chills n   Chest Pain n    Poor Appetite n   Edema n    Cough n   Abdominal Pain n    Sputum n   Joint Pain n    SOB/LU n   Pruritis/Rash     Nausea/vomit    Tolerating PT/OT     Diarrhea    Tolerating Diet     Constipation    Other       Could NOT obtain due to:      Objective:     VITALS:   Last 24hrs VS reviewed since prior progress note. Most recent are:  Patient Vitals for the past 24 hrs:   Temp Pulse Resp BP SpO2   23 0816 97.9 °F (36.6 °C) 67 18 136/62 98 %   23 0246 98.1 °F (36.7 °C) 71 16 118/66 98 %   23 98.1 °F (36.7 °C) 73 16 (!) 130/57 99 %   23 1519 97.5 °F (36.4 °C) 70 18 (!) 109/58 100 %   23 1457 97.5 °F (36.4 °C) 63 18 (!) 103/47  98 %     No intake or output data in the 24 hours ending 04/28/23 1319     I had a face to face encounter and independently examined this patient on 4/28/2023, as outlined below:  PHYSICAL EXAM:  General: Awake, No acute distress  EENT:  EOMI. Anicteric sclerae. MMM  Resp:  CTA bilaterally, no wheezing or rales.  No accessory muscle use  CV:  Regular  rhythm,  No edema  GI:  Soft, Non distended, Non tender.  +Bowel sounds  Neurologic:  Alert and oriented X 3, normal speech,   Psych:   Not anxious nor agitated  Skin:  No rashes.  No jaundice    Reviewed most current lab test results and cultures  YES  Reviewed most current radiology test results   YES  Review and summation of old records today    NO  Reviewed patient's current orders and MAR    YES  PMH/SH reviewed - no change compared to H&P  ________________________________________________________________________  Care Plan discussed with:    Comments   Patient y    Family      RN y    Care Manager     Consultant                       y Multidiciplinary team rounds were held today with , nursing, pharmacist and clinical coordinator.  Patient's plan of care was discussed; medications were reviewed and discharge planning was addressed.     ________________________________________________________________________  Total NON critical care TIME:  37   Minutes    Total CRITICAL CARE TIME Spent:   Minutes non procedure based      Comments   >50% of visit spent in counseling and coordination of care x    ________________________________________________________________________  Annamaria Tan MD     Procedures: see electronic medical records for all procedures/Xrays and details which were not copied into this note but were reviewed prior to creation of Plan.      LABS:  I reviewed today's most current labs and imaging studies.  Pertinent labs include:  Recent Labs     04/28/23  0247 04/27/23  1344   WBC 4.7 5.7   HGB 12.7 13.5   HCT 35.1* 37.2    207      Recent Labs     04/28/23  0247 04/27/23  1344    137   K 3.4* 3.7    104   CO2 30 27   * 137*   BUN 23* 21*   CREA 1.06 1.18   CA 8.2* 8.4*   ALB 3.3* 3.6   TBILI 0.6 1.2*   ALT 14 17       Signed: Annamaria Tan MD

## 2023-04-28 NOTE — PROGRESS NOTES
Pharmacy Note     Cefepime 1000mg IV q8h and Flagyl 500mg IV q8h  ordered for treatment of Bone and joint infx. Per [facility] Renal / Extended Infusion B Lactam Policy, Cefepime will be changed to 2000 mg IV push x 1, followed by 2000mg IV q8h infused over 4 hours. Flagyl will be changed to 500mg IV q12h.     Estimated Creatinine Clearance: Estimated Creatinine Clearance: 68.6 mL/min (based on SCr of 1.06 mg/dL).  Dialysis Status, TANVI, CKD:      BMI:  Body mass index is 29.01 kg/m².    Recent Labs     23  0247 23  1344   WBC 4.7 5.7     Temp (24hrs), Av.8 °F (36.6 °C), Min:97.5 °F (36.4 °C), Max:98.1 °F (36.7 °C)      Rationale for Adjustment: Extended infusion dosing strategy aims to enhance microbiology and clinical efficacy.    Pharmacy will continue to monitor and adjust dose as necessary.      Please call with any questions.    Thank you,  Francine Cifuentes

## 2023-04-28 NOTE — PROGRESS NOTES
.End of Shift Note    Bedside shift change report given to FRANCA Shaw (oncoming nurse) by Adina Siddiqi LPN (offgoing nurse).  Report included the following information SBAR, Kardex, ED Summary, Procedure Summary, Intake/Output, MAR, and Recent Results    Shift worked:  7am-7pm     Shift summary and any significant changes:     Plan of care, prepare for surgery 4/29, patient to have toe amputation. IV infiltrate on left arm, warm compress applied. PICC team has placed new IV. Patient on antibiotic therapy and is on Vancomycin. NPO at 12am.      Concerns for physician to address:  Plan of care     Zone phone for oncoming shift:   [phone]       Activity:  Activity Level: Up with Assistance  Number times ambulated in hallways past shift: 0  Number of times OOB to chair past shift: 3    Cardiac:   Cardiac Monitoring: No           Access:  Current line(s): PIV     Genitourinary:   Urinary status: voiding    Respiratory:   O2 Device: None (Room air)  Chronic home O2 use?: NO  Incentive spirometer at bedside: YES       GI:  Last Bowel Movement Date: 04/27/23  Current diet:  ADULT DIET Regular  DIET NPO  Passing flatus: YES  Tolerating current diet: YES       Pain Management:   Patient states pain is manageable on current regimen: YES    Skin:  Harris Score: 19  Interventions: float heels and nutritional support     Patient Safety:  Fall Score:    Interventions: gripper socks, pt to call before getting OOB, and stay with me (per policy)       Length of Stay:  Expected LOS: 2d 21h  Actual LOS: 0      Adina Siddiqi LPN

## 2023-04-29 ENCOUNTER — ANESTHESIA (OUTPATIENT)
Dept: SURGERY | Age: 73
End: 2023-04-29
Payer: MEDICARE

## 2023-04-29 ENCOUNTER — ANESTHESIA EVENT (OUTPATIENT)
Dept: SURGERY | Age: 73
End: 2023-04-29
Payer: MEDICARE

## 2023-04-29 LAB
ANION GAP SERPL CALC-SCNC: 4 MMOL/L (ref 5–15)
BUN SERPL-MCNC: 27 MG/DL (ref 6–20)
BUN/CREAT SERPL: 21 (ref 12–20)
CALCIUM SERPL-MCNC: 8.6 MG/DL (ref 8.5–10.1)
CHLORIDE SERPL-SCNC: 107 MMOL/L (ref 97–108)
CO2 SERPL-SCNC: 28 MMOL/L (ref 21–32)
CREAT SERPL-MCNC: 1.26 MG/DL (ref 0.7–1.3)
DATE LAST DOSE: ABNORMAL
ERYTHROCYTE [DISTWIDTH] IN BLOOD BY AUTOMATED COUNT: 13.6 % (ref 11.5–14.5)
GLUCOSE SERPL-MCNC: 142 MG/DL (ref 65–100)
HCT VFR BLD AUTO: 37.7 % (ref 36.6–50.3)
HGB BLD-MCNC: 13.5 G/DL (ref 12.1–17)
MCH RBC QN AUTO: 32.8 PG (ref 26–34)
MCHC RBC AUTO-ENTMCNC: 35.8 G/DL (ref 30–36.5)
MCV RBC AUTO: 91.7 FL (ref 80–99)
NRBC # BLD: 0 K/UL (ref 0–0.01)
NRBC BLD-RTO: 0 PER 100 WBC
PLATELET # BLD AUTO: 224 K/UL (ref 150–400)
PMV BLD AUTO: 10.2 FL (ref 8.9–12.9)
POTASSIUM SERPL-SCNC: 4.2 MMOL/L (ref 3.5–5.1)
RBC # BLD AUTO: 4.11 M/UL (ref 4.1–5.7)
REPORTED DOSE,DOSE: ABNORMAL UNITS
REPORTED DOSE/TIME,TMG: 200
SODIUM SERPL-SCNC: 139 MMOL/L (ref 136–145)
VANCOMYCIN TROUGH SERPL-MCNC: 14 UG/ML (ref 5–10)
WBC # BLD AUTO: 4.6 K/UL (ref 4.1–11.1)

## 2023-04-29 PROCEDURE — 87075 CULTR BACTERIA EXCEPT BLOOD: CPT

## 2023-04-29 PROCEDURE — 87205 SMEAR GRAM STAIN: CPT

## 2023-04-29 PROCEDURE — 76010000138 HC OR TIME 0.5 TO 1 HR: Performed by: PODIATRIST

## 2023-04-29 PROCEDURE — 74011250636 HC RX REV CODE- 250/636: Performed by: STUDENT IN AN ORGANIZED HEALTH CARE EDUCATION/TRAINING PROGRAM

## 2023-04-29 PROCEDURE — 74011000258 HC RX REV CODE- 258: Performed by: STUDENT IN AN ORGANIZED HEALTH CARE EDUCATION/TRAINING PROGRAM

## 2023-04-29 PROCEDURE — 36415 COLL VENOUS BLD VENIPUNCTURE: CPT

## 2023-04-29 PROCEDURE — 74011250636 HC RX REV CODE- 250/636: Performed by: NURSE ANESTHETIST, CERTIFIED REGISTERED

## 2023-04-29 PROCEDURE — 74011000250 HC RX REV CODE- 250: Performed by: PODIATRIST

## 2023-04-29 PROCEDURE — 76060000032 HC ANESTHESIA 0.5 TO 1 HR: Performed by: PODIATRIST

## 2023-04-29 PROCEDURE — 74011000250 HC RX REV CODE- 250: Performed by: NURSE PRACTITIONER

## 2023-04-29 PROCEDURE — 74011250637 HC RX REV CODE- 250/637: Performed by: PODIATRIST

## 2023-04-29 PROCEDURE — 74011000250 HC RX REV CODE- 250: Performed by: NURSE ANESTHETIST, CERTIFIED REGISTERED

## 2023-04-29 PROCEDURE — 74011250636 HC RX REV CODE- 250/636: Performed by: PODIATRIST

## 2023-04-29 PROCEDURE — 77030000032 HC CUF TRNQT ZIMM -B: Performed by: PODIATRIST

## 2023-04-29 PROCEDURE — 2709999900 HC NON-CHARGEABLE SUPPLY: Performed by: PODIATRIST

## 2023-04-29 PROCEDURE — 85027 COMPLETE CBC AUTOMATED: CPT

## 2023-04-29 PROCEDURE — 74011000258 HC RX REV CODE- 258: Performed by: NURSE ANESTHETIST, CERTIFIED REGISTERED

## 2023-04-29 PROCEDURE — 65270000029 HC RM PRIVATE

## 2023-04-29 PROCEDURE — 88305 TISSUE EXAM BY PATHOLOGIST: CPT

## 2023-04-29 PROCEDURE — 88311 DECALCIFY TISSUE: CPT

## 2023-04-29 PROCEDURE — 76210000063 HC OR PH I REC FIRST 0.5 HR: Performed by: PODIATRIST

## 2023-04-29 PROCEDURE — 80202 ASSAY OF VANCOMYCIN: CPT

## 2023-04-29 PROCEDURE — 74011250637 HC RX REV CODE- 250/637: Performed by: NURSE PRACTITIONER

## 2023-04-29 PROCEDURE — 77030019895 HC PCKNG STRP IODO -A: Performed by: PODIATRIST

## 2023-04-29 PROCEDURE — 74011000250 HC RX REV CODE- 250: Performed by: ANESTHESIOLOGY

## 2023-04-29 PROCEDURE — 80048 BASIC METABOLIC PNL TOTAL CA: CPT

## 2023-04-29 RX ORDER — BUPIVACAINE HYDROCHLORIDE 5 MG/ML
INJECTION, SOLUTION PERINEURAL AS NEEDED
Status: DISCONTINUED | OUTPATIENT
Start: 2023-04-29 | End: 2023-04-29 | Stop reason: HOSPADM

## 2023-04-29 RX ORDER — DIPHENHYDRAMINE HYDROCHLORIDE 50 MG/ML
12.5 INJECTION, SOLUTION INTRAMUSCULAR; INTRAVENOUS AS NEEDED
Status: DISCONTINUED | OUTPATIENT
Start: 2023-04-29 | End: 2023-04-29 | Stop reason: HOSPADM

## 2023-04-29 RX ORDER — SODIUM CHLORIDE 0.9 % (FLUSH) 0.9 %
5-40 SYRINGE (ML) INJECTION AS NEEDED
Status: DISCONTINUED | OUTPATIENT
Start: 2023-04-29 | End: 2023-04-29 | Stop reason: HOSPADM

## 2023-04-29 RX ORDER — LIDOCAINE HYDROCHLORIDE 10 MG/ML
0.1 INJECTION, SOLUTION EPIDURAL; INFILTRATION; INTRACAUDAL; PERINEURAL AS NEEDED
Status: DISCONTINUED | OUTPATIENT
Start: 2023-04-29 | End: 2023-04-29 | Stop reason: HOSPADM

## 2023-04-29 RX ORDER — SODIUM CHLORIDE 0.9 % (FLUSH) 0.9 %
5-40 SYRINGE (ML) INJECTION EVERY 8 HOURS
Status: DISCONTINUED | OUTPATIENT
Start: 2023-04-29 | End: 2023-04-29 | Stop reason: HOSPADM

## 2023-04-29 RX ORDER — LIDOCAINE HYDROCHLORIDE 20 MG/ML
INJECTION, SOLUTION EPIDURAL; INFILTRATION; INTRACAUDAL; PERINEURAL AS NEEDED
Status: DISCONTINUED | OUTPATIENT
Start: 2023-04-29 | End: 2023-04-29 | Stop reason: HOSPADM

## 2023-04-29 RX ORDER — DEXAMETHASONE SODIUM PHOSPHATE 4 MG/ML
INJECTION, SOLUTION INTRA-ARTICULAR; INTRALESIONAL; INTRAMUSCULAR; INTRAVENOUS; SOFT TISSUE AS NEEDED
Status: DISCONTINUED | OUTPATIENT
Start: 2023-04-29 | End: 2023-04-29 | Stop reason: HOSPADM

## 2023-04-29 RX ORDER — MORPHINE SULFATE 4 MG/ML
2 INJECTION INTRAVENOUS
Status: DISCONTINUED | OUTPATIENT
Start: 2023-04-29 | End: 2023-04-29 | Stop reason: HOSPADM

## 2023-04-29 RX ORDER — SODIUM CHLORIDE, SODIUM LACTATE, POTASSIUM CHLORIDE, CALCIUM CHLORIDE 600; 310; 30; 20 MG/100ML; MG/100ML; MG/100ML; MG/100ML
INJECTION, SOLUTION INTRAVENOUS
Status: DISCONTINUED | OUTPATIENT
Start: 2023-04-29 | End: 2023-04-29 | Stop reason: HOSPADM

## 2023-04-29 RX ORDER — ONDANSETRON 2 MG/ML
INJECTION INTRAMUSCULAR; INTRAVENOUS AS NEEDED
Status: DISCONTINUED | OUTPATIENT
Start: 2023-04-29 | End: 2023-04-29 | Stop reason: HOSPADM

## 2023-04-29 RX ORDER — ENOXAPARIN SODIUM 100 MG/ML
40 INJECTION SUBCUTANEOUS EVERY 24 HOURS
Status: DISCONTINUED | OUTPATIENT
Start: 2023-04-30 | End: 2023-05-02 | Stop reason: HOSPADM

## 2023-04-29 RX ORDER — FENTANYL CITRATE 50 UG/ML
INJECTION, SOLUTION INTRAMUSCULAR; INTRAVENOUS AS NEEDED
Status: DISCONTINUED | OUTPATIENT
Start: 2023-04-29 | End: 2023-04-29 | Stop reason: HOSPADM

## 2023-04-29 RX ORDER — HYDROMORPHONE HYDROCHLORIDE 1 MG/ML
.2-.5 INJECTION, SOLUTION INTRAMUSCULAR; INTRAVENOUS; SUBCUTANEOUS
Status: DISCONTINUED | OUTPATIENT
Start: 2023-04-29 | End: 2023-04-29 | Stop reason: HOSPADM

## 2023-04-29 RX ORDER — SODIUM CHLORIDE, SODIUM LACTATE, POTASSIUM CHLORIDE, CALCIUM CHLORIDE 600; 310; 30; 20 MG/100ML; MG/100ML; MG/100ML; MG/100ML
25 INJECTION, SOLUTION INTRAVENOUS CONTINUOUS
Status: DISCONTINUED | OUTPATIENT
Start: 2023-04-29 | End: 2023-04-29 | Stop reason: HOSPADM

## 2023-04-29 RX ORDER — PROPOFOL 10 MG/ML
INJECTION, EMULSION INTRAVENOUS
Status: DISCONTINUED | OUTPATIENT
Start: 2023-04-29 | End: 2023-04-29 | Stop reason: HOSPADM

## 2023-04-29 RX ORDER — ONDANSETRON 2 MG/ML
4 INJECTION INTRAMUSCULAR; INTRAVENOUS AS NEEDED
Status: DISCONTINUED | OUTPATIENT
Start: 2023-04-29 | End: 2023-04-29 | Stop reason: HOSPADM

## 2023-04-29 RX ORDER — FENTANYL CITRATE 50 UG/ML
25 INJECTION, SOLUTION INTRAMUSCULAR; INTRAVENOUS
Status: DISCONTINUED | OUTPATIENT
Start: 2023-04-29 | End: 2023-04-29 | Stop reason: HOSPADM

## 2023-04-29 RX ADMIN — LIDOCAINE HYDROCHLORIDE 60 MG: 20 INJECTION, SOLUTION EPIDURAL; INFILTRATION; INTRACAUDAL; PERINEURAL at 14:46

## 2023-04-29 RX ADMIN — SODIUM CHLORIDE, PRESERVATIVE FREE 10 ML: 5 INJECTION INTRAVENOUS at 21:22

## 2023-04-29 RX ADMIN — SODIUM CHLORIDE, PRESERVATIVE FREE 10 ML: 5 INJECTION INTRAVENOUS at 13:59

## 2023-04-29 RX ADMIN — CEFEPIME 2 G: 2 INJECTION, POWDER, FOR SOLUTION INTRAVENOUS at 08:00

## 2023-04-29 RX ADMIN — PROPOFOL 50 MCG/KG/MIN: 10 INJECTION, EMULSION INTRAVENOUS at 14:47

## 2023-04-29 RX ADMIN — FENTANYL CITRATE 12.5 MCG: 50 INJECTION, SOLUTION INTRAMUSCULAR; INTRAVENOUS at 15:02

## 2023-04-29 RX ADMIN — DEXMEDETOMIDINE HYDROCHLORIDE 4 MCG: 100 INJECTION, SOLUTION, CONCENTRATE INTRAVENOUS at 15:02

## 2023-04-29 RX ADMIN — Medication 3 AMPULE: at 13:58

## 2023-04-29 RX ADMIN — FENTANYL CITRATE 12.5 MCG: 50 INJECTION, SOLUTION INTRAMUSCULAR; INTRAVENOUS at 14:42

## 2023-04-29 RX ADMIN — VANCOMYCIN HYDROCHLORIDE 750 MG: 750 INJECTION, POWDER, LYOPHILIZED, FOR SOLUTION INTRAVENOUS at 13:58

## 2023-04-29 RX ADMIN — METRONIDAZOLE 500 MG: 500 INJECTION, SOLUTION INTRAVENOUS at 11:30

## 2023-04-29 RX ADMIN — ONDANSETRON HYDROCHLORIDE 4 MG: 2 INJECTION, SOLUTION INTRAMUSCULAR; INTRAVENOUS at 14:42

## 2023-04-29 RX ADMIN — TAMSULOSIN HYDROCHLORIDE 0.4 MG: 0.4 CAPSULE ORAL at 09:45

## 2023-04-29 RX ADMIN — FUROSEMIDE 20 MG: 20 TABLET ORAL at 09:45

## 2023-04-29 RX ADMIN — ATORVASTATIN CALCIUM 10 MG: 10 TABLET, FILM COATED ORAL at 21:20

## 2023-04-29 RX ADMIN — SODIUM CHLORIDE, PRESERVATIVE FREE 10 ML: 5 INJECTION INTRAVENOUS at 14:00

## 2023-04-29 RX ADMIN — VANCOMYCIN HYDROCHLORIDE 750 MG: 750 INJECTION, POWDER, LYOPHILIZED, FOR SOLUTION INTRAVENOUS at 02:14

## 2023-04-29 RX ADMIN — FENTANYL CITRATE 12.5 MCG: 50 INJECTION, SOLUTION INTRAMUSCULAR; INTRAVENOUS at 15:00

## 2023-04-29 RX ADMIN — DEXMEDETOMIDINE HYDROCHLORIDE 2 MCG: 100 INJECTION, SOLUTION, CONCENTRATE INTRAVENOUS at 14:42

## 2023-04-29 RX ADMIN — Medication 1 AMPULE: at 21:24

## 2023-04-29 RX ADMIN — SODIUM CHLORIDE, POTASSIUM CHLORIDE, SODIUM LACTATE AND CALCIUM CHLORIDE: 600; 310; 30; 20 INJECTION, SOLUTION INTRAVENOUS at 14:42

## 2023-04-29 RX ADMIN — SODIUM CHLORIDE, PRESERVATIVE FREE 10 ML: 5 INJECTION INTRAVENOUS at 06:50

## 2023-04-29 RX ADMIN — METRONIDAZOLE 500 MG: 500 INJECTION, SOLUTION INTRAVENOUS at 21:19

## 2023-04-29 RX ADMIN — DEXMEDETOMIDINE HYDROCHLORIDE 2 MCG: 100 INJECTION, SOLUTION, CONCENTRATE INTRAVENOUS at 14:50

## 2023-04-29 RX ADMIN — DEXAMETHASONE SODIUM PHOSPHATE 4 MG: 4 INJECTION, SOLUTION INTRAMUSCULAR; INTRAVENOUS at 14:42

## 2023-04-29 RX ADMIN — DEXMEDETOMIDINE HYDROCHLORIDE 2 MCG: 100 INJECTION, SOLUTION, CONCENTRATE INTRAVENOUS at 14:46

## 2023-04-29 NOTE — ANESTHESIA PREPROCEDURE EVALUATION
Anesthetic History   No history of anesthetic complications            Review of Systems / Medical History  Patient summary reviewed, nursing notes reviewed and pertinent labs reviewed    Pulmonary          Smoker    Pertinent negatives: No sleep apnea  Comments: Former Smoker - Quit 1995 - Previous 60 Pack Years   Neuro/Psych   Within defined limits           Cardiovascular    Hypertension: well controlled  Valvular problems/murmurs: aortic stenosis            Exercise tolerance: >4 METS  Comments: TTE on 12/24/14:  EF = 55-60%, Moderate AS-s/p TAVR   GI/Hepatic/Renal  Within defined limits              Endo/Other    Diabetes: well controlled, type 2    Obesity and arthritis     Other Findings   Comments: Lumbar Spinal Stenosis           Physical Exam    Airway  Mallampati: III  TM Distance: > 6 cm  Neck ROM: normal range of motion   Mouth opening: Normal     Cardiovascular  Regular rate and rhythm,  S1 and S2 normal,  no murmur, click, rub, or gallop             Dental      Comments: One remaining tooth - right lower biscuspid   Pulmonary  Breath sounds clear to auscultation               Abdominal  GI exam deferred       Other Findings            Anesthetic Plan    ASA: 3  Anesthesia type: MAC and general - backup          Induction: Intravenous  Anesthetic plan and risks discussed with: Patient

## 2023-04-29 NOTE — PROGRESS NOTES
Pharmacy Antimicrobial Kinetic Dosing    Indication for Antimicrobials: SSTI, OM (DB foot with exposed bone on 4th Rt toe)    Current Regimen of Each Antimicrobial:  Vancomycin 750 mg IV q12h; Start Date ; Day 2  Cefepime 2 gm IV q8h.  Start date ; day 2    Previous Antimicrobial Therapy:  Cefazolin -     Goal Level: AUC: 400-600 mg/hr/Liter/day    Date Dose & Interval Measured (mcg/mL) Predicted AUC/SHAHBAZ    750 mg IV q12h 14.0 535                 Date & time of next level: --    Dosing calculator used: [employer] calculator    Significant Cultures:    blood. NG. Pending   MRSA: Neg. Final     Conditions for Dosing Consideration: None    Labs:  Recent Labs     23  1344   CREA 1.26 1.06 1.18   BUN 27* 23* 21*   PCT  --  <0.05  --      Recent Labs     23  1344   WBC 4.6 4.7 5.7     Temp (24hrs), Av.9 °F (36.6 °C), Min:97.3 °F (36.3 °C), Max:98.2 °F (36.8 °C)      Creatinine Clearance (mL/min):   CrCl (Adjusted Body Weight): 57.7 mL/min   If actual weight < IBW: CrCl (Actual Body Weight) 65.8    Impression/Plan:   Podiatry on board for Rt-toe amputation   Vancomycin level is therapeutic, continue 750 mg IV q12h for an AUC of 535  Continue Cefepime 2 gm IV q8h per renal protocol  BMP daily  Antimicrobial stop date: Vancomycin, planned 7 days, cefepime TBD     Pharmacy will follow daily and adjust medications as appropriate for renal function and/or serum levels.    Thank you,  Loki Benjamin, PHARMD

## 2023-04-29 NOTE — PROGRESS NOTES
Hospitalist Progress Note    NAME: Liborio Iniguez   :  1950   MRN:  [id number]       Assessment / Plan:  Right fourth Toe osteomyelitis:    XR foot Rt 3V:  No radiographic evidence of osteomyelitis.    Xray negative however, high clinical suspicion with exposed bone  Discussed with podiatrist Dr. Godoy today  MRI couldn't be done ?d/t contraindication  Podiatry planning on toe amputation today  Blood culture: NGTD  MRSA screen: negative  Procalcitonin:low  Cw vancomycin + cefepime + flagyl for now    Hypokalemia:  Normal today     Hyperlipidemia  Continue statin     DM, diet controlled  Hba1c: 5.6     BPH  Continue Flomax     Medical Decision Making:     Labs reviewed by myself   - CBC, BMP     Diagnostic data reviewed by myself   Consult notes     Toxic drug monitoring    Check Vancomycin trough     Code Status: Full  Surrogate Decision Maker: Kim Iniguez, spouse ([phone])     DVT Prophylaxis: Lovenox held today  GI Prophylaxis: not indicated  GENEVA:   Barriers: Toe amputation today, and will need cultures to finalize     Subjective:     Chief Complaint / Reason for Physician Visit  Seen and evaluated today  Denies much pain  Awaiting for surgery      Review of Systems:  Symptom Y/N Comments  Symptom Y/N Comments   Fever/Chills n   Chest Pain n    Poor Appetite n   Edema n    Cough n   Abdominal Pain n    Sputum n   Joint Pain n    SOB/LU n   Pruritis/Rash     Nausea/vomit    Tolerating PT/OT     Diarrhea    Tolerating Diet     Constipation    Other       Could NOT obtain due to:      Objective:     VITALS:   Last 24hrs VS reviewed since prior progress note. Most recent are:  Patient Vitals for the past 24 hrs:   Temp Pulse Resp BP SpO2   23 0824 97.3 °F (36.3 °C) 65 18 130/66 100 %   23 0338 98.2 °F (36.8 °C) 62 18 (!) 108/52 97 %   23 98.1 °F (36.7 °C) 65 18 (!) 140/65 100 %     No intake or output data in the 24 hours ending 23 1153     I had a face to face  encounter and independently examined this patient on 4/29/2023, as outlined below:  PHYSICAL EXAM:  General: Awake, No acute distress  EENT:  EOMI. Anicteric sclerae. MMM  Resp:  CTA bilaterally, no wheezing or rales.  No accessory muscle use  CV:  Regular  rhythm,  No edema  GI:  Soft, Non distended, Non tender.  +Bowel sounds  Neurologic:  Alert and oriented X 3, normal speech,   Psych:   Not anxious nor agitated  Skin:  No rashes.  No jaundice    Reviewed most current lab test results and cultures  YES  Reviewed most current radiology test results   YES  Review and summation of old records today    NO  Reviewed patient's current orders and MAR    YES  PMH/SH reviewed - no change compared to H&P  ________________________________________________________________________  Care Plan discussed with:    Comments   Patient y    Family      RN y    Care Manager     Consultant                       y Multidiciplinary team rounds were held today with , nursing, pharmacist and clinical coordinator.  Patient's plan of care was discussed; medications were reviewed and discharge planning was addressed.     ________________________________________________________________________  Total NON critical care TIME:  37   Minutes    Total CRITICAL CARE TIME Spent:   Minutes non procedure based      Comments   >50% of visit spent in counseling and coordination of care x    ________________________________________________________________________  Annamaria Tan MD     Procedures: see electronic medical records for all procedures/Xrays and details which were not copied into this note but were reviewed prior to creation of Plan.      LABS:  I reviewed today's most current labs and imaging studies.  Pertinent labs include:  Recent Labs     04/29/23 0228 04/28/23  0247 04/27/23  1344   WBC 4.6 4.7 5.7   HGB 13.5 12.7 13.5   HCT 37.7 35.1* 37.2    187 207     Recent Labs     04/29/23 0228 04/28/23  0247 04/27/23  1344     141 137   K 4.2 3.4* 3.7    105 104   CO2 28 30 27   * 133* 137*   BUN 27* 23* 21*   CREA 1.26 1.06 1.18   CA 8.6 8.2* 8.4*   ALB  --  3.3* 3.6   TBILI  --  0.6 1.2*   ALT  --  14 17       Signed: Annamaria Tan MD

## 2023-04-29 NOTE — PERIOP NOTES
TRANSFER - IN REPORT:    Verbal report received from FRANCA Holden(name) on Liborio Iniguez  being received from 3114(unit) for ordered procedure      Report consisted of patient’s Situation, Background, Assessment and   Recommendations(SBAR).     Information from the following report(s) SBAR, Kardex, ED Summary, OR Summary, Procedure Summary, Intake/Output, MAR, Accordion, Recent Results, Med Rec Status, Alarm Parameters , Pre Procedure Checklist, Procedure Verification, and Quality Measures was reviewed with the receiving nurse.    Opportunity for questions and clarification was provided.      Assessment completed upon patient’s arrival to unit and care assumed.

## 2023-04-29 NOTE — PROGRESS NOTES
.TRANSFER - IN REPORT:    Verbal report received from IainRN(name) on Liborio Iniguez  being received from PACU(unit) for routine progression of care      Report consisted of patient’s Situation, Background, Assessment and   Recommendations(SBAR).     Information from the following report(s) SBAR, Kardex, OR Summary, Procedure Summary, Intake/Output, and MAR was reviewed with the receiving nurse.    Opportunity for questions and clarification was provided.      Assessment completed upon patient’s arrival to unit and care assumed.

## 2023-04-29 NOTE — PROGRESS NOTES
.End of Shift Note    Bedside shift change report given to FRANCA Shaw (oncoming nurse) by Adina Siddiqi LPN (offgoing nurse).  Report included the following information SBAR, Kardex, OR Summary, Procedure Summary, Intake/Output, MAR, and Recent Results    Shift worked:  7am-7pm     Shift summary and any significant changes:     Post op plan of care. VSS, patient encouraged to stay in bed use urinal. Patient does not report pain at this time of the right foot. Right foot dressing post op is dry and intact. Alert and oriented .     Concerns for physician to address:  Post op status     Zone phone for oncoming shift:     [phone]     Activity:  Activity Level: Up with Assistance  Number times ambulated in hallways past shift: 0  Number of times OOB to chair past shift: 0    Cardiac:   Cardiac Monitoring: No      Cardiac Rhythm: Sinus Rhythm, PVC    Access:  Current line(s): PIV     Genitourinary:   Urinary status: voiding    Respiratory:   O2 Device: Nasal cannula  Chronic home O2 use?: NO  Incentive spirometer at bedside: NO       GI:  Last Bowel Movement Date: 04/29/23  Current diet:  ADULT DIET Regular; 4 carb choices (60 gm/meal)  Passing flatus: YES  Tolerating current diet: YES       Pain Management:   Patient states pain is manageable on current regimen: YES    Skin:  Harris Score: 20  Interventions: float heels and nutritional support     Patient Safety:  Fall Score:    Interventions: bed/chair alarm, assistive device (walker, cane, etc), gripper socks, pt to call before getting OOB, and stay with me (per policy)       Length of Stay:  Expected LOS: 2d 21h  Actual LOS: 1      Adina Siddiqi LPN

## 2023-04-29 NOTE — PERIOP NOTES
TRANSFER - OUT REPORT:    Verbal report given to Marnie(name) on Liborio Iniguez  being transferred to [facility](unit) for routine post - op       Report consisted of patient’s Situation, Background, Assessment and   Recommendations(SBAR).     Information from the following report(s) SBAR, Kardex, OR Summary, Procedure Summary, Intake/Output, MAR, Accordion, and Cardiac Rhythm NSR  was reviewed with the receiving nurse.    Opportunity for questions and clarification was provided.      Patient transported with:   O2 @ 2 liters  Registered Nurse    Handoff Report from Operating Room to PACU    Report received from ALEXANDRU Parks RN and SHAWN Styles CRNA regarding Liborio Iniguez.      Surgeon(s):  Andrae Godoy DPM  And Procedure(s) (LRB):  RIGHT FOURTH TOE AMPUTATION (Right)  confirmed   with allergies and dressings discussed.    Anesthesia type, drugs, patient history, complications, estimated blood loss, vital signs, intake and output, and last pain medication, lines, and temperature were reviewed.

## 2023-04-29 NOTE — BRIEF OP NOTE
Brief Postoperative Note    Patient: Liborio Iniguez  YOB: 1950  MRN: [id number]    Date of Procedure: 4/29/2023     Pre-Op Diagnosis: INFECTED RIGHT FOURTH TOE    Post-Op Diagnosis: Same as preoperative diagnosis.      Procedure(s):  RIGHT FOURTH TOE AMPUTATION    Surgeon(s):  Andrae Godoy DPM    Surgical Assistant: None    Anesthesia: Other     Estimated Blood Loss (mL): less than 50     Complications: None    Specimens:   ID Type Source Tests Collected by Time Destination   1 : RIGHT FOURTH TOE Preservative Foot, Right  Andrae Godoy DPM 4/29/2023 1503 Pathology   1 : RIGHT FOURTH TOE WOUND Wound Foot, Right CULTURE, ANAEROBIC, CULTURE, WOUND W GRAM STAIN Andrae Godoy DPM 4/29/2023 1507 Microbiology        Implants: * No implants in log *    Drains: * No LDAs found *    Findings: viable margins     Electronically Signed by Andrae Godoy DPM on 4/29/2023 at 3:33 PM

## 2023-04-30 LAB
ANION GAP SERPL CALC-SCNC: 3 MMOL/L (ref 5–15)
BUN SERPL-MCNC: 21 MG/DL (ref 6–20)
BUN/CREAT SERPL: 20 (ref 12–20)
CALCIUM SERPL-MCNC: 9 MG/DL (ref 8.5–10.1)
CHLORIDE SERPL-SCNC: 104 MMOL/L (ref 97–108)
CO2 SERPL-SCNC: 29 MMOL/L (ref 21–32)
CREAT SERPL-MCNC: 1.05 MG/DL (ref 0.7–1.3)
GLUCOSE SERPL-MCNC: 226 MG/DL (ref 65–100)
POTASSIUM SERPL-SCNC: 3.9 MMOL/L (ref 3.5–5.1)
SODIUM SERPL-SCNC: 136 MMOL/L (ref 136–145)

## 2023-04-30 PROCEDURE — 74011250637 HC RX REV CODE- 250/637: Performed by: PODIATRIST

## 2023-04-30 PROCEDURE — 80048 BASIC METABOLIC PNL TOTAL CA: CPT

## 2023-04-30 PROCEDURE — 74011250636 HC RX REV CODE- 250/636: Performed by: PODIATRIST

## 2023-04-30 PROCEDURE — 74011000250 HC RX REV CODE- 250: Performed by: PODIATRIST

## 2023-04-30 PROCEDURE — 74011000258 HC RX REV CODE- 258: Performed by: PODIATRIST

## 2023-04-30 PROCEDURE — 65270000029 HC RM PRIVATE

## 2023-04-30 PROCEDURE — 36415 COLL VENOUS BLD VENIPUNCTURE: CPT

## 2023-04-30 RX ADMIN — Medication 1 AMPULE: at 09:47

## 2023-04-30 RX ADMIN — ENOXAPARIN SODIUM 40 MG: 100 INJECTION SUBCUTANEOUS at 18:12

## 2023-04-30 RX ADMIN — SODIUM CHLORIDE, PRESERVATIVE FREE 10 ML: 5 INJECTION INTRAVENOUS at 21:42

## 2023-04-30 RX ADMIN — Medication 1 AMPULE: at 21:41

## 2023-04-30 RX ADMIN — METRONIDAZOLE 500 MG: 500 INJECTION, SOLUTION INTRAVENOUS at 21:40

## 2023-04-30 RX ADMIN — TAMSULOSIN HYDROCHLORIDE 0.4 MG: 0.4 CAPSULE ORAL at 09:48

## 2023-04-30 RX ADMIN — CEFEPIME 2 G: 2 INJECTION, POWDER, FOR SOLUTION INTRAVENOUS at 00:02

## 2023-04-30 RX ADMIN — SODIUM CHLORIDE, PRESERVATIVE FREE 10 ML: 5 INJECTION INTRAVENOUS at 18:16

## 2023-04-30 RX ADMIN — CEFEPIME 2 G: 2 INJECTION, POWDER, FOR SOLUTION INTRAVENOUS at 11:00

## 2023-04-30 RX ADMIN — VANCOMYCIN HYDROCHLORIDE 750 MG: 750 INJECTION, POWDER, LYOPHILIZED, FOR SOLUTION INTRAVENOUS at 03:51

## 2023-04-30 RX ADMIN — METRONIDAZOLE 500 MG: 500 INJECTION, SOLUTION INTRAVENOUS at 10:12

## 2023-04-30 RX ADMIN — CEFEPIME 2 G: 2 INJECTION, POWDER, FOR SOLUTION INTRAVENOUS at 18:16

## 2023-04-30 RX ADMIN — FUROSEMIDE 20 MG: 20 TABLET ORAL at 09:48

## 2023-04-30 RX ADMIN — VANCOMYCIN HYDROCHLORIDE 750 MG: 750 INJECTION, POWDER, LYOPHILIZED, FOR SOLUTION INTRAVENOUS at 16:29

## 2023-04-30 RX ADMIN — ATORVASTATIN CALCIUM 10 MG: 10 TABLET, FILM COATED ORAL at 21:40

## 2023-04-30 RX ADMIN — ASPIRIN 81 MG: 81 TABLET, COATED ORAL at 09:48

## 2023-04-30 NOTE — PROGRESS NOTES
End of Shift Note    Bedside shift change report given to Adina (oncoming nurse) by Lisa Montaño (offgoing nurse).  Report included the following information SBAR, Kardex, and MAR    Shift worked:  7p-7a     Shift summary and any significant changes:     Scheduled medications were given, see MAR.  IV has been flushed and is patent.  Patient did not complain of any pain during my shift.  Patient is up with the assistance of his cane.  Patient has his right foot elevated due to surgical procedure done on his fourth right toe.  Patient teaching and routine rounding has been done.     Concerns for physician to address:       Zone phone for oncoming shift:          Activity:  Activity Level: Up ad duncan, Up with Assistance  Number times ambulated in hallways past shift: 0  Number of times OOB to chair past shift: 0    Cardiac:   Cardiac Monitoring: No      Cardiac Rhythm: Sinus Rhythm, PVC    Access:  Current line(s): PIV     Genitourinary:   Urinary status: voiding    Respiratory:   O2 Device: None (Room air)  Chronic home O2 use?: NO  Incentive spirometer at bedside: NO       GI:  Last Bowel Movement Date: 04/29/23  Current diet:  ADULT DIET Regular; 4 carb choices (60 gm/meal)  Passing flatus: YES  Tolerating current diet: YES       Pain Management:   Patient states pain is manageable on current regimen: YES    Skin:  Harris Score: 20  Interventions: float heels and nutritional support     Patient Safety:  Fall Score:    Interventions: bed/chair alarm, assistive device (walker, cane, etc), and pt to call before getting OOB       Length of Stay:  Expected LOS: 2d 21h  Actual LOS: 2      Lisa Montaño

## 2023-04-30 NOTE — PROGRESS NOTES
.End of Shift Note    Bedside shift change report given to FRANCA Shaw (oncoming nurse) by Adina Siddiqi LPN (offgoing nurse).  Report included the following information SBAR, Kardex, OR Summary, Procedure Summary, Intake/Output, MAR, and Recent Results    Shift worked:  7am-7pm     Shift summary and any significant changes:     Plan of care, post op right foot surgery. Dressing has been intact, no signs of bleeding. Patient reports no pain to foot. Patient is opting to not weight bear on the surgical foot. Using urinal to unrinate. Continues on antibiotic therapy.      Concerns for physician to address:  Plan of care     Zone phone for oncoming shift:   [phone]       Activity:  Activity Level: Up with Assistance  Number times ambulated in hallways past shift: 0  Number of times OOB to chair past shift: 0    Cardiac:   Cardiac Monitoring: No      Cardiac Rhythm: Sinus Rhythm    Access:  Current line(s): PIV /Endurance    Genitourinary:   Urinary status: voiding    Respiratory:   O2 Device: None (Room air)  Chronic home O2 use?: NO  Incentive spirometer at bedside: YES       GI:  Last Bowel Movement Date: 04/29/23  Current diet:  ADULT DIET Regular; 4 carb choices (60 gm/meal)  Passing flatus: YES  Tolerating current diet: YES       Pain Management:   Patient states pain is manageable on current regimen: YES    Skin:  Harris Score: 20  Interventions: float heels and nutritional support     Patient Safety:  Fall Score:    Interventions: bed/chair alarm, assistive device (walker, cane, etc), gripper socks, pt to call before getting OOB, and stay with me (per policy)       Length of Stay:  Expected LOS: 2d 21h  Actual LOS: 2      Adina Siddiqi LPN

## 2023-05-01 LAB
ALBUMIN SERPL-MCNC: 3.2 G/DL (ref 3.5–5)
ALBUMIN/GLOB SERPL: 1.2 (ref 1.1–2.2)
ALP SERPL-CCNC: 68 U/L (ref 45–117)
ALT SERPL-CCNC: 19 U/L (ref 12–78)
ANION GAP SERPL CALC-SCNC: 1 MMOL/L (ref 5–15)
AST SERPL-CCNC: 25 U/L (ref 15–37)
BASOPHILS # BLD: 0.1 K/UL (ref 0–0.1)
BASOPHILS NFR BLD: 1 % (ref 0–1)
BILIRUB SERPL-MCNC: 0.7 MG/DL (ref 0.2–1)
BUN SERPL-MCNC: 25 MG/DL (ref 6–20)
BUN/CREAT SERPL: 23 (ref 12–20)
CALCIUM SERPL-MCNC: 8.9 MG/DL (ref 8.5–10.1)
CHLORIDE SERPL-SCNC: 104 MMOL/L (ref 97–108)
CO2 SERPL-SCNC: 31 MMOL/L (ref 21–32)
CREAT SERPL-MCNC: 1.09 MG/DL (ref 0.7–1.3)
DATE LAST DOSE: ABNORMAL
DIFFERENTIAL METHOD BLD: ABNORMAL
EOSINOPHIL # BLD: 0.5 K/UL (ref 0–0.4)
EOSINOPHIL NFR BLD: 8 % (ref 0–7)
ERYTHROCYTE [DISTWIDTH] IN BLOOD BY AUTOMATED COUNT: 13.6 % (ref 11.5–14.5)
GLOBULIN SER CALC-MCNC: 2.7 G/DL (ref 2–4)
GLUCOSE SERPL-MCNC: 148 MG/DL (ref 65–100)
HCT VFR BLD AUTO: 37.2 % (ref 36.6–50.3)
HGB BLD-MCNC: 13 G/DL (ref 12.1–17)
IMM GRANULOCYTES # BLD AUTO: 0 K/UL (ref 0–0.04)
IMM GRANULOCYTES NFR BLD AUTO: 0 % (ref 0–0.5)
LYMPHOCYTES # BLD: 2.2 K/UL (ref 0.8–3.5)
LYMPHOCYTES NFR BLD: 31 % (ref 12–49)
MCH RBC QN AUTO: 31.9 PG (ref 26–34)
MCHC RBC AUTO-ENTMCNC: 34.9 G/DL (ref 30–36.5)
MCV RBC AUTO: 91.2 FL (ref 80–99)
MONOCYTES # BLD: 0.7 K/UL (ref 0–1)
MONOCYTES NFR BLD: 9 % (ref 5–13)
NEUTS SEG # BLD: 3.5 K/UL (ref 1.8–8)
NEUTS SEG NFR BLD: 51 % (ref 32–75)
NRBC # BLD: 0 K/UL (ref 0–0.01)
NRBC BLD-RTO: 0 PER 100 WBC
PLATELET # BLD AUTO: 213 K/UL (ref 150–400)
PMV BLD AUTO: 10.2 FL (ref 8.9–12.9)
POTASSIUM SERPL-SCNC: 3.8 MMOL/L (ref 3.5–5.1)
PROT SERPL-MCNC: 5.9 G/DL (ref 6.4–8.2)
RBC # BLD AUTO: 4.08 M/UL (ref 4.1–5.7)
REPORTED DOSE,DOSE: ABNORMAL UNITS
REPORTED DOSE/TIME,TMG: 1600
SODIUM SERPL-SCNC: 136 MMOL/L (ref 136–145)
VANCOMYCIN TROUGH SERPL-MCNC: 16.8 UG/ML (ref 5–10)
WBC # BLD AUTO: 6.9 K/UL (ref 4.1–11.1)

## 2023-05-01 PROCEDURE — 80053 COMPREHEN METABOLIC PANEL: CPT

## 2023-05-01 PROCEDURE — 74011000258 HC RX REV CODE- 258: Performed by: PODIATRIST

## 2023-05-01 PROCEDURE — 74011250636 HC RX REV CODE- 250/636: Performed by: PODIATRIST

## 2023-05-01 PROCEDURE — 85025 COMPLETE CBC W/AUTO DIFF WBC: CPT

## 2023-05-01 PROCEDURE — 74011000250 HC RX REV CODE- 250: Performed by: PODIATRIST

## 2023-05-01 PROCEDURE — 74011250637 HC RX REV CODE- 250/637: Performed by: PODIATRIST

## 2023-05-01 PROCEDURE — 80202 ASSAY OF VANCOMYCIN: CPT

## 2023-05-01 PROCEDURE — 65270000029 HC RM PRIVATE

## 2023-05-01 PROCEDURE — 36415 COLL VENOUS BLD VENIPUNCTURE: CPT

## 2023-05-01 RX ADMIN — Medication 1 AMPULE: at 09:13

## 2023-05-01 RX ADMIN — METRONIDAZOLE 500 MG: 500 INJECTION, SOLUTION INTRAVENOUS at 09:08

## 2023-05-01 RX ADMIN — FUROSEMIDE 20 MG: 20 TABLET ORAL at 09:10

## 2023-05-01 RX ADMIN — CEFEPIME 2 G: 2 INJECTION, POWDER, FOR SOLUTION INTRAVENOUS at 00:29

## 2023-05-01 RX ADMIN — CEFEPIME 2 G: 2 INJECTION, POWDER, FOR SOLUTION INTRAVENOUS at 09:08

## 2023-05-01 RX ADMIN — VANCOMYCIN HYDROCHLORIDE 750 MG: 750 INJECTION, POWDER, LYOPHILIZED, FOR SOLUTION INTRAVENOUS at 16:00

## 2023-05-01 RX ADMIN — ASPIRIN 81 MG: 81 TABLET, COATED ORAL at 09:09

## 2023-05-01 RX ADMIN — SODIUM CHLORIDE, PRESERVATIVE FREE 10 ML: 5 INJECTION INTRAVENOUS at 14:00

## 2023-05-01 RX ADMIN — ATORVASTATIN CALCIUM 10 MG: 10 TABLET, FILM COATED ORAL at 21:34

## 2023-05-01 RX ADMIN — CEFEPIME 2 G: 2 INJECTION, POWDER, FOR SOLUTION INTRAVENOUS at 16:00

## 2023-05-01 RX ADMIN — SODIUM CHLORIDE, PRESERVATIVE FREE 10 ML: 5 INJECTION INTRAVENOUS at 20:34

## 2023-05-01 RX ADMIN — VANCOMYCIN HYDROCHLORIDE 750 MG: 750 INJECTION, POWDER, LYOPHILIZED, FOR SOLUTION INTRAVENOUS at 04:59

## 2023-05-01 RX ADMIN — METRONIDAZOLE 500 MG: 500 INJECTION, SOLUTION INTRAVENOUS at 20:34

## 2023-05-01 RX ADMIN — TAMSULOSIN HYDROCHLORIDE 0.4 MG: 0.4 CAPSULE ORAL at 09:10

## 2023-05-01 RX ADMIN — Medication 1 AMPULE: at 20:32

## 2023-05-01 RX ADMIN — ENOXAPARIN SODIUM 40 MG: 100 INJECTION SUBCUTANEOUS at 18:00

## 2023-05-01 NOTE — PROGRESS NOTES
End of Shift Note    Bedside shift change report given to Chica Kaminski (oncoming nurse) by Lorena Silverman (offgoing nurse). Report included the following information SBAR, Kardex, and MAR    Shift worked:  7p-7a     Shift summary and any significant changes:     Scheduled medications were given, see MAR. Patient did not complain of any pain during my shift. IV has been flushed and is patent. Patient uses the urinal at bedside. Patient teaching and routine rounding has been done. Concerns for physician to address:       Zone phone for oncoming shift:          Activity:  Activity Level:  Up with Assistance  Number times ambulated in hallways past shift: 0  Number of times OOB to chair past shift: 0    Cardiac:   Cardiac Monitoring: No      Cardiac Rhythm: Sinus Rhythm    Access:  Current line(s): PIV     Genitourinary:   Urinary status: voiding    Respiratory:   O2 Device: None (Room air)  Chronic home O2 use?: NO  Incentive spirometer at bedside: NO       GI:  Last Bowel Movement Date: 04/29/23  Current diet:  ADULT DIET Regular; 4 carb choices (60 gm/meal)  Passing flatus: YES  Tolerating current diet: YES       Pain Management:   Patient states pain is manageable on current regimen: YES    Skin:  Harris Score: 20  Interventions: float heels    Patient Safety:  Fall Score:    Interventions: assistive device (walker, cane, etc)       Length of Stay:  Expected LOS: 2d 21h  Actual LOS: [address]

## 2023-05-01 NOTE — PROGRESS NOTES
End of Shift Note    Bedside shift change report given to [facility], [address] [place] [address] (oncoming nurse) by Yevgeniy Baum RN (offgoing nurse). Report included the following information SBAR, Kardex, OR Summary, Procedure Summary, Intake/Output, MAR, and Recent Results    Shift worked:  7am-7pm     Shift summary and any significant changes:    -dressing cdi  -no c/o pain     Concerns for physician to address:  Plan of care     Zone phone for oncoming shift:   [phone]       Activity:  Activity Level:  Up with Assistance  Number times ambulated in hallways past shift: 0  Number of times OOB to chair past shift: 0    Cardiac:   Cardiac Monitoring: No      Cardiac Rhythm: Sinus Rhythm    Access:  Current line(s): PIV /Endurance    Genitourinary:   Urinary status: voiding    Respiratory:   O2 Device: None (Room air)  Chronic home O2 use?: NO  Incentive spirometer at bedside: YES       GI:  Last Bowel Movement Date: 04/29/23  Current diet:  ADULT DIET Regular; 4 carb choices (60 gm/meal)  Passing flatus: YES  Tolerating current diet: YES       Pain Management:   Patient states pain is manageable on current regimen: YES    Skin:  Harris Score: 21  Interventions: float heels and nutritional support     Patient Safety:  Fall Score:    Interventions: bed/chair alarm, assistive device (walker, cane, etc), gripper socks, pt to call before getting OOB, and stay with me (per policy)       Length of Stay:  Expected LOS: 2d 21h  Actual LOS: 3      Yevgeniy Baum, RN

## 2023-05-01 NOTE — PROGRESS NOTES
Hospitalist Progress Note    NAME: Pao Rome   :  1950   MRN:  [id number]       Assessment / Plan:  Right fourth Toe osteomyelitis POA  S/p right 4th toe amputation   XR foot Rt 3V:  No radiographic evidence of osteomyelitis. Xray negative however, high clinical suspicion with exposed bone  Dr. Myron Norris took to OR  for R 4th toe amputation  Blood culture: NGTD,  MRSA screen: negative  Procalcitonin:low  Cw vancomycin + cefepime + flagyl for now pending path and intrap cx. Podiatry following    Hypokalemia  Resolved     Hyperlipidemia  Continue statin     DM, diet controlled  Hba1c: 5.6     BPH  Continue Flomax     Medical Decision Making:   Labs reviewed by myself   - CBC, BMP   Diagnostic data reviewed by myself   Consult notes   Toxic drug monitoring    Check Vancomycin trough, daily bmp     Code Status: Full  Surrogate Decision Maker: Vera Egan, spouse ([phone])     DVT Prophylaxis: Lovenox held today  GI Prophylaxis: not indicated  GENEVA:   Barriers: Toe amputation today, and will need cultures to finalize     Subjective:     Chief Complaint / Reason for Physician Visit  Seen and evaluated today  No complaints. Denies pain. Review of Systems:  Symptom Y/N Comments  Symptom Y/N Comments   Fever/Chills n   Chest Pain n    Poor Appetite n   Edema     Cough n   Abdominal Pain n    Sputum    Joint Pain n    SOB/LU n   Pruritis/Rash     Nausea/vomit    Tolerating PT/OT     Diarrhea    Tolerating Diet     Constipation    Other       Could NOT obtain due to:      Objective:     VITALS:   Last 24hrs VS reviewed since prior progress note.  Most recent are:  Patient Vitals for the past 24 hrs:   Temp Pulse Resp BP SpO2   23 98.1 °F (36.7 °C) 71 18 112/63 99 %   23 1511 97.9 °F (36.6 °C) 68 18 129/68 --         Intake/Output Summary (Last 24 hours) at 2023 1326  Last data filed at 2023 1751  Gross per 24 hour   Intake --   Output 375 ml   Net -375 ml I had a face to face encounter and independently examined this patient on 5/1/2023, as outlined below:  PHYSICAL EXAM:  General: Awake, No acute distress  EENT:  Anicteric sclerae. MMM  Resp:  CTA bilaterally, no wheezing or rales. No accessory muscle use  CV:  Regular  rhythm,  No edema  GI:  Soft, Non distended, Non tender. +Bowel sounds  Neurologic:  Alert and oriented X 3, normal speech,   Psych:   Not anxious nor agitated  Skin:  No rashes. No jaundice    Reviewed most current lab test results and cultures  YES  Reviewed most current radiology test results   YES  Review and summation of old records today    NO  Reviewed patient's current orders and MAR    YES  PMH/SH reviewed - no change compared to H&P  ________________________________________________________________________  Care Plan discussed with:    Comments   Patient y    Family      RN y    Care Manager     Consultant                        Multidiciplinary team rounds were held today with , nursing, pharmacist and clinical coordinator. Patient's plan of care was discussed; medications were reviewed and discharge planning was addressed. ________________________________________________________________________  Total NON critical care TIME:  35   Minutes    Total CRITICAL CARE TIME Spent:   Minutes non procedure based      Comments   >50% of visit spent in counseling and coordination of care x    ________________________________________________________________________  Johnathan Garcia MD     Procedures: see electronic medical records for all procedures/Xrays and details which were not copied into this note but were reviewed prior to creation of Plan. LABS:  I reviewed today's most current labs and imaging studies.   Pertinent labs include:  Recent Labs     05/01/23 0324 04/29/23 0228   WBC 6.9 4.6   HGB 13.0 13.5   HCT 37.2 37.7    224       Recent Labs     05/01/23 0324 04/30/23  0415 04/29/23 0228    136 139   K 3.8 3.9 4.2    104 107   CO2 31 29 28   * 226* 142*   BUN 25* 21* 27*   CREA 1.09 1.05 1.26   CA 8.9 9.0 8.6   ALB 3.2*  --   --    TBILI 0.7  --   --    ALT 19  --   --          Signed: Manuel Alfaro MD

## 2023-05-01 NOTE — PROGRESS NOTES
Pharmacy Antimicrobial Kinetic Dosing    Indication for Antimicrobials: SSTI, OM (DB foot with exposed bone on 4th Rt toe)    Current Regimen of Each Antimicrobial:  Vancomycin 750 mg IV q12h; Start Date 4/28; Day 4  Cefepime 2 gm IV q8h. Start date 4/28; day 4  Flagyl 500mg iv q12h   Start Date 4/28; day # 4    Impression/Plan:   Podiatry on board for Rt-toe amputation 4/29  Nares neg. For MRSA on 4/27 ; xray neg for OM. Vancomycin level is therapeutic with apparent  mg*hr/L  Continue 750 mg IV q12h  Continue Cefepime 2 gm IV q8h per renal protocol  Continue Flagyl  BMP daily  Antimicrobial stop date: Vancomycin, planned 7 days, cefepime and Flagyl TBD     Pharmacy will follow daily and adjust medications as appropriate for renal function and/or serum levels.     Thank you,  Efren Gandhi, [facility]

## 2023-05-01 NOTE — PROGRESS NOTES
Problem: Pain  Goal: *Control of Pain  Outcome: Progressing Towards Goal     Problem: Falls - Risk of  Goal: *Absence of Falls  Description: Document Mahad Fall Risk and appropriate interventions in the flowsheet.   Outcome: Progressing Towards Goal  Note: Fall Risk Interventions:      Problem: General Medical Care Plan  Goal: *Skin integrity maintained  Outcome: Progressing Towards Goal

## 2023-05-01 NOTE — PROGRESS NOTES
Transition of Care Plan:    RUR:7%  Disposition:Home w/HH  If SNF or IPR: Date FOC offered:  Date FOC received:  Date authorization started with reference number:  Date authorization received and expires:  Accepting facility:  Follow up appointments:  DME needed:owns a r/w, cane & shower chair  Transportation at [address] or means to access home:        IM Medicare Letter:2nd IM needed  Is patient a [place] and connected with the [place]? If yes, was [employer] transfer form completed and [facility] notified? Caregiver Contact:  Discharge Caregiver contacted prior to discharge? Care Conference needed?:       CM completed assessment w/pt at bedside. Patient is independent w/ADL/IADL to include driving. Does not recall name of [facility] provider & does not have a preference in [facility] provider at d/c. No history of Rehab. Patients support system includes, wife, sister, son & daughter. PCP- Damián Santiago NP  Pharmacy-[facility] in [facility] Management Interventions  PCP Verified by CM: Yes  Mode of Transport at Discharge:  Other (see comment) (wife)  Transition of Care Consult (CM Consult): Discharge Planning  Discharge Durable Medical Equipment: No (owns a walker, cane & shower chair)  Physical Therapy Consult: No  Occupational Therapy Consult: No  Speech Therapy Consult: No  Support Systems: Spouse/Significant Other, Child(raul), Other Family Member(s) (Lives in a one story home w/5 STE)  Confirm Follow Up Transport: Self  Discharge Location  Patient Expects to be Discharged to[de-identified] Home with home health ([facility] family vs w/HH)    Carla Barefoot  Ext [phone]

## 2023-05-01 NOTE — PROGRESS NOTES
Reviewed the available data and appreciated, patient surgically cured of infection can be discharged when medically appropriate   With following instructions and recommendations    1, Follow up in my office in one week in [place] [address] please call [phone] for appointment   2, please discharge patient with home health for skilled wound care to change dressing two times a week with Opticell dry gauze and loosely applied kirlex dressing  3. Can be discharged with an oral antibiotic like Doxycycline for 10 days  4. Partial weight bearing on the heel as tolerated with a post op shoe  5.  Floor nurse to change the dressing before discharge with Opticell dry gauze and loosely applied Kirlex and loosely applied ACE wrap to secure     If need further assistance from me please reach out to me on perfect serve or my cell [phone]

## 2023-05-01 NOTE — PROGRESS NOTES
Hospitalist Progress Note    NAME: Tiburcio Alejandro   :  1950   MRN:  [id number]       Assessment / Plan:  Right fourth Toe osteomyelitis POA  S/p right 4th toe amputation   XR foot Rt 3V:  No radiographic evidence of osteomyelitis. Xray negative however, high clinical suspicion with exposed bone  Dr. Marc Walker took to OR yesterday  Podiatry planning on toe amputation today  Blood culture: NGTD  MRSA screen: negative  Procalcitonin:low  Cw vancomycin + cefepime + flagyl for now  Awaiting OR cultures and pathology    Hypokalemia:  Resolved     Hyperlipidemia  Continue statin     DM, diet controlled  Hba1c: 5.6     BPH  Continue Flomax     Medical Decision Making:     Labs reviewed by myself   - CBC, BMP     Diagnostic data reviewed by myself   Consult notes     Toxic drug monitoring    Check Vancomycin trough     Code Status: Full  Surrogate Decision Maker: Purvi Murphy, spouse ([phone])     DVT Prophylaxis: Lovenox held today  GI Prophylaxis: not indicated  GENEVA:   Barriers: Toe amputation today, and will need cultures to finalize     Subjective:     Chief Complaint / Reason for Physician Visit  Seen and evaluated today  No complaints  OR yesterday for right 4th toe amputation      Review of Systems:  Symptom Y/N Comments  Symptom Y/N Comments   Fever/Chills n   Chest Pain n    Poor Appetite n   Edema     Cough n   Abdominal Pain n    Sputum    Joint Pain n    SOB/LU n   Pruritis/Rash     Nausea/vomit    Tolerating PT/OT     Diarrhea    Tolerating Diet     Constipation    Other       Could NOT obtain due to:      Objective:     VITALS:   Last 24hrs VS reviewed since prior progress note.  Most recent are:  Patient Vitals for the past 24 hrs:   Temp Pulse Resp BP SpO2   23 98.1 °F (36.7 °C) 71 18 112/63 99 %   23 1511 97.9 °F (36.6 °C) 68 18 129/68 --   23 0758 98.4 °F (36.9 °C) 85 17 111/66 --         Intake/Output Summary (Last 24 hours) at 2023 4732  Last data filed at 4/30/2023 1751  Gross per 24 hour   Intake --   Output 375 ml   Net -375 ml        I had a face to face encounter and independently examined this patient on 4/30/2023, as outlined below:  PHYSICAL EXAM:  General: Awake, No acute distress  EENT:  Anicteric sclerae. MMM  Resp:  CTA bilaterally, no wheezing or rales. No accessory muscle use  CV:  Regular  rhythm,  No edema  GI:  Soft, Non distended, Non tender. +Bowel sounds  Neurologic:  Alert and oriented X 3, normal speech,   Psych:   Not anxious nor agitated  Skin:  No rashes. No jaundice    Reviewed most current lab test results and cultures  YES  Reviewed most current radiology test results   YES  Review and summation of old records today    NO  Reviewed patient's current orders and MAR    YES  PMH/SH reviewed - no change compared to H&P  ________________________________________________________________________  Care Plan discussed with:    Comments   Patient y    Family      RN y    Care Manager     Consultant                        Multidiciplinary team rounds were held today with , nursing, pharmacist and clinical coordinator. Patient's plan of care was discussed; medications were reviewed and discharge planning was addressed. ________________________________________________________________________  Total NON critical care TIME:  37   Minutes    Total CRITICAL CARE TIME Spent:   Minutes non procedure based      Comments   >50% of visit spent in counseling and coordination of care x    ________________________________________________________________________  Netta Marmolejo MD     Procedures: see electronic medical records for all procedures/Xrays and details which were not copied into this note but were reviewed prior to creation of Plan. LABS:  I reviewed today's most current labs and imaging studies.   Pertinent labs include:  Recent Labs     04/29/23  0228 04/28/23  0247   WBC 4.6 4.7   HGB 13.5 12.7   HCT 37.7 35.1*    187       Recent Labs     04/30/23  0415 04/29/23  0228 04/28/23  0247    139 141   K 3.9 4.2 3.4*    107 105   CO2 29 28 30   * 142* 133*   BUN 21* 27* 23*   CREA 1.05 1.26 1.06   CA 9.0 8.6 8.2*   ALB  --   --  3.3*   TBILI  --   --  0.6   ALT  --   --  14         Signed: Cleopatra Marin MD

## 2023-05-02 VITALS
HEIGHT: 69 IN | TEMPERATURE: 97.7 F | HEART RATE: 65 BPM | SYSTOLIC BLOOD PRESSURE: 138 MMHG | BODY MASS INDEX: 29.09 KG/M2 | OXYGEN SATURATION: 100 % | DIASTOLIC BLOOD PRESSURE: 70 MMHG | RESPIRATION RATE: 16 BRPM | WEIGHT: 196.43 LBS

## 2023-05-02 LAB
ANION GAP SERPL CALC-SCNC: 3 MMOL/L (ref 5–15)
BACTERIA SPEC CULT: ABNORMAL
BUN SERPL-MCNC: 26 MG/DL (ref 6–20)
BUN/CREAT SERPL: 25 (ref 12–20)
CALCIUM SERPL-MCNC: 8.4 MG/DL (ref 8.5–10.1)
CHLORIDE SERPL-SCNC: 105 MMOL/L (ref 97–108)
CO2 SERPL-SCNC: 29 MMOL/L (ref 21–32)
CREAT SERPL-MCNC: 1.06 MG/DL (ref 0.7–1.3)
GLUCOSE SERPL-MCNC: 183 MG/DL (ref 65–100)
GRAM STN SPEC: ABNORMAL
GRAM STN SPEC: ABNORMAL
POTASSIUM SERPL-SCNC: 3.9 MMOL/L (ref 3.5–5.1)
SERVICE CMNT-IMP: ABNORMAL
SODIUM SERPL-SCNC: 137 MMOL/L (ref 136–145)

## 2023-05-02 PROCEDURE — 74011250637 HC RX REV CODE- 250/637: Performed by: PODIATRIST

## 2023-05-02 PROCEDURE — 74011000250 HC RX REV CODE- 250: Performed by: PODIATRIST

## 2023-05-02 PROCEDURE — 97116 GAIT TRAINING THERAPY: CPT | Performed by: PHYSICAL THERAPIST

## 2023-05-02 PROCEDURE — 74011250637 HC RX REV CODE- 250/637: Performed by: STUDENT IN AN ORGANIZED HEALTH CARE EDUCATION/TRAINING PROGRAM

## 2023-05-02 PROCEDURE — 36415 COLL VENOUS BLD VENIPUNCTURE: CPT

## 2023-05-02 PROCEDURE — 74011250636 HC RX REV CODE- 250/636: Performed by: PODIATRIST

## 2023-05-02 PROCEDURE — 80048 BASIC METABOLIC PNL TOTAL CA: CPT

## 2023-05-02 PROCEDURE — 74011000258 HC RX REV CODE- 258: Performed by: PODIATRIST

## 2023-05-02 PROCEDURE — 97161 PT EVAL LOW COMPLEX 20 MIN: CPT | Performed by: PHYSICAL THERAPIST

## 2023-05-02 PROCEDURE — 97530 THERAPEUTIC ACTIVITIES: CPT | Performed by: PHYSICAL THERAPIST

## 2023-05-02 RX ORDER — DOXYCYCLINE HYCLATE 100 MG
100 TABLET ORAL EVERY 12 HOURS
Status: DISCONTINUED | OUTPATIENT
Start: 2023-05-02 | End: 2023-05-02 | Stop reason: HOSPADM

## 2023-05-02 RX ORDER — POLYETHYLENE GLYCOL 3350 17 G/17G
17 POWDER, FOR SOLUTION ORAL
Qty: 1 EACH | Refills: 0 | Status: SHIPPED | OUTPATIENT
Start: 2023-05-02 | End: 2023-06-01

## 2023-05-02 RX ORDER — DOXYCYCLINE HYCLATE 100 MG
100 TABLET ORAL EVERY 12 HOURS
Qty: 20 TABLET | Refills: 0 | Status: SHIPPED | OUTPATIENT
Start: 2023-05-02 | End: 2023-05-12

## 2023-05-02 RX ADMIN — SODIUM CHLORIDE, PRESERVATIVE FREE 10 ML: 5 INJECTION INTRAVENOUS at 04:23

## 2023-05-02 RX ADMIN — SODIUM CHLORIDE, PRESERVATIVE FREE 10 ML: 5 INJECTION INTRAVENOUS at 14:00

## 2023-05-02 RX ADMIN — TAMSULOSIN HYDROCHLORIDE 0.4 MG: 0.4 CAPSULE ORAL at 10:11

## 2023-05-02 RX ADMIN — CEFEPIME 2 G: 2 INJECTION, POWDER, FOR SOLUTION INTRAVENOUS at 00:05

## 2023-05-02 RX ADMIN — CEFEPIME 2 G: 2 INJECTION, POWDER, FOR SOLUTION INTRAVENOUS at 10:12

## 2023-05-02 RX ADMIN — VANCOMYCIN HYDROCHLORIDE 750 MG: 750 INJECTION, POWDER, LYOPHILIZED, FOR SOLUTION INTRAVENOUS at 04:20

## 2023-05-02 RX ADMIN — METRONIDAZOLE 500 MG: 500 INJECTION, SOLUTION INTRAVENOUS at 10:12

## 2023-05-02 RX ADMIN — FUROSEMIDE 20 MG: 20 TABLET ORAL at 10:11

## 2023-05-02 RX ADMIN — DOXYCYCLINE HYCLATE 100 MG: 100 TABLET, FILM COATED ORAL at 14:07

## 2023-05-02 RX ADMIN — Medication 1 AMPULE: at 09:00

## 2023-05-02 RX ADMIN — ASPIRIN 81 MG: 81 TABLET, COATED ORAL at 10:11

## 2023-05-02 NOTE — PROGRESS NOTES
End of Shift Note    Bedside shift change report given to FRANCA Obando (oncoming nurse) by Lara Figueroa RN (offgoing nurse).  Report included the following information SBAR, Kardex, MAR, and Recent Results    Shift worked:  1191-5815     Shift summary and any significant changes:     Pt alert, VS. Pt denies pain. Meds given as ordered.     Concerns for physician to address:       Zone phone for oncoming shift:          Activity:  Activity Level: Up with Assistance  Number times ambulated in hallways past shift: 1  Number of times OOB to chair past shift: 1    Cardiac:   Cardiac Monitoring: No      Cardiac Rhythm: Sinus Rhythm    Access:  Current line(s): PIV     Genitourinary:   Urinary status: voiding    Respiratory:   O2 Device: None (Room air)  Chronic home O2 use?: NO  Incentive spirometer at bedside: NO       GI:  Last Bowel Movement Date: 05/02/23  Current diet:  ADULT DIET Regular; 4 carb choices (60 gm/meal)  Passing flatus: YES  Tolerating current diet: YES       Pain Management:   Patient states pain is manageable on current regimen: YES    Skin:  Harris Score: 21  Interventions: increase time out of bed    Patient Safety:  Fall Score:    Interventions: pt to call before getting OOB       Length of Stay:  Expected LOS: 2d 21h  Actual LOS: 4      Lara Figueroa RN

## 2023-05-02 NOTE — DISCHARGE INSTRUCTIONS
You were treated for a toe infection. This toe was removed Please followup with Podiatry and take the medications as listed.

## 2023-05-02 NOTE — PROGRESS NOTES
Transition of Care Plan:     RUR:7%  Disposition:Home w/[place]/HH  If SNF or IPR: Date FOC offered:  Date FOC received:  Date authorization started with reference number:  Date authorization received and expires:  Accepting facility:  Follow up appointments:on AVS  DME needed:owns a r/w, cane & shower chair  Transportation at Discharge:wife  Keys or means to access home:        IM Medicare Letter:2nd IM given  Is patient a [place] and connected with the VA?                If yes, was  transfer form completed and VA notified?   Caregiver Contact:  Discharge Caregiver contacted prior to discharge?   Care Conference needed?:       Patient is d/c home w/HH today.  No other needs or concerns identified.    Care Management Interventions  PCP Verified by CM: Yes  Mode of Transport at Discharge: Other (see comment) (wife)  Transition of Care Consult (CM Consult): Home Health  [facility]: No  Discharge Durable Medical Equipment: No (owns a walker, cane & shower chair)  Physical Therapy Consult: No  Occupational Therapy Consult: No  Speech Therapy Consult: No  Support Systems: Spouse/Significant Other, Friend/Neighbor (renting room to friend)  Confirm Follow Up Transport: Self  The Patient and/or Patient Representative was Provided with a Choice of Provider and Agrees with the Discharge Plan?: Yes  Name of the Patient Representative Who was Provided with a Choice of Provider and Agrees with the Discharge Plan: pt  Freedom of Choice List was Provided with Basic Dialogue that Supports the Patient's Individualized Plan of Care/Goals, Treatment Preferences and Shares the Quality Data Associated with the Providers?: Yes  Discharge Location  Patient Expects to be Discharged to:: Home with home health ([facility])    Tanisha Josue  Ext [phone]

## 2023-05-02 NOTE — DISCHARGE SUMMARY
Hospitalist Discharge Summary     Patient ID:  Liborio Iniguez  [id number]  72 y.o.  1950 4/27/2023    PCP on record: Lily Reed NP    Admit date: 4/27/2023  Discharge date and time: 5/2/2023    DISCHARGE DIAGNOSIS:    Right fourth Toe osteomyelitis POA  Hypokalemia  HLD  DM  BPH    CONSULTATIONS:  IP CONSULT TO PODIATRY  IP CONSULT TO HOSPITALIST    Excerpted HPI from H&P of Janelle Winkler NP  Liborio Iniguez is a 72 y.o.   male who presents with right foot wound x 2 months now accompanied by progressive pain. Patient went to see his podiatrist, Dr. Godoy yesterday, started him on Keflex and recommends him to come to ED for osteomyelitis workup. Reports 1 episode of chills, denies fever, headache, SOB, LU, CP, abdominal pain, nausea, vomiting, diarrhea, melena, dysuria, hematuria, and focal weakness. Past medical history is significant for diet controlled DM, hypertension, hyperlipidemia and BPH.     We were asked to admit for work up and evaluation of the above problems.     ______________________________________________________________________  DISCHARGE SUMMARY/HOSPITAL COURSE:  for full details see H&P, daily progress notes, labs, consult notes.     Right fourth Toe osteomyelitis POA  S/p right 4th toe amputation 4/29  XR foot Rt 3V: No radiographic evidence of osteomyelitis.  Xray negative however, high clinical suspicion with exposed bone  Dr. Godoy took to OR 4/29 for R 4th toe amputation  Blood culture: NGTD,  MRSA screen: negative  Procalcitonin:low  Abx changed to doxycicline for 10 days per Podiatry, patient surgically cured of infection  - outpatient Podiatry followup     Hypokalemia  Resolved     Hyperlipidemia  Continue statin     DM, diet controlled  Hba1c: 5.6     BPH  Continue Flomax    Discharged after patient evaluated by PT, CM aware.  Wound care: skilled wound care to change dressing two times a week with Opticell dry gauze and loosely applied kirlex  dressing    _______________________________________________________________________  Patient seen and examined by me on discharge day.  Pertinent Findings:  Gen:    Not in distress  Chest: Clear lungs  CVS:   Regular rhythm.  No edema  Abd:  Soft, not distended, not tender  Ext:  R foot in bandage  Neuro:  Alert, oriented x4  _______________________________________________________________________  DISCHARGE MEDICATIONS:   Current Discharge Medication List        START taking these medications    Details   doxycycline (VIBRA-TABS) 100 mg tablet Take 1 Tablet by mouth every twelve (12) hours for 20 doses.  Qty: 20 Tablet, Refills: 0  Start date: 5/2/2023, End date: 5/12/2023           CONTINUE these medications which have CHANGED    Details   polyethylene glycol (MIRALAX) 17 gram packet Take 1 Packet by mouth daily as needed for Constipation for up to 30 days.  Qty: 1 Each, Refills: 0  Start date: 5/2/2023, End date: 6/1/2023           CONTINUE these medications which have NOT CHANGED    Details   tamsulosin (FLOMAX) 0.4 mg capsule       furosemide (LASIX) 40 mg tablet Take 0.5 Tablets by mouth daily.      simvastatin (ZOCOR) 10 mg tablet Take 1 Tablet by mouth nightly.      HYDROcodone-acetaminophen (NORCO) 5-325 mg per tablet Take 1 Tablet by mouth every eight (8) hours as needed for Pain.      aspirin delayed-release 81 mg tablet Take 1 Tablet by mouth daily.      acetaminophen (TYLENOL) 500 mg tablet Take 2 Tablets by mouth four (4) times daily.      ACCU-CHEK KIRILL PLUS TEST STRP strip       ACCU-CHEK SOFTCLIX LANCETS misc       multivitamin (ONE A DAY) tablet Take 1 Tab by mouth daily.             Patient Follow Up Instructions:   Activity: Partial weight bearing on the heel as tolerated with a post op shoe  Diet: Cardiac Diet and Diabetic Diet  Wound Care: skilled wound care to change dressing two times a week with Opticell dry gauze and loosely applied kirlex dressing      Follow-up Information       Follow up  With Specialties Details Why Contact Info    Lily Reed NP Nurse Practitioner Follow up Office will call patient with a follow-up appointment [address] [facility] [address] [phone]      [facility] Home Health Services Follow up This is your home health care provider.  If you dont hear from them within 48hrs of discharge, please give them a call [address] [facility], [address]  [facility] [address] [phone]    Andrae Godoy DPM Podiatry, Orthopedic Surgery Follow up in 1 week(s) Follow up in my office in one week in [place] please call [phone] for appointment [address] [facility] [facility] [place] [address] [phone]            ________________________________________________________________    Risk of deterioration: Low    Condition at Discharge:  Stable  __________________________________________________________________    Disposition  Pending PT eval    ____________________________________________________________________    Code Status: Full Code  ___________________________________________________________________      Total time in minutes spent coordinating this discharge (includes going over instructions, follow-up, prescriptions, and preparing report for sign off to her PCP) :  >30 minutes    Signed:  David L Mendel, MD

## 2023-05-02 NOTE — PROGRESS NOTES
DISCHARGE NOTE FROM SURGICAL-TELEMETRY NURSE    Patient determined to be stable for discharge by attending provider. I have reviewed the discharge instructions and follow-up appointments with the patient. They verbalized understanding and all questions were answered to their satisfaction. No complaints or further questions were expressed.      Medications sent to pharmacy.  Appropriate educational materials and medication side effect teaching were provided.      PIV were removed prior to discharge.     Patient did not discharge with any line, hendricks, or drain.     All personal items collected during admission were returned to the patient prior to discharge.    Post-op patient: Yes- Patient given post-op discharge kit and instructed on use.       Gisella Kwong RN

## 2023-05-02 NOTE — PROGRESS NOTES
PHYSICAL THERAPY EVALUATION/DISCHARGE  Patient: Liborio Iniguez (72 y.o. male)  Date: 5/2/2023  Primary Diagnosis: Cellulitis [L03.90]  Foot pain [M79.673]  Acute osteomyelitis (HCC) [M86.10]  Procedure(s) (LRB):  RIGHT FOURTH TOE AMPUTATION (Right) 3 Days Post-Op   Precautions:   PWB (R heel); [place]      ASSESSMENT  Based on the objective data described below, the patient presents with inability to maintain R heel partial weight bearing during ambulation and transfers despite continues verbal cues and education. Patient is also very [place] and lacks insight into deficits which further limits his ability to follow commands. Patient also demonstrating unsafe use of RW frequently attempting to push walker to side. He ambulated 10 feet x 2 (to and from bathroom). He stood to urinate and to wash hands. No LOB noted but continued to bear full weight through R LE. Patient remained in chair at end of session; bed alarm in place.   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   No family present during tx session for education.   Recommend HHPT following discharge to attempt further gait training in his familiar home envirnoment. Patient will need to use RW for ambulation. Would limit OOB mobility until able to be WBAT.        Further skilled acute physical therapy is not indicated at this time.     PLAN :  Recommendation for discharge: (in order for the patient to meet his/her long term goals)  Physical therapy at least 2 days/week in the home AND ensure assist and/or supervision for safety with limited weight bearing status    This discharge recommendation:  Has been made in collaboration with the attending provider and/or case management    IF patient discharges home will need the following DME: patient owns DME required for discharge. Needs to use RW for ambulation       SUBJECTIVE:   Patient stated “I'm standing on my heel.”- noted to be foot flat and full  weight bearing on R LE    OBJECTIVE DATA SUMMARY:   HISTORY:    Past Medical History:   Diagnosis Date    Arthritis     knees, back    Burning with urination     Chronic pain     knees, back    DM (diabetes mellitus) ([facility])     Type II - diet controlled    High cholesterol     HTN (hypertension)     currently on no meds    PE (pulmonary thromboembolism) ([facility]) 01/2017    bilateral with right heart strain. Treated by Dr. Tariq    Valvular heart disease      Past Surgical History:   Procedure Laterality Date    HX AMPUTATION      vascular; Left great toe amputation    HX HEART CATHETERIZATION  2019    HX HEART VALVE SURGERY  2019    TAVR    HX HERNIA REPAIR  07/25/2022    Laparoscopic repair of right inguinal hernia with mesh, robot assisted    HX ORTHOPAEDIC Left     Left arm fracture & repair ORIF    HX OTHER SURGICAL  06/04/2015    INCISION AND DRAINAGE, RESECTION OF REMAINING 1ST METATARSAL, INSERTION OF ANTIBIOTIC BEADS       Prior level of function: patient reports modified independence using cane for ambulation  Personal factors and/or comorbidities impacting plan of care: very [place]; impaired insight     Home Situation  Home Environment: Private residence  # Steps to Enter: 6  Wheelchair Ramp: Yes  One/Two Story Residence: One story  Living Alone: No  Support Systems: Spouse/Significant Other, Friend/Neighbor (renting room to friend)  Patient Expects to be Discharged to:: Home with home health (Anticipate Home family vs w/HH)  Current DME Used/Available at Home: Cane, straight, Walker, rolling, Raised toilet seat, Grab bars, Shower chair  Tub or Shower Type: Tub/Shower combination    EXAMINATION/PRESENTATION/DECISION MAKING:   Critical Behavior:  Neurologic State: Alert, Appropriate for age, Eyes open spontaneously  Orientation Level: Oriented X4, Appropriate for age  Cognition: Follows commands, Impulsive, Poor safety awareness     Hearing:  Auditory  Auditory Impairment: Hard of hearing, bilateral  Hearing  Aids/Status: Does not own  Skin:  R foot dressing dry and intact    Range Of Motion:  AROM: Within functional limits                       Strength:    Strength: Within functional limits                    Tone & Sensation:   Tone: Normal                              Coordination:  Coordination: Within functional limits       Functional Mobility:  Bed Mobility:  Rolling: Independent  Supine to Sit: Independent     Scooting: Independent  Transfers:  Sit to Stand: Supervision  Stand to Sit: Supervision                       Balance:   Sitting: Intact  Standing: Impaired  Standing - Static: Good  Standing - Dynamic : Good;Constant support  Ambulation/Gait Training:  Distance (ft):  (10 feet x 2)  Assistive Device: Walker, rolling;Gait belt  Ambulation - Level of Assistance: Supervision (constant VC for weigth bearing)        Gait Abnormalities: Decreased step clearance (inability to maintain partial heel weight bearing on R)  Right Side Weight Bearing: Partial (%);Heel     Base of Support: Widened     Speed/Elysia: Pace decreased (<100 feet/min)  Step Length: Left shortened;Right shortened         Patient unable to maintain partial weight bearing through R heel only.         Activity Tolerance:   Good      After treatment patient left in no apparent distress:   Sitting in chair, Call bell within reach, and Bed / chair alarm activated    COMMUNICATION/EDUCATION:   The patient’s plan of care was discussed with: Registered nurse and Case management.     Fall prevention education was provided and the patient/caregiver indicated understanding., Patient/family have participated as able in goal setting and plan of care., and Patient/family agree to work toward stated goals and plan of care.    Thank you for this referral.  Dalila Blevins, PT   Time Calculation: 33 mins

## 2023-05-03 LAB
BACTERIA SPEC CULT: NORMAL
SERVICE CMNT-IMP: NORMAL

## 2023-05-08 NOTE — OP NOTES
[facility]  OPERATIVE REPORT    Name:  ANUEL YU  MR#:  [id number]  :  1950  ACCOUNT #:  [id number]  DATE OF SERVICE:  2023    PREOPERATIVE DIAGNOSIS:  Infected right fourth toe.    POSTOPERATIVE DIAGNOSIS:  Infected right fourth toe.    PROCEDURE PERFORMED:  Right fourth toe amputation to the metatarsophalangeal joint level.    SURGEON:  Andrae Godoy DPM    ASSISTANT:  Surgical assistants none.    ANESTHESIA:  MAC.    COMPLICATIONS:  None.    SPECIMENS REMOVED:  Removed toe was sent for pathology and deep cultures were taken.    IMPLANTS:  None.    ESTIMATED BLOOD LOSS:  Less than 50 mL.    HEMOSTASIS:  Ankle tourniquet to 250 mmHg.    DRAINS:  None.    PROCEDURE:  This patient was sent by my office to be admitted through the ER for a severely infected right fourth toe.  The patient has had chronic off and on infections secondary to a large distal toe wound which was chronic.  The patient is diabetic.  The patient presented to the clinic with edema and erythema which was extending to the entire foot.  The patient was sent to be admitted through the ER.  The patient was already given IV antibiotics.  The patient has been n.p.o. since morning.    This patient was brought into the OR and left on the patient's bed in supine position.  IV sedation performed as per CRNA.  The local infiltration of 0.5% Marcaine plain was injected for a digital block and the right foot was prepped and draped via usual sterile manner.  Using elevation as exsanguination, the tourniquet was inflated to the previously mentioned setting and attention was directed to the right foot in the dorsal aspect.  After anesthesia check, an incision was made around the right fourth toe at the base in a fishmouth fashion and extended dorsally to gain access to the metatarsophalangeal joint.  The incision was then deepened into the metatarsophalangeal joint level where the proximal phalanx was dislocated and  the entire distal toe with the nonviable tissues and the distal toe was removed from the operative site.  A small vessel was cauterized and the area was then copiously irrigated with normal saline and the deep cultures were taken before the flush and the distal stump was then closed with the 4-0 nylon sutures and the tourniquet was deflated noting hyperemia and mild hemorrhage was noted to the incision site.  Before the closure, careful examination was performed to assure there were no nonviable tissue or drainable abscesses were noted.  After releasing the tourniquet, mild hemorrhage was noted to the incision site which was controlled with mild compressive bandage.  The patient tolerated the procedure and anesthesia without any complications and sent back to the emergency room for medical management.  He will be discharged when the pathology and microbiology results are obtained.        EDWINA [id number]  D:  05/08/2023 13:01  T:  05/08/2023 15:06  JOB #:  [id number]

## 2023-05-18 ENCOUNTER — OFFICE VISIT (OUTPATIENT)
Age: 73
End: 2023-05-18

## 2023-05-18 VITALS
HEIGHT: 69 IN | WEIGHT: 188 LBS | DIASTOLIC BLOOD PRESSURE: 72 MMHG | HEART RATE: 62 BPM | SYSTOLIC BLOOD PRESSURE: 140 MMHG | BODY MASS INDEX: 27.85 KG/M2 | RESPIRATION RATE: 16 BRPM | OXYGEN SATURATION: 100 %

## 2023-05-18 DIAGNOSIS — F02.C18 SEVERE ALZHEIMER'S DEMENTIA OF OTHER ONSET WITH OTHER BEHAVIORAL DISTURBANCE (HCC): Primary | ICD-10-CM

## 2023-05-18 DIAGNOSIS — G30.8 SEVERE ALZHEIMER'S DEMENTIA OF OTHER ONSET WITH OTHER BEHAVIORAL DISTURBANCE (HCC): Primary | ICD-10-CM

## 2023-05-18 RX ORDER — DONEPEZIL HYDROCHLORIDE 5 MG/1
5 TABLET, FILM COATED ORAL NIGHTLY
Qty: 90 TABLET | Refills: 1 | Status: SHIPPED | OUTPATIENT
Start: 2023-05-18

## 2023-05-18 ASSESSMENT — PATIENT HEALTH QUESTIONNAIRE - PHQ9
SUM OF ALL RESPONSES TO PHQ QUESTIONS 1-9: 1
SUM OF ALL RESPONSES TO PHQ9 QUESTIONS 1 & 2: 1
2. FEELING DOWN, DEPRESSED OR HOPELESS: 1
SUM OF ALL RESPONSES TO PHQ QUESTIONS 1-9: 1
1. LITTLE INTEREST OR PLEASURE IN DOING THINGS: 0

## 2023-05-18 NOTE — PROGRESS NOTES
History obtained from the patient  Psychological ROS: negative  ENT ROS: negative  Hematological and Lymphatic ROS: negative  Endocrine ROS: negative  Respiratory ROS: no cough, shortness of breath, or wheezing  Cardiovascular ROS: no chest pain or dyspnea on exertion  Gastrointestinal ROS: no abdominal pain, change in bowel habits, or black or bloody stools  Genito-Urinary ROS: no dysuria, trouble voiding, or hematuria  Musculoskeletal ROS: negative  Dermatological ROS: negative      PHYSICAL EXAMINATION:    Vitals:    05/18/23 0749   BP: (!) 140/72   Pulse: 62   Resp: 16   SpO2: 100%     General:  Well groomed individual in no acute distress. Neck: Supple, nontender, no bruits, no pain with resistance to active range of motion. Heart: Regular rate and rhythm. Normal S1S2. Lungs:  Equal chest expansion, no cough, no wheeze  Musculoskeletal:  Extremities revealed no edema and had full range of motion of joints. Psych:  Good mood and bright affect    NEUROLOGICAL EXAMINATION:     Mental Status:   Alert and oriented to person, place. Could not tell me today's date. He has impaired visuospatial function, executive function, serial 7 subtractions, abstraction, orientation and delayed recall was 0/5. He scored 10/30 on MoCA    Cranial Nerves:    II, III, IV, VI:  Visual acuity grossly intact. Visual fields are normal.    Pupils are equal, round, and reactive to light. Extra-ocular movements are full and fluid. Fundoscopic exam was benign, no ptosis or nystagmus. V-XII: Hearing is grossly intact. Facial features are symmetric, with normal sensation and strength. The palate rises symmetrically and the tongue protrudes midline. Sternocleidomastoids 5/5. Motor Examination: Normal tone, bulk, and strength. 5/5 muscle strength throughout. Limited range of motion at the right shoulder and left knee. No cogwheel rigidity or clonus present.       Sensory exam:  Normal throughout to pinprick,

## 2023-05-19 LAB
TSH SERPL DL<=0.005 MIU/L-ACNC: 2.74 UIU/ML (ref 0.45–4.5)
VIT B12 SERPL-MCNC: 476 PG/ML (ref 232–1245)

## 2023-06-19 ENCOUNTER — HOSPITAL ENCOUNTER (OUTPATIENT)
Facility: HOSPITAL | Age: 73
Discharge: HOME | End: 2023-06-22
Payer: MEDICARE

## 2023-06-19 DIAGNOSIS — G30.8 SEVERE ALZHEIMER'S DEMENTIA OF OTHER ONSET WITH OTHER BEHAVIORAL DISTURBANCE (HCC): ICD-10-CM

## 2023-06-19 DIAGNOSIS — F02.C18 SEVERE ALZHEIMER'S DEMENTIA OF OTHER ONSET WITH OTHER BEHAVIORAL DISTURBANCE (HCC): ICD-10-CM

## 2023-06-19 PROCEDURE — 70551 MRI BRAIN STEM W/O DYE: CPT

## 2023-06-23 ENCOUNTER — TELEPHONE (OUTPATIENT)
Age: 73
End: 2023-06-23

## 2023-06-23 DIAGNOSIS — G30.8 SEVERE ALZHEIMER'S DEMENTIA OF OTHER ONSET WITH OTHER BEHAVIORAL DISTURBANCE (HCC): ICD-10-CM

## 2023-06-23 DIAGNOSIS — F02.C18 SEVERE ALZHEIMER'S DEMENTIA OF OTHER ONSET WITH OTHER BEHAVIORAL DISTURBANCE (HCC): ICD-10-CM

## 2023-06-23 RX ORDER — DONEPEZIL HYDROCHLORIDE 10 MG/1
10 TABLET, FILM COATED ORAL NIGHTLY
Qty: 90 TABLET | Refills: 3 | Status: SHIPPED | OUTPATIENT
Start: 2023-06-23 | End: 2023-06-23 | Stop reason: SDUPTHER

## 2023-06-23 RX ORDER — DONEPEZIL HYDROCHLORIDE 10 MG/1
10 TABLET, FILM COATED ORAL NIGHTLY
Qty: 90 TABLET | Refills: 3 | Status: SHIPPED | OUTPATIENT
Start: 2023-06-23

## 2023-06-23 NOTE — TELEPHONE ENCOUNTER
[address] [place] [address] Yeison herr     [phone]    Fax [phone]      Can patient get one  Donepezil 10 mg. tablet instead of two  5 mg dosage?

## 2023-09-04 NOTE — ANESTHESIA PREPROCEDURE EVALUATION
Relevant Problems   No relevant active problems       Anesthetic History   No history of anesthetic complications            Review of Systems / Medical History  Patient summary reviewed, nursing notes reviewed and pertinent labs reviewed    Pulmonary  Within defined limits                 Neuro/Psych   Within defined limits           Cardiovascular    Hypertension  Valvular problems/murmurs: aortic stenosis        Hyperlipidemia    Exercise tolerance: <4 METS     GI/Hepatic/Renal  Within defined limits              Endo/Other    Diabetes    Arthritis     Other Findings   Comments: H/o DVT/PE           Physical Exam    Airway  Mallampati: II  TM Distance: 4 - 6 cm  Neck ROM: normal range of motion   Mouth opening: Normal     Cardiovascular    Rhythm: regular  Rate: normal    Murmur, Aortic area     Dental    Dentition: Edentulous  Comments: 1 lower right tooth   Pulmonary  Breath sounds clear to auscultation               Abdominal  GI exam deferred       Other Findings            Anesthetic Plan    ASA: 4  Anesthesia type: MAC    Monitoring Plan: Arterial line, CVP and Parker-Ceferino      Induction: Intravenous  Anesthetic plan and risks discussed with: Patient      Paceport PA Catheter Yes

## 2023-09-14 ENCOUNTER — TELEPHONE (OUTPATIENT)
Age: 73
End: 2023-09-14

## 2023-09-14 NOTE — TELEPHONE ENCOUNTER
Patient's wife called back, verified, advised that patient does not need repeat labwork done at this time, Dr. Helen Yadav may do labwork at patient's appointment on 03/19/2024.

## 2023-09-14 NOTE — TELEPHONE ENCOUNTER
Patients wife stated that she has a note stating patient has a upcoming appointment for 10/4/23 for B12 and TSH lab work but I'm not seeing any records for a upcomming appointment in October. Patients wife is requesting to be contacted to see if patient needs lab work done.

## 2023-11-01 ENCOUNTER — HOSPITAL ENCOUNTER (OUTPATIENT)
Facility: HOSPITAL | Age: 73
Discharge: HOME | End: 2023-11-04
Payer: MEDICARE

## 2023-11-01 DIAGNOSIS — M17.11 ARTHRITIS OF RIGHT KNEE: ICD-10-CM

## 2023-11-01 PROCEDURE — 73700 CT LOWER EXTREMITY W/O DYE: CPT

## 2023-11-06 ENCOUNTER — HOSPITAL ENCOUNTER (OUTPATIENT)
Facility: HOSPITAL | Age: 73
Discharge: HOME | End: 2023-11-09
Payer: MEDICARE

## 2023-11-06 VITALS
WEIGHT: 169.09 LBS | DIASTOLIC BLOOD PRESSURE: 58 MMHG | RESPIRATION RATE: 16 BRPM | BODY MASS INDEX: 27.18 KG/M2 | HEIGHT: 66 IN | SYSTOLIC BLOOD PRESSURE: 130 MMHG | HEART RATE: 57 BPM | TEMPERATURE: 97.6 F | OXYGEN SATURATION: 100 %

## 2023-11-06 LAB
ABO + RH BLD: NORMAL
ALBUMIN SERPL-MCNC: 3.4 G/DL (ref 3.5–5)
ALBUMIN/GLOB SERPL: 1.1 (ref 1.1–2.2)
ALP SERPL-CCNC: 84 U/L (ref 45–117)
ALT SERPL-CCNC: 12 U/L (ref 12–78)
ANION GAP SERPL CALC-SCNC: 4 MMOL/L (ref 5–15)
APPEARANCE UR: CLEAR
AST SERPL-CCNC: 14 U/L (ref 15–37)
BACTERIA URNS QL MICRO: NEGATIVE /HPF
BILIRUB SERPL-MCNC: 1 MG/DL (ref 0.2–1)
BILIRUB UR QL: NEGATIVE
BLOOD GROUP ANTIBODIES SERPL: NORMAL
BUN SERPL-MCNC: 12 MG/DL (ref 6–20)
BUN/CREAT SERPL: 11 (ref 12–20)
CALCIUM SERPL-MCNC: 8.9 MG/DL (ref 8.5–10.1)
CHLORIDE SERPL-SCNC: 106 MMOL/L (ref 97–108)
CO2 SERPL-SCNC: 32 MMOL/L (ref 21–32)
COLOR UR: ABNORMAL
CREAT SERPL-MCNC: 1.07 MG/DL (ref 0.7–1.3)
EPITH CASTS URNS QL MICRO: ABNORMAL /LPF
ERYTHROCYTE [DISTWIDTH] IN BLOOD BY AUTOMATED COUNT: 14 % (ref 11.5–14.5)
EST. AVERAGE GLUCOSE BLD GHB EST-MCNC: 103 MG/DL
GLOBULIN SER CALC-MCNC: 3.1 G/DL (ref 2–4)
GLUCOSE SERPL-MCNC: 142 MG/DL (ref 65–100)
GLUCOSE UR STRIP.AUTO-MCNC: NEGATIVE MG/DL
HBA1C MFR BLD: 5.2 % (ref 4–5.6)
HCT VFR BLD AUTO: 35.1 % (ref 36.6–50.3)
HGB BLD-MCNC: 12.1 G/DL (ref 12.1–17)
HGB UR QL STRIP: NEGATIVE
HYALINE CASTS URNS QL MICRO: ABNORMAL /LPF (ref 0–2)
INR PPP: 1.1 (ref 0.9–1.1)
KETONES UR QL STRIP.AUTO: ABNORMAL MG/DL
LEUKOCYTE ESTERASE UR QL STRIP.AUTO: NEGATIVE
MCH RBC QN AUTO: 32 PG (ref 26–34)
MCHC RBC AUTO-ENTMCNC: 34.5 G/DL (ref 30–36.5)
MCV RBC AUTO: 92.9 FL (ref 80–99)
NITRITE UR QL STRIP.AUTO: NEGATIVE
NRBC # BLD: 0 K/UL (ref 0–0.01)
NRBC BLD-RTO: 0 PER 100 WBC
PH UR STRIP: 6 (ref 5–8)
PLATELET # BLD AUTO: 201 K/UL (ref 150–400)
PMV BLD AUTO: 9.9 FL (ref 8.9–12.9)
POTASSIUM SERPL-SCNC: 4.1 MMOL/L (ref 3.5–5.1)
PROT SERPL-MCNC: 6.5 G/DL (ref 6.4–8.2)
PROT UR STRIP-MCNC: NEGATIVE MG/DL
PROTHROMBIN TIME: 11.4 SEC (ref 9–11.1)
RBC # BLD AUTO: 3.78 M/UL (ref 4.1–5.7)
RBC #/AREA URNS HPF: ABNORMAL /HPF (ref 0–5)
SODIUM SERPL-SCNC: 142 MMOL/L (ref 136–145)
SP GR UR REFRACTOMETRY: 1.02
SPECIMEN EXP DATE BLD: NORMAL
URINE CULTURE IF INDICATED: ABNORMAL
UROBILINOGEN UR QL STRIP.AUTO: 1 EU/DL (ref 0.2–1)
WBC # BLD AUTO: 5.3 K/UL (ref 4.1–11.1)
WBC URNS QL MICRO: ABNORMAL /HPF (ref 0–4)

## 2023-11-06 PROCEDURE — 97530 THERAPEUTIC ACTIVITIES: CPT

## 2023-11-06 PROCEDURE — 86850 RBC ANTIBODY SCREEN: CPT

## 2023-11-06 PROCEDURE — 80053 COMPREHEN METABOLIC PANEL: CPT

## 2023-11-06 PROCEDURE — 97161 PT EVAL LOW COMPLEX 20 MIN: CPT

## 2023-11-06 PROCEDURE — 86901 BLOOD TYPING SEROLOGIC RH(D): CPT

## 2023-11-06 PROCEDURE — 81001 URINALYSIS AUTO W/SCOPE: CPT

## 2023-11-06 PROCEDURE — 83036 HEMOGLOBIN GLYCOSYLATED A1C: CPT

## 2023-11-06 PROCEDURE — 86900 BLOOD TYPING SEROLOGIC ABO: CPT

## 2023-11-06 PROCEDURE — 36415 COLL VENOUS BLD VENIPUNCTURE: CPT

## 2023-11-06 PROCEDURE — 85610 PROTHROMBIN TIME: CPT

## 2023-11-06 PROCEDURE — 85027 COMPLETE CBC AUTOMATED: CPT

## 2023-11-06 RX ORDER — ACETAMINOPHEN 500 MG
1000 TABLET ORAL ONCE
OUTPATIENT
Start: 2023-11-17

## 2023-11-06 RX ORDER — CELECOXIB 200 MG/1
200 CAPSULE ORAL ONCE
OUTPATIENT
Start: 2023-11-17

## 2023-11-06 RX ORDER — SODIUM CHLORIDE, SODIUM LACTATE, POTASSIUM CHLORIDE, CALCIUM CHLORIDE 600; 310; 30; 20 MG/100ML; MG/100ML; MG/100ML; MG/100ML
INJECTION, SOLUTION INTRAVENOUS CONTINUOUS
OUTPATIENT
Start: 2023-11-17

## 2023-11-06 NOTE — PERIOP NOTE
Orthopedic and Spine Patients: Instructions on When You Can   Eat or Drink Before Surgery      You have been provided a pre-surgery drink received at your pre-admission testing appointment. Night before surgery: You should drink 1/2 bottle of the  pre-surgery drink at bedtime. No food after midnight! Day of Surgery:  Complete 2nd half of the bottle of the pre-surgery drink 1 hour prior to arrival at hospital.  For questions call Pre-Admission Testing at [phone]. They are available from 8:00am-5:00pm, Monday through Friday.

## 2023-11-06 NOTE — PERIOP NOTE

## 2023-11-07 LAB
EKG ATRIAL RATE: 59 BPM
EKG DIAGNOSIS: NORMAL
EKG P AXIS: 70 DEGREES
EKG P-R INTERVAL: 172 MS
EKG Q-T INTERVAL: 412 MS
EKG QRS DURATION: 88 MS
EKG QTC CALCULATION (BAZETT): 407 MS
EKG R AXIS: 80 DEGREES
EKG T AXIS: 72 DEGREES
EKG VENTRICULAR RATE: 59 BPM

## 2023-11-10 LAB
BACTERIA SPEC CULT: NORMAL
BACTERIA SPEC CULT: NORMAL
SERVICE CMNT-IMP: NORMAL

## 2023-11-16 ENCOUNTER — ANESTHESIA EVENT (OUTPATIENT)
Facility: HOSPITAL | Age: 73
End: 2023-11-16
Payer: MEDICARE

## 2023-11-16 NOTE — ANESTHESIA PRE PROCEDURE
or weight on file to calculate BMI.    CBC:   Lab Results   Component Value Date/Time    WBC 5.3 11/06/2023 10:41 AM    RBC 3.78 11/06/2023 10:41 AM    HGB 12.1 11/06/2023 10:41 AM    HCT 35.1 11/06/2023 10:41 AM    MCV 92.9 11/06/2023 10:41 AM    RDW 14.0 11/06/2023 10:41 AM     11/06/2023 10:41 AM       CMP:   Lab Results   Component Value Date/Time     11/06/2023 10:41 AM    K 4.1 11/06/2023 10:41 AM     11/06/2023 10:41 AM    CO2 32 11/06/2023 10:41 AM    BUN 12 11/06/2023 10:41 AM    CREATININE 1.07 11/06/2023 10:41 AM    GFRAA >60 06/29/2019 01:21 AM    AGRATIO 1.2 05/01/2023 03:24 AM    LABGLOM >60 11/06/2023 10:41 AM    GLUCOSE 142 11/06/2023 10:41 AM    PROT 6.5 11/06/2023 10:41 AM    CALCIUM 8.9 11/06/2023 10:41 AM    BILITOT 1.0 11/06/2023 10:41 AM    ALKPHOS 84 11/06/2023 10:41 AM    ALKPHOS 68 05/01/2023 03:24 AM    AST 14 11/06/2023 10:41 AM    ALT 12 11/06/2023 10:41 AM       POC Tests: No results for input(s): \"POCGLU\", \"POCNA\", \"POCK\", \"POCCL\", \"POCBUN\", \"POCHEMO\", \"POCHCT\" in the last 72 hours.     Coags:   Lab Results   Component Value Date/Time    PROTIME 11.4 11/06/2023 10:41 AM    INR 1.1 11/06/2023 10:41 AM    APTT 32.3 06/26/2019 12:56 PM       HCG (If Applicable): No results found for: \"PREGTESTUR\", \"PREGSERUM\", \"HCG\", \"HCGQUANT\"     ABGs:   Lab Results   Component Value Date/Time    PHART 7.401 06/20/2019 11:13 AM    PO2ART 77 06/20/2019 11:13 AM    ILS1CVR 37.9 06/20/2019 11:13 AM    LFO7UZW 23.5 06/20/2019 11:13 AM        Type & Screen (If Applicable):  No results found for: \"LABABO\", \"LABRH\"    Drug/Infectious Status (If Applicable):  No results found for: \"HIV\", \"HEPCAB\"    COVID-19 Screening (If Applicable): No results found for: \"COVID19\"        Anesthesia Evaluation  Patient summary reviewed and Nursing notes reviewed  Airway: Mallampati: II  TM distance: >3 FB   Neck ROM: full     Dental: normal exam   (+) edentulous      Pulmonary:normal exam

## 2023-11-17 ENCOUNTER — APPOINTMENT (OUTPATIENT)
Facility: HOSPITAL | Age: 73
End: 2023-11-17
Attending: ORTHOPAEDIC SURGERY
Payer: MEDICARE

## 2023-11-17 ENCOUNTER — ANESTHESIA (OUTPATIENT)
Facility: HOSPITAL | Age: 73
End: 2023-11-17
Payer: MEDICARE

## 2023-11-17 ENCOUNTER — HOSPITAL ENCOUNTER (OUTPATIENT)
Facility: HOSPITAL | Age: 73
Setting detail: OBSERVATION
Discharge: HOME HEALTH | End: 2023-11-20
Attending: ORTHOPAEDIC SURGERY | Admitting: ORTHOPAEDIC SURGERY
Payer: MEDICARE

## 2023-11-17 DIAGNOSIS — Z96.651 S/P TOTAL KNEE ARTHROPLASTY, RIGHT: Primary | ICD-10-CM

## 2023-11-17 PROBLEM — M17.11 PRIMARY OSTEOARTHRITIS OF RIGHT KNEE: Status: ACTIVE | Noted: 2023-11-17

## 2023-11-17 LAB
GLUCOSE BLD STRIP.AUTO-MCNC: 130 MG/DL (ref 65–117)
GLUCOSE BLD STRIP.AUTO-MCNC: 138 MG/DL (ref 65–117)
GLUCOSE BLD STRIP.AUTO-MCNC: 255 MG/DL (ref 65–117)
SERVICE CMNT-IMP: ABNORMAL

## 2023-11-17 PROCEDURE — 2580000003 HC RX 258: Performed by: ANESTHESIOLOGY

## 2023-11-17 PROCEDURE — 3600000005 HC SURGERY LEVEL 5 BASE: Performed by: ORTHOPAEDIC SURGERY

## 2023-11-17 PROCEDURE — 2500000003 HC RX 250 WO HCPCS: Performed by: ORTHOPAEDIC SURGERY

## 2023-11-17 PROCEDURE — 7100000000 HC PACU RECOVERY - FIRST 15 MIN: Performed by: ORTHOPAEDIC SURGERY

## 2023-11-17 PROCEDURE — C1776 JOINT DEVICE (IMPLANTABLE): HCPCS | Performed by: ORTHOPAEDIC SURGERY

## 2023-11-17 PROCEDURE — 2500000003 HC RX 250 WO HCPCS

## 2023-11-17 PROCEDURE — 6360000002 HC RX W HCPCS: Performed by: ORTHOPAEDIC SURGERY

## 2023-11-17 PROCEDURE — 2580000003 HC RX 258: Performed by: ORTHOPAEDIC SURGERY

## 2023-11-17 PROCEDURE — 2709999900 HC NON-CHARGEABLE SUPPLY: Performed by: ORTHOPAEDIC SURGERY

## 2023-11-17 PROCEDURE — 6370000000 HC RX 637 (ALT 250 FOR IP): Performed by: ORTHOPAEDIC SURGERY

## 2023-11-17 PROCEDURE — 3600000015 HC SURGERY LEVEL 5 ADDTL 15MIN: Performed by: ORTHOPAEDIC SURGERY

## 2023-11-17 PROCEDURE — 3700000000 HC ANESTHESIA ATTENDED CARE: Performed by: ORTHOPAEDIC SURGERY

## 2023-11-17 PROCEDURE — 97530 THERAPEUTIC ACTIVITIES: CPT

## 2023-11-17 PROCEDURE — 2720000010 HC SURG SUPPLY STERILE: Performed by: ORTHOPAEDIC SURGERY

## 2023-11-17 PROCEDURE — 82962 GLUCOSE BLOOD TEST: CPT

## 2023-11-17 PROCEDURE — 2580000003 HC RX 258

## 2023-11-17 PROCEDURE — G0378 HOSPITAL OBSERVATION PER HR: HCPCS

## 2023-11-17 PROCEDURE — 64447 NJX AA&/STRD FEMORAL NRV IMG: CPT | Performed by: ANESTHESIOLOGY

## 2023-11-17 PROCEDURE — 6360000002 HC RX W HCPCS: Performed by: ANESTHESIOLOGY

## 2023-11-17 PROCEDURE — 3700000001 HC ADD 15 MINUTES (ANESTHESIA): Performed by: ORTHOPAEDIC SURGERY

## 2023-11-17 PROCEDURE — 73560 X-RAY EXAM OF KNEE 1 OR 2: CPT

## 2023-11-17 PROCEDURE — APPNB180 APP NON BILLABLE TIME > 60 MINS

## 2023-11-17 PROCEDURE — 97162 PT EVAL MOD COMPLEX 30 MIN: CPT

## 2023-11-17 PROCEDURE — 7100000001 HC PACU RECOVERY - ADDTL 15 MIN: Performed by: ORTHOPAEDIC SURGERY

## 2023-11-17 PROCEDURE — 6360000002 HC RX W HCPCS

## 2023-11-17 DEVICE — UNIVERSAL TIBIAL BASEPLATE
Type: IMPLANTABLE DEVICE | Site: KNEE | Status: FUNCTIONAL
Brand: TRIATHLON

## 2023-11-17 DEVICE — COMPONENT PART KNEE CAPPED K1 STRYKER: Type: IMPLANTABLE DEVICE | Site: KNEE | Status: FUNCTIONAL

## 2023-11-17 DEVICE — FEMORAL DISTAL FIXATION PEG
Type: IMPLANTABLE DEVICE | Site: KNEE | Status: FUNCTIONAL
Brand: TRIATHLON

## 2023-11-17 DEVICE — SIMPLEX® HV IS A FAST-SETTING ACRYLIC RESIN FOR USE IN BONE SURGERY. MIXING THE TWO SEPARATE STERILE COMPONENTS PRODUCES A DUCTILE BONE CEMENT WHICH, AFTER HARDENING, FIXES THE IMPLANT AND TRANSFERS STRESSES PRODUCED DURING MOVEMENT EVENLY TO THE BONE. SIMPLEX® HV CEMENT POWDER ALSO CONTAINS INSOLUBLE ZIRCONIUM DIOXIDE AS AN X-RAY CONTRAST MEDIUM. SIMPLEX® HV DOES NOT EMIT A SIGNAL AND DOES NOT POSE A SAFETY RISK IN A MAGNETIC RESONANCE ENVIRONMENT.
Type: IMPLANTABLE DEVICE | Site: KNEE | Status: FUNCTIONAL
Brand: SIMPLEX HV

## 2023-11-17 DEVICE — SYMMETRIC PATELLA
Type: IMPLANTABLE DEVICE | Site: KNEE | Status: FUNCTIONAL
Brand: TRIATHLON

## 2023-11-17 DEVICE — TOTAL STABILIZER+ TIBIAL INSERT
Type: IMPLANTABLE DEVICE | Site: KNEE | Status: FUNCTIONAL
Brand: TRIATHLON

## 2023-11-17 DEVICE — POSTERIOR STABILIZED FEMORAL
Type: IMPLANTABLE DEVICE | Site: KNEE | Status: FUNCTIONAL
Brand: TRIATHLON

## 2023-11-17 RX ORDER — SODIUM CHLORIDE 0.9 % (FLUSH) 0.9 %
5-40 SYRINGE (ML) INJECTION PRN
Status: DISCONTINUED | OUTPATIENT
Start: 2023-11-17 | End: 2023-11-17 | Stop reason: HOSPADM

## 2023-11-17 RX ORDER — ONDANSETRON 2 MG/ML
4 INJECTION INTRAMUSCULAR; INTRAVENOUS
Status: DISCONTINUED | OUTPATIENT
Start: 2023-11-17 | End: 2023-11-17 | Stop reason: HOSPADM

## 2023-11-17 RX ORDER — ONDANSETRON 4 MG/1
4 TABLET, ORALLY DISINTEGRATING ORAL EVERY 8 HOURS PRN
Status: DISCONTINUED | OUTPATIENT
Start: 2023-11-17 | End: 2023-11-20 | Stop reason: HOSPADM

## 2023-11-17 RX ORDER — SODIUM CHLORIDE 9 MG/ML
INJECTION, SOLUTION INTRAVENOUS PRN
Status: DISCONTINUED | OUTPATIENT
Start: 2023-11-17 | End: 2023-11-17 | Stop reason: HOSPADM

## 2023-11-17 RX ORDER — DIPHENHYDRAMINE HYDROCHLORIDE 50 MG/ML
25 INJECTION INTRAMUSCULAR; INTRAVENOUS EVERY 6 HOURS PRN
Status: DISCONTINUED | OUTPATIENT
Start: 2023-11-17 | End: 2023-11-20 | Stop reason: HOSPADM

## 2023-11-17 RX ORDER — MEPERIDINE HYDROCHLORIDE 25 MG/ML
12.5 INJECTION INTRAMUSCULAR; INTRAVENOUS; SUBCUTANEOUS EVERY 5 MIN PRN
Status: DISCONTINUED | OUTPATIENT
Start: 2023-11-17 | End: 2023-11-17 | Stop reason: HOSPADM

## 2023-11-17 RX ORDER — SODIUM CHLORIDE, SODIUM LACTATE, POTASSIUM CHLORIDE, CALCIUM CHLORIDE 600; 310; 30; 20 MG/100ML; MG/100ML; MG/100ML; MG/100ML
INJECTION, SOLUTION INTRAVENOUS CONTINUOUS
Status: DISCONTINUED | OUTPATIENT
Start: 2023-11-17 | End: 2023-11-17 | Stop reason: HOSPADM

## 2023-11-17 RX ORDER — FAMOTIDINE 20 MG/1
20 TABLET, FILM COATED ORAL 2 TIMES DAILY
Status: DISCONTINUED | OUTPATIENT
Start: 2023-11-17 | End: 2023-11-20 | Stop reason: HOSPADM

## 2023-11-17 RX ORDER — PROPOFOL 10 MG/ML
INJECTION, EMULSION INTRAVENOUS PRN
Status: DISCONTINUED | OUTPATIENT
Start: 2023-11-17 | End: 2023-11-17 | Stop reason: SDUPTHER

## 2023-11-17 RX ORDER — SODIUM CHLORIDE 9 MG/ML
INJECTION, SOLUTION INTRAVENOUS CONTINUOUS
Status: DISCONTINUED | OUTPATIENT
Start: 2023-11-17 | End: 2023-11-17

## 2023-11-17 RX ORDER — ACETAMINOPHEN 500 MG
1000 TABLET ORAL ONCE
Status: COMPLETED | OUTPATIENT
Start: 2023-11-17 | End: 2023-11-17

## 2023-11-17 RX ORDER — SODIUM CHLORIDE 0.9 % (FLUSH) 0.9 %
5-40 SYRINGE (ML) INJECTION EVERY 12 HOURS SCHEDULED
Status: DISCONTINUED | OUTPATIENT
Start: 2023-11-17 | End: 2023-11-20 | Stop reason: HOSPADM

## 2023-11-17 RX ORDER — SODIUM CHLORIDE 0.9 % (FLUSH) 0.9 %
5-40 SYRINGE (ML) INJECTION EVERY 12 HOURS SCHEDULED
Status: DISCONTINUED | OUTPATIENT
Start: 2023-11-17 | End: 2023-11-17 | Stop reason: HOSPADM

## 2023-11-17 RX ORDER — TRANEXAMIC ACID 650 MG/1
1950 TABLET ORAL
Status: COMPLETED | OUTPATIENT
Start: 2023-11-18 | End: 2023-11-20

## 2023-11-17 RX ORDER — DROPERIDOL 2.5 MG/ML
0.62 INJECTION, SOLUTION INTRAMUSCULAR; INTRAVENOUS
Status: DISCONTINUED | OUTPATIENT
Start: 2023-11-17 | End: 2023-11-17 | Stop reason: HOSPADM

## 2023-11-17 RX ORDER — SODIUM CHLORIDE 0.9 % (FLUSH) 0.9 %
5-40 SYRINGE (ML) INJECTION PRN
Status: DISCONTINUED | OUTPATIENT
Start: 2023-11-17 | End: 2023-11-20 | Stop reason: HOSPADM

## 2023-11-17 RX ORDER — LIDOCAINE HYDROCHLORIDE 20 MG/ML
INJECTION, SOLUTION EPIDURAL; INFILTRATION; INTRACAUDAL; PERINEURAL PRN
Status: DISCONTINUED | OUTPATIENT
Start: 2023-11-17 | End: 2023-11-17 | Stop reason: SDUPTHER

## 2023-11-17 RX ORDER — ACETAMINOPHEN 500 MG
1000 TABLET ORAL EVERY 8 HOURS SCHEDULED
Status: DISCONTINUED | OUTPATIENT
Start: 2023-11-17 | End: 2023-11-20 | Stop reason: HOSPADM

## 2023-11-17 RX ORDER — DONEPEZIL HYDROCHLORIDE 5 MG/1
10 TABLET, FILM COATED ORAL NIGHTLY
Status: DISCONTINUED | OUTPATIENT
Start: 2023-11-17 | End: 2023-11-20 | Stop reason: HOSPADM

## 2023-11-17 RX ORDER — TAMSULOSIN HYDROCHLORIDE 0.4 MG/1
0.4 CAPSULE ORAL DAILY
Status: DISCONTINUED | OUTPATIENT
Start: 2023-11-17 | End: 2023-11-20 | Stop reason: HOSPADM

## 2023-11-17 RX ORDER — KETOROLAC TROMETHAMINE 30 MG/ML
15 INJECTION, SOLUTION INTRAMUSCULAR; INTRAVENOUS EVERY 6 HOURS
Status: COMPLETED | OUTPATIENT
Start: 2023-11-17 | End: 2023-11-18

## 2023-11-17 RX ORDER — ONDANSETRON 2 MG/ML
INJECTION INTRAMUSCULAR; INTRAVENOUS PRN
Status: DISCONTINUED | OUTPATIENT
Start: 2023-11-17 | End: 2023-11-17 | Stop reason: SDUPTHER

## 2023-11-17 RX ORDER — DIPHENHYDRAMINE HCL 25 MG
25 CAPSULE ORAL EVERY 6 HOURS PRN
Status: DISCONTINUED | OUTPATIENT
Start: 2023-11-17 | End: 2023-11-20 | Stop reason: HOSPADM

## 2023-11-17 RX ORDER — CELECOXIB 200 MG/1
200 CAPSULE ORAL ONCE
Status: COMPLETED | OUTPATIENT
Start: 2023-11-17 | End: 2023-11-17

## 2023-11-17 RX ORDER — TRANEXAMIC ACID 100 MG/ML
INJECTION, SOLUTION INTRAVENOUS PRN
Status: DISCONTINUED | OUTPATIENT
Start: 2023-11-17 | End: 2023-11-17 | Stop reason: ALTCHOICE

## 2023-11-17 RX ORDER — DEXAMETHASONE SODIUM PHOSPHATE 4 MG/ML
INJECTION, SOLUTION INTRA-ARTICULAR; INTRALESIONAL; INTRAMUSCULAR; INTRAVENOUS; SOFT TISSUE PRN
Status: DISCONTINUED | OUTPATIENT
Start: 2023-11-17 | End: 2023-11-17 | Stop reason: SDUPTHER

## 2023-11-17 RX ORDER — TRAMADOL HYDROCHLORIDE 50 MG/1
100 TABLET ORAL EVERY 6 HOURS PRN
Status: DISCONTINUED | OUTPATIENT
Start: 2023-11-17 | End: 2023-11-20 | Stop reason: HOSPADM

## 2023-11-17 RX ORDER — FENTANYL CITRATE 50 UG/ML
25 INJECTION, SOLUTION INTRAMUSCULAR; INTRAVENOUS EVERY 5 MIN PRN
Status: DISCONTINUED | OUTPATIENT
Start: 2023-11-17 | End: 2023-11-17 | Stop reason: HOSPADM

## 2023-11-17 RX ORDER — TRAMADOL HYDROCHLORIDE 50 MG/1
50 TABLET ORAL EVERY 6 HOURS PRN
Status: DISCONTINUED | OUTPATIENT
Start: 2023-11-17 | End: 2023-11-20 | Stop reason: HOSPADM

## 2023-11-17 RX ORDER — TRANEXAMIC ACID 100 MG/ML
INJECTION, SOLUTION INTRAVENOUS PRN
Status: DISCONTINUED | OUTPATIENT
Start: 2023-11-17 | End: 2023-11-17 | Stop reason: SDUPTHER

## 2023-11-17 RX ORDER — ASPIRIN 81 MG/1
81 TABLET ORAL 2 TIMES DAILY
Status: DISCONTINUED | OUTPATIENT
Start: 2023-11-17 | End: 2023-11-20 | Stop reason: HOSPADM

## 2023-11-17 RX ORDER — ROPIVACAINE HYDROCHLORIDE 5 MG/ML
INJECTION, SOLUTION EPIDURAL; INFILTRATION; PERINEURAL PRN
Status: DISCONTINUED | OUTPATIENT
Start: 2023-11-17 | End: 2023-11-17 | Stop reason: ALTCHOICE

## 2023-11-17 RX ORDER — MIDAZOLAM HYDROCHLORIDE 1 MG/ML
INJECTION INTRAMUSCULAR; INTRAVENOUS PRN
Status: DISCONTINUED | OUTPATIENT
Start: 2023-11-17 | End: 2023-11-17 | Stop reason: SDUPTHER

## 2023-11-17 RX ORDER — 0.9 % SODIUM CHLORIDE 0.9 %
500 INTRAVENOUS SOLUTION INTRAVENOUS ONCE AS NEEDED
Status: DISCONTINUED | OUTPATIENT
Start: 2023-11-17 | End: 2023-11-20 | Stop reason: HOSPADM

## 2023-11-17 RX ORDER — HYDROMORPHONE HYDROCHLORIDE 1 MG/ML
0.5 INJECTION, SOLUTION INTRAMUSCULAR; INTRAVENOUS; SUBCUTANEOUS EVERY 5 MIN PRN
Status: DISCONTINUED | OUTPATIENT
Start: 2023-11-17 | End: 2023-11-17 | Stop reason: HOSPADM

## 2023-11-17 RX ORDER — MORPHINE SULFATE 2 MG/ML
2 INJECTION, SOLUTION INTRAMUSCULAR; INTRAVENOUS
Status: ACTIVE | OUTPATIENT
Start: 2023-11-17 | End: 2023-11-18

## 2023-11-17 RX ORDER — SENNA AND DOCUSATE SODIUM 50; 8.6 MG/1; MG/1
1 TABLET, FILM COATED ORAL 2 TIMES DAILY
Status: DISCONTINUED | OUTPATIENT
Start: 2023-11-17 | End: 2023-11-20 | Stop reason: HOSPADM

## 2023-11-17 RX ORDER — FUROSEMIDE 40 MG/1
40 TABLET ORAL EVERY MORNING
Status: DISCONTINUED | OUTPATIENT
Start: 2023-11-18 | End: 2023-11-20 | Stop reason: HOSPADM

## 2023-11-17 RX ORDER — LIDOCAINE HYDROCHLORIDE 10 MG/ML
1 INJECTION, SOLUTION EPIDURAL; INFILTRATION; INTRACAUDAL; PERINEURAL
Status: DISCONTINUED | OUTPATIENT
Start: 2023-11-17 | End: 2023-11-17 | Stop reason: HOSPADM

## 2023-11-17 RX ORDER — BISACODYL 10 MG
10 SUPPOSITORY, RECTAL RECTAL DAILY PRN
Status: DISCONTINUED | OUTPATIENT
Start: 2023-11-17 | End: 2023-11-20 | Stop reason: HOSPADM

## 2023-11-17 RX ORDER — ONDANSETRON 2 MG/ML
4 INJECTION INTRAMUSCULAR; INTRAVENOUS EVERY 6 HOURS PRN
Status: DISCONTINUED | OUTPATIENT
Start: 2023-11-17 | End: 2023-11-20 | Stop reason: HOSPADM

## 2023-11-17 RX ORDER — POLYETHYLENE GLYCOL 3350 17 G/17G
17 POWDER, FOR SOLUTION ORAL DAILY
Status: DISCONTINUED | OUTPATIENT
Start: 2023-11-17 | End: 2023-11-20 | Stop reason: HOSPADM

## 2023-11-17 RX ORDER — ROPIVACAINE HYDROCHLORIDE 5 MG/ML
INJECTION, SOLUTION EPIDURAL; INFILTRATION; PERINEURAL
Status: COMPLETED | OUTPATIENT
Start: 2023-11-17 | End: 2023-11-17

## 2023-11-17 RX ADMIN — TRANEXAMIC ACID 1000 MG: 100 INJECTION, SOLUTION INTRAVENOUS at 08:52

## 2023-11-17 RX ADMIN — Medication 3 AMPULE: at 07:24

## 2023-11-17 RX ADMIN — ONDANSETRON 4 MG: 2 INJECTION INTRAMUSCULAR; INTRAVENOUS at 10:26

## 2023-11-17 RX ADMIN — TAMSULOSIN HYDROCHLORIDE 0.4 MG: 0.4 CAPSULE ORAL at 13:22

## 2023-11-17 RX ADMIN — DEXAMETHASONE SODIUM PHOSPHATE 10 MG: 4 INJECTION INTRA-ARTICULAR; INTRALESIONAL; INTRAMUSCULAR; INTRAVENOUS; SOFT TISSUE at 08:52

## 2023-11-17 RX ADMIN — ASPIRIN 81 MG: 81 TABLET, COATED ORAL at 20:18

## 2023-11-17 RX ADMIN — DONEPEZIL HYDROCHLORIDE 10 MG: 5 TABLET, FILM COATED ORAL at 20:19

## 2023-11-17 RX ADMIN — ASPIRIN 81 MG: 81 TABLET, COATED ORAL at 13:21

## 2023-11-17 RX ADMIN — FAMOTIDINE 20 MG: 20 TABLET, FILM COATED ORAL at 20:18

## 2023-11-17 RX ADMIN — CELECOXIB 200 MG: 200 CAPSULE ORAL at 07:24

## 2023-11-17 RX ADMIN — FAMOTIDINE 20 MG: 20 TABLET, FILM COATED ORAL at 13:22

## 2023-11-17 RX ADMIN — TRAMADOL HYDROCHLORIDE 50 MG: 50 TABLET ORAL at 12:44

## 2023-11-17 RX ADMIN — ACETAMINOPHEN 1000 MG: 500 TABLET ORAL at 13:21

## 2023-11-17 RX ADMIN — DOCUSATE SODIUM 50MG AND SENNOSIDES 8.6MG 1 TABLET: 8.6; 5 TABLET, FILM COATED ORAL at 13:22

## 2023-11-17 RX ADMIN — VANCOMYCIN HYDROCHLORIDE 1000 MG: 1 INJECTION, POWDER, LYOPHILIZED, FOR SOLUTION INTRAVENOUS at 07:38

## 2023-11-17 RX ADMIN — WATER 2000 MG: 1 INJECTION INTRAMUSCULAR; INTRAVENOUS; SUBCUTANEOUS at 08:54

## 2023-11-17 RX ADMIN — PROPOFOL 125 MCG/KG/MIN: 10 INJECTION, EMULSION INTRAVENOUS at 08:46

## 2023-11-17 RX ADMIN — DOCUSATE SODIUM 50MG AND SENNOSIDES 8.6MG 1 TABLET: 8.6; 5 TABLET, FILM COATED ORAL at 20:18

## 2023-11-17 RX ADMIN — SODIUM CHLORIDE, PRESERVATIVE FREE 10 ML: 5 INJECTION INTRAVENOUS at 20:21

## 2023-11-17 RX ADMIN — ACETAMINOPHEN 1000 MG: 500 TABLET ORAL at 07:24

## 2023-11-17 RX ADMIN — WATER 2000 MG: 1 INJECTION INTRAMUSCULAR; INTRAVENOUS; SUBCUTANEOUS at 16:38

## 2023-11-17 RX ADMIN — TRANEXAMIC ACID 1000 MG: 100 INJECTION, SOLUTION INTRAVENOUS at 10:14

## 2023-11-17 RX ADMIN — PROPOFOL 40 MG: 10 INJECTION, EMULSION INTRAVENOUS at 08:45

## 2023-11-17 RX ADMIN — MEPIVACAINE HYDROCHLORIDE 53.2 MG: 20 INJECTION, SOLUTION EPIDURAL; INFILTRATION at 08:20

## 2023-11-17 RX ADMIN — KETOROLAC TROMETHAMINE 15 MG: 30 INJECTION, SOLUTION INTRAMUSCULAR; INTRAVENOUS at 19:55

## 2023-11-17 RX ADMIN — SODIUM CHLORIDE, POTASSIUM CHLORIDE, SODIUM LACTATE AND CALCIUM CHLORIDE: 600; 310; 30; 20 INJECTION, SOLUTION INTRAVENOUS at 07:25

## 2023-11-17 RX ADMIN — POLYETHYLENE GLYCOL (3350) 17 G: 17 POWDER, FOR SOLUTION ORAL at 13:22

## 2023-11-17 RX ADMIN — LIDOCAINE HYDROCHLORIDE 20 MG: 20 INJECTION, SOLUTION EPIDURAL; INFILTRATION; INTRACAUDAL; PERINEURAL at 08:45

## 2023-11-17 RX ADMIN — ACETAMINOPHEN 1000 MG: 500 TABLET ORAL at 22:47

## 2023-11-17 RX ADMIN — KETOROLAC TROMETHAMINE 15 MG: 30 INJECTION, SOLUTION INTRAMUSCULAR; INTRAVENOUS at 13:22

## 2023-11-17 RX ADMIN — PHENYLEPHRINE HYDROCHLORIDE 40 MCG/MIN: 10 INJECTION INTRAVENOUS at 08:54

## 2023-11-17 RX ADMIN — ROPIVACAINE HYDROCHLORIDE 20 ML: 5 INJECTION, SOLUTION EPIDURAL; INFILTRATION; PERINEURAL at 08:29

## 2023-11-17 RX ADMIN — MIDAZOLAM HYDROCHLORIDE 1 MG: 1 INJECTION, SOLUTION INTRAMUSCULAR; INTRAVENOUS at 08:17

## 2023-11-17 ASSESSMENT — PAIN SCALES - GENERAL
PAINLEVEL_OUTOF10: 0
PAINLEVEL_OUTOF10: 3
PAINLEVEL_OUTOF10: 6
PAINLEVEL_OUTOF10: 0

## 2023-11-17 ASSESSMENT — PAIN - FUNCTIONAL ASSESSMENT: PAIN_FUNCTIONAL_ASSESSMENT: ACTIVITIES ARE NOT PREVENTED

## 2023-11-17 ASSESSMENT — PAIN DESCRIPTION - LOCATION
LOCATION: KNEE

## 2023-11-17 ASSESSMENT — PAIN DESCRIPTION - DESCRIPTORS
DESCRIPTORS: ACHING
DESCRIPTORS: ACHING

## 2023-11-17 ASSESSMENT — PAIN DESCRIPTION - ORIENTATION
ORIENTATION: RIGHT

## 2023-11-17 ASSESSMENT — PAIN DESCRIPTION - PAIN TYPE: TYPE: SURGICAL PAIN

## 2023-11-17 ASSESSMENT — PAIN DESCRIPTION - ONSET: ONSET: SUDDEN

## 2023-11-17 ASSESSMENT — PAIN DESCRIPTION - FREQUENCY: FREQUENCY: INTERMITTENT

## 2023-11-17 NOTE — ANESTHESIA PROCEDURE NOTES
Spinal Block    Patient location during procedure: OB  End time: 11/17/2023 8:21 AM  Reason for block: primary anesthetic  Staffing  Performed: anesthesiologist   Anesthesiologist: Demarcus Rodriguez MD  Performed by: Demarcus Rodriguez MD  Authorized by: Demarcus Rodriguez MD    Spinal Block  Patient position: sitting  Prep: DuraPrep  Patient monitoring: cardiac monitor, continuous pulse ox, frequent blood pressure checks and oxygen  Approach: midline  Location: L2/L3  Provider prep: mask and sterile gloves  Local infiltration: lidocaine  Needle  Needle type: Quincke   Needle gauge: 22 G  Needle length: 3.5 in  Needle insertion depth: 5 cm  Assessment  Sensory level: T4  Swirl obtained: Yes  CSF: clear  Attempts: 1  Hemodynamics: stable  Preanesthetic Checklist  Completed: patient identified, IV checked, site marked, risks and benefits discussed, surgical/procedural consents, equipment checked, pre-op evaluation, timeout performed, anesthesia consent given, oxygen available, monitors applied/VS acknowledged, fire risk safety assessment completed and verbalized and blood product R/B/A discussed and consented

## 2023-11-17 NOTE — PROGRESS NOTES
End of Shift Note    Bedside shift change report given to Edin (oncoming nurse) by Mily Salguero RN (offgoing nurse). Report included the following information SBAR, Kardex, OR Summary, Procedure Summary, Intake/Output, MAR, Accordion, Recent Results, and Med Rec Status    Shift worked:  days     Shift summary and any significant changes: Failed PT for DC     Concerns for physician to address:  None     Zone phone for oncoming shift:  [phone]       Activity:     Number times ambulated in hallways past shift: 0  Number of times OOB to chair past shift: 1    Cardiac:   Cardiac Monitoring: No           Access:  Current line(s): PIV     Genitourinary:   Urinary status: voiding    Respiratory:      Chronic home O2 use?: NO  Incentive spirometer at bedside: YES       GI:     Current diet:  ADULT DIET; Regular  Passing flatus: YES  Tolerating current diet: YES       Pain Management:   Patient states pain is manageable on current regimen: YES    Skin:     Interventions: float heels, increase time out of bed, PT/OT consult, and limit briefs    Patient Safety:  Fall Score:    Interventions: bed/chair alarm, assistive device (walker, cane.  etc), gripper socks, pt to call before getting OOB, and stay with me (per policy)       Length of Stay:  Expected LOS: 1  Actual LOS: [address], RN

## 2023-11-17 NOTE — OP NOTE
OPERATIVE REPORT    FACILITY: [facility]    PATIENT NAME: Alana Dawson     DATE OF OPERATION: 11/15/23    PREOPERATIVE DIAGNOSIS: Right knee end stage osteoarthritis    POSTOPERATIVE DIAGNOSIS: same    SURGERIES PERFORMED:   Right total knee arthroplasty    ATTENDING PHYSICIAN: Dyana Reinoso MD    ASSISTANT: Jules Story    IMPLANTS:    Ragan Triathlon size 5 PS femur, 6 tibia, 11 mm TS poly, 36 mm patella    SPECIMENS:  none    OPERATIVE FINDINGS: End stage osteoarthritis. Attenuated MCL. Stable total knee at conclusion. ANESTHESIA: spinal plus regional    FLUIDS: Please see anesthesia record    ESTIMATED BLOOD LOSS: 25     TOURNIQUET TIME: 75 in at 300 mmHg    INDICATIONS FOR PROCEDURE: Alana Dawson is a 75 y.o. male who presented to clinic with right knee pain. Radiographs demonstrated advanced arthritic changes of the affected knee. They were counseled on the risks, benefits, and alternatives to surgery. Risks were outlined to include, but were not limited to: bleeding, infection, fracture, damage to blood vessels or nerves, hardware failure, loosening, continued pain, stiffness, leg length inequality, and medical risks including heart attack, stroke, blood clot, and even death. The patient elected to continue with the operation, and gave informed consent to do so. DETAILED DESCRIPTION OF PROCEDURE:   The patient was identified in the preoperative holding area. The operative site was marked. The nature of procedure was reviewed and informed consent was confirmed. The patient was evaluated by anesthesia and a regional block was completed. The patient was subsequently taken to the operating room and anesthesia was administered. The patient was positioned supine and a nonsterile thigh tourniquet was placed. The lower extremity was prepped and draped in a standard fashion. Preoperative antibiotics were administered. A time-out was performed. A standard midline incision was made.  Sharp dissection was

## 2023-11-17 NOTE — CARE COORDINATION
Planned ortho surgery patient pre-arranged with  through [facility]. AVS updated.      [facility] [address]

## 2023-11-17 NOTE — ANESTHESIA POSTPROCEDURE EVALUATION
Department of Anesthesiology  Postprocedure Note    Patient: Oksana Anthony  MRN: [id number]  YOB: 1950  Date of evaluation: 11/17/2023      Procedure Summary       Date: 11/17/23 Room / Location: [facility] MAIN OR 7 / [facility] MAIN OR    Anesthesia Start: 0839 Anesthesia Stop: 5966    Procedure: RIGHT TOTAL KNEE ARTHROPLASTY WITH KRISTIN (Right: Knee) Diagnosis:       Primary osteoarthritis of right knee      (Primary osteoarthritis of right knee [M17.11])    Providers: Conor Patel MD Responsible Provider: Lynette Bethea MD    Anesthesia Type: MAC, Spinal, Regional ASA Status: 2            Anesthesia Type: MAC, Spinal, Regional    Rosemary Phase I:      Rosemary Phase II:        Anesthesia Post Evaluation    Patient location during evaluation: bedside  Patient participation: complete - patient participated  Level of consciousness: awake and alert  Airway patency: patent  Nausea & Vomiting: no nausea and no vomiting  Complications: no  Cardiovascular status: hemodynamically stable  Respiratory status: acceptable  Hydration status: stable  Multimodal analgesia pain management approach  Pain management: adequate

## 2023-11-17 NOTE — PERIOP NOTE
7700:  Patient to OR Holding & up to BR to change into surgical gown & empty bladder. Consents reviewed with patient & signatures obtained for blood, anesthesia & procedure by Len Holloway RN.    7443:  Dr. Wilbert Barkley in to see the patient & discuss surgical plan of care. All questions answered & surgical site marked. Dr. Xavi Garcia in to see the patient & discuss anesthesia plan of care. Right knee clipped. No CHG used due to patient allergy. Dr. Wilbert Barkley is aware of abrasion to right inner leg below knee. No new orders. 5014:  Time out completed for spinal    0826:  Patient assisted to supine position.   Second time out completed for RLE block    0839:  Patient to OR & Belongings to PACU

## 2023-11-17 NOTE — ANESTHESIA PROCEDURE NOTES
Peripheral Block    Patient location during procedure: holding area  Reason for block: post-op pain management and at surgeon's request  Start time: 11/17/2023 8:26 AM  End time: 11/17/2023 8:30 AM  Staffing  Performed: anesthesiologist   Anesthesiologist: Laura Julian MD  Performed by: Laura Julian MD  Authorized by: Laura Julian MD    Preanesthetic Checklist  Completed: patient identified, IV checked, site marked, risks and benefits discussed, surgical/procedural consents, equipment checked, pre-op evaluation, timeout performed, anesthesia consent given, oxygen available, monitors applied/VS acknowledged, fire risk safety assessment completed and verbalized and blood product R/B/A discussed and consented  Peripheral Block   Patient position: supine  Prep: ChloraPrep  Provider prep: sterile gloves, mask and sterile gown  Patient monitoring: cardiac monitor, continuous pulse ox, continuous capnometry, frequent blood pressure checks, IV access, oxygen and responsive to questions  Block type: Femoral  Adductor canal  Laterality: right  Injection technique: single-shot  Guidance: ultrasound guided    Needle   Needle type: insulated echogenic nerve stimulator needle   Needle gauge: 22 G  Needle localization: nerve stimulator and ultrasound guidance  Needle insertion depth: 4 cm  Needle length: 5 cm  Assessment   Injection assessment: negative aspiration for heme, no paresthesia on injection, local visualized surrounding nerve on ultrasound and no intravascular symptoms  Slow fractionated injection: yes  Hemodynamics: stable  Real-time US image taken/store: yes  Outcomes: uncomplicated    Medications Administered  ropivacaine (NAROPIN) injection 0.5% - Perineural   20 mL - 11/17/2023 8:29:00 AM

## 2023-11-18 LAB
ANION GAP SERPL CALC-SCNC: 4 MMOL/L (ref 5–15)
BUN SERPL-MCNC: 14 MG/DL (ref 6–20)
BUN/CREAT SERPL: 13 (ref 12–20)
CALCIUM SERPL-MCNC: 8.8 MG/DL (ref 8.5–10.1)
CHLORIDE SERPL-SCNC: 106 MMOL/L (ref 97–108)
CO2 SERPL-SCNC: 28 MMOL/L (ref 21–32)
CREAT SERPL-MCNC: 1.04 MG/DL (ref 0.7–1.3)
GLUCOSE BLD STRIP.AUTO-MCNC: 132 MG/DL (ref 65–117)
GLUCOSE BLD STRIP.AUTO-MCNC: 141 MG/DL (ref 65–117)
GLUCOSE SERPL-MCNC: 185 MG/DL (ref 65–100)
HCT VFR BLD AUTO: 35.5 % (ref 36.6–50.3)
HGB BLD-MCNC: 12.4 G/DL (ref 12.1–17)
POTASSIUM SERPL-SCNC: 3.8 MMOL/L (ref 3.5–5.1)
SERVICE CMNT-IMP: ABNORMAL
SERVICE CMNT-IMP: ABNORMAL
SODIUM SERPL-SCNC: 138 MMOL/L (ref 136–145)

## 2023-11-18 PROCEDURE — 80048 BASIC METABOLIC PNL TOTAL CA: CPT

## 2023-11-18 PROCEDURE — G0378 HOSPITAL OBSERVATION PER HR: HCPCS

## 2023-11-18 PROCEDURE — 2580000003 HC RX 258: Performed by: ORTHOPAEDIC SURGERY

## 2023-11-18 PROCEDURE — 85018 HEMOGLOBIN: CPT

## 2023-11-18 PROCEDURE — 97530 THERAPEUTIC ACTIVITIES: CPT

## 2023-11-18 PROCEDURE — 6370000000 HC RX 637 (ALT 250 FOR IP): Performed by: ORTHOPAEDIC SURGERY

## 2023-11-18 PROCEDURE — 96376 TX/PRO/DX INJ SAME DRUG ADON: CPT

## 2023-11-18 PROCEDURE — 96374 THER/PROPH/DIAG INJ IV PUSH: CPT

## 2023-11-18 PROCEDURE — 6360000002 HC RX W HCPCS: Performed by: ORTHOPAEDIC SURGERY

## 2023-11-18 PROCEDURE — 97116 GAIT TRAINING THERAPY: CPT

## 2023-11-18 PROCEDURE — 85014 HEMATOCRIT: CPT

## 2023-11-18 PROCEDURE — 36415 COLL VENOUS BLD VENIPUNCTURE: CPT

## 2023-11-18 PROCEDURE — 82962 GLUCOSE BLOOD TEST: CPT

## 2023-11-18 RX ADMIN — KETOROLAC TROMETHAMINE 15 MG: 30 INJECTION, SOLUTION INTRAMUSCULAR; INTRAVENOUS at 06:44

## 2023-11-18 RX ADMIN — TAMSULOSIN HYDROCHLORIDE 0.4 MG: 0.4 CAPSULE ORAL at 08:31

## 2023-11-18 RX ADMIN — WATER 2000 MG: 1 INJECTION INTRAMUSCULAR; INTRAVENOUS; SUBCUTANEOUS at 01:48

## 2023-11-18 RX ADMIN — TRAMADOL HYDROCHLORIDE 100 MG: 50 TABLET ORAL at 08:31

## 2023-11-18 RX ADMIN — DOCUSATE SODIUM 50MG AND SENNOSIDES 8.6MG 1 TABLET: 8.6; 5 TABLET, FILM COATED ORAL at 21:15

## 2023-11-18 RX ADMIN — TRAMADOL HYDROCHLORIDE 50 MG: 50 TABLET ORAL at 01:59

## 2023-11-18 RX ADMIN — FAMOTIDINE 20 MG: 20 TABLET, FILM COATED ORAL at 21:15

## 2023-11-18 RX ADMIN — ACETAMINOPHEN 1000 MG: 500 TABLET ORAL at 06:36

## 2023-11-18 RX ADMIN — FUROSEMIDE 40 MG: 40 TABLET ORAL at 08:31

## 2023-11-18 RX ADMIN — ASPIRIN 81 MG: 81 TABLET, COATED ORAL at 21:15

## 2023-11-18 RX ADMIN — SODIUM CHLORIDE, PRESERVATIVE FREE 10 ML: 5 INJECTION INTRAVENOUS at 08:31

## 2023-11-18 RX ADMIN — KETOROLAC TROMETHAMINE 15 MG: 30 INJECTION, SOLUTION INTRAMUSCULAR; INTRAVENOUS at 01:52

## 2023-11-18 RX ADMIN — DOCUSATE SODIUM 50MG AND SENNOSIDES 8.6MG 1 TABLET: 8.6; 5 TABLET, FILM COATED ORAL at 08:31

## 2023-11-18 RX ADMIN — TRAMADOL HYDROCHLORIDE 50 MG: 50 TABLET ORAL at 17:00

## 2023-11-18 RX ADMIN — SODIUM CHLORIDE, PRESERVATIVE FREE 10 ML: 5 INJECTION INTRAVENOUS at 21:19

## 2023-11-18 RX ADMIN — ACETAMINOPHEN 1000 MG: 500 TABLET ORAL at 16:58

## 2023-11-18 RX ADMIN — DONEPEZIL HYDROCHLORIDE 10 MG: 5 TABLET, FILM COATED ORAL at 21:15

## 2023-11-18 RX ADMIN — TRANEXAMIC ACID 1950 MG: 650 TABLET ORAL at 09:01

## 2023-11-18 RX ADMIN — POLYETHYLENE GLYCOL (3350) 17 G: 17 POWDER, FOR SOLUTION ORAL at 08:31

## 2023-11-18 RX ADMIN — ACETAMINOPHEN 1000 MG: 500 TABLET ORAL at 21:15

## 2023-11-18 RX ADMIN — ASPIRIN 81 MG: 81 TABLET, COATED ORAL at 08:30

## 2023-11-18 RX ADMIN — FAMOTIDINE 20 MG: 20 TABLET, FILM COATED ORAL at 08:30

## 2023-11-18 ASSESSMENT — PAIN DESCRIPTION - ORIENTATION
ORIENTATION: RIGHT

## 2023-11-18 ASSESSMENT — PAIN DESCRIPTION - FREQUENCY
FREQUENCY: INTERMITTENT

## 2023-11-18 ASSESSMENT — PAIN DESCRIPTION - PAIN TYPE
TYPE: ACUTE PAIN

## 2023-11-18 ASSESSMENT — PAIN DESCRIPTION - LOCATION
LOCATION: KNEE

## 2023-11-18 ASSESSMENT — PAIN DESCRIPTION - DESCRIPTORS
DESCRIPTORS: ACHING

## 2023-11-18 ASSESSMENT — PAIN SCALES - GENERAL
PAINLEVEL_OUTOF10: 1
PAINLEVEL_OUTOF10: 4
PAINLEVEL_OUTOF10: 3
PAINLEVEL_OUTOF10: 4
PAINLEVEL_OUTOF10: 1
PAINLEVEL_OUTOF10: 6
PAINLEVEL_OUTOF10: 4

## 2023-11-18 ASSESSMENT — PAIN DESCRIPTION - ONSET
ONSET: SUDDEN

## 2023-11-18 ASSESSMENT — PAIN - FUNCTIONAL ASSESSMENT
PAIN_FUNCTIONAL_ASSESSMENT: PREVENTS OR INTERFERES SOME ACTIVE ACTIVITIES AND ADLS

## 2023-11-18 NOTE — PROGRESS NOTES
End of Shift Note    Bedside shift change report given to  [address] [place] [address] (oncoming nurse) by Yessica West LPN (offgoing nurse). Report included the following information SBAR, Kardex, OR Summary, Intake/Output, MAR, and Recent Results    Shift worked:  night     Shift summary and any significant changes:     Pt VS stable   Pt voiding via urinal and BSC  Pt given pain medication x1 during shift,  50 mg Tramadol, scheduled Tylenol and ice pack  Ace wrap removed   Pt turned q4hr   New IV inserted due to patient removing IV        Concerns for physician to address:       Zone phone for oncoming shift:          Activity:     Number times ambulated in hallways past shift: 0  Number of times OOB to chair past shift: 0    Cardiac:   Cardiac Monitoring: No           Access:  Current line(s): PIV     Genitourinary:   Urinary status: voiding    Respiratory:      Chronic home O2 use?: NO  Incentive spirometer at bedside: YES       GI:     Current diet:  ADULT DIET; Regular  Passing flatus: YES  Tolerating current diet: YES       Pain Management:   Patient states pain is manageable on current regimen: YES    Skin:     Interventions: float heels, increase time out of bed, PT/OT consult, limit briefs, internal/external urinary devices, and nutritional support    Patient Safety:  Fall Score:    Interventions: bed/chair alarm, assistive device (walker, cane.  etc), gripper socks, pt to call before getting OOB, and stay with me (per policy)       Length of Stay:  Expected LOS: 1  Actual LOS: 0      Yessica West LPN

## 2023-11-18 NOTE — CARE COORDINATION
Medicare Outpatient Observation Notice provided to Alyx Herrera. Oral explanation was provided and all questions answered. Signed document placed on the bedside chart to be scanned under the media tab and copy provided to patient.

## 2023-11-19 LAB
GLUCOSE BLD STRIP.AUTO-MCNC: 138 MG/DL (ref 65–117)
GLUCOSE BLD STRIP.AUTO-MCNC: 153 MG/DL (ref 65–117)
GLUCOSE BLD STRIP.AUTO-MCNC: 178 MG/DL (ref 65–117)
GLUCOSE BLD STRIP.AUTO-MCNC: 212 MG/DL (ref 65–117)
SERVICE CMNT-IMP: ABNORMAL

## 2023-11-19 PROCEDURE — G0378 HOSPITAL OBSERVATION PER HR: HCPCS

## 2023-11-19 PROCEDURE — 2580000003 HC RX 258: Performed by: ORTHOPAEDIC SURGERY

## 2023-11-19 PROCEDURE — 6370000000 HC RX 637 (ALT 250 FOR IP): Performed by: ORTHOPAEDIC SURGERY

## 2023-11-19 PROCEDURE — APPNB15 APP NON BILLABLE TIME 0-15 MINS: Performed by: ORTHOPAEDIC SURGERY

## 2023-11-19 PROCEDURE — 82962 GLUCOSE BLOOD TEST: CPT

## 2023-11-19 RX ADMIN — TRAMADOL HYDROCHLORIDE 50 MG: 50 TABLET ORAL at 10:39

## 2023-11-19 RX ADMIN — ASPIRIN 81 MG: 81 TABLET, COATED ORAL at 08:43

## 2023-11-19 RX ADMIN — DOCUSATE SODIUM 50MG AND SENNOSIDES 8.6MG 1 TABLET: 8.6; 5 TABLET, FILM COATED ORAL at 08:43

## 2023-11-19 RX ADMIN — TAMSULOSIN HYDROCHLORIDE 0.4 MG: 0.4 CAPSULE ORAL at 08:43

## 2023-11-19 RX ADMIN — SODIUM CHLORIDE, PRESERVATIVE FREE 10 ML: 5 INJECTION INTRAVENOUS at 08:44

## 2023-11-19 RX ADMIN — POLYETHYLENE GLYCOL (3350) 17 G: 17 POWDER, FOR SOLUTION ORAL at 08:44

## 2023-11-19 RX ADMIN — TRAMADOL HYDROCHLORIDE 100 MG: 50 TABLET ORAL at 18:17

## 2023-11-19 RX ADMIN — FUROSEMIDE 40 MG: 40 TABLET ORAL at 08:43

## 2023-11-19 RX ADMIN — DONEPEZIL HYDROCHLORIDE 10 MG: 5 TABLET, FILM COATED ORAL at 21:50

## 2023-11-19 RX ADMIN — ASPIRIN 81 MG: 81 TABLET, COATED ORAL at 21:50

## 2023-11-19 RX ADMIN — ACETAMINOPHEN 1000 MG: 500 TABLET ORAL at 21:50

## 2023-11-19 RX ADMIN — FAMOTIDINE 20 MG: 20 TABLET, FILM COATED ORAL at 21:50

## 2023-11-19 RX ADMIN — SODIUM CHLORIDE, PRESERVATIVE FREE 10 ML: 5 INJECTION INTRAVENOUS at 21:51

## 2023-11-19 RX ADMIN — TRAMADOL HYDROCHLORIDE 50 MG: 50 TABLET ORAL at 08:43

## 2023-11-19 RX ADMIN — ACETAMINOPHEN 1000 MG: 500 TABLET ORAL at 10:42

## 2023-11-19 RX ADMIN — FAMOTIDINE 20 MG: 20 TABLET, FILM COATED ORAL at 08:43

## 2023-11-19 RX ADMIN — ACETAMINOPHEN 1000 MG: 500 TABLET ORAL at 06:15

## 2023-11-19 RX ADMIN — TRANEXAMIC ACID 1950 MG: 650 TABLET ORAL at 08:43

## 2023-11-19 RX ADMIN — TRAMADOL HYDROCHLORIDE 50 MG: 50 TABLET ORAL at 02:51

## 2023-11-19 ASSESSMENT — PAIN DESCRIPTION - PAIN TYPE
TYPE: SURGICAL PAIN
TYPE: ACUTE PAIN

## 2023-11-19 ASSESSMENT — PAIN SCALES - GENERAL
PAINLEVEL_OUTOF10: 3
PAINLEVEL_OUTOF10: 5
PAINLEVEL_OUTOF10: 3
PAINLEVEL_OUTOF10: 6
PAINLEVEL_OUTOF10: 9

## 2023-11-19 ASSESSMENT — PAIN DESCRIPTION - LOCATION
LOCATION: KNEE

## 2023-11-19 ASSESSMENT — PAIN DESCRIPTION - DESCRIPTORS
DESCRIPTORS: ACHING

## 2023-11-19 ASSESSMENT — PAIN DESCRIPTION - ORIENTATION
ORIENTATION: RIGHT

## 2023-11-19 ASSESSMENT — PAIN - FUNCTIONAL ASSESSMENT
PAIN_FUNCTIONAL_ASSESSMENT: PREVENTS OR INTERFERES SOME ACTIVE ACTIVITIES AND ADLS

## 2023-11-19 ASSESSMENT — PAIN DESCRIPTION - FREQUENCY
FREQUENCY: INTERMITTENT

## 2023-11-19 ASSESSMENT — PAIN DESCRIPTION - ONSET
ONSET: SUDDEN
ONSET: GRADUAL

## 2023-11-19 NOTE — PROGRESS NOTES
End of Shift Note    Bedside shift change report given to RN (oncoming nurse) by Gordo Prieto LPN (offgoing nurse). Report included the following information SBAR, Kardex, Intake/Output, MAR, Accordion, and Recent Results    Shift worked:  night     Shift summary and any significant changes:    Pt voiding via urinal  Pt given pain medication x1 during shift, 50 mg Tramadol  Pt turns self  Pt VS stable      Concerns for physician to address:       Zone phone for oncoming shift:          Activity:     Number times ambulated in hallways past shift: 0  Number of times OOB to chair past shift: 0    Cardiac:   Cardiac Monitoring: No           Access:  Current line(s): PIV     Genitourinary:   Urinary status: voiding and external catheter    Respiratory:      Chronic home O2 use?: NO  Incentive spirometer at bedside: YES       GI:     Current diet:  ADULT DIET; Regular  Passing flatus: YES  Tolerating current diet: YES       Pain Management:   Patient states pain is manageable on current regimen: YES    Skin:     Interventions: float heels, PT/OT consult, limit briefs, internal/external urinary devices, and nutritional support    Patient Safety:  Fall Score:    Interventions: bed/chair alarm, assistive device (walker, cane.  etc), gripper socks, pt to call before getting OOB, and stay with me (per policy)       Length of Stay:  Expected LOS: 1  Actual LOS: 0      Gordo Prieto LPN

## 2023-11-19 NOTE — PROGRESS NOTES
End of Shift Note    Bedside shift change report given to Craolyne Cam (oncoming nurse) by Eliza Conner RN (offgoing nurse). Report included the following information SBAR, Kardex, Intake/Output, MAR, and Recent Results    Shift worked:  day     Shift summary and any significant changes:     VS Stable, pt up with 2 assist and walker and gait belt, very impulsive and non-complaint with safety precautions postop, bed alarm on, prn tramadol given for complaints of pain, voiding via urinal clear yellow urine large brown 4 BM today     Concerns for physician to address:       Zone phone for oncoming shift:   [phone]       Activity:     Number times ambulated in hallways past shift: 1  Number of times OOB to chair past shift: 0    Cardiac:   Cardiac Monitoring: Yes           Access:  Current line(s): PIV     Genitourinary:   Urinary status: voiding    Respiratory:      Chronic home O2 use?: NO  Incentive spirometer at bedside: YES       GI:     Current diet:  ADULT DIET; Regular  Passing flatus: YES  Tolerating current diet: YES       Pain Management:   Patient states pain is manageable on current regimen: YES    Skin:     Interventions: float heels, increase time out of bed, PT/OT consult, and limit briefs    Patient Safety:  Fall Score:    Interventions: bed/chair alarm, assistive device (walker, cane.  etc), gripper socks, pt to call before getting OOB, stay with me (per policy), and gait belt       Length of Stay:  Expected LOS: 1  Actual LOS: 0      Rose Marie Chavarria RN

## 2023-11-19 NOTE — PROGRESS NOTES
End of Shift Note    Bedside shift change report given to Aura Moran RN/Clayton HESTER (oncoming nurse) by Joshua Alicia RN (offgoing nurse). Report included the following information SBAR, Kardex, Intake/Output, MAR, and Recent Results    Shift worked:  day     Shift summary and any significant changes:     VS Stable, pt up with 2 assist and walker and gait belt, very impulsive and non-complaint with safety precautions postop, bed alarm on, prn tramadol given for complaints of pain, voiding via urinal clear yellow urine     Concerns for physician to address:       Zone phone for oncoming shift:   [phone]       Activity:     Number times ambulated in hallways past shift: 1  Number of times OOB to chair past shift: 0    Cardiac:   Cardiac Monitoring: Yes           Access:  Current line(s): PIV     Genitourinary:   Urinary status: voiding    Respiratory:      Chronic home O2 use?: NO  Incentive spirometer at bedside: YES       GI:     Current diet:  ADULT DIET; Regular  Passing flatus: YES  Tolerating current diet: YES       Pain Management:   Patient states pain is manageable on current regimen: YES    Skin:     Interventions: float heels, increase time out of bed, PT/OT consult, and limit briefs    Patient Safety:  Fall Score:    Interventions: bed/chair alarm, assistive device (walker, cane.  etc), gripper socks, pt to call before getting OOB, stay with me (per policy), and gait belt       Length of Stay:  Expected LOS: 1  Actual LOS: 0      Rose Marie Lee RN

## 2023-11-20 VITALS
BODY MASS INDEX: 27.28 KG/M2 | SYSTOLIC BLOOD PRESSURE: 125 MMHG | HEART RATE: 68 BPM | HEIGHT: 66 IN | RESPIRATION RATE: 16 BRPM | OXYGEN SATURATION: 100 % | TEMPERATURE: 97.7 F | WEIGHT: 169.75 LBS | DIASTOLIC BLOOD PRESSURE: 66 MMHG

## 2023-11-20 LAB
GLUCOSE BLD STRIP.AUTO-MCNC: 135 MG/DL (ref 65–117)
GLUCOSE BLD STRIP.AUTO-MCNC: 152 MG/DL (ref 65–117)
SERVICE CMNT-IMP: ABNORMAL
SERVICE CMNT-IMP: ABNORMAL

## 2023-11-20 PROCEDURE — 82962 GLUCOSE BLOOD TEST: CPT

## 2023-11-20 PROCEDURE — 2580000003 HC RX 258: Performed by: ORTHOPAEDIC SURGERY

## 2023-11-20 PROCEDURE — G0378 HOSPITAL OBSERVATION PER HR: HCPCS

## 2023-11-20 PROCEDURE — 97116 GAIT TRAINING THERAPY: CPT

## 2023-11-20 PROCEDURE — APPNB60 APP NON BILLABLE TIME 46-60 MINS: Performed by: NURSE PRACTITIONER

## 2023-11-20 PROCEDURE — 97530 THERAPEUTIC ACTIVITIES: CPT

## 2023-11-20 PROCEDURE — 6370000000 HC RX 637 (ALT 250 FOR IP): Performed by: ORTHOPAEDIC SURGERY

## 2023-11-20 RX ORDER — CASTOR OIL AND BALSAM, PERU 788; 87 MG/G; MG/G
OINTMENT TOPICAL 2 TIMES DAILY
Status: DISCONTINUED | OUTPATIENT
Start: 2023-11-20 | End: 2023-11-20 | Stop reason: HOSPADM

## 2023-11-20 RX ORDER — TRAMADOL HYDROCHLORIDE 50 MG/1
50-100 TABLET ORAL EVERY 6 HOURS PRN
Qty: 30 TABLET | Refills: 0 | Status: SHIPPED | OUTPATIENT
Start: 2023-11-20 | End: 2023-11-27

## 2023-11-20 RX ORDER — SENNA AND DOCUSATE SODIUM 50; 8.6 MG/1; MG/1
1 TABLET, FILM COATED ORAL 2 TIMES DAILY
Qty: 9 TABLET | Refills: 0 | Status: SHIPPED | OUTPATIENT
Start: 2023-11-20 | End: 2023-11-25

## 2023-11-20 RX ORDER — FAMOTIDINE 20 MG/1
20 TABLET, FILM COATED ORAL 2 TIMES DAILY
Qty: 60 TABLET | Refills: 3 | Status: SHIPPED | OUTPATIENT
Start: 2023-11-20

## 2023-11-20 RX ORDER — ASPIRIN 81 MG/1
81 TABLET ORAL 2 TIMES DAILY
Qty: 60 TABLET | Refills: 0 | Status: SHIPPED
Start: 2023-11-20 | End: 2023-12-20

## 2023-11-20 RX ADMIN — SODIUM CHLORIDE, PRESERVATIVE FREE 10 ML: 5 INJECTION INTRAVENOUS at 10:49

## 2023-11-20 RX ADMIN — ASPIRIN 81 MG: 81 TABLET, COATED ORAL at 10:47

## 2023-11-20 RX ADMIN — FAMOTIDINE 20 MG: 20 TABLET, FILM COATED ORAL at 10:47

## 2023-11-20 RX ADMIN — ACETAMINOPHEN 1000 MG: 500 TABLET ORAL at 14:35

## 2023-11-20 RX ADMIN — TRAMADOL HYDROCHLORIDE 100 MG: 50 TABLET ORAL at 00:34

## 2023-11-20 RX ADMIN — ACETAMINOPHEN 1000 MG: 500 TABLET ORAL at 06:08

## 2023-11-20 RX ADMIN — TAMSULOSIN HYDROCHLORIDE 0.4 MG: 0.4 CAPSULE ORAL at 10:47

## 2023-11-20 RX ADMIN — FUROSEMIDE 40 MG: 40 TABLET ORAL at 10:47

## 2023-11-20 RX ADMIN — TRANEXAMIC ACID 1950 MG: 650 TABLET ORAL at 10:47

## 2023-11-20 ASSESSMENT — PAIN - FUNCTIONAL ASSESSMENT: PAIN_FUNCTIONAL_ASSESSMENT: PREVENTS OR INTERFERES SOME ACTIVE ACTIVITIES AND ADLS

## 2023-11-20 ASSESSMENT — PAIN DESCRIPTION - LOCATION: LOCATION: KNEE

## 2023-11-20 ASSESSMENT — PAIN DESCRIPTION - ORIENTATION: ORIENTATION: RIGHT

## 2023-11-20 ASSESSMENT — PAIN SCALES - GENERAL: PAINLEVEL_OUTOF10: 7

## 2023-11-20 ASSESSMENT — PAIN DESCRIPTION - DESCRIPTORS: DESCRIPTORS: ACHING

## 2023-11-20 NOTE — PROGRESS NOTES
DISCHARGE NOTE FROM [facility]    Patient determined to be stable for discharge by attending provider. I have reviewed the discharge instructions with the patient and spouse. They verbalized understanding and all questions were answered to their satisfaction. No complaints or further questions were expressed. Medications sent to pharmacy. Appropriate educational materials and medication side effect teaching were provided. PIV were removed prior to discharge. Patient did not discharge with any line, aparicio, or drain. Personal items and valuables accounted for at discharge by patient and/or family: Yes    Post-op patient: Yes-Patient given post-op discharge kit and instructed on use.     Lizeth Birmingham RN

## 2023-11-20 NOTE — PROGRESS NOTES
End of Shift Note    Bedside shift change report given to Nicholas Le and Helen Jones RN (oncoming nurse) by Yulia Tabor RN (offgoing nurse). Report included the following information SBAR, Kardex, Intake/Output, MAR, and Recent Results    Shift worked:  night     Shift summary and any significant changes:     VS Stable, pt up with 2 assist and walker and gait belt, very impulsive and non-complaint with safety precautions postop, bed alarm on, prn tramadol given for complaints of pain, voiding with urinal     Concerns for physician to address:  See above     Zone phone for oncoming shift:          Activity:     Number times ambulated in hallways past shift: 1  Number of times OOB to chair past shift: 0    Cardiac:   Cardiac Monitoring: Yes           Access:  Current line(s): PIV     Genitourinary:   Urinary status: voiding    Respiratory:      Chronic home O2 use?: NO  Incentive spirometer at bedside: YES       GI:     Current diet:  ADULT DIET; Regular  Passing flatus: YES  Tolerating current diet: YES       Pain Management:   Patient states pain is manageable on current regimen: YES    Skin:     Interventions: float heels, increase time out of bed, PT/OT consult, and limit briefs    Patient Safety:  Fall Score:    Interventions: bed/chair alarm, assistive device (walker, cane.  etc), gripper socks, pt to call before getting OOB, stay with me (per policy), and gait belt       Length of Stay:  Expected LOS: 1  Actual LOS: 0      Yulia Tabor RN

## 2023-11-27 ENCOUNTER — TELEPHONE (OUTPATIENT)
Age: 73
End: 2023-11-27

## 2023-11-27 RX ORDER — MEMANTINE HYDROCHLORIDE 5 MG/1
5 TABLET ORAL 2 TIMES DAILY
Qty: 60 TABLET | Refills: 0 | Status: SHIPPED | OUTPATIENT
Start: 2023-11-27

## 2023-11-29 NOTE — TELEPHONE ENCOUNTER
He should be on donepezil 10 mg daily. In addition, he should start memantine 5 mg twice daily and I will send in a prescription. The dose can be increased after 2 weeks to 10 mg twice daily.      Sahra Shukla MD

## 2023-12-04 ENCOUNTER — TELEPHONE (OUTPATIENT)
Age: 73
End: 2023-12-04

## 2023-12-04 NOTE — TELEPHONE ENCOUNTER
Patient's wife would like a call back to discuss her 's medication. No further information was provided to the [facility].

## 2023-12-04 NOTE — TELEPHONE ENCOUNTER
Called patient's spouse. She wanted to know if patient should be taking both memantine and donepezil together. Informed her that he is to take both the medications and can take it together if he doesn't have any stomach upset.

## 2024-01-09 NOTE — Clinical Note
Physician has arrived. X Size Of Lesion In Cm (Optional): 0 Introduction Text (Please End With A Colon): The following procedure was deferred: Other Procedure: 15 units in glabella PRN- Pt to schedule Procedure To Be Performed At Next Visit: Botox (Cosmetic) Detail Level: Detailed

## 2024-01-28 ENCOUNTER — HOSPITAL ENCOUNTER (EMERGENCY)
Facility: HOSPITAL | Age: 74
Discharge: HOME | End: 2024-01-28
Attending: EMERGENCY MEDICINE
Payer: MEDICARE

## 2024-01-28 VITALS
SYSTOLIC BLOOD PRESSURE: 117 MMHG | HEIGHT: 69 IN | TEMPERATURE: 97.5 F | WEIGHT: 169.5 LBS | DIASTOLIC BLOOD PRESSURE: 57 MMHG | OXYGEN SATURATION: 98 % | HEART RATE: 63 BPM | BODY MASS INDEX: 25.1 KG/M2 | RESPIRATION RATE: 18 BRPM

## 2024-01-28 DIAGNOSIS — K40.90 INGUINAL HERNIA WITHOUT OBSTRUCTION OR GANGRENE, RECURRENCE NOT SPECIFIED, UNSPECIFIED LATERALITY: Primary | ICD-10-CM

## 2024-01-28 PROCEDURE — 99283 EMERGENCY DEPT VISIT LOW MDM: CPT

## 2024-01-28 RX ORDER — DOCUSATE SODIUM 100 MG/1
100 CAPSULE, LIQUID FILLED ORAL 2 TIMES DAILY
Qty: 60 CAPSULE | Refills: 0 | Status: SHIPPED | OUTPATIENT
Start: 2024-01-28 | End: 2024-02-27

## 2024-01-28 ASSESSMENT — PAIN - FUNCTIONAL ASSESSMENT
PAIN_FUNCTIONAL_ASSESSMENT: NONE - DENIES PAIN
PAIN_FUNCTIONAL_ASSESSMENT: NONE - DENIES PAIN

## 2024-01-28 ASSESSMENT — PAIN SCALES - GENERAL: PAINLEVEL_OUTOF10: 0

## 2024-01-29 NOTE — ED NOTES
Discharge instructions have been discussed with patient. Opportunity for questions and further education has been provided to patient. Patient and/or patient guardian expressed understanding. Patient and/or patient guardian understand to seek Emergency Medical attention for chest pain or new or worsening symptoms.

## 2024-01-29 NOTE — ED PROVIDER NOTES
[facility] EMERGENCY DEPT  EMERGENCY DEPARTMENT HISTORY AND PHYSICAL EXAM      Date: 1/28/2024  Patient Name: Philip Segal  MRN: [id number]  Birthdate 1950  Date of evaluation: 1/28/2024  Provider: Paul aSge MD   Note Started: 10:55 PM EST 1/28/24    HISTORY OF PRESENT ILLNESS     Chief Complaint   Patient presents with    Groin Pain     Arrives being wheeled by sister with c/o localized left groin pain.        History Provided By: Patient    HPI: Philip Segal is a 73 y.o. male with known past medical history significant for recent knee replacement presents with left inguinal pain that sharp at times and then completely pain-free at other times.  He says he symptoms feels a bulge when he lifts something heavy.  He denies numbness tingling loss of sensation no saddle paresthesias no difficulty urinating.    PAST MEDICAL HISTORY   Past Medical History:  Past Medical History:   Diagnosis Date    Alzheimer disease ([facility]) 2023    Arthritis     knees, back    At risk for sleep apnea 11/2023    ELENA 6. STOP BANG tool form faxed to PCP.    Burning with urination     Chronic pain     knees, back    DM (diabetes mellitus) ([facility])     Type II - diet controlled    High cholesterol     History of MRSA infection 2017    r/t back injection    HTN (hypertension)     currently on no meds    PE (pulmonary thromboembolism) ([facility]) 01/2017    bilateral with right heart strain. Treated by Dr. Quiñones    Valvular heart disease        Past Surgical History:  Past Surgical History:   Procedure Laterality Date    AMPUTATION Bilateral     vascular; great toe amp    CARDIAC CATHETERIZATION  2019    CARDIAC VALVE SURGERY  2019    TAVR    HERNIA REPAIR  07/25/2022    Laparoscopic repair of right inguinal hernia with mesh, robot assisted    ORTHOPEDIC SURGERY Left     Left arm fracture & repair ORIF    OTHER SURGICAL HISTORY  06/04/2015    INCISION AND DRAINAGE, RESECTION OF REMAINING 1ST METATARSAL, INSERTION OF ANTIBIOTIC BEADS    TOE AMPUTATION

## 2024-03-28 ENCOUNTER — OFFICE VISIT (OUTPATIENT)
Age: 74
End: 2024-03-28
Payer: MEDICARE

## 2024-03-28 VITALS
HEIGHT: 69 IN | HEART RATE: 66 BPM | BODY MASS INDEX: 25.03 KG/M2 | RESPIRATION RATE: 18 BRPM | SYSTOLIC BLOOD PRESSURE: 123 MMHG | OXYGEN SATURATION: 97 % | WEIGHT: 169 LBS | DIASTOLIC BLOOD PRESSURE: 76 MMHG

## 2024-03-28 DIAGNOSIS — F02.C11 SEVERE ALZHEIMER'S DEMENTIA WITH AGITATION, UNSPECIFIED TIMING OF DEMENTIA ONSET (HCC): Primary | ICD-10-CM

## 2024-03-28 DIAGNOSIS — F22 PARANOIA (HCC): ICD-10-CM

## 2024-03-28 DIAGNOSIS — G30.9 SEVERE ALZHEIMER'S DEMENTIA WITH AGITATION, UNSPECIFIED TIMING OF DEMENTIA ONSET (HCC): Primary | ICD-10-CM

## 2024-03-28 PROCEDURE — 1123F ACP DISCUSS/DSCN MKR DOCD: CPT

## 2024-03-28 PROCEDURE — 3017F COLORECTAL CA SCREEN DOC REV: CPT

## 2024-03-28 PROCEDURE — G8427 DOCREV CUR MEDS BY ELIG CLIN: HCPCS

## 2024-03-28 PROCEDURE — 99215 OFFICE O/P EST HI 40 MIN: CPT

## 2024-03-28 PROCEDURE — 3078F DIAST BP <80 MM HG: CPT

## 2024-03-28 PROCEDURE — 3074F SYST BP LT 130 MM HG: CPT

## 2024-03-28 PROCEDURE — G8420 CALC BMI NORM PARAMETERS: HCPCS

## 2024-03-28 PROCEDURE — 1036F TOBACCO NON-USER: CPT

## 2024-03-28 PROCEDURE — G8484 FLU IMMUNIZE NO ADMIN: HCPCS

## 2024-03-28 RX ORDER — MEMANTINE HYDROCHLORIDE 10 MG/1
10 TABLET ORAL 2 TIMES DAILY
Qty: 180 TABLET | Refills: 1 | Status: SHIPPED | OUTPATIENT
Start: 2024-03-28

## 2024-03-28 RX ORDER — HYDROCODONE BITARTRATE AND ACETAMINOPHEN 5; 325 MG/1; MG/1
TABLET ORAL
COMMUNITY
Start: 2024-02-21

## 2024-03-28 RX ORDER — QUETIAPINE FUMARATE 25 MG/1
12.5 TABLET, FILM COATED ORAL NIGHTLY
Qty: 30 TABLET | Refills: 0 | Status: SHIPPED | OUTPATIENT
Start: 2024-03-28

## 2024-03-28 RX ORDER — DONEPEZIL HYDROCHLORIDE 10 MG/1
10 TABLET, FILM COATED ORAL NIGHTLY
Qty: 90 TABLET | Refills: 3 | Status: SHIPPED | OUTPATIENT
Start: 2024-03-28

## 2024-03-28 ASSESSMENT — PATIENT HEALTH QUESTIONNAIRE - PHQ9
SUM OF ALL RESPONSES TO PHQ QUESTIONS 1-9: 0
SUM OF ALL RESPONSES TO PHQ9 QUESTIONS 1 & 2: 0
1. LITTLE INTEREST OR PLEASURE IN DOING THINGS: NOT AT ALL
2. FEELING DOWN, DEPRESSED OR HOPELESS: NOT AT ALL
SUM OF ALL RESPONSES TO PHQ QUESTIONS 1-9: 0

## 2024-03-28 NOTE — PROGRESS NOTES
Chief Complaint   Patient presents with    Follow-up     dementia    Memory Loss      Vitals:    03/28/24 1328   BP: 123/76   Pulse: 66   Resp: 18   SpO2: 97%      
a standardized protocol tailored  for this examination and automatic exposure control for dose modulation.    FINDINGS:  There is a large right inguinal hernia containing distal small bowel and cecum  without apparent strangulation or obstruction. There is no apparent left  inguinal hernia.    No urinary tract stones are seen. There is no hydroureteronephrosis. The kidneys  are borderline small with lengths approximately 9 cm, without apparent mass on  unenhanced imaging.    Liver shows no apparent significant finding without contrast. Cholelithiasis is  likely. Pancreas, adrenal glands, spleen and aorta show no significant  enlargement.    There are colonic diverticula. No inflammation is seen. There is no ascites,  pneumoperitoneum or significant adenopathy.    The bladder is near empty. There is mild prostatomegaly. The distal ureters are  not dilated. There is no apparent pelvic mass.    Impression  1. Large right inguinal hernia containing bowel.  2. Borderline small kidneys.  3. No urinary tract stone or hydronephrosis.        MRI Results (maximum last 3):  MRI Result (most recent):  MRI BRAIN WO CONTRAST 06/19/2023    Narrative  EXAM: MRI BRAIN WO CONTRAST    INDICATION: Other Alzheimer's disease; Dementia in other diseases classified  elsewhere, severe, with other behavioral disturbance    COMPARISON: None.    CONTRAST: None.    TECHNIQUE:  Multiplanar multisequence acquisition without contrast of the brain.    FINDINGS:  Diffusion imaging does not show acute ischemic changes. There are no evidence  for extra-axial fluid collection hemorrhage or shift of the midline.  Flow voids in major vessels at the base of the brain are present.  There is mild atrophy, no significant white matter disease.  Masses.    Impression  1. No acute findings no mass.  2. Mild atrophy.            Assessment and Plan   Philip was seen today for follow-up and memory loss.    Diagnoses and all orders for this visit:    Severe

## 2024-03-29 ENCOUNTER — TELEPHONE (OUTPATIENT)
Age: 74
End: 2024-03-29

## 2024-03-29 NOTE — TELEPHONE ENCOUNTER
Martin Nagel wanted me to send you a quick note to let you know this patient is needing to be scheduled for a Focused Capacity Evaluation.  Thanks.

## 2024-04-02 ENCOUNTER — TELEPHONE (OUTPATIENT)
Age: 74
End: 2024-04-02

## 2024-04-02 NOTE — TELEPHONE ENCOUNTER
Patient's wife requesting a call back to discuss medication QUETIAPINE 25 MG. Wife states  has Dementia, but on medication paperwork it states not to give medication to patient with dementia.    Please contact

## 2024-04-03 NOTE — TELEPHONE ENCOUNTER
Call placed to Mrs. Segal.  2 patient identifiers received.  Advised Mrs Segal that ANDREI Larson is out of the office until Monday.  Advised that our office does use seroquel for dementia patients.  Mrs Segal stated she knew that ANDREI Larson knew what she was doing but just wanted to check.  Advised that I would route her message to ANDREI Larson on her return and would be in touch.

## 2024-05-15 DIAGNOSIS — G30.9 SEVERE ALZHEIMER'S DEMENTIA WITH AGITATION, UNSPECIFIED TIMING OF DEMENTIA ONSET (HCC): ICD-10-CM

## 2024-05-15 DIAGNOSIS — F02.C11 SEVERE ALZHEIMER'S DEMENTIA WITH AGITATION, UNSPECIFIED TIMING OF DEMENTIA ONSET (HCC): ICD-10-CM

## 2024-05-15 RX ORDER — DONEPEZIL HYDROCHLORIDE 10 MG/1
10 TABLET, FILM COATED ORAL NIGHTLY
Qty: 90 TABLET | Refills: 3 | Status: SHIPPED | OUTPATIENT
Start: 2024-05-15

## 2024-05-15 NOTE — TELEPHONE ENCOUNTER
Script was sent to local pharmacy. Patient is requesting it sent to Home delivery instead    LOV: 03/28/24  Last refill: 03/18/24 with 3 refills (yr supply)  Next visit: 03/15/24

## 2024-05-16 DIAGNOSIS — G30.9 SEVERE ALZHEIMER'S DEMENTIA WITH AGITATION, UNSPECIFIED TIMING OF DEMENTIA ONSET (HCC): ICD-10-CM

## 2024-05-16 DIAGNOSIS — F02.C11 SEVERE ALZHEIMER'S DEMENTIA WITH AGITATION, UNSPECIFIED TIMING OF DEMENTIA ONSET (HCC): ICD-10-CM

## 2024-05-16 RX ORDER — QUETIAPINE FUMARATE 25 MG/1
12.5 TABLET, FILM COATED ORAL NIGHTLY
Qty: 30 TABLET | Refills: 0 | Status: SHIPPED | OUTPATIENT
Start: 2024-05-16

## 2024-05-23 ENCOUNTER — TELEPHONE (OUTPATIENT)
Age: 74
End: 2024-05-23

## 2024-05-23 NOTE — TELEPHONE ENCOUNTER
Call placed to Ms Segal.  2 patient identifiers received.  Ms Philipp say she thinks patient needs a higher dose of seroquel.  The 12.5mg is not helping him.  She says he gets very agitated.  Advised I would send her message to [facility] [place] and see what she wants to do.  Please advise.  Thanks

## 2024-05-23 NOTE — TELEPHONE ENCOUNTER
Patient spouse is requesting phone call to discuss medication QUETIAPINE. States that when she spoke to pharmacy medication was denied by office but some how pharmacy was able to fill it. Spouse is inquiring reason for denial and also states patient is still agitated and confused a little more often, would like to discuss increasing doses.    Please contact

## 2024-05-24 NOTE — TELEPHONE ENCOUNTER
Call placed to  Philipp.  2 patient identifiers received.  Advised per NP Marsha to starting taking a whole pill not a half and to let us know if the increase in medication is helping.  Mrs Segal indicated understanding.

## 2024-06-22 DIAGNOSIS — F02.C11 SEVERE ALZHEIMER'S DEMENTIA WITH AGITATION, UNSPECIFIED TIMING OF DEMENTIA ONSET (HCC): ICD-10-CM

## 2024-06-22 DIAGNOSIS — G30.9 SEVERE ALZHEIMER'S DEMENTIA WITH AGITATION, UNSPECIFIED TIMING OF DEMENTIA ONSET (HCC): ICD-10-CM

## 2024-06-24 DIAGNOSIS — G30.9 SEVERE ALZHEIMER'S DEMENTIA WITH AGITATION, UNSPECIFIED TIMING OF DEMENTIA ONSET (HCC): ICD-10-CM

## 2024-06-24 DIAGNOSIS — F02.C11 SEVERE ALZHEIMER'S DEMENTIA WITH AGITATION, UNSPECIFIED TIMING OF DEMENTIA ONSET (HCC): ICD-10-CM

## 2024-06-24 RX ORDER — QUETIAPINE FUMARATE 25 MG/1
25 TABLET, FILM COATED ORAL NIGHTLY
Qty: 30 TABLET | Refills: 0 | Status: SHIPPED | OUTPATIENT
Start: 2024-06-24

## 2024-06-27 RX ORDER — QUETIAPINE FUMARATE 25 MG/1
12.5 TABLET, FILM COATED ORAL NIGHTLY
Qty: 30 TABLET | Refills: 0 | OUTPATIENT
Start: 2024-06-27

## 2024-06-29 DIAGNOSIS — F02.C11 SEVERE ALZHEIMER'S DEMENTIA WITH AGITATION, UNSPECIFIED TIMING OF DEMENTIA ONSET (HCC): ICD-10-CM

## 2024-06-29 DIAGNOSIS — G30.9 SEVERE ALZHEIMER'S DEMENTIA WITH AGITATION, UNSPECIFIED TIMING OF DEMENTIA ONSET (HCC): ICD-10-CM

## 2024-07-01 ENCOUNTER — TELEPHONE (OUTPATIENT)
Age: 74
End: 2024-07-01

## 2024-07-01 RX ORDER — QUETIAPINE FUMARATE 25 MG/1
12.5 TABLET, FILM COATED ORAL NIGHTLY
Qty: 30 TABLET | Refills: 0 | OUTPATIENT
Start: 2024-07-01

## 2024-07-01 NOTE — TELEPHONE ENCOUNTER
RE: Seroquel    Approved:  January 1, 2024 to December 31, 2024     [employer]    Approval letter scanned to chart    Fyi to nurse.

## 2024-07-03 ENCOUNTER — TELEPHONE (OUTPATIENT)
Age: 74
End: 2024-07-03

## 2024-07-03 DIAGNOSIS — F02.C11 SEVERE ALZHEIMER'S DEMENTIA WITH AGITATION, UNSPECIFIED TIMING OF DEMENTIA ONSET (HCC): ICD-10-CM

## 2024-07-03 DIAGNOSIS — G30.9 SEVERE ALZHEIMER'S DEMENTIA WITH AGITATION, UNSPECIFIED TIMING OF DEMENTIA ONSET (HCC): ICD-10-CM

## 2024-07-03 RX ORDER — QUETIAPINE FUMARATE 25 MG/1
25 TABLET, FILM COATED ORAL NIGHTLY
Qty: 90 TABLET | Refills: 0 | Status: SHIPPED | OUTPATIENT
Start: 2024-07-03

## 2024-07-03 NOTE — TELEPHONE ENCOUNTER
Patient's wife calling for update on refill for QUEtiapine (SEROQUEL) 25 MG tablet . Stated pharmacy informed her they have no refill request or approval for medication. Please contact

## 2024-07-15 ENCOUNTER — TELEPHONE (OUTPATIENT)
Age: 74
End: 2024-07-15

## 2024-09-09 ENCOUNTER — HOSPITAL ENCOUNTER (EMERGENCY)
Facility: HOSPITAL | Age: 74
Discharge: HOME | End: 2024-09-09
Attending: EMERGENCY MEDICINE
Payer: MEDICARE

## 2024-09-09 VITALS
TEMPERATURE: 97.5 F | RESPIRATION RATE: 16 BRPM | SYSTOLIC BLOOD PRESSURE: 146 MMHG | DIASTOLIC BLOOD PRESSURE: 62 MMHG | HEART RATE: 64 BPM | OXYGEN SATURATION: 97 %

## 2024-09-09 DIAGNOSIS — E86.0 DEHYDRATION: Primary | ICD-10-CM

## 2024-09-09 LAB
ALBUMIN SERPL-MCNC: 3.4 G/DL (ref 3.5–5)
ALBUMIN/GLOB SERPL: 1.3 (ref 1.1–2.2)
ALP SERPL-CCNC: 89 U/L (ref 45–117)
ALT SERPL-CCNC: 11 U/L (ref 12–78)
ANION GAP SERPL CALC-SCNC: 3 MMOL/L (ref 2–12)
APPEARANCE UR: CLEAR
AST SERPL-CCNC: 7 U/L (ref 15–37)
BACTERIA URNS QL MICRO: NEGATIVE /HPF
BASOPHILS # BLD: 0.1 K/UL (ref 0–0.1)
BASOPHILS NFR BLD: 1 % (ref 0–1)
BILIRUB SERPL-MCNC: 0.6 MG/DL (ref 0.2–1)
BILIRUB UR QL: NEGATIVE
BUN SERPL-MCNC: 14 MG/DL (ref 6–20)
BUN/CREAT SERPL: 12 (ref 12–20)
CALCIUM SERPL-MCNC: 8.4 MG/DL (ref 8.5–10.1)
CHLORIDE SERPL-SCNC: 105 MMOL/L (ref 97–108)
CO2 SERPL-SCNC: 31 MMOL/L (ref 21–32)
COLOR UR: NORMAL
CREAT SERPL-MCNC: 1.17 MG/DL (ref 0.7–1.3)
DIFFERENTIAL METHOD BLD: ABNORMAL
EOSINOPHIL # BLD: 0.2 K/UL (ref 0–0.4)
EOSINOPHIL NFR BLD: 4 % (ref 0–7)
EPITH CASTS URNS QL MICRO: NORMAL /LPF
ERYTHROCYTE [DISTWIDTH] IN BLOOD BY AUTOMATED COUNT: 14.1 % (ref 11.5–14.5)
GLOBULIN SER CALC-MCNC: 2.7 G/DL (ref 2–4)
GLUCOSE SERPL-MCNC: 166 MG/DL (ref 65–100)
GLUCOSE UR STRIP.AUTO-MCNC: NEGATIVE MG/DL
HCT VFR BLD AUTO: 36.8 % (ref 36.6–50.3)
HGB BLD-MCNC: 13.2 G/DL (ref 12.1–17)
HGB UR QL STRIP: NEGATIVE
HYALINE CASTS URNS QL MICRO: NORMAL /LPF (ref 0–2)
IMM GRANULOCYTES # BLD AUTO: 0 K/UL (ref 0–0.04)
IMM GRANULOCYTES NFR BLD AUTO: 0 % (ref 0–0.5)
KETONES UR QL STRIP.AUTO: NEGATIVE MG/DL
LEUKOCYTE ESTERASE UR QL STRIP.AUTO: NEGATIVE
LYMPHOCYTES # BLD: 1.2 K/UL (ref 0.8–3.5)
LYMPHOCYTES NFR BLD: 19 % (ref 12–49)
MAGNESIUM SERPL-MCNC: 2.1 MG/DL (ref 1.6–2.4)
MCH RBC QN AUTO: 32.5 PG (ref 26–34)
MCHC RBC AUTO-ENTMCNC: 35.9 G/DL (ref 30–36.5)
MCV RBC AUTO: 90.6 FL (ref 80–99)
MONOCYTES # BLD: 0.4 K/UL (ref 0–1)
MONOCYTES NFR BLD: 7 % (ref 5–13)
NEUTS SEG # BLD: 4.2 K/UL (ref 1.8–8)
NEUTS SEG NFR BLD: 69 % (ref 32–75)
NITRITE UR QL STRIP.AUTO: NEGATIVE
NRBC # BLD: 0 K/UL (ref 0–0.01)
NRBC BLD-RTO: 0 PER 100 WBC
PH UR STRIP: 6.5 (ref 5–8)
PLATELET # BLD AUTO: 194 K/UL (ref 150–400)
PMV BLD AUTO: 10.5 FL (ref 8.9–12.9)
POTASSIUM SERPL-SCNC: 3.5 MMOL/L (ref 3.5–5.1)
PROT SERPL-MCNC: 6.1 G/DL (ref 6.4–8.2)
PROT UR STRIP-MCNC: NEGATIVE MG/DL
RBC # BLD AUTO: 4.06 M/UL (ref 4.1–5.7)
RBC #/AREA URNS HPF: NORMAL /HPF (ref 0–5)
SODIUM SERPL-SCNC: 139 MMOL/L (ref 136–145)
SP GR UR REFRACTOMETRY: 1.01
TROPONIN I SERPL HS-MCNC: 6 NG/L (ref 0–76)
URINE CULTURE IF INDICATED: NORMAL
UROBILINOGEN UR QL STRIP.AUTO: 0.2 EU/DL (ref 0.2–1)
WBC # BLD AUTO: 6.1 K/UL (ref 4.1–11.1)
WBC URNS QL MICRO: NORMAL /HPF (ref 0–4)

## 2024-09-09 PROCEDURE — 81001 URINALYSIS AUTO W/SCOPE: CPT

## 2024-09-09 PROCEDURE — 83735 ASSAY OF MAGNESIUM: CPT

## 2024-09-09 PROCEDURE — 2580000003 HC RX 258: Performed by: EMERGENCY MEDICINE

## 2024-09-09 PROCEDURE — 96361 HYDRATE IV INFUSION ADD-ON: CPT

## 2024-09-09 PROCEDURE — 99284 EMERGENCY DEPT VISIT MOD MDM: CPT

## 2024-09-09 PROCEDURE — 80053 COMPREHEN METABOLIC PANEL: CPT

## 2024-09-09 PROCEDURE — 93005 ELECTROCARDIOGRAM TRACING: CPT | Performed by: EMERGENCY MEDICINE

## 2024-09-09 PROCEDURE — 85025 COMPLETE CBC W/AUTO DIFF WBC: CPT

## 2024-09-09 PROCEDURE — 84484 ASSAY OF TROPONIN QUANT: CPT

## 2024-09-09 PROCEDURE — 96360 HYDRATION IV INFUSION INIT: CPT

## 2024-09-09 PROCEDURE — 36415 COLL VENOUS BLD VENIPUNCTURE: CPT

## 2024-09-09 RX ORDER — 0.9 % SODIUM CHLORIDE 0.9 %
1000 INTRAVENOUS SOLUTION INTRAVENOUS ONCE
Status: COMPLETED | OUTPATIENT
Start: 2024-09-09 | End: 2024-09-09

## 2024-09-09 RX ADMIN — SODIUM CHLORIDE 1000 ML: 9 INJECTION, SOLUTION INTRAVENOUS at 15:32

## 2024-09-09 ASSESSMENT — PAIN - FUNCTIONAL ASSESSMENT: PAIN_FUNCTIONAL_ASSESSMENT: NONE - DENIES PAIN

## 2024-09-11 LAB
EKG ATRIAL RATE: 65 BPM
EKG DIAGNOSIS: NORMAL
EKG P AXIS: 71 DEGREES
EKG P-R INTERVAL: 186 MS
EKG Q-T INTERVAL: 404 MS
EKG QRS DURATION: 102 MS
EKG QTC CALCULATION (BAZETT): 420 MS
EKG R AXIS: 73 DEGREES
EKG T AXIS: 71 DEGREES
EKG VENTRICULAR RATE: 65 BPM

## 2024-10-07 ENCOUNTER — OFFICE VISIT (OUTPATIENT)
Age: 74
End: 2024-10-07
Payer: MEDICARE

## 2024-10-07 VITALS
SYSTOLIC BLOOD PRESSURE: 138 MMHG | DIASTOLIC BLOOD PRESSURE: 76 MMHG | HEIGHT: 69 IN | HEART RATE: 75 BPM | OXYGEN SATURATION: 98 % | BODY MASS INDEX: 25.03 KG/M2 | WEIGHT: 169 LBS | RESPIRATION RATE: 19 BRPM

## 2024-10-07 DIAGNOSIS — G30.9 SEVERE ALZHEIMER'S DEMENTIA WITH AGITATION, UNSPECIFIED TIMING OF DEMENTIA ONSET (HCC): Primary | ICD-10-CM

## 2024-10-07 DIAGNOSIS — F22 PARANOIA (HCC): ICD-10-CM

## 2024-10-07 DIAGNOSIS — F02.C11 SEVERE ALZHEIMER'S DEMENTIA WITH AGITATION, UNSPECIFIED TIMING OF DEMENTIA ONSET (HCC): Primary | ICD-10-CM

## 2024-10-07 PROCEDURE — 1123F ACP DISCUSS/DSCN MKR DOCD: CPT

## 2024-10-07 PROCEDURE — G8428 CUR MEDS NOT DOCUMENT: HCPCS

## 2024-10-07 PROCEDURE — G8420 CALC BMI NORM PARAMETERS: HCPCS

## 2024-10-07 PROCEDURE — 3017F COLORECTAL CA SCREEN DOC REV: CPT

## 2024-10-07 PROCEDURE — 1036F TOBACCO NON-USER: CPT

## 2024-10-07 PROCEDURE — 3078F DIAST BP <80 MM HG: CPT

## 2024-10-07 PROCEDURE — 3075F SYST BP GE 130 - 139MM HG: CPT

## 2024-10-07 PROCEDURE — 99215 OFFICE O/P EST HI 40 MIN: CPT

## 2024-10-07 PROCEDURE — G8484 FLU IMMUNIZE NO ADMIN: HCPCS

## 2024-10-07 RX ORDER — MEMANTINE HYDROCHLORIDE 10 MG/1
10 TABLET ORAL 2 TIMES DAILY
Qty: 180 TABLET | Refills: 1 | Status: SHIPPED | OUTPATIENT
Start: 2024-10-07

## 2024-10-07 RX ORDER — QUETIAPINE FUMARATE 25 MG/1
25 TABLET, FILM COATED ORAL NIGHTLY
Qty: 90 TABLET | Refills: 1 | Status: SHIPPED | OUTPATIENT
Start: 2024-10-07

## 2024-10-07 RX ORDER — DONEPEZIL HYDROCHLORIDE 10 MG/1
10 TABLET, FILM COATED ORAL NIGHTLY
Qty: 90 TABLET | Refills: 3 | Status: SHIPPED | OUTPATIENT
Start: 2024-10-07

## 2024-10-07 ASSESSMENT — PATIENT HEALTH QUESTIONNAIRE - PHQ9
SUM OF ALL RESPONSES TO PHQ9 QUESTIONS 1 & 2: 0
SUM OF ALL RESPONSES TO PHQ QUESTIONS 1-9: 0
1. LITTLE INTEREST OR PLEASURE IN DOING THINGS: NOT AT ALL
SUM OF ALL RESPONSES TO PHQ QUESTIONS 1-9: 0
2. FEELING DOWN, DEPRESSED OR HOPELESS: NOT AT ALL

## 2024-10-07 NOTE — PROGRESS NOTES
Chief Complaint   Patient presents with    Follow-up    Memory Loss     dementia       HPI    Mr Segal is a 74 year old male here for follow up. I saw him last on 3/28/24 for severe AD dementia with some paranoia. He is on Aricept and Namenda and Seroquel was added. He requires 24 hour care and help with all ADL's. He has mental capacity testing with Dr Jade in 1/2025. He missed his FU with me due to a fall. He was recently in the ED on 9/9/24 due to dehydration. He is here today with his wife. She is reporting that he is doing about the same. Memory is slightly worse. He sleeps a lot during the day and not very motivated to do a lot of activities. Watched TV and counts his coins. Tolerating his medications well, no side effects. Eating and drinking well. Sleeps good throughout the night.               Last visit  Mr Segal is a 73 year old male here for follow up. He is a new patient for me today. He was last seen by Dr Story on 5/18/23 for memory concerns. Based on his exam he showed severe dementia most likely consistent with Alzheimer's. He was started on Aricept daily. He lives with his wife. His wife noted worsening memory and he was then started on Namenda on 11/27/23. He is here today with his wife who he lives with. She is reporting that his memory is progressing. He is starting to show some paranoia especially at night. He spends a lot of time looking out the door to make sure no one is wandering around outside and stealing things. There is some aggression. This was there before hand as well, but its getting worse. He is not sleeping well. He will walk around the house at night, but is safe. Wife is handling all meds and bills. No hallucinations. He requires 24 hour care and help with all ADL's. Showering is a big argument at times. He does eat well. Lots of falls, despite using a cane. His car is broken so he's not driving. He likes to work on cars in the yard. Wife is asking about paperwork for

## 2024-10-07 NOTE — PROGRESS NOTES
Chief Complaint   Patient presents with    Follow-up    Memory Loss     dementia      Vitals:    10/07/24 1319   BP: 138/76   Pulse: 75   Resp: 19   SpO2: 98%

## 2025-01-20 ENCOUNTER — TELEPHONE (OUTPATIENT)
Age: 75
End: 2025-01-20

## 2025-01-21 ENCOUNTER — TELEPHONE (OUTPATIENT)
Age: 75
End: 2025-01-21

## 2025-01-21 NOTE — TELEPHONE ENCOUNTER
Duplicate encounter - see note 1/21/2025  
Patient needs to reschedule missed new patient eval and testing appointments. Please call   
[phone]

## 2025-01-25 NOTE — PROGRESS NOTES
Mr. Philip Segal is a 74 y.o., right-handed, , White (non-) male who is diagnosed with Alzhiemer's dementia and is being followed in the Neurology clinic by Martin Larson NP. He is currently taking Aricept and Namenda for cognitive impairment as well as Seroquel for the behavior disturbance. He is here today for a focused capacity evaluation to help inform decision-making capacity. Based on the interview and my observations, it was difficult for Mr. Segal to discuss his current health conditions or any of his history regarding his care. He did however seem to have good long term memory based on the fact that he could tell me some information about what he did for work. We will proceed with this brief evaluation to inform capacity. Comprehensive evaluation will assist in obtaining a quantitative assessment of his cognitive and emotional functioning in order to guide differential diagnosis, treatment planning, and recommendations.     ASSESSMENT:   Severe Alzheimer's dementia    PLAN:  Complete neuropsychological testing to obtain a quantitative assessment of his cognitive and emotional functioning.   Comprehensive feedback regarding the results of this neuropsychological evaluation will be provided following neuropsychological testing.     DOCUMENTATION OF SERVICE ACTIVITIES TIME SPENT   Diagnostic Interview Examination 90791 x 1 unit   Clinical interview with patient  Interview with collateral informant (if applicable) 18 minutes       Jesenia Jade PsyD, MOIRA  Clinical Neuropsychologist  [facility] at [facility]

## 2025-01-27 ENCOUNTER — OFFICE VISIT (OUTPATIENT)
Age: 75
End: 2025-01-27
Payer: MEDICARE

## 2025-01-27 DIAGNOSIS — F02.C11 SEVERE ALZHEIMER'S DEMENTIA WITH AGITATION, UNSPECIFIED TIMING OF DEMENTIA ONSET (HCC): Primary | ICD-10-CM

## 2025-01-27 DIAGNOSIS — G30.9 SEVERE ALZHEIMER'S DEMENTIA WITH AGITATION, UNSPECIFIED TIMING OF DEMENTIA ONSET (HCC): Primary | ICD-10-CM

## 2025-01-27 PROCEDURE — 90791 PSYCH DIAGNOSTIC EVALUATION: CPT | Performed by: STUDENT IN AN ORGANIZED HEALTH CARE EDUCATION/TRAINING PROGRAM

## 2025-01-27 PROCEDURE — 1036F TOBACCO NON-USER: CPT | Performed by: STUDENT IN AN ORGANIZED HEALTH CARE EDUCATION/TRAINING PROGRAM

## 2025-01-27 PROCEDURE — 1123F ACP DISCUSS/DSCN MKR DOCD: CPT | Performed by: STUDENT IN AN ORGANIZED HEALTH CARE EDUCATION/TRAINING PROGRAM

## 2025-01-27 NOTE — PATIENT INSTRUCTIONS
Thank you for choosing us for your neuropsychological evaluation. We will examine your overall cognitive functioning, including areas such as memory, attention, concentration, language, and problem solving.   You were seen for an initial visit by Dr. Jesenia Jade (Neuropsychologist) today. Please refer to the dates listed under \"What's Next\" for your scheduled Procedure (testing) and Follow-Up appointments.    Reminders for your testing appointment:   If you wear glasses/contacts and/or hearing assistive devices, please be sure to bring them with you to your testing appointment.   You will be working with my Psychometrist, Kerry, on the day of testing. She is specially trained to administer these cognitive tests to you.   Please prepare accordingly, as the duration of testing may take a few hours to complete.     To reschedule or cancel your appointment, please call [phone].   In case of an emergency, call [phone] or report to the nearest emergency department.  It has been our pleasure to work with you, and we look forward to speaking with you soon.

## 2025-01-29 ENCOUNTER — PROCEDURE VISIT (OUTPATIENT)
Age: 75
End: 2025-01-29
Payer: MEDICARE

## 2025-01-29 DIAGNOSIS — G30.8 SEVERE ALZHEIMER'S DEMENTIA OF OTHER ONSET WITH OTHER BEHAVIORAL DISTURBANCE (HCC): Primary | ICD-10-CM

## 2025-01-29 DIAGNOSIS — Z78.9 PATIENT INCAPABLE OF MAKING INFORMED DECISIONS: ICD-10-CM

## 2025-01-29 DIAGNOSIS — F02.C18 SEVERE ALZHEIMER'S DEMENTIA OF OTHER ONSET WITH OTHER BEHAVIORAL DISTURBANCE (HCC): Primary | ICD-10-CM

## 2025-01-29 PROCEDURE — 96136 PSYCL/NRPSYC TST PHY/QHP 1ST: CPT | Performed by: STUDENT IN AN ORGANIZED HEALTH CARE EDUCATION/TRAINING PROGRAM

## 2025-01-29 PROCEDURE — 96138 PSYCL/NRPSYC TECH 1ST: CPT | Performed by: STUDENT IN AN ORGANIZED HEALTH CARE EDUCATION/TRAINING PROGRAM

## 2025-01-29 PROCEDURE — 96139 PSYCL/NRPSYC TST TECH EA: CPT | Performed by: STUDENT IN AN ORGANIZED HEALTH CARE EDUCATION/TRAINING PROGRAM

## 2025-01-29 PROCEDURE — 96133 NRPSYC TST EVAL PHYS/QHP EA: CPT | Performed by: STUDENT IN AN ORGANIZED HEALTH CARE EDUCATION/TRAINING PROGRAM

## 2025-01-29 PROCEDURE — 96132 NRPSYC TST EVAL PHYS/QHP 1ST: CPT | Performed by: STUDENT IN AN ORGANIZED HEALTH CARE EDUCATION/TRAINING PROGRAM

## 2025-02-05 ENCOUNTER — TELEPHONE (OUTPATIENT)
Age: 75
End: 2025-02-05

## 2025-02-25 ENCOUNTER — OFFICE VISIT (OUTPATIENT)
Age: 75
End: 2025-02-25
Payer: MEDICARE

## 2025-02-25 DIAGNOSIS — G30.8 SEVERE ALZHEIMER'S DEMENTIA OF OTHER ONSET WITH OTHER BEHAVIORAL DISTURBANCE (HCC): Primary | ICD-10-CM

## 2025-02-25 DIAGNOSIS — F02.C18 SEVERE ALZHEIMER'S DEMENTIA OF OTHER ONSET WITH OTHER BEHAVIORAL DISTURBANCE (HCC): Primary | ICD-10-CM

## 2025-02-25 DIAGNOSIS — Z78.9 PATIENT INCAPABLE OF MAKING INFORMED DECISIONS: ICD-10-CM

## 2025-02-25 PROBLEM — F02.80 DAT (DEMENTIA OF ALZHEIMER TYPE) (HCC): Status: ACTIVE | Noted: 2025-02-25

## 2025-02-25 PROBLEM — G30.9 DAT (DEMENTIA OF ALZHEIMER TYPE) (HCC): Status: ACTIVE | Noted: 2025-02-25

## 2025-02-25 PROCEDURE — 96132 NRPSYC TST EVAL PHYS/QHP 1ST: CPT | Performed by: STUDENT IN AN ORGANIZED HEALTH CARE EDUCATION/TRAINING PROGRAM

## 2025-02-25 NOTE — PROGRESS NOTES
NEUROPSYCHOLOGICAL EVALUATION   Feedback     Prior to seeing the patient for today's visit, I reviewed pertinent records, including the previously completed report, the records in Epic, and any updated visits from other providers since the patient's last visit. Mr. Philip Segal was seen for a feedback appointment via in-person to discuss the results of his neuropsychological evaluation. His wife, Mrs. Segal, was also present.     DISCUSSION OF RESULTS AND RECOMMENDATIONS:   I provided feedback about Mr. Segal's capacity evaluation in detail:   Discussed lack of decision-making capacity in light of test findings.   Talked about how primary memory impairments will likely preclude him from being able to retain information.  Mr. Segal and his wife expressed agreement with the results and that the findings were consistent with what is noticed in daily life.    Recommendations from the report were discussed in detail. He and his wife expressed understanding. The patient and/or their care partner(s) will:   Wife is designated as POA and is aware of the need to make decisions for him moving forward.   She will contact  to determine eligibility for in-home health care / aide assistance as it has become difficult for her to manage his care alone. I also pointed her to the local [facility] agency on aging respective to her [facility] to clarify any further services that may be helpful for her. Website and phone number is provided in the printed report I sent home with them.   Follow-up with Martin Larson NP in April for continuity of care.     I answered any questions in detail, provided him with a printed copy of his report, and encouraged him and his family to contact us with any further questions or concerns moving forward.     MENTAL STATUS:  Arrival: On time and accompanied by his wife.  General appearance: Appropriately dressed and groomed. Mild dishevelment of clothing, evidence of body odor. Wife noted

## 2025-03-27 DIAGNOSIS — G30.9 SEVERE ALZHEIMER'S DEMENTIA WITH AGITATION, UNSPECIFIED TIMING OF DEMENTIA ONSET (HCC): ICD-10-CM

## 2025-03-27 DIAGNOSIS — F02.C11 SEVERE ALZHEIMER'S DEMENTIA WITH AGITATION, UNSPECIFIED TIMING OF DEMENTIA ONSET (HCC): ICD-10-CM

## 2025-03-27 DIAGNOSIS — F22 PARANOIA (HCC): ICD-10-CM

## 2025-03-28 RX ORDER — QUETIAPINE FUMARATE 25 MG/1
25 TABLET, FILM COATED ORAL NIGHTLY
Qty: 90 TABLET | Refills: 0 | Status: SHIPPED | OUTPATIENT
Start: 2025-03-28

## 2025-03-28 RX ORDER — MEMANTINE HYDROCHLORIDE 10 MG/1
10 TABLET ORAL 2 TIMES DAILY
Qty: 180 TABLET | Refills: 0 | Status: SHIPPED | OUTPATIENT
Start: 2025-03-28

## 2025-03-28 NOTE — TELEPHONE ENCOUNTER
LOV: 10/07/24  Last refill for memantine: 10/07/24 with 1 refill  Last refill for seroquel: 10/07/24 with 1 refill  Next visit: 04/07/25

## 2025-04-08 ENCOUNTER — OFFICE VISIT (OUTPATIENT)
Age: 75
End: 2025-04-08
Payer: MEDICARE

## 2025-04-08 VITALS
HEIGHT: 69 IN | SYSTOLIC BLOOD PRESSURE: 128 MMHG | WEIGHT: 169 LBS | DIASTOLIC BLOOD PRESSURE: 84 MMHG | OXYGEN SATURATION: 98 % | RESPIRATION RATE: 18 BRPM | HEART RATE: 77 BPM | BODY MASS INDEX: 25.03 KG/M2

## 2025-04-08 DIAGNOSIS — G30.9 SEVERE ALZHEIMER'S DEMENTIA WITH AGITATION, UNSPECIFIED TIMING OF DEMENTIA ONSET (HCC): Primary | ICD-10-CM

## 2025-04-08 DIAGNOSIS — G30.8 SEVERE ALZHEIMER'S DEMENTIA OF OTHER ONSET WITH OTHER BEHAVIORAL DISTURBANCE: ICD-10-CM

## 2025-04-08 DIAGNOSIS — F02.C18 SEVERE ALZHEIMER'S DEMENTIA OF OTHER ONSET WITH OTHER BEHAVIORAL DISTURBANCE: ICD-10-CM

## 2025-04-08 DIAGNOSIS — F02.C11 SEVERE ALZHEIMER'S DEMENTIA WITH AGITATION, UNSPECIFIED TIMING OF DEMENTIA ONSET (HCC): Primary | ICD-10-CM

## 2025-04-08 DIAGNOSIS — F22 PARANOIA (HCC): ICD-10-CM

## 2025-04-08 PROCEDURE — 1036F TOBACCO NON-USER: CPT

## 2025-04-08 PROCEDURE — G8420 CALC BMI NORM PARAMETERS: HCPCS

## 2025-04-08 PROCEDURE — 3017F COLORECTAL CA SCREEN DOC REV: CPT

## 2025-04-08 PROCEDURE — G8427 DOCREV CUR MEDS BY ELIG CLIN: HCPCS

## 2025-04-08 PROCEDURE — 1160F RVW MEDS BY RX/DR IN RCRD: CPT

## 2025-04-08 PROCEDURE — 99215 OFFICE O/P EST HI 40 MIN: CPT

## 2025-04-08 PROCEDURE — 3074F SYST BP LT 130 MM HG: CPT

## 2025-04-08 PROCEDURE — 3079F DIAST BP 80-89 MM HG: CPT

## 2025-04-08 PROCEDURE — 1123F ACP DISCUSS/DSCN MKR DOCD: CPT

## 2025-04-08 PROCEDURE — 1126F AMNT PAIN NOTED NONE PRSNT: CPT

## 2025-04-08 PROCEDURE — 1159F MED LIST DOCD IN RCRD: CPT

## 2025-04-08 ASSESSMENT — PATIENT HEALTH QUESTIONNAIRE - PHQ9
2. FEELING DOWN, DEPRESSED OR HOPELESS: NOT AT ALL
SUM OF ALL RESPONSES TO PHQ QUESTIONS 1-9: 0
1. LITTLE INTEREST OR PLEASURE IN DOING THINGS: NOT AT ALL
SUM OF ALL RESPONSES TO PHQ QUESTIONS 1-9: 0

## 2025-04-08 ASSESSMENT — VISUAL ACUITY: VA_NORMAL: 1

## 2025-04-08 NOTE — PROGRESS NOTES
Chief Complaint   Patient presents with    Follow-up     Alzheimers.  Wife reports decline in short term/long term memeory       HPI    Mr Segal is a 74 year old male here for FU. Seen last on 10/7/24 for dementia. He completed neuropsych testing on 1/29/25 which showed Severe Alzheimer's with some behavioral impairments. He does lack capacity to make decisions. He is on Aricept and Namenda, as well as Seroquel. He is here today with his wife. She is reporting that he is declining. There is still some behavior issues and aggression. Slightly more frequently. He will sometimes try to hit. He gets upset when she goes into his room and tries to straighten up. She is having a difficult time with hygiene with him. He sleeps most of the day. There is not a lot of activity. She is the main caregiver and she is becoming strained. He does not wander. He knows who she is. Eating well. He has fallen out of bed a few times, and now he has a bed rail. He is incontinent. Tolerating his medications without any side effects.               Review of Systems   HENT:  Positive for hearing loss.    Musculoskeletal:  Positive for gait problem.   Neurological:  Positive for weakness.   Psychiatric/Behavioral:  Positive for agitation, behavioral problems, confusion and sleep disturbance. The patient is nervous/anxious.          Past Medical History:   Diagnosis Date    Alzheimer disease ([facility]) 2023    Arthritis     knees, back    At risk for sleep apnea 11/2023    ELENA 6. STOP BANG tool form faxed to PCP.    Burning with urination     Chronic pain     knees, back    DM (diabetes mellitus) ([facility])     Type II - diet controlled    High cholesterol     History of MRSA infection 2017    r/t back injection    HTN (hypertension)     currently on no meds    PE (pulmonary thromboembolism) ([facility]) 01/2017    bilateral with right heart strain. Treated by Dr. Quiñones    Valvular heart disease      Family History   Problem Relation Age of Onset    Heart

## 2025-04-08 NOTE — PROGRESS NOTES
Chief Complaint   Patient presents with    Follow-up     Alzheimers.  Wife reports decline in short term/long term memeory      Vitals:    04/08/25 1143   BP: 128/84   Pulse: 77   Resp: 18   SpO2: 98%

## 2025-06-13 DIAGNOSIS — F22 PARANOIA (HCC): ICD-10-CM

## 2025-06-13 DIAGNOSIS — F02.C11 SEVERE ALZHEIMER'S DEMENTIA WITH AGITATION, UNSPECIFIED TIMING OF DEMENTIA ONSET (HCC): ICD-10-CM

## 2025-06-13 DIAGNOSIS — G30.9 SEVERE ALZHEIMER'S DEMENTIA WITH AGITATION, UNSPECIFIED TIMING OF DEMENTIA ONSET (HCC): ICD-10-CM

## 2025-06-13 RX ORDER — QUETIAPINE FUMARATE 25 MG/1
25 TABLET, FILM COATED ORAL NIGHTLY
Qty: 90 TABLET | Refills: 3 | Status: SHIPPED | OUTPATIENT
Start: 2025-06-13

## 2025-06-13 RX ORDER — MEMANTINE HYDROCHLORIDE 10 MG/1
10 TABLET ORAL 2 TIMES DAILY
Qty: 180 TABLET | Refills: 3 | Status: SHIPPED | OUTPATIENT
Start: 2025-06-13

## (undated) DEVICE — SWAN-GANZ TRUE SIZE THERMODULTION CATHETER, 5F: Brand: SWAN-GANZ TRUE SIZE

## (undated) DEVICE — SYR 50ML LR LCK 1ML GRAD NSAF --

## (undated) DEVICE — BAG PRESSURE INFUSION 1000ML -- CONVERT TO ITEM 360122

## (undated) DEVICE — AGENT HEMSTAT 3GM PURIFIED PLNT STARCH PWD ABSRB ARISTA AH

## (undated) DEVICE — SOLUTION IRRIG 1000ML STRL H2O USP PLAS POUR BTL

## (undated) DEVICE — GDWIRE COR 0.035INX260CM -- CONFIDA

## (undated) DEVICE — 3M™ IOBAN™ 2 ANTIMICROBIAL INCISE DRAPE 6651EZ: Brand: IOBAN™ 2

## (undated) DEVICE — PACK PROCEDURE SURG HRT CATH

## (undated) DEVICE — SEAL UNIV 5-8MM DISP BX/10 -- DA VINCI XI - SNGL USE

## (undated) DEVICE — 3M™ TEGADERM™ TRANSPARENT FILM DRESSING FRAME STYLE, 1626W, 4 IN X 4-3/4 IN (10 CM X 12 CM), 50/CT 4CT/CASE: Brand: 3M™ TEGADERM™

## (undated) DEVICE — GUIDEWIRE VASC L150CM DIA0.035IN FLX TIP L7CM PTFE STR FIX

## (undated) DEVICE — PREMIUM WET SKIN PREP TRAY: Brand: MEDLINE INDUSTRIES, INC.

## (undated) DEVICE — GENERAL LAPAROSCOPY-MRMC: Brand: MEDLINE INDUSTRIES, INC.

## (undated) DEVICE — 3M™ IOBAN™ 2 ANTIMICROBIAL INCISE DRAPE 6648EZ: Brand: IOBAN™ 2

## (undated) DEVICE — TRANSFER SET 3": Brand: MEDLINE INDUSTRIES, INC.

## (undated) DEVICE — CATH DIAG FRC4 IMPLS 6FRX100CM -- IMPULSE 16599-02

## (undated) DEVICE — PINNACLE INTRODUCER SHEATH: Brand: PINNACLE

## (undated) DEVICE — HANDPIECE SET WITH COAXIAL HIGH FLOW TIP AND SUCTION TUBE: Brand: INTERPULSE

## (undated) DEVICE — HI-TORQUE VERSACORE FLOPPY GUIDE WIRE SYSTEM 145 CM: Brand: HI-TORQUE VERSACORE

## (undated) DEVICE — GUIDEWIRE VASC L260CM 0.035IN J TIP L3MM PTFE FIX COR NAMIC

## (undated) DEVICE — CONTAINER SPEC ANAERB VACTNR

## (undated) DEVICE — GLOVE ORTHO 8   MSG9480

## (undated) DEVICE — (D)PREP SKN CHLRAPRP APPL 26ML -- CONVERT TO ITEM 371833

## (undated) DEVICE — LUB SURG MEDC STRL 2OZ TUBE MC -- MEDICHOICE

## (undated) DEVICE — MICROPUNCTURE INTRODUCER SET SILHOUETTE TRANSITIONLESS WITH STAINLESS STEEL WIRE GUIDE: Brand: MICROPUNCTURE

## (undated) DEVICE — ZIMMER® STERILE DISPOSABLE TOURNIQUET CUFF WITH PROTECTIVE SLEEVE AND PLC, DUAL PORT, SINGLE BLADDER, 18 IN. (46 CM)

## (undated) DEVICE — SUTURE MCRYL SZ 4-0 L27IN ABSRB UD L19MM PS-2 1/2 CIR PRIM Y426H

## (undated) DEVICE — TR BAND RADIAL ARTERY COMPRESSION DEVICE: Brand: TR BAND

## (undated) DEVICE — DRAPE,U/ SHT,SPLIT,PLAS,STERIL: Brand: MEDLINE

## (undated) DEVICE — ANGIOGRAPHY KIT CUST [K0910930B] [MERIT MEDICAL SYSTEMS INC]

## (undated) DEVICE — TUBING SUCT 10FR MAL ALUM SHFT FN CAP VENT UNIV CONN W/ OBT

## (undated) DEVICE — SET EXTN PRIMING 0.59ML 8.5IN 1.55LB PRSS RATE MINIBOR PUR

## (undated) DEVICE — ADULT SPO2 SENSOR,REMANUFACTURED,REPROCESSED DEVICE FOR SINGLE USE; REPROCESSED BY COVIDIEN LLC: Brand: NELLCOR

## (undated) DEVICE — KIT DRP FOR RIO ROBOTIC ARM ASST SYS

## (undated) DEVICE — DRAPE PRB US TRNSDCR 6X96IN --

## (undated) DEVICE — STERILE POLYISOPRENE POWDER-FREE SURGICAL GLOVES WITH EMOLLIENT COATING: Brand: PROTEXIS

## (undated) DEVICE — SUTURE ABSRB L30CM 2-0 VLT SPRL PDS + STRATAFIX SXPP1B410

## (undated) DEVICE — DRSG GZ OIL EMUL CURAD 3X8 -- 3/PK

## (undated) DEVICE — YANKAUER,SMOOTH HANDLE,HIGH CAPACITY: Brand: MEDLINE INDUSTRIES, INC.

## (undated) DEVICE — ARM DRAPE

## (undated) DEVICE — SENSOR TEMP SKIN DISP

## (undated) DEVICE — APPLICATOR SURG XL L38CM FOR ARISTA ABSRB HEMSTAT FLEXITIP

## (undated) DEVICE — GOWN,SIRUS,NONRNF,SETINSLV,2XL,18/CS: Brand: MEDLINE

## (undated) DEVICE — PIN BONE FIX L110MM DIA3.2MM

## (undated) DEVICE — INTENDED TO STANDARDIZE OR CAMERAS TO ALLOW FOR THE USE OF THE OR CAMERA COVER: Brand: ASPEN® O.R. CAMERA COVER

## (undated) DEVICE — CULTURETTE SGL LIQ STU -- 50EA/PK

## (undated) DEVICE — SUTURE VCRL SZ 1 L36IN ABSRB UD L36MM CT-1 1/2 CIR J947H

## (undated) DEVICE — COLUMN DRAPE

## (undated) DEVICE — PIN BNE FIX L140MM DIA3.2MM SELF DRL

## (undated) DEVICE — PERCLOSE PROGLIDE™ SUTURE-MEDIATED CLOSURE SYSTEM: Brand: PERCLOSE PROGLIDE™

## (undated) DEVICE — TOWEL SURG W17XL27IN STD BLU COT NONFENESTRATED PREWASHED

## (undated) DEVICE — TOTAL JOINT-MRMC: Brand: MEDLINE INDUSTRIES, INC.

## (undated) DEVICE — RADIFOCUS OPTITORQUE ANGIOGRAPHIC CATHETER: Brand: OPTITORQUE

## (undated) DEVICE — BANDAGE,GAUZE,BULKEE II,4.5"X4.1YD,STRL: Brand: MEDLINE

## (undated) DEVICE — SUTURE STRATAFIX SYMMETRIC SZ 1 L18IN ABSRB VLT CT1 L36CM SXPP1A404

## (undated) DEVICE — APPLICATOR MEDICATED 26 CC SOLUTION HI LT ORNG CHLORAPREP

## (undated) DEVICE — COVER,TABLE,60X90,STERILE: Brand: MEDLINE

## (undated) DEVICE — DRESSING FOAM POST OPERATIVE 4X10 IN MEPILEX BORDER AG

## (undated) DEVICE — SYRINGE ANGIO 10 CC BRL STD PRNT POLYCARB LT BLU MEDALLION

## (undated) DEVICE — HYPODERMIC SAFETY NEEDLE: Brand: MAGELLAN

## (undated) DEVICE — HOOD: Brand: FLYTE

## (undated) DEVICE — COVER,MAYO STAND,STERILE: Brand: MEDLINE

## (undated) DEVICE — DRAPE,ORTHOMAX,EXTREMITY: Brand: MEDLINE

## (undated) DEVICE — SPONGE GZ W4XL4IN COT 12 PLY TYP VII WVN C FLD DSGN STERILE

## (undated) DEVICE — BOWL MED L 32OZ PLAS W/ MOLD GRAD EZ OPN PEEL PCH

## (undated) DEVICE — BLADE, TONGUE, 6", STERILE: Brand: MEDLINE

## (undated) DEVICE — GLOVE ORANGE PI 7   MSG9070

## (undated) DEVICE — DERMABOND SKIN ADH 0.7ML -- DERMABOND ADVANCED 12/BX

## (undated) DEVICE — DUAL CUT SAGITTAL BLADE

## (undated) DEVICE — VISUALIZATION SYSTEM: Brand: CLEARIFY

## (undated) DEVICE — SPLINT WR POS F/ARTERIAL ACC -- BX/10

## (undated) DEVICE — REM POLYHESIVE ADULT PATIENT RETURN ELECTRODE: Brand: VALLEYLAB

## (undated) DEVICE — BLADELESS OBTURATOR: Brand: WECK VISTA

## (undated) DEVICE — BOOT POS LEG DEMAYO

## (undated) DEVICE — CATH DIAG IMPLS PIG 6FRX100CM -- IMPULSE 16599-40

## (undated) DEVICE — NEEDLE SPNL L3.5IN PNK HUB S STL REG WALL FIT STYL W/ QNCKE

## (undated) DEVICE — GDWIRE ANGIO SUP STF 0.035IN --

## (undated) DEVICE — WRAP SURG W1.31XL1.34M CARD FOR PT 165-172CM THERMOWRP

## (undated) DEVICE — SYR POWER 150ML 8IN FILL TUBE --

## (undated) DEVICE — EXTREMITY-MRMC: Brand: MEDLINE INDUSTRIES, INC.

## (undated) DEVICE — TUBESET BNE PREP CO2 CARBOJET

## (undated) DEVICE — KIT TRK KNEE PROC VIZADISC

## (undated) DEVICE — SUTURE STRATAFIX SZ 3-0 30CM NONABSORB UD 26MM FS 3/8 SXMP2B412

## (undated) DEVICE — AMPLATZ EXTRA STIFF WIRE GUIDE: Brand: AMPLATZ

## (undated) DEVICE — SOLUTION ANTISEP 4OZ 70% ALC ISO 1ST AID

## (undated) DEVICE — GOWN,SIRUS,FABRNF,XL,20/CS: Brand: MEDLINE

## (undated) DEVICE — DRAPE OPHTH 4 PLY SGL FEN

## (undated) DEVICE — DRAPE XR C ARM 41X74IN LF --

## (undated) DEVICE — SOLUTION IV 1000ML 0.9% SOD CHL PH 5 INJ USP VIAFLX PLAS

## (undated) DEVICE — INFECTION CONTROL KIT SYS

## (undated) DEVICE — GLOVE SURG SZ 85 L12IN FNGR THK79MIL GRN LTX FREE

## (undated) DEVICE — TUBING PRSS MON L6IN PVC M FEM CONN

## (undated) DEVICE — 6 FOOT DISPOSABLE EXTENSION CABLE WITH SAFE CONNECT / SCREW-DOWN

## (undated) DEVICE — SUTURE MCRYL SZ 2-0 L36IN ABSRB UD L36MM CT-1 1/2 CIR Y945H

## (undated) DEVICE — CATHETER ETER ANGIO L110CM OD5FR ID046IN L75CM 038IN 145DEG CARD

## (undated) DEVICE — CLOSURE SKIN FLX NONINVASIVE PRELOC TECHNOLOGY FOR 24IN

## (undated) DEVICE — ZIMMER® STERILE DISPOSABLE TOURNIQUET CUFF WITH PROTECTIVE SLEEVE AND PLC, DUAL PORT, SINGLE BLADDER, 34 IN. (86 CM)

## (undated) DEVICE — KIT ACCS INTRO 4FR L10CM NDL 21GA L7CM GWIRE L40CM

## (undated) DEVICE — TIDISHIELD TRANSPORT, CONTAINMENT COVER FOR BACK TABLE 4'6" (1.37M) TO 8' (2.43M) IN LENGTH: Brand: TIDISHIELD

## (undated) DEVICE — Device

## (undated) DEVICE — SYR LR LCK 1ML GRAD NSAF 30ML --

## (undated) DEVICE — SOLUTION IRRIG 500ML 0.9% SOD CHLO USP POUR PLAS BTL

## (undated) DEVICE — Z DISCONTINUED NO SUB IDED CATHETER ANGIO 5-6FR L110CM GWIRE 0.038IN 145DEG PGTL 5-8

## (undated) DEVICE — COVER MPLR TIP CRV SCIS ACC DA VINCI

## (undated) DEVICE — LUER-LOK 360°: Brand: CONNECTA, LUER-LOK

## (undated) DEVICE — ADULT SPO2 SENSOR: Brand: NELLCOR

## (undated) DEVICE — CATHETER PACE 5FR L110CM 1CM ELECTRD SPACE R HRT CARD VENT

## (undated) DEVICE — BANDAGE COMPR M W6INXL10YD WHT BGE VELC E MTRX HK AND LOOP

## (undated) DEVICE — GLIDESHEATH SLENDER ACCESS KIT: Brand: GLIDESHEATH SLENDER

## (undated) DEVICE — STERILE POLYISOPRENE POWDER-FREE SURGICAL GLOVES: Brand: PROTEXIS

## (undated) DEVICE — GUIDEWIRE VASC L260CM DIA0.035IN RAD 3MM J TIP L7CM PTFE

## (undated) DEVICE — TROCAR: Brand: KII FIOS FIRST ENTRY

## (undated) DEVICE — 40418 TRENDELENBURG ONE-STEP ARM PROTECTORS LARGE (1 PAIR): Brand: 40418 TRENDELENBURG ONE-STEP ARM PROTECTORS LARGE (1 PAIR)

## (undated) DEVICE — CATH IV AUTOGRD BC GRY 16GA 30 -- INSYTE

## (undated) DEVICE — LIQUIBAND RAPID ADHESIVE 36/CS 0.8ML: Brand: MEDLINE

## (undated) DEVICE — CATHETER ANGIO JR4 AD 5 FRX100 CM 25 CM PERFORMA

## (undated) DEVICE — SUT STRATA PDS+ 15CM SZ 3-0 SH -- STRATAFIX

## (undated) DEVICE — GAUZE,PACKING STRIP,IODOFORM,1/2"X5YD,ST: Brand: CURAD

## (undated) DEVICE — BLADE ASSEMB CLP HAIR FINE --